# Patient Record
Sex: MALE | Race: WHITE | Employment: OTHER | URBAN - NONMETROPOLITAN AREA
[De-identification: names, ages, dates, MRNs, and addresses within clinical notes are randomized per-mention and may not be internally consistent; named-entity substitution may affect disease eponyms.]

---

## 2017-10-29 ENCOUNTER — HOSPITAL ENCOUNTER (INPATIENT)
Age: 82
LOS: 5 days | Discharge: HOME HEALTH | DRG: 690 | End: 2017-11-03
Attending: EMERGENCY MEDICINE | Admitting: INTERNAL MEDICINE
Payer: MEDICARE

## 2017-10-29 ENCOUNTER — APPOINTMENT (OUTPATIENT)
Dept: GENERAL RADIOLOGY | Age: 82
DRG: 690 | End: 2017-10-29
Payer: MEDICARE

## 2017-10-29 ENCOUNTER — APPOINTMENT (OUTPATIENT)
Dept: CT IMAGING | Age: 82
DRG: 690 | End: 2017-10-29
Payer: MEDICARE

## 2017-10-29 DIAGNOSIS — N18.9 ACUTE RENAL FAILURE SUPERIMPOSED ON CHRONIC KIDNEY DISEASE, UNSPECIFIED CKD STAGE, UNSPECIFIED ACUTE RENAL FAILURE TYPE (HCC): ICD-10-CM

## 2017-10-29 DIAGNOSIS — R31.9 URINARY TRACT INFECTION WITH HEMATURIA, SITE UNSPECIFIED: ICD-10-CM

## 2017-10-29 DIAGNOSIS — N17.9 ACUTE RENAL FAILURE SUPERIMPOSED ON CHRONIC KIDNEY DISEASE, UNSPECIFIED CKD STAGE, UNSPECIFIED ACUTE RENAL FAILURE TYPE (HCC): ICD-10-CM

## 2017-10-29 DIAGNOSIS — R06.02 SHORTNESS OF BREATH: ICD-10-CM

## 2017-10-29 DIAGNOSIS — N39.0 URINARY TRACT INFECTION WITH HEMATURIA, SITE UNSPECIFIED: ICD-10-CM

## 2017-10-29 DIAGNOSIS — A41.9 SIRS DUE TO INFECTIOUS PROCESS WITH ORGAN DYSFUNCTION (HCC): Primary | ICD-10-CM

## 2017-10-29 DIAGNOSIS — R65.20 SIRS DUE TO INFECTIOUS PROCESS WITH ORGAN DYSFUNCTION (HCC): Primary | ICD-10-CM

## 2017-10-29 LAB
ALBUMIN SERPL-MCNC: 3.9 G/DL (ref 3.5–5.1)
ALLEN TEST: POSITIVE
ALP BLD-CCNC: 66 U/L (ref 38–126)
ALT SERPL-CCNC: 15 U/L (ref 11–66)
ANION GAP SERPL CALCULATED.3IONS-SCNC: 13 MEQ/L (ref 8–16)
ANISOCYTOSIS: ABNORMAL
AST SERPL-CCNC: 17 U/L (ref 5–40)
BACTERIA: ABNORMAL /HPF
BASE EXCESS (CALCULATED): -2.7 MMOL/L (ref -2.5–2.5)
BASOPHILS # BLD: 0.5 %
BASOPHILS ABSOLUTE: 0.1 THOU/MM3 (ref 0–0.1)
BILIRUB SERPL-MCNC: 1.1 MG/DL (ref 0.3–1.2)
BILIRUBIN DIRECT: 0.3 MG/DL (ref 0–0.3)
BILIRUBIN URINE: NEGATIVE
BLOOD, URINE: ABNORMAL
BUN BLDV-MCNC: 25 MG/DL (ref 7–22)
CALCIUM SERPL-MCNC: 8.8 MG/DL (ref 8.5–10.5)
CASTS 2: ABNORMAL /LPF
CASTS UA: ABNORMAL /LPF
CHARACTER, URINE: ABNORMAL
CHLORIDE BLD-SCNC: 92 MEQ/L (ref 98–111)
CO2: 23 MEQ/L (ref 23–33)
COLLECTED BY:: ABNORMAL
COLOR: YELLOW
CREAT SERPL-MCNC: 1.6 MG/DL (ref 0.4–1.2)
CRYSTALS, UA: ABNORMAL
DEVICE: ABNORMAL
EOSINOPHIL # BLD: 1.3 %
EOSINOPHILS ABSOLUTE: 0.2 THOU/MM3 (ref 0–0.4)
EPITHELIAL CELLS, UA: ABNORMAL /HPF
GFR SERPL CREATININE-BSD FRML MDRD: 41 ML/MIN/1.73M2
GLUCOSE BLD-MCNC: 142 MG/DL (ref 70–108)
GLUCOSE BLD-MCNC: 146 MG/DL (ref 70–108)
GLUCOSE URINE: NEGATIVE MG/DL
HCO3: 22 MMOL/L (ref 23–28)
HCT VFR BLD CALC: 39 % (ref 42–52)
HEMOGLOBIN: 13 GM/DL (ref 14–18)
IFIO2: 4
KETONES, URINE: NEGATIVE
LACTIC ACID: 1.3 MMOL/L (ref 0.5–2.2)
LEUKOCYTE ESTERASE, URINE: ABNORMAL
LIPASE: 11.1 U/L (ref 5.6–51.3)
LYMPHOCYTES # BLD: 8.1 %
LYMPHOCYTES ABSOLUTE: 1.2 THOU/MM3 (ref 1–4.8)
MCH RBC QN AUTO: 29.9 PG (ref 27–31)
MCHC RBC AUTO-ENTMCNC: 33.2 GM/DL (ref 33–37)
MCV RBC AUTO: 89.9 FL (ref 80–94)
MISCELLANEOUS 2: ABNORMAL
MONOCYTES # BLD: 10.5 %
MONOCYTES ABSOLUTE: 1.5 THOU/MM3 (ref 0.4–1.3)
NITRITE, URINE: NEGATIVE
NUCLEATED RED BLOOD CELLS: 0 /100 WBC
O2 SATURATION: 98 %
OSMOLALITY CALCULATION: 264.1 MOSMOL/KG (ref 275–300)
PCO2: 37 MMHG (ref 35–45)
PDW BLD-RTO: 18.8 % (ref 11.5–14.5)
PH BLOOD GAS: 7.39 (ref 7.35–7.45)
PH UA: 5.5
PLATELET # BLD: 308 THOU/MM3 (ref 130–400)
PMV BLD AUTO: 8.4 MCM (ref 7.4–10.4)
PO2: 100 MMHG (ref 71–104)
POTASSIUM SERPL-SCNC: 4.8 MEQ/L (ref 3.5–5.2)
PROTEIN UA: 30
RBC # BLD: 4.34 MILL/MM3 (ref 4.7–6.1)
RBC URINE: ABNORMAL /HPF
RENAL EPITHELIAL, UA: ABNORMAL
SEG NEUTROPHILS: 79.6 %
SEGMENTED NEUTROPHILS ABSOLUTE COUNT: 11.7 THOU/MM3 (ref 1.8–7.7)
SODIUM BLD-SCNC: 128 MEQ/L (ref 135–145)
SOURCE, BLOOD GAS: ABNORMAL
SPECIFIC GRAVITY, URINE: 1.02 (ref 1–1.03)
TOTAL PROTEIN: 6.9 G/DL (ref 6.1–8)
TROPONIN T: < 0.01 NG/ML
UROBILINOGEN, URINE: 0.2 EU/DL
WBC # BLD: 14.7 THOU/MM3 (ref 4.8–10.8)
WBC UA: > 100 /HPF
YEAST: ABNORMAL

## 2017-10-29 PROCEDURE — 99285 EMERGENCY DEPT VISIT HI MDM: CPT

## 2017-10-29 PROCEDURE — 81001 URINALYSIS AUTO W/SCOPE: CPT

## 2017-10-29 PROCEDURE — 87184 SC STD DISK METHOD PER PLATE: CPT

## 2017-10-29 PROCEDURE — 85025 COMPLETE CBC W/AUTO DIFF WBC: CPT

## 2017-10-29 PROCEDURE — 82948 REAGENT STRIP/BLOOD GLUCOSE: CPT

## 2017-10-29 PROCEDURE — 74176 CT ABD & PELVIS W/O CONTRAST: CPT

## 2017-10-29 PROCEDURE — 96361 HYDRATE IV INFUSION ADD-ON: CPT

## 2017-10-29 PROCEDURE — 84484 ASSAY OF TROPONIN QUANT: CPT

## 2017-10-29 PROCEDURE — 87186 SC STD MICRODIL/AGAR DIL: CPT

## 2017-10-29 PROCEDURE — 96375 TX/PRO/DX INJ NEW DRUG ADDON: CPT

## 2017-10-29 PROCEDURE — 6360000002 HC RX W HCPCS: Performed by: EMERGENCY MEDICINE

## 2017-10-29 PROCEDURE — 36600 WITHDRAWAL OF ARTERIAL BLOOD: CPT

## 2017-10-29 PROCEDURE — 87040 BLOOD CULTURE FOR BACTERIA: CPT

## 2017-10-29 PROCEDURE — 83690 ASSAY OF LIPASE: CPT

## 2017-10-29 PROCEDURE — 93005 ELECTROCARDIOGRAM TRACING: CPT

## 2017-10-29 PROCEDURE — 87077 CULTURE AEROBIC IDENTIFY: CPT

## 2017-10-29 PROCEDURE — 96374 THER/PROPH/DIAG INJ IV PUSH: CPT

## 2017-10-29 PROCEDURE — 80053 COMPREHEN METABOLIC PANEL: CPT

## 2017-10-29 PROCEDURE — 2580000003 HC RX 258: Performed by: EMERGENCY MEDICINE

## 2017-10-29 PROCEDURE — 82248 BILIRUBIN DIRECT: CPT

## 2017-10-29 PROCEDURE — 36415 COLL VENOUS BLD VENIPUNCTURE: CPT

## 2017-10-29 PROCEDURE — 83605 ASSAY OF LACTIC ACID: CPT

## 2017-10-29 PROCEDURE — 71010 XR CHEST PORTABLE: CPT

## 2017-10-29 PROCEDURE — 82803 BLOOD GASES ANY COMBINATION: CPT

## 2017-10-29 PROCEDURE — 87086 URINE CULTURE/COLONY COUNT: CPT

## 2017-10-29 PROCEDURE — C9113 INJ PANTOPRAZOLE SODIUM, VIA: HCPCS | Performed by: EMERGENCY MEDICINE

## 2017-10-29 PROCEDURE — 1200000003 HC TELEMETRY R&B

## 2017-10-29 RX ORDER — ONDANSETRON 2 MG/ML
4 INJECTION INTRAMUSCULAR; INTRAVENOUS EVERY 30 MIN PRN
Status: DISCONTINUED | OUTPATIENT
Start: 2017-10-29 | End: 2017-10-30

## 2017-10-29 RX ORDER — 0.9 % SODIUM CHLORIDE 0.9 %
30 INTRAVENOUS SOLUTION INTRAVENOUS ONCE
Status: COMPLETED | OUTPATIENT
Start: 2017-10-29 | End: 2017-10-29

## 2017-10-29 RX ORDER — PANTOPRAZOLE SODIUM 40 MG/10ML
40 INJECTION, POWDER, LYOPHILIZED, FOR SOLUTION INTRAVENOUS ONCE
Status: COMPLETED | OUTPATIENT
Start: 2017-10-29 | End: 2017-10-29

## 2017-10-29 RX ORDER — SODIUM CHLORIDE 9 MG/ML
INJECTION, SOLUTION INTRAVENOUS CONTINUOUS
Status: DISCONTINUED | OUTPATIENT
Start: 2017-10-29 | End: 2017-10-30

## 2017-10-29 RX ORDER — 0.9 % SODIUM CHLORIDE 0.9 %
1000 INTRAVENOUS SOLUTION INTRAVENOUS ONCE
Status: COMPLETED | OUTPATIENT
Start: 2017-10-29 | End: 2017-10-29

## 2017-10-29 RX ADMIN — ONDANSETRON 4 MG: 2 INJECTION INTRAMUSCULAR; INTRAVENOUS at 19:59

## 2017-10-29 RX ADMIN — SODIUM CHLORIDE 1000 ML: 9 INJECTION, SOLUTION INTRAVENOUS at 21:54

## 2017-10-29 RX ADMIN — WATER 1 G: 1 INJECTION INTRAMUSCULAR; INTRAVENOUS; SUBCUTANEOUS at 21:54

## 2017-10-29 RX ADMIN — PANTOPRAZOLE SODIUM 40 MG: 40 INJECTION, POWDER, FOR SOLUTION INTRAVENOUS at 19:59

## 2017-10-29 RX ADMIN — SODIUM CHLORIDE 1000 ML: 9 INJECTION, SOLUTION INTRAVENOUS at 19:59

## 2017-10-29 ASSESSMENT — ENCOUNTER SYMPTOMS
VOMITING: 0
WHEEZING: 0
COUGH: 0
DIARRHEA: 0
ABDOMINAL PAIN: 1
SHORTNESS OF BREATH: 1
NAUSEA: 1
BLOOD IN STOOL: 0
SORE THROAT: 0
BACK PAIN: 0

## 2017-10-29 NOTE — ED PROVIDER NOTES
[facility] [[email or web address]]     eMERGENCY dEPARTMENT eNCOUnter         Pt Name: Laura Truong  MRN: [id number]  [place] 10/6/1928  Date of evaluation: 10/29/2017  Provider: Ksenia Gonzalez MD    CHIEF COMPLAINT       Chief Complaint   Patient presents with    Abdominal Pain    Dizziness    Nausea    Hypertension    Urinary Retention       Nurses Notes reviewed and I agree except as noted in the HPI. HISTORY OF PRESENT ILLNESS    Laura Truong is a 80 y.o. male who presents with complaints of abdominal pain. Patient describes it more as a pressure than a pain. Patient reports associated dizziness, difficulty urinating, nausea, and shortness of breath. Patient denies any fever, chest pain, constipation, headache, seizure, or syncopal episode. Patient denies taking anticoagulants. Patient has a history of cancer, CAD, diabetes, hypertension, and CABG. No other complaints at this time. This HPI was provided by the patient. REVIEW OF SYSTEMS     Review of Systems   Constitutional: Negative for chills, fever and unexpected weight change. HENT: Negative for congestion, ear pain and sore throat. Eyes: Negative for visual disturbance. Respiratory: Positive for shortness of breath. Negative for cough and wheezing. Cardiovascular: Negative for chest pain, palpitations and leg swelling. Gastrointestinal: Positive for abdominal pain and nausea. Negative for blood in stool, diarrhea and vomiting. Genitourinary: Positive for difficulty urinating. Negative for decreased urine volume, dysuria and hematuria. Musculoskeletal: Negative for back pain, joint swelling and neck pain. Skin: Negative for rash. Allergic/Immunologic: Negative for environmental allergies. Neurological: Positive for dizziness. Negative for syncope, weakness and headaches. Hematological: Does not bruise/bleed easily. Psychiatric/Behavioral: Negative for confusion and dysphoric mood.  The patient is not nervous/anxious. All other systems reviewed and are negative. PAST MEDICAL HISTORY    has a past medical history of Arthritis; CAD (coronary artery disease); Cancer ([facility] [address] Diabetes mellitus ([facility] [address] GERD (gastroesophageal reflux disease); Hyperlipidemia; Hypertension; and Hypothyroid. SURGICAL HISTORY      has a past surgical history that includes Coronary artery bypass graft; knee surgery; colostomy; colectomy; Abdomen surgery; Cardiac surgery; joint replacement; vascular surgery; Tonsillectomy; Colonoscopy; Appendectomy; eye surgery; and Dilatation, esophagus. CURRENT MEDICATIONS       Previous Medications    CAPTOPRIL (CAPOTEN) 12.5 MG TABLET    Take 1 tablet by mouth 3 times daily    CARVEDILOL (COREG) 3.125 MG TABLET    Take 3.125 mg by mouth 2 times daily (with meals). GLIMEPIRIDE (AMARYL) 4 MG TABLET    Take 4 mg by mouth 2 times daily    INSULIN ASPART (NOVOLOG) 100 UNIT/ML INJECTION VIAL    Inject 10 Units into the skin 2 times daily    INSULIN GLARGINE (LANTUS) 100 UNIT/ML INJECTION    Inject 40 Units into the skin every morning. LEVOTHYROXINE (SYNTHROID) 50 MCG TABLET    Take 50 mcg by mouth every morning       ALLERGIES     has No Known Allergies. FAMILY HISTORY     indicated that his mother is . He indicated that his father is . He indicated that his sister is . family history includes Asthma in his father; Cancer in his mother. SOCIAL HISTORY      reports that he has quit smoking. He quit after 25.00 years of use. He does not have any smokeless tobacco history on file. He reports that he does not drink alcohol or use drugs.     PHYSICAL EXAM       ED Triage Vitals   BP Temp Temp Source Pulse Resp SpO2 Height Weight   10/29/17 1941 10/29/17 1939 10/29/17 1939 10/29/17 1939 10/29/17 1939 10/29/17 1939 -- 10/29/17 1939   112/83 98.5 °F (36.9 °C) Oral 68 18 (!) 81 %  250 lb (113.4 kg)      Physical Exam   Constitutional: He is oriented to person, place, and time. Vital signs are normal. He appears well-developed and well-nourished. He is cooperative. Non-toxic appearance. He does not appear ill. Nasal cannula in place. HENT:   Head: Normocephalic. Right Ear: External ear normal. No drainage. Left Ear: External ear normal. No drainage. Nose: Nose normal. No epistaxis. Mouth/Throat: Mucous membranes are not dry and not cyanotic. Patient is hard of hearing. Eyes: Conjunctivae and EOM are normal. Right eye exhibits no discharge. Left eye exhibits no discharge. No scleral icterus. Neck: Trachea normal, normal range of motion and phonation normal. Neck supple. No tracheal deviation present. Cardiovascular: Normal rate, regular rhythm, normal heart sounds and intact distal pulses. Exam reveals no gallop and no friction rub. No murmur heard. Pulses:       Radial pulses are 2+ on the right side. Pulmonary/Chest: Effort normal and breath sounds normal. No stridor. No respiratory distress. He has no wheezes. He has no rales. He exhibits no tenderness. Abdominal: Soft. Bowel sounds are normal. He exhibits distension. He exhibits no pulsatile midline mass. There is no tenderness. There is no rebound and no guarding. Colostomy bag present. Musculoskeletal: Normal range of motion. He exhibits no edema or tenderness (No calf pain or tenderness. No evidence of DVT). Neurological: He is alert and oriented to person, place, and time. He has normal strength. He displays no tremor. He displays no seizure activity. Coordination normal. GCS eye subscore is 4. GCS verbal subscore is 5. GCS motor subscore is 6. Skin: Skin is warm and dry. No rash (on exposed surfaced) noted. He is not diaphoretic. No cyanosis or erythema. No pallor. Psychiatric: He has a normal mood and affect. His speech is normal and behavior is normal.   Nursing note and vitals reviewed.         DIFFERENTIAL DIAGNOSIS:   Including but not limited to: Dehydration with electrolyte

## 2017-10-30 PROBLEM — E87.1 HYPONATREMIA: Status: ACTIVE | Noted: 2017-10-30

## 2017-10-30 PROBLEM — R65.10 SIRS (SYSTEMIC INFLAMMATORY RESPONSE SYNDROME) (HCC): Status: ACTIVE | Noted: 2017-10-30

## 2017-10-30 PROBLEM — N17.9 AKI (ACUTE KIDNEY INJURY) (HCC): Status: ACTIVE | Noted: 2017-10-30

## 2017-10-30 PROBLEM — N12 PYELONEPHRITIS: Status: ACTIVE | Noted: 2017-10-30

## 2017-10-30 LAB
ANION GAP SERPL CALCULATED.3IONS-SCNC: 13 MEQ/L (ref 8–16)
ANISOCYTOSIS: ABNORMAL
BASOPHILS # BLD: 0.3 %
BASOPHILS ABSOLUTE: 0 THOU/MM3 (ref 0–0.1)
BUN BLDV-MCNC: 20 MG/DL (ref 7–22)
CALCIUM SERPL-MCNC: 8 MG/DL (ref 8.5–10.5)
CHLORIDE BLD-SCNC: 100 MEQ/L (ref 98–111)
CO2: 21 MEQ/L (ref 23–33)
CREAT SERPL-MCNC: 1.4 MG/DL (ref 0.4–1.2)
EKG ATRIAL RATE: 73 BPM
EKG P AXIS: 54 DEGREES
EKG P-R INTERVAL: 170 MS
EKG Q-T INTERVAL: 380 MS
EKG QRS DURATION: 94 MS
EKG QTC CALCULATION (BAZETT): 418 MS
EKG R AXIS: -17 DEGREES
EKG T AXIS: 83 DEGREES
EKG VENTRICULAR RATE: 73 BPM
EOSINOPHIL # BLD: 0.2 %
EOSINOPHILS ABSOLUTE: 0 THOU/MM3 (ref 0–0.4)
GFR SERPL CREATININE-BSD FRML MDRD: 48 ML/MIN/1.73M2
GLUCOSE BLD-MCNC: 133 MG/DL (ref 70–108)
GLUCOSE BLD-MCNC: 152 MG/DL (ref 70–108)
GLUCOSE BLD-MCNC: 162 MG/DL (ref 70–108)
GLUCOSE BLD-MCNC: 174 MG/DL (ref 70–108)
GLUCOSE BLD-MCNC: 193 MG/DL (ref 70–108)
GLUCOSE BLD-MCNC: 196 MG/DL (ref 70–108)
GLUCOSE BLD-MCNC: 63 MG/DL (ref 70–108)
HCT VFR BLD CALC: 33.7 % (ref 42–52)
HEMOGLOBIN: 11.1 GM/DL (ref 14–18)
LYMPHOCYTES # BLD: 5.9 %
LYMPHOCYTES ABSOLUTE: 1 THOU/MM3 (ref 1–4.8)
MCH RBC QN AUTO: 29.4 PG (ref 27–31)
MCHC RBC AUTO-ENTMCNC: 32.9 GM/DL (ref 33–37)
MCV RBC AUTO: 89.2 FL (ref 80–94)
MONOCYTES # BLD: 10.3 %
MONOCYTES ABSOLUTE: 1.7 THOU/MM3 (ref 0.4–1.3)
NUCLEATED RED BLOOD CELLS: 0 /100 WBC
OSMOLALITY URINE: 351 MOSMOL/KG (ref 250–750)
OSMOLALITY: 282 MOSMOL/KG (ref 275–295)
PDW BLD-RTO: 19 % (ref 11.5–14.5)
PLATELET # BLD: 253 THOU/MM3 (ref 130–400)
PMV BLD AUTO: 8.1 MCM (ref 7.4–10.4)
POTASSIUM SERPL-SCNC: 5 MEQ/L (ref 3.5–5.2)
RBC # BLD: 3.77 MILL/MM3 (ref 4.7–6.1)
SEG NEUTROPHILS: 83.3 %
SEGMENTED NEUTROPHILS ABSOLUTE COUNT: 13.8 THOU/MM3 (ref 1.8–7.7)
SODIUM BLD-SCNC: 134 MEQ/L (ref 135–145)
SODIUM URINE: 33 MEQ/L
WBC # BLD: 16.6 THOU/MM3 (ref 4.8–10.8)

## 2017-10-30 PROCEDURE — 82948 REAGENT STRIP/BLOOD GLUCOSE: CPT

## 2017-10-30 PROCEDURE — 6370000000 HC RX 637 (ALT 250 FOR IP): Performed by: INTERNAL MEDICINE

## 2017-10-30 PROCEDURE — 83930 ASSAY OF BLOOD OSMOLALITY: CPT

## 2017-10-30 PROCEDURE — 1200000003 HC TELEMETRY R&B

## 2017-10-30 PROCEDURE — 87086 URINE CULTURE/COLONY COUNT: CPT

## 2017-10-30 PROCEDURE — 80048 BASIC METABOLIC PNL TOTAL CA: CPT

## 2017-10-30 PROCEDURE — 85025 COMPLETE CBC W/AUTO DIFF WBC: CPT

## 2017-10-30 PROCEDURE — 6360000002 HC RX W HCPCS: Performed by: INTERNAL MEDICINE

## 2017-10-30 PROCEDURE — G8979 MOBILITY GOAL STATUS: HCPCS

## 2017-10-30 PROCEDURE — 84300 ASSAY OF URINE SODIUM: CPT

## 2017-10-30 PROCEDURE — 87077 CULTURE AEROBIC IDENTIFY: CPT

## 2017-10-30 PROCEDURE — 2580000003 HC RX 258: Performed by: INTERNAL MEDICINE

## 2017-10-30 PROCEDURE — 97162 PT EVAL MOD COMPLEX 30 MIN: CPT

## 2017-10-30 PROCEDURE — 99221 1ST HOSP IP/OBS SF/LOW 40: CPT | Performed by: NURSE PRACTITIONER

## 2017-10-30 PROCEDURE — 83935 ASSAY OF URINE OSMOLALITY: CPT

## 2017-10-30 PROCEDURE — 97110 THERAPEUTIC EXERCISES: CPT

## 2017-10-30 PROCEDURE — G8978 MOBILITY CURRENT STATUS: HCPCS

## 2017-10-30 PROCEDURE — 2580000003 HC RX 258: Performed by: EMERGENCY MEDICINE

## 2017-10-30 PROCEDURE — 2700000000 HC OXYGEN THERAPY PER DAY

## 2017-10-30 PROCEDURE — 36415 COLL VENOUS BLD VENIPUNCTURE: CPT

## 2017-10-30 RX ORDER — NICOTINE POLACRILEX 4 MG
15 LOZENGE BUCCAL PRN
Status: DISCONTINUED | OUTPATIENT
Start: 2017-10-30 | End: 2017-11-03 | Stop reason: HOSPADM

## 2017-10-30 RX ORDER — CIPROFLOXACIN 2 MG/ML
200 INJECTION, SOLUTION INTRAVENOUS EVERY 12 HOURS
Status: DISCONTINUED | OUTPATIENT
Start: 2017-10-30 | End: 2017-11-02

## 2017-10-30 RX ORDER — HYDROCODONE BITARTRATE AND ACETAMINOPHEN 5; 325 MG/1; MG/1
2 TABLET ORAL EVERY 4 HOURS PRN
Status: DISCONTINUED | OUTPATIENT
Start: 2017-10-30 | End: 2017-11-03 | Stop reason: HOSPADM

## 2017-10-30 RX ORDER — HEPARIN SODIUM 5000 [USP'U]/ML
5000 INJECTION, SOLUTION INTRAVENOUS; SUBCUTANEOUS EVERY 8 HOURS SCHEDULED
Status: DISCONTINUED | OUTPATIENT
Start: 2017-10-30 | End: 2017-11-03 | Stop reason: HOSPADM

## 2017-10-30 RX ORDER — HYDROCODONE BITARTRATE AND ACETAMINOPHEN 5; 325 MG/1; MG/1
1 TABLET ORAL EVERY 4 HOURS PRN
Status: DISCONTINUED | OUTPATIENT
Start: 2017-10-30 | End: 2017-11-03 | Stop reason: HOSPADM

## 2017-10-30 RX ORDER — LEVOTHYROXINE SODIUM 0.05 MG/1
50 TABLET ORAL
Status: DISCONTINUED | OUTPATIENT
Start: 2017-10-30 | End: 2017-11-03 | Stop reason: HOSPADM

## 2017-10-30 RX ORDER — SODIUM CHLORIDE 0.9 % (FLUSH) 0.9 %
10 SYRINGE (ML) INJECTION EVERY 12 HOURS SCHEDULED
Status: DISCONTINUED | OUTPATIENT
Start: 2017-10-30 | End: 2017-11-03 | Stop reason: HOSPADM

## 2017-10-30 RX ORDER — CARVEDILOL 3.12 MG/1
3.12 TABLET ORAL 2 TIMES DAILY WITH MEALS
Status: DISCONTINUED | OUTPATIENT
Start: 2017-10-30 | End: 2017-11-02

## 2017-10-30 RX ORDER — SODIUM CHLORIDE 0.9 % (FLUSH) 0.9 %
10 SYRINGE (ML) INJECTION PRN
Status: DISCONTINUED | OUTPATIENT
Start: 2017-10-30 | End: 2017-10-30

## 2017-10-30 RX ORDER — ACETAMINOPHEN 325 MG/1
650 TABLET ORAL EVERY 4 HOURS PRN
Status: DISCONTINUED | OUTPATIENT
Start: 2017-10-30 | End: 2017-10-30 | Stop reason: SDUPTHER

## 2017-10-30 RX ORDER — ACETAMINOPHEN 325 MG/1
650 TABLET ORAL EVERY 4 HOURS PRN
Status: DISCONTINUED | OUTPATIENT
Start: 2017-10-30 | End: 2017-11-03 | Stop reason: HOSPADM

## 2017-10-30 RX ORDER — DEXTROSE MONOHYDRATE 50 MG/ML
100 INJECTION, SOLUTION INTRAVENOUS PRN
Status: DISCONTINUED | OUTPATIENT
Start: 2017-10-30 | End: 2017-11-03 | Stop reason: HOSPADM

## 2017-10-30 RX ORDER — SODIUM CHLORIDE 0.9 % (FLUSH) 0.9 %
10 SYRINGE (ML) INJECTION PRN
Status: DISCONTINUED | OUTPATIENT
Start: 2017-10-30 | End: 2017-11-03 | Stop reason: HOSPADM

## 2017-10-30 RX ORDER — POLYVINYL ALCOHOL 14 MG/ML
1 SOLUTION/ DROPS OPHTHALMIC PRN
Status: DISCONTINUED | OUTPATIENT
Start: 2017-10-30 | End: 2017-11-03 | Stop reason: HOSPADM

## 2017-10-30 RX ORDER — INSULIN GLARGINE 100 [IU]/ML
40 INJECTION, SOLUTION SUBCUTANEOUS EVERY MORNING
Status: DISCONTINUED | OUTPATIENT
Start: 2017-10-30 | End: 2017-11-03 | Stop reason: HOSPADM

## 2017-10-30 RX ORDER — DEXTROSE MONOHYDRATE 25 G/50ML
12.5 INJECTION, SOLUTION INTRAVENOUS PRN
Status: DISCONTINUED | OUTPATIENT
Start: 2017-10-30 | End: 2017-11-03 | Stop reason: HOSPADM

## 2017-10-30 RX ORDER — GLIMEPIRIDE 4 MG/1
4 TABLET ORAL 2 TIMES DAILY
Status: DISCONTINUED | OUTPATIENT
Start: 2017-10-30 | End: 2017-11-01

## 2017-10-30 RX ORDER — SODIUM CHLORIDE 0.9 % (FLUSH) 0.9 %
10 SYRINGE (ML) INJECTION EVERY 12 HOURS SCHEDULED
Status: DISCONTINUED | OUTPATIENT
Start: 2017-10-30 | End: 2017-10-30

## 2017-10-30 RX ORDER — SODIUM CHLORIDE 9 MG/ML
INJECTION, SOLUTION INTRAVENOUS CONTINUOUS
Status: DISCONTINUED | OUTPATIENT
Start: 2017-10-30 | End: 2017-11-01

## 2017-10-30 RX ORDER — ONDANSETRON 2 MG/ML
4 INJECTION INTRAMUSCULAR; INTRAVENOUS EVERY 6 HOURS PRN
Status: DISCONTINUED | OUTPATIENT
Start: 2017-10-30 | End: 2017-11-03 | Stop reason: HOSPADM

## 2017-10-30 RX ADMIN — Medication 1 UNITS: at 22:12

## 2017-10-30 RX ADMIN — INSULIN GLARGINE 40 UNITS: 100 INJECTION, SOLUTION SUBCUTANEOUS at 07:41

## 2017-10-30 RX ADMIN — CARVEDILOL 3.12 MG: 3.12 TABLET, FILM COATED ORAL at 07:46

## 2017-10-30 RX ADMIN — HEPARIN SODIUM 5000 UNITS: 5000 INJECTION, SOLUTION INTRAVENOUS; SUBCUTANEOUS at 06:19

## 2017-10-30 RX ADMIN — CIPROFLOXACIN 200 MG: 2 INJECTION, SOLUTION INTRAVENOUS at 21:57

## 2017-10-30 RX ADMIN — GLIMEPIRIDE 4 MG: 4 TABLET ORAL at 07:46

## 2017-10-30 RX ADMIN — HEPARIN SODIUM 5000 UNITS: 5000 INJECTION, SOLUTION INTRAVENOUS; SUBCUTANEOUS at 22:12

## 2017-10-30 RX ADMIN — LEVOTHYROXINE SODIUM 50 MCG: 50 TABLET ORAL at 07:46

## 2017-10-30 RX ADMIN — HEPARIN SODIUM 5000 UNITS: 5000 INJECTION, SOLUTION INTRAVENOUS; SUBCUTANEOUS at 15:24

## 2017-10-30 RX ADMIN — SODIUM CHLORIDE: 9 INJECTION, SOLUTION INTRAVENOUS at 00:45

## 2017-10-30 RX ADMIN — Medication 2 UNITS: at 16:42

## 2017-10-30 RX ADMIN — GLIMEPIRIDE 4 MG: 4 TABLET ORAL at 22:10

## 2017-10-30 RX ADMIN — SODIUM CHLORIDE: 9 INJECTION, SOLUTION INTRAVENOUS at 15:29

## 2017-10-30 RX ADMIN — LISINOPRIL 25 MG: 20 TABLET ORAL at 23:47

## 2017-10-30 RX ADMIN — Medication 2 UNITS: at 07:42

## 2017-10-30 RX ADMIN — CARVEDILOL 3.12 MG: 3.12 TABLET, FILM COATED ORAL at 16:40

## 2017-10-30 RX ADMIN — WATER 1 G: 1 INJECTION INTRAMUSCULAR; INTRAVENOUS; SUBCUTANEOUS at 23:48

## 2017-10-30 RX ADMIN — WATER 1 G: 1 INJECTION INTRAMUSCULAR; INTRAVENOUS; SUBCUTANEOUS at 10:17

## 2017-10-30 ASSESSMENT — PAIN SCALES - GENERAL
PAINLEVEL_OUTOF10: 0

## 2017-10-30 NOTE — H&P
[address] [place] [address]                             HISTORY AND PHYSICAL    PATIENT NAME: Ghulam Mahajan                   :             10/06/1928  MED REC NO:   [id number]                            ROOM:            0010  ACCOUNT NO:   [de-identified]                            ADMISSION DATE:  10/29/2017  PROVIDER:     MARIMAR Bah Hasten:  10/30/2017    HOSPITAL COURSE:  This is an 79-year-old male known to our practice,  apparently has not been able to urinate, and hence admitted for  further evaluation. He did have a Matos catheter placed, was noted to  have urinary tract infection. He does have chronic complaints of  shortness of breath and dizziness. He has been offered certain  testing, but the patient had declined. He did agree to having an  echocardiogram, but then later, he said that he was not going to have  further testing done. The patient was seen in the ER, did have Matos  catheter placed, and antibiotics were started. He did not notice  blood in his urine, but UA did show blood. PAST MEDICAL HISTORY:  Significant for history of prostate cancer,  status post radiation treatment, history of benign prostate  hypertrophy, ulcerative colitis status post colectomy, history of  diabetes, coronary artery disease status post coronary artery bypass  graft, hypertension, hypothyroidism, and emphysema. PAST SURGICAL HISTORY:  He had bilateral knee replacement, coronary  artery bypass graft x3, colectomy, and left rotator cuff repair. ALLERGIES:  No known drug allergies. HOME MEDICATION LIST:  Capoten, Amaryl, NovoLog, Synthroid, Coreg, and  Lantus. SOCIAL HISTORY:  He lives with his wife who has dementia and another  son also lives with him. He has 3 children. He quit smoking more  than 35 years back. No recent alcohol or illicit drug use.     REVIEW OF SYSTEMS:  Positive for inability to We  will consult Palliative Care. 5.  Careful IV hydration for his acute kidney injury with chronic  kidney disease stage III.         Verito Helms M.D.    D: 10/30/2017 12:02:17       T: 10/30/2017 13:38:28     MOIRA/ROBERT_APARNA_REMBERTO  Job#: [id number]     Doc#: [id number]

## 2017-10-30 NOTE — PROGRESS NOTES
[address] [facility]  INPATIENT PHYSICAL THERAPY  EVALUATION  [facility] ONC MED 5K    Time In: 9729  Time Out: 1054  Timed Code Treatment Minutes: 10 Minutes  Minutes: 24    Date: 10/30/2017  Patient Name: Luis Ayala,  Gender:  male        MRN: [id number]  : 10/6/1928  (80 y.o.)  Referral Date : 10/30/17   Referring Practitioner: Earline Miguel MD  Diagnosis: SIRS due to infectious process with organ dysfunction  Additional Pertinent Hx: 81 y/o male admitted 10/29 with complaints of abdominal pain associated with dizziness, difficulty urinating, nausea and SOB. Admitted for SIRS and UTI. Past Medical History:   Diagnosis Date    Arthritis     CAD (coronary artery disease)     s/p CABG    Cancer ([facility] [address]     prostate    Diabetes mellitus ([facility] [address]     GERD (gastroesophageal reflux disease)     Hyperlipidemia     Hypertension     Hypothyroid      Past Surgical History:   Procedure Laterality Date    ABDOMEN SURGERY      APPENDECTOMY      CARDIAC SURGERY      COLECTOMY      COLONOSCOPY      COLOSTOMY      CORONARY ARTERY BYPASS GRAFT      triple to Distal RCA, first OM, and LIMA to diagonal by Dr Sabrina Palacio, ESOPHAGUS     [facility] 12      bilateral cataracts    JOINT REPLACEMENT      KNEE SURGERY      left total knee and right total knee    TONSILLECTOMY      VASCULAR SURGERY      cabg harvests from legs       Restrictions/Precautions:  Restrictions/Precautions: Fall Risk    Subjective:  Chart Reviewed: Yes  Patient assessed for rehabilitation services?: Yes  Family / Caregiver Present: No  Subjective: RN approved PT session. Patient lying in bed upon therapist arrival, agreeable to participating with encouragement.     General:  Overall Orientation Status: Within Normal Limits  Follows Commands: Within Functional Limits    Vision: Within Functional Limits    Hearing: Exceptions to [facility]  Hearing Exceptions: Bilateral hearing aid    Pain:  No.          Social/Functional details. Assessment: Body structures, Functions, Activity limitations: Decreased functional mobility , Decreased strength, Decreased safe awareness, Decreased balance  Assessment: Patient demonstrates fair tolerance to PT evaluation. He will benefit from continued PT to address the above stated impairments in order to maximize his safety and independence with mobility. Will continue to assess his need for ECF vs returning home with home PT. Prognosis: Good    Clinical Presentation: Moderate - Evolving with Changing Characteristics: . Due to an analysis of data from a detailed assessment, a consideration of several treatment options, the presence of co morbidities that affect the POC, and the need for minimal to moderate modifications or assistance needed to complete the evaluation. Decision Making: Moderate Complexitybased on patient assessment and decision making process of determining plan of care and establishing reasonable expectations for measurable functional outcomes    REQUIRES PT FOLLOW UP: Yes  Discharge Recommendations: Continue to assess pending progress, Patient would benefit from continued therapy after discharge    Patient Education:  Patient Education: Role of PT, POC, LE therex    Equipment Recommendations:  Equipment Needed: Yes  Other: will continue to assess    Safety:  Type of devices:  All fall risk precautions in place, Call light within reach, Chair alarm in place, Patient at risk for falls, Gait belt, Left in chair    Plan:  Times per week: 5x GM  Current Treatment Recommendations: Strengthening, Balance Training, Functional Mobility Training, Equipment Evaluation, Education, & procurement, Transfer Training, Gait Training, Safety Education & Training, Endurance Training, Patient/Caregiver Education & Training, Stair training    Goals:  Patient goals : Return home  Short term goals  Time Frame for Short term goals: 2 weeks  Short term goal 1: Patient will complete bed mobility with mod I.  Short term goal 2: Patient will complete sit <--> stand transfers with mod I. Short term goal 3: Patient will ambulate 150 ft with SBA using least restrictive AD in order to safely ambulate in his home. Short term goal 4: Patient will ascend/descend 2 steps with B hand rail and SBA in order to get into/out of his home. Long term goals  Time Frame for Long term goals : No LTGs due to estimated length of stay    Evaluation Complexity: Based on the findings of patient history, examination, clinical presentation, and decision making during this evaluation, the evaluation of Geneva Mcmillan  is of medium complexity. PT G-Codes  Functional Assessment Tool Used: Professional judgement  Functional Limitation: Mobility: Walking and moving around  Mobility: Walking and Moving Around Current Status (): At least 40 percent but less than 60 percent impaired, limited or restricted  Mobility: Walking and Moving Around Goal Status ():  At least 20 percent but less than 40 percent impaired, limited or restricted    AM-PAC Inpatient Mobility without Stair Climbing Raw Score : 15  AM-PAC Inpatient without Stair Climbing T-Scale Score : 43.03  Mobility Inpatient CMS 0-100% Score: 47.43  Mobility Inpatient without Stair CMS G-Code Modifier : HERNAN Singleton, PT, DPT

## 2017-10-30 NOTE — CONSULTS
stated in HPI. PHYSICAL EXAM:  VITALS:  BP (!) 153/66   Pulse 86   Temp 98.3 °F (36.8 °C)   Resp 24   Ht 5' 10\" (1.778 m)   Wt 247 lb (112 kg)   SpO2 99%   BMI 35.44 kg/m² . Nursing note and vitals reviewed. Constitutional:    Alert and oriented times 3, no acute distress and cooperative to examination with appropriate mood and affect. HEENT:   Head:         Normocephalic and atraumatic. Mouth/Throat:          Mucous membranes are normal.   Eyes:         EOM are normal. No scleral icterus. PERRLA. Cardiovascular:        Normal rate, regular rhythm, S1 S2 heart sounds. No murmurs, rub, or gallops. Pulmonary/Chest:       Chest symmetric with normal A/P diameter,  CTA with no wheezes, rales, or rhonchi noted. Normal respiratory rate and rhthym. No use of accessory muscles. Abdominal:          Soft. No abdominal or suprapubic tenderness with palpation. No rebound, no guarding and no CVA tenderness. Bowel sounds active. :             Matos catheter in place draining darkyellow urine with small clots and sediment to gravity   Psychiatric:    Normal mood and affect.     DATA:  CBC:   Lab Results   Component Value Date    WBC 16.6 10/30/2017    RBC 3.77 10/30/2017    RBC 4.16 10/22/2011    HGB 11.1 10/30/2017    HCT 33.7 10/30/2017    MCV 89.2 10/30/2017    MCH 29.4 10/30/2017    MCHC 32.9 10/30/2017    RDW 19.0 10/30/2017     10/30/2017    MPV 8.1 10/30/2017     BMP:    Lab Results   Component Value Date     10/30/2017    K 5.0 10/30/2017     10/30/2017    CO2 21 10/30/2017    BUN 20 10/30/2017    CREATININE 1.4 10/30/2017    CALCIUM 8.0 10/30/2017    LABGLOM 48 10/30/2017    GLUCOSE 152 10/30/2017    GLUCOSE 170 10/23/2011     BUN/Creatinine:    Lab Results   Component Value Date    BUN 20 10/30/2017    CREATININE 1.4 10/30/2017     Magnesium:    Lab Results   Component Value Date    MG 2.0 10/29/2016     Phosphorus:    Lab Results   Component Value Date    PHOS 3.6

## 2017-10-30 NOTE — ED NOTES
Pt resting in bed with family at the bedside. Dr. Bobby Crook into update pt and family on the POC  And inform them of admission. Pt medicated per order and fluids increased per order. Pt assisted to the side of the bed to use the urinal and returned to bed without incident however pt was sob/labored and complained of not feeling well. Radiology into transport pt for testing. Family at the bedside are questioning if pt can have a catheter due to his sob with movement and admission status.       Hafsa Bryson, RN  10/29/17 Jai [id number], HORACE  10/29/17 5664

## 2017-10-31 LAB
ANION GAP SERPL CALCULATED.3IONS-SCNC: 13 MEQ/L (ref 8–16)
ANISOCYTOSIS: ABNORMAL
BASOPHILS # BLD: 0.5 %
BASOPHILS ABSOLUTE: 0.1 THOU/MM3 (ref 0–0.1)
BLOOD CULTURE, ROUTINE: ABNORMAL
BLOOD CULTURE, ROUTINE: ABNORMAL
BUN BLDV-MCNC: 20 MG/DL (ref 7–22)
CALCIUM SERPL-MCNC: 7.9 MG/DL (ref 8.5–10.5)
CHLORIDE BLD-SCNC: 98 MEQ/L (ref 98–111)
CO2: 22 MEQ/L (ref 23–33)
CREAT SERPL-MCNC: 1.4 MG/DL (ref 0.4–1.2)
EOSINOPHIL # BLD: 2.1 %
EOSINOPHILS ABSOLUTE: 0.2 THOU/MM3 (ref 0–0.4)
GFR SERPL CREATININE-BSD FRML MDRD: 48 ML/MIN/1.73M2
GLUCOSE BLD-MCNC: 131 MG/DL (ref 70–108)
GLUCOSE BLD-MCNC: 142 MG/DL (ref 70–108)
GLUCOSE BLD-MCNC: 206 MG/DL (ref 70–108)
GLUCOSE BLD-MCNC: 49 MG/DL (ref 70–108)
GLUCOSE BLD-MCNC: 65 MG/DL (ref 70–108)
GLUCOSE BLD-MCNC: 84 MG/DL (ref 70–108)
GLUCOSE BLD-MCNC: 91 MG/DL (ref 70–108)
HCT VFR BLD CALC: 31.7 % (ref 42–52)
HEMOGLOBIN: 10.5 GM/DL (ref 14–18)
LYMPHOCYTES # BLD: 9.7 %
LYMPHOCYTES ABSOLUTE: 1.1 THOU/MM3 (ref 1–4.8)
MCH RBC QN AUTO: 29.8 PG (ref 27–31)
MCHC RBC AUTO-ENTMCNC: 33.1 GM/DL (ref 33–37)
MCV RBC AUTO: 90 FL (ref 80–94)
MONOCYTES # BLD: 15.9 %
MONOCYTES ABSOLUTE: 1.8 THOU/MM3 (ref 0.4–1.3)
NUCLEATED RED BLOOD CELLS: 0 /100 WBC
ORGANISM: ABNORMAL
ORGANISM: ABNORMAL
PDW BLD-RTO: 19 % (ref 11.5–14.5)
PLATELET # BLD: 229 THOU/MM3 (ref 130–400)
PMV BLD AUTO: 8.7 MCM (ref 7.4–10.4)
POTASSIUM SERPL-SCNC: 4.5 MEQ/L (ref 3.5–5.2)
RBC # BLD: 3.52 MILL/MM3 (ref 4.7–6.1)
SEG NEUTROPHILS: 71.8 %
SEGMENTED NEUTROPHILS ABSOLUTE COUNT: 8.3 THOU/MM3 (ref 1.8–7.7)
SODIUM BLD-SCNC: 133 MEQ/L (ref 135–145)
URINE CULTURE REFLEX: ABNORMAL
WBC # BLD: 11.5 THOU/MM3 (ref 4.8–10.8)

## 2017-10-31 PROCEDURE — 6360000002 HC RX W HCPCS: Performed by: INTERNAL MEDICINE

## 2017-10-31 PROCEDURE — 6370000000 HC RX 637 (ALT 250 FOR IP): Performed by: INTERNAL MEDICINE

## 2017-10-31 PROCEDURE — 82948 REAGENT STRIP/BLOOD GLUCOSE: CPT

## 2017-10-31 PROCEDURE — 2580000003 HC RX 258: Performed by: INTERNAL MEDICINE

## 2017-10-31 PROCEDURE — 2700000000 HC OXYGEN THERAPY PER DAY

## 2017-10-31 PROCEDURE — 1200000003 HC TELEMETRY R&B

## 2017-10-31 PROCEDURE — 80048 BASIC METABOLIC PNL TOTAL CA: CPT

## 2017-10-31 PROCEDURE — 85025 COMPLETE CBC W/AUTO DIFF WBC: CPT

## 2017-10-31 PROCEDURE — 36415 COLL VENOUS BLD VENIPUNCTURE: CPT

## 2017-10-31 RX ORDER — HYDRALAZINE HYDROCHLORIDE 20 MG/ML
10 INJECTION INTRAMUSCULAR; INTRAVENOUS EVERY 6 HOURS PRN
Status: DISCONTINUED | OUTPATIENT
Start: 2017-10-31 | End: 2017-11-01

## 2017-10-31 RX ADMIN — INSULIN GLARGINE 40 UNITS: 100 INJECTION, SOLUTION SUBCUTANEOUS at 09:20

## 2017-10-31 RX ADMIN — GLIMEPIRIDE 4 MG: 4 TABLET ORAL at 09:17

## 2017-10-31 RX ADMIN — WATER 1 G: 1 INJECTION INTRAMUSCULAR; INTRAVENOUS; SUBCUTANEOUS at 22:42

## 2017-10-31 RX ADMIN — HYDRALAZINE HYDROCHLORIDE 10 MG: 20 INJECTION INTRAMUSCULAR; INTRAVENOUS at 21:00

## 2017-10-31 RX ADMIN — LEVOTHYROXINE SODIUM 50 MCG: 50 TABLET ORAL at 09:17

## 2017-10-31 RX ADMIN — Medication 4 UNITS: at 12:01

## 2017-10-31 RX ADMIN — HEPARIN SODIUM 5000 UNITS: 5000 INJECTION, SOLUTION INTRAVENOUS; SUBCUTANEOUS at 21:42

## 2017-10-31 RX ADMIN — WATER 1 G: 1 INJECTION INTRAMUSCULAR; INTRAVENOUS; SUBCUTANEOUS at 09:18

## 2017-10-31 RX ADMIN — CIPROFLOXACIN 200 MG: 2 INJECTION, SOLUTION INTRAVENOUS at 21:38

## 2017-10-31 RX ADMIN — CIPROFLOXACIN 200 MG: 2 INJECTION, SOLUTION INTRAVENOUS at 09:18

## 2017-10-31 RX ADMIN — LISINOPRIL 25 MG: 20 TABLET ORAL at 09:17

## 2017-10-31 RX ADMIN — CARVEDILOL 3.12 MG: 3.12 TABLET, FILM COATED ORAL at 11:33

## 2017-10-31 RX ADMIN — HEPARIN SODIUM 5000 UNITS: 5000 INJECTION, SOLUTION INTRAVENOUS; SUBCUTANEOUS at 05:52

## 2017-10-31 RX ADMIN — GLIMEPIRIDE 4 MG: 4 TABLET ORAL at 21:42

## 2017-10-31 RX ADMIN — HEPARIN SODIUM 5000 UNITS: 5000 INJECTION, SOLUTION INTRAVENOUS; SUBCUTANEOUS at 13:37

## 2017-10-31 RX ADMIN — CARVEDILOL 3.12 MG: 3.12 TABLET, FILM COATED ORAL at 17:03

## 2017-10-31 ASSESSMENT — PAIN SCALES - GENERAL
PAINLEVEL_OUTOF10: 0

## 2017-10-31 NOTE — PROGRESS NOTES
No changes to morning assessment. Patient continues to sit up in chair. Denies needs at present time. Call light and bedside table in place.

## 2017-10-31 NOTE — PROGRESS NOTES
IM Progress Note  Dr. Smith Fall  10/31/2017 11:53 AM      Patient name Addy Mancini  [id number]  PCP: Rashmi Buenrostro MD  Admit Date: 10/29/2017  Acct No. [de-identified]    Subjective: Interval History:   Sitting in chair  Wants to go home  Blood cult + for gram neg   Urine + kleb      Diet: DIET CARDIAC;  DIET CARB CONTROL;    I/O last 3 completed shifts: In: 2464 [P.O.:1000; I.V.:1464]  Out: 1475 [Urine:1050; Stool:425]  No intake/output data recorded. Admission weight: 250 lb (113.4 kg) as of 10/29/2017  7:34 PM  Wt Readings from Last 3 Encounters:   10/30/17 247 lb (112 kg)   03/31/17 250 lb (113.4 kg)   10/29/16 240 lb (108.9 kg)     Body mass index is 35.44 kg/m².     ROS   CVS;  no cp or palpitation  Resp: no SOB or cough  Neuro:  No numbness or weakness or dizziness  Abd: no nausea or vomiting or abd pain      Medications:   Scheduled Meds:   sodium chloride flush  10 mL Intravenous 2 times per day    carvedilol  3.125 mg Oral BID WC    glimepiride  4 mg Oral BID    insulin glargine  40 Units Subcutaneous QAM    levothyroxine  50 mcg Oral QAM AC    cefTRIAXone (ROCEPHIN) IV  1 g Intravenous Q12H    insulin lispro  0-12 Units Subcutaneous TID WC    insulin lispro  0-6 Units Subcutaneous QPM    heparin (porcine)  5,000 Units Subcutaneous 3 times per day    ciprofloxacin  200 mg Intravenous Q12H    lisinopril  25 mg Oral Daily     Continuous Infusions:   sodium chloride 60 mL/hr at 10/30/17 1529    dextrose         Labs :     CBC:   Recent Labs      10/29/17   1953  10/30/17   1115  10/31/17   0520   WBC  14.7*  16.6*  11.5*   HGB  13.0*  11.1*  10.5*   PLT  308  253  229     BMP:    Recent Labs      10/29/17   1953  10/30/17   1115  10/31/17   0520   NA  128*  134*  133*   K  4.8  5.0  4.5   CL  92*  100  98   CO2  23  21*  22*   BUN  25*  20  20   CREATININE  1.6*  1.4*  1.4*   GLUCOSE  146*  152*  142*     Hepatic:   Recent Labs      10/29/17   1953   AST  17   ALT  15 BILITOT  1.1   ALKPHOS  66     Troponin: No results for input(s): TROPONINI in the last 72 hours. BNP: No results for input(s): BNP in the last 72 hours. Lipids: No results for input(s): CHOL, HDL in the last 72 hours. Invalid input(s): LDLCALCU  INR: No results for input(s): INR in the last 72 hours. Radiology    Objective:   Vitals: BP (!) 160/72   Pulse 63   Temp 98.5 °F (36.9 °C) (Oral)   Resp 18   Ht 5' 10\" (1.778 m)   Wt 247 lb (112 kg)   SpO2 100%   BMI 35.44 kg/m²   HEENT: Head:pupils react  Neck: supple  Lungs: decreased both base bilat  Heart: regular rate and rhythm   Abdomen: soft BS heard   Extremities: warm no edema  Neurologic:  Alert, oriented X3    Impression:   :   1.  Urinary tract infection with CT suggestive of  Non obstructing calculi in the midpole of the right kidney and       possible pyelonephritis . 2. History of prostate cancer, status post radiation. 3.  History of benign prostate hypertrophy with urinary symptoms. 4.  Coronary artery disease, status post coronary artery bypass graft. 5.  Insulin-requiring diabetes mellitus. 6.  Hypertension. 7.  Hypothyroidism. 8.  Possible COPD. 9.  Hyponatremia. 10.  Leukocytosis. 11.  Acute kidney injury with chronic kidney disease.   12 Acute hypoxic resp failure    Plan:    Cont antibiotics  Await final blood cult results to narrow down antibiotics  Check psa      Ronny Jimenez MD

## 2017-10-31 NOTE — PROGRESS NOTES
Physical Therapy  [facility]  PHYSICAL THERAPY MISSED TREATMENT NOTE  ACUTE CARE    Date: 10/31/2017  Patient Name: Kassie Cortez        MRN: [id number]   : 10/6/1928  (80 y.o.)  Gender: male   Referring Practitioner: Dr. General Joe MD  Referring Practitioner: Kamryn Nicole MD  Diagnosis: SIRS Due to Infectious Process with Organ Dysfunction  Diagnosis: SIRS due to infectious process with organ dysfunction         REASON FOR MISSED TREATMENT:  RN approved session and present. Pt at first ok with session. Upon mentioning getting in bedside chair pt became very agitated and swinging UE at therapist stating it was to early and he was not doing anything. Offered supine therex and pt still declined. Pt very angry at therapist stating \"I'm not doing this today. \" Will attempt back if time allows.          Rahel Govea PTA [id number]

## 2017-10-31 NOTE — PLAN OF CARE
Problem: Discharge Planning:  Goal: Patients continuum of care needs are met  Patients continuum of care needs are met  Outcome: Ongoing  Home as PTA-denies need for services. See SW notes.

## 2017-11-01 ENCOUNTER — APPOINTMENT (OUTPATIENT)
Dept: GENERAL RADIOLOGY | Age: 82
DRG: 690 | End: 2017-11-01
Payer: MEDICARE

## 2017-11-01 LAB
ANION GAP SERPL CALCULATED.3IONS-SCNC: 12 MEQ/L (ref 8–16)
ANISOCYTOSIS: ABNORMAL
BASOPHILS # BLD: 0.5 %
BASOPHILS ABSOLUTE: 0 THOU/MM3 (ref 0–0.1)
BLOOD CULTURE, ROUTINE: ABNORMAL
BUN BLDV-MCNC: 16 MG/DL (ref 7–22)
CALCIUM SERPL-MCNC: 8.1 MG/DL (ref 8.5–10.5)
CHLORIDE BLD-SCNC: 101 MEQ/L (ref 98–111)
CO2: 21 MEQ/L (ref 23–33)
CREAT SERPL-MCNC: 1.2 MG/DL (ref 0.4–1.2)
EOSINOPHIL # BLD: 4.2 %
EOSINOPHILS ABSOLUTE: 0.4 THOU/MM3 (ref 0–0.4)
GFR SERPL CREATININE-BSD FRML MDRD: 57 ML/MIN/1.73M2
GLUCOSE BLD-MCNC: 166 MG/DL (ref 70–108)
GLUCOSE BLD-MCNC: 171 MG/DL (ref 70–108)
GLUCOSE BLD-MCNC: 67 MG/DL (ref 70–108)
GLUCOSE BLD-MCNC: 80 MG/DL (ref 70–108)
HCT VFR BLD CALC: 32.4 % (ref 42–52)
HEMOGLOBIN: 10.8 GM/DL (ref 14–18)
LYMPHOCYTES # BLD: 12.1 %
LYMPHOCYTES ABSOLUTE: 1.1 THOU/MM3 (ref 1–4.8)
MCH RBC QN AUTO: 30 PG (ref 27–31)
MCHC RBC AUTO-ENTMCNC: 33.4 GM/DL (ref 33–37)
MCV RBC AUTO: 89.9 FL (ref 80–94)
MONOCYTES # BLD: 17.6 %
MONOCYTES ABSOLUTE: 1.7 THOU/MM3 (ref 0.4–1.3)
NUCLEATED RED BLOOD CELLS: 0 /100 WBC
ORGANISM: ABNORMAL
ORGANISM: ABNORMAL
PDW BLD-RTO: 19.1 % (ref 11.5–14.5)
PLATELET # BLD: 248 THOU/MM3 (ref 130–400)
PMV BLD AUTO: 8.6 MCM (ref 7.4–10.4)
POTASSIUM SERPL-SCNC: 4.3 MEQ/L (ref 3.5–5.2)
PROSTATE SPECIFIC ANTIGEN: 1.58 NG/ML (ref 0–1)
RBC # BLD: 3.61 MILL/MM3 (ref 4.7–6.1)
SEG NEUTROPHILS: 65.6 %
SEGMENTED NEUTROPHILS ABSOLUTE COUNT: 6.2 THOU/MM3 (ref 1.8–7.7)
SODIUM BLD-SCNC: 134 MEQ/L (ref 135–145)
URINE CULTURE, ROUTINE: ABNORMAL
WBC # BLD: 9.5 THOU/MM3 (ref 4.8–10.8)

## 2017-11-01 PROCEDURE — 97116 GAIT TRAINING THERAPY: CPT

## 2017-11-01 PROCEDURE — 6360000002 HC RX W HCPCS: Performed by: INTERNAL MEDICINE

## 2017-11-01 PROCEDURE — 71020 XR CHEST STANDARD TWO VW: CPT

## 2017-11-01 PROCEDURE — G8988 SELF CARE GOAL STATUS: HCPCS

## 2017-11-01 PROCEDURE — 2580000003 HC RX 258: Performed by: INTERNAL MEDICINE

## 2017-11-01 PROCEDURE — G0103 PSA SCREENING: HCPCS

## 2017-11-01 PROCEDURE — 94640 AIRWAY INHALATION TREATMENT: CPT

## 2017-11-01 PROCEDURE — 36415 COLL VENOUS BLD VENIPUNCTURE: CPT

## 2017-11-01 PROCEDURE — 97165 OT EVAL LOW COMPLEX 30 MIN: CPT

## 2017-11-01 PROCEDURE — 6370000000 HC RX 637 (ALT 250 FOR IP): Performed by: INTERNAL MEDICINE

## 2017-11-01 PROCEDURE — 2700000000 HC OXYGEN THERAPY PER DAY

## 2017-11-01 PROCEDURE — 1200000003 HC TELEMETRY R&B

## 2017-11-01 PROCEDURE — 97530 THERAPEUTIC ACTIVITIES: CPT

## 2017-11-01 PROCEDURE — 85025 COMPLETE CBC W/AUTO DIFF WBC: CPT

## 2017-11-01 PROCEDURE — 82948 REAGENT STRIP/BLOOD GLUCOSE: CPT

## 2017-11-01 PROCEDURE — G8987 SELF CARE CURRENT STATUS: HCPCS

## 2017-11-01 PROCEDURE — 80048 BASIC METABOLIC PNL TOTAL CA: CPT

## 2017-11-01 PROCEDURE — 97110 THERAPEUTIC EXERCISES: CPT

## 2017-11-01 RX ORDER — METHYLPREDNISOLONE SODIUM SUCCINATE 40 MG/ML
40 INJECTION, POWDER, LYOPHILIZED, FOR SOLUTION INTRAMUSCULAR; INTRAVENOUS EVERY 8 HOURS
Status: COMPLETED | OUTPATIENT
Start: 2017-11-01 | End: 2017-11-02

## 2017-11-01 RX ORDER — HYDRALAZINE HYDROCHLORIDE 25 MG/1
25 TABLET, FILM COATED ORAL 3 TIMES DAILY
Status: DISCONTINUED | OUTPATIENT
Start: 2017-11-01 | End: 2017-11-02

## 2017-11-01 RX ORDER — HYDRALAZINE HYDROCHLORIDE 20 MG/ML
20 INJECTION INTRAMUSCULAR; INTRAVENOUS EVERY 4 HOURS PRN
Status: DISCONTINUED | OUTPATIENT
Start: 2017-11-01 | End: 2017-11-03 | Stop reason: HOSPADM

## 2017-11-01 RX ORDER — IPRATROPIUM BROMIDE AND ALBUTEROL SULFATE 2.5; .5 MG/3ML; MG/3ML
1 SOLUTION RESPIRATORY (INHALATION) EVERY 4 HOURS PRN
Status: DISCONTINUED | OUTPATIENT
Start: 2017-11-01 | End: 2017-11-03 | Stop reason: HOSPADM

## 2017-11-01 RX ORDER — IPRATROPIUM BROMIDE AND ALBUTEROL SULFATE 2.5; .5 MG/3ML; MG/3ML
1 SOLUTION RESPIRATORY (INHALATION) 4 TIMES DAILY
Status: DISCONTINUED | OUTPATIENT
Start: 2017-11-01 | End: 2017-11-03 | Stop reason: HOSPADM

## 2017-11-01 RX ADMIN — IPRATROPIUM BROMIDE AND ALBUTEROL SULFATE 1 AMPULE: .5; 3 SOLUTION RESPIRATORY (INHALATION) at 22:50

## 2017-11-01 RX ADMIN — HYDRALAZINE HYDROCHLORIDE 25 MG: 25 TABLET, FILM COATED ORAL at 20:26

## 2017-11-01 RX ADMIN — CIPROFLOXACIN 200 MG: 2 INJECTION, SOLUTION INTRAVENOUS at 09:05

## 2017-11-01 RX ADMIN — LEVOTHYROXINE SODIUM 50 MCG: 50 TABLET ORAL at 09:04

## 2017-11-01 RX ADMIN — LISINOPRIL 25 MG: 20 TABLET ORAL at 07:47

## 2017-11-01 RX ADMIN — METHYLPREDNISOLONE SODIUM SUCCINATE 40 MG: 40 INJECTION, POWDER, FOR SOLUTION INTRAMUSCULAR; INTRAVENOUS at 15:55

## 2017-11-01 RX ADMIN — Medication 2 UNITS: at 12:14

## 2017-11-01 RX ADMIN — IPRATROPIUM BROMIDE AND ALBUTEROL SULFATE 1 AMPULE: .5; 3 SOLUTION RESPIRATORY (INHALATION) at 16:19

## 2017-11-01 RX ADMIN — CIPROFLOXACIN 200 MG: 2 INJECTION, SOLUTION INTRAVENOUS at 21:00

## 2017-11-01 RX ADMIN — METHYLPREDNISOLONE SODIUM SUCCINATE 40 MG: 40 INJECTION, POWDER, FOR SOLUTION INTRAMUSCULAR; INTRAVENOUS at 22:55

## 2017-11-01 RX ADMIN — HYDRALAZINE HYDROCHLORIDE 25 MG: 25 TABLET, FILM COATED ORAL at 15:55

## 2017-11-01 RX ADMIN — WATER 1 G: 1 INJECTION INTRAMUSCULAR; INTRAVENOUS; SUBCUTANEOUS at 09:04

## 2017-11-01 RX ADMIN — GLIMEPIRIDE 4 MG: 4 TABLET ORAL at 09:04

## 2017-11-01 RX ADMIN — HYDRALAZINE HYDROCHLORIDE 10 MG: 20 INJECTION INTRAMUSCULAR; INTRAVENOUS at 05:55

## 2017-11-01 RX ADMIN — HEPARIN SODIUM 5000 UNITS: 5000 INJECTION, SOLUTION INTRAVENOUS; SUBCUTANEOUS at 20:26

## 2017-11-01 RX ADMIN — CARVEDILOL 3.12 MG: 3.12 TABLET, FILM COATED ORAL at 16:28

## 2017-11-01 RX ADMIN — HYDRALAZINE HYDROCHLORIDE 10 MG: 20 INJECTION INTRAMUSCULAR; INTRAVENOUS at 16:35

## 2017-11-01 RX ADMIN — Medication 10 ML: at 20:27

## 2017-11-01 RX ADMIN — ONDANSETRON 4 MG: 2 INJECTION INTRAMUSCULAR; INTRAVENOUS at 07:31

## 2017-11-01 RX ADMIN — HEPARIN SODIUM 5000 UNITS: 5000 INJECTION, SOLUTION INTRAVENOUS; SUBCUTANEOUS at 14:12

## 2017-11-01 RX ADMIN — CARVEDILOL 3.12 MG: 3.12 TABLET, FILM COATED ORAL at 07:47

## 2017-11-01 RX ADMIN — HEPARIN SODIUM 5000 UNITS: 5000 INJECTION, SOLUTION INTRAVENOUS; SUBCUTANEOUS at 05:53

## 2017-11-01 ASSESSMENT — PAIN SCALES - GENERAL
PAINLEVEL_OUTOF10: 0

## 2017-11-01 ASSESSMENT — PAIN DESCRIPTION - PAIN TYPE: TYPE: ACUTE PAIN

## 2017-11-01 NOTE — PROGRESS NOTES
[facility]  INPATIENT OCCUPATIONAL THERAPY  [facility] ONC MED 5K  EVALUATION    Time:  Time In: 3587  Time Out: 1445  Timed Code Treatment Minutes: 10 Minutes  Minutes: 25          Date: 2017  Patient Name: Walt Moya,   Gender: male      MRN: [id number]  : 10/6/1928  (80 y.o.)  Referring Practitioner: Dr. Goldie Stephens MD  Diagnosis: SIRS Due to Infectious Process with Organ Dysfunction  Additional Pertinent Hx: Pt admitted 10/29 with complaints of abdominal pain associated with dizziness, difficulty urinating, nausea and SOB. Admitted for SIRS and UTI. Restrictions/Precautions:  Restrictions/Precautions: Fall Risk                      Past Medical History:   Diagnosis Date    Arthritis     CAD (coronary artery disease)     s/p CABG    Cancer (HCC)     prostate    Diabetes mellitus ([facility] [address]     GERD (gastroesophageal reflux disease)     Hyperlipidemia     Hypertension     Hypothyroid      Past Surgical History:   Procedure Laterality Date    ABDOMEN SURGERY      APPENDECTOMY      CARDIAC SURGERY      COLECTOMY      COLONOSCOPY      COLOSTOMY      CORONARY ARTERY BYPASS GRAFT      triple to Distal RCA, first OM, and LIMA to diagonal by Dr Mehul Nick, ESOPHAGUS     [address]      bilateral cataracts    JOINT REPLACEMENT      KNEE SURGERY      left total knee and right total knee    TONSILLECTOMY      VASCULAR SURGERY      cabg harvests from legs           Subjective  Chart Reviewed: Yes (Internal medicine note; PT evaluation; order review)  Patient assessed for rehabilitation services?: Yes  Response to previous treatment: Patient with no complaints from previous session  Family / Caregiver Present: No    Subjective: Pleasant and cooperative  Comments: Pt agreed to get up and shave while at the sink.       General:  Overall Orientation Status: Within Normal Limits    Vision: Impaired    Hearing: Exceptions to [facility]  Hearing Exceptions: Bilateral hearing

## 2017-11-01 NOTE — PROGRESS NOTES
Dr. Flora Olivas called in an order of 10 mg IV Hydralazine Q6 PRN for a systolic of 140 or above. Thank you.

## 2017-11-01 NOTE — SIGNIFICANT EVENT
On-Call for Dr. Apple Barcenas    Notified by RN via Pager that patient's BP has remained uncontrolled with PRN IV medications. Chart review showed SBP > 200 despite IV & PO Hydralazine. No other symptoms reported. IV Nicardipine gtt was ordered and patient was upgraded to [employer].     Donovan Flynn MD

## 2017-11-01 NOTE — PROGRESS NOTES
[facility] ONC MED 5K    Time In: 8656  Time Out: 0920  Timed Code Treatment Minutes: 32 Minutes  Minutes: 27          Date: 2017  Patient Name: Gordon Sloan,  Gender:  male        MRN: [id number]  : 10/6/1928  (80 y.o.)  Referral Date : 10/30/17  Referring Practitioner: Demetris Myers MD  Diagnosis: SIRS due to infectious process with organ dysfunction  Additional Pertinent Hx: 79 y/o male admitted 10/29 with complaints of abdominal pain associated with dizziness, difficulty urinating, nausea and SOB. Admitted for SIRS and UTI. Past Medical History:   Diagnosis Date    Arthritis     CAD (coronary artery disease)     s/p CABG    Cancer ([facility] [address]     prostate    Diabetes mellitus ([facility] [address]     GERD (gastroesophageal reflux disease)     Hyperlipidemia     Hypertension     Hypothyroid      Past Surgical History:   Procedure Laterality Date    ABDOMEN SURGERY      APPENDECTOMY      CARDIAC SURGERY      COLECTOMY      COLONOSCOPY      COLOSTOMY      CORONARY ARTERY BYPASS GRAFT      triple to Distal RCA, first OM, and LIMA to diagonal by Dr Sindy Briceño, ESOPHAGUS     [address]      bilateral cataracts    JOINT REPLACEMENT      KNEE SURGERY      left total knee and right total knee    TONSILLECTOMY      VASCULAR SURGERY      cabg harvests from legs       Restrictions/Precautions:  Restrictions/Precautions: Fall Risk                      Subjective:     Subjective: RN approved therapy session and present in room with student RN. Pt. Edenilson Dodson in bed and requires encouragement to participate. Pt. impulsive at times during session. Pain:  Denies.           Social/Functional:  Lives With: Spouse (and son/daughter)  Type of Home: House  Home Layout: One level  Home Access: Stairs to enter with rails  Entrance Stairs - Number of Steps: 2  Entrance Stairs - Rails: Both     Objective:  Supine to Sit: Stand by assistance

## 2017-11-01 NOTE — PLAN OF CARE
Problem: Impaired respiratory status  Goal: Clear lung sounds  Outcome: Ongoing  Pt has diminished breath sounds. txs to help improve lung aeration.

## 2017-11-02 LAB
ANION GAP SERPL CALCULATED.3IONS-SCNC: 15 MEQ/L (ref 8–16)
ANISOCYTOSIS: ABNORMAL
BASOPHILS # BLD: 0.2 %
BASOPHILS ABSOLUTE: 0 THOU/MM3 (ref 0–0.1)
BUN BLDV-MCNC: 20 MG/DL (ref 7–22)
CALCIUM SERPL-MCNC: 8.3 MG/DL (ref 8.5–10.5)
CHLORIDE BLD-SCNC: 96 MEQ/L (ref 98–111)
CO2: 19 MEQ/L (ref 23–33)
CREAT SERPL-MCNC: 1.1 MG/DL (ref 0.4–1.2)
EOSINOPHIL # BLD: 0.1 %
EOSINOPHILS ABSOLUTE: 0 THOU/MM3 (ref 0–0.4)
GFR SERPL CREATININE-BSD FRML MDRD: 63 ML/MIN/1.73M2
GLUCOSE BLD-MCNC: 182 MG/DL (ref 70–108)
GLUCOSE BLD-MCNC: 236 MG/DL (ref 70–108)
GLUCOSE BLD-MCNC: 237 MG/DL (ref 70–108)
GLUCOSE BLD-MCNC: 256 MG/DL (ref 70–108)
GLUCOSE BLD-MCNC: 311 MG/DL (ref 70–108)
HCT VFR BLD CALC: 34.4 % (ref 42–52)
HEMOGLOBIN: 11.6 GM/DL (ref 14–18)
LYMPHOCYTES # BLD: 14.1 %
LYMPHOCYTES ABSOLUTE: 0.7 THOU/MM3 (ref 1–4.8)
MCH RBC QN AUTO: 30.2 PG (ref 27–31)
MCHC RBC AUTO-ENTMCNC: 33.9 GM/DL (ref 33–37)
MCV RBC AUTO: 89.1 FL (ref 80–94)
MONOCYTES # BLD: 5.2 %
MONOCYTES ABSOLUTE: 0.2 THOU/MM3 (ref 0.4–1.3)
NUCLEATED RED BLOOD CELLS: 0 /100 WBC
PDW BLD-RTO: 18.8 % (ref 11.5–14.5)
PLATELET # BLD: 298 THOU/MM3 (ref 130–400)
PMV BLD AUTO: 8.5 MCM (ref 7.4–10.4)
POTASSIUM SERPL-SCNC: 5.1 MEQ/L (ref 3.5–5.2)
PRO-BNP: 1822 PG/ML (ref 0–1800)
RBC # BLD: 3.86 MILL/MM3 (ref 4.7–6.1)
SEG NEUTROPHILS: 80.4 %
SEGMENTED NEUTROPHILS ABSOLUTE COUNT: 3.8 THOU/MM3 (ref 1.8–7.7)
SODIUM BLD-SCNC: 130 MEQ/L (ref 135–145)
WBC # BLD: 4.7 THOU/MM3 (ref 4.8–10.8)

## 2017-11-02 PROCEDURE — 83880 ASSAY OF NATRIURETIC PEPTIDE: CPT

## 2017-11-02 PROCEDURE — 6370000000 HC RX 637 (ALT 250 FOR IP): Performed by: INTERNAL MEDICINE

## 2017-11-02 PROCEDURE — 97116 GAIT TRAINING THERAPY: CPT

## 2017-11-02 PROCEDURE — 94640 AIRWAY INHALATION TREATMENT: CPT

## 2017-11-02 PROCEDURE — 6360000002 HC RX W HCPCS: Performed by: INTERNAL MEDICINE

## 2017-11-02 PROCEDURE — 2580000003 HC RX 258: Performed by: INTERNAL MEDICINE

## 2017-11-02 PROCEDURE — 1200000003 HC TELEMETRY R&B

## 2017-11-02 PROCEDURE — 80048 BASIC METABOLIC PNL TOTAL CA: CPT

## 2017-11-02 PROCEDURE — 97110 THERAPEUTIC EXERCISES: CPT

## 2017-11-02 PROCEDURE — 85025 COMPLETE CBC W/AUTO DIFF WBC: CPT

## 2017-11-02 PROCEDURE — 36415 COLL VENOUS BLD VENIPUNCTURE: CPT

## 2017-11-02 PROCEDURE — 82948 REAGENT STRIP/BLOOD GLUCOSE: CPT

## 2017-11-02 RX ORDER — GLIMEPIRIDE 2 MG/1
2 TABLET ORAL
Status: DISCONTINUED | OUTPATIENT
Start: 2017-11-02 | End: 2017-11-03 | Stop reason: HOSPADM

## 2017-11-02 RX ORDER — HYDRALAZINE HYDROCHLORIDE 50 MG/1
50 TABLET, FILM COATED ORAL 3 TIMES DAILY
Status: DISCONTINUED | OUTPATIENT
Start: 2017-11-02 | End: 2017-11-03 | Stop reason: HOSPADM

## 2017-11-02 RX ORDER — ALBUTEROL SULFATE 90 UG/1
2 AEROSOL, METERED RESPIRATORY (INHALATION) EVERY 6 HOURS PRN
Status: DISCONTINUED | OUTPATIENT
Start: 2017-11-02 | End: 2017-11-03 | Stop reason: HOSPADM

## 2017-11-02 RX ORDER — CIPROFLOXACIN 500 MG/1
500 TABLET, FILM COATED ORAL EVERY 12 HOURS SCHEDULED
Status: DISCONTINUED | OUTPATIENT
Start: 2017-11-02 | End: 2017-11-03 | Stop reason: HOSPADM

## 2017-11-02 RX ORDER — CARVEDILOL 3.12 MG/1
3.12 TABLET ORAL ONCE
Status: COMPLETED | OUTPATIENT
Start: 2017-11-02 | End: 2017-11-02

## 2017-11-02 RX ORDER — CARVEDILOL 6.25 MG/1
6.25 TABLET ORAL 2 TIMES DAILY WITH MEALS
Status: DISCONTINUED | OUTPATIENT
Start: 2017-11-02 | End: 2017-11-03 | Stop reason: HOSPADM

## 2017-11-02 RX ORDER — HYDRALAZINE HYDROCHLORIDE 25 MG/1
25 TABLET, FILM COATED ORAL ONCE
Status: COMPLETED | OUTPATIENT
Start: 2017-11-02 | End: 2017-11-02

## 2017-11-02 RX ORDER — HYDRALAZINE HYDROCHLORIDE 25 MG/1
25 TABLET, FILM COATED ORAL ONCE
Status: DISCONTINUED | OUTPATIENT
Start: 2017-11-02 | End: 2017-11-02

## 2017-11-02 RX ADMIN — CARVEDILOL 3.12 MG: 3.12 TABLET, FILM COATED ORAL at 08:49

## 2017-11-02 RX ADMIN — HYDRALAZINE HYDROCHLORIDE 25 MG: 25 TABLET, FILM COATED ORAL at 08:52

## 2017-11-02 RX ADMIN — METHYLPREDNISOLONE SODIUM SUCCINATE 40 MG: 40 INJECTION, POWDER, FOR SOLUTION INTRAMUSCULAR; INTRAVENOUS at 08:27

## 2017-11-02 RX ADMIN — INSULIN GLARGINE 40 UNITS: 100 INJECTION, SOLUTION SUBCUTANEOUS at 08:34

## 2017-11-02 RX ADMIN — Medication 2 UNITS: at 16:55

## 2017-11-02 RX ADMIN — LEVOTHYROXINE SODIUM 50 MCG: 50 TABLET ORAL at 07:13

## 2017-11-02 RX ADMIN — IPRATROPIUM BROMIDE AND ALBUTEROL SULFATE 1 AMPULE: .5; 3 SOLUTION RESPIRATORY (INHALATION) at 17:51

## 2017-11-02 RX ADMIN — Medication 10 ML: at 20:15

## 2017-11-02 RX ADMIN — IPRATROPIUM BROMIDE AND ALBUTEROL SULFATE 1 AMPULE: .5; 3 SOLUTION RESPIRATORY (INHALATION) at 12:28

## 2017-11-02 RX ADMIN — HYDRALAZINE HYDROCHLORIDE 50 MG: 50 TABLET, FILM COATED ORAL at 20:14

## 2017-11-02 RX ADMIN — Medication 10 ML: at 08:34

## 2017-11-02 RX ADMIN — HYDRALAZINE HYDROCHLORIDE 50 MG: 50 TABLET, FILM COATED ORAL at 15:22

## 2017-11-02 RX ADMIN — HYDRALAZINE HYDROCHLORIDE 25 MG: 25 TABLET, FILM COATED ORAL at 12:44

## 2017-11-02 RX ADMIN — GLIMEPIRIDE 2 MG: 2 TABLET ORAL at 12:44

## 2017-11-02 RX ADMIN — HYDRALAZINE HYDROCHLORIDE 20 MG: 20 INJECTION INTRAMUSCULAR; INTRAVENOUS at 14:42

## 2017-11-02 RX ADMIN — CARVEDILOL 6.25 MG: 6.25 TABLET, FILM COATED ORAL at 16:40

## 2017-11-02 RX ADMIN — Medication 8 UNITS: at 13:16

## 2017-11-02 RX ADMIN — CIPROFLOXACIN 200 MG: 2 INJECTION, SOLUTION INTRAVENOUS at 08:39

## 2017-11-02 RX ADMIN — IPRATROPIUM BROMIDE AND ALBUTEROL SULFATE 1 AMPULE: .5; 3 SOLUTION RESPIRATORY (INHALATION) at 08:56

## 2017-11-02 RX ADMIN — HEPARIN SODIUM 5000 UNITS: 5000 INJECTION, SOLUTION INTRAVENOUS; SUBCUTANEOUS at 13:38

## 2017-11-02 RX ADMIN — LISINOPRIL 25 MG: 20 TABLET ORAL at 08:52

## 2017-11-02 RX ADMIN — HEPARIN SODIUM 5000 UNITS: 5000 INJECTION, SOLUTION INTRAVENOUS; SUBCUTANEOUS at 07:12

## 2017-11-02 RX ADMIN — CARVEDILOL 3.12 MG: 3.12 TABLET, FILM COATED ORAL at 12:44

## 2017-11-02 RX ADMIN — CIPROFLOXACIN 500 MG: 500 TABLET, FILM COATED ORAL at 20:14

## 2017-11-02 RX ADMIN — HEPARIN SODIUM 5000 UNITS: 5000 INJECTION, SOLUTION INTRAVENOUS; SUBCUTANEOUS at 22:26

## 2017-11-02 ASSESSMENT — PAIN SCALES - GENERAL
PAINLEVEL_OUTOF10: 0

## 2017-11-02 NOTE — PROGRESS NOTES
hours.  Troponin: No results for input(s): TROPONINI in the last 72 hours. BNP: No results for input(s): BNP in the last 72 hours. Lipids: No results for input(s): CHOL, HDL in the last 72 hours. Invalid input(s): LDLCALCU  INR: No results for input(s): INR in the last 72 hours. Radiology    Objective:   Vitals: BP (!) 191/80   Pulse 81   Temp 97.9 °F (36.6 °C) (Oral)   Resp 18   Ht 5' 10\" (1.778 m)   Wt 245 lb 4.8 oz (111.3 kg)   SpO2 97%   BMI 35.20 kg/m²   HEENT: Head:pupils react  Neck: supple  Lungs: decreased both base bilat  Heart: regular rate and rhythm   Abdomen: soft BS heard   Extremities: warm no edema  Neurologic:  Alert, oriented X3    Impression:   :   1.  Urinary tract infection with pyelonephritis . + Kleb sen to cipro  2. History of prostate cancer, status post radiation. 3.  History of benign prostate hypertrophy with urinary symptoms. 4.  Coronary artery disease, status post coronary artery bypass graft. 5.  Insulin-requiring diabetes mellitus. hypoglycemia  6. Hypertension accelerated    7. Hypothyroidism. 8.  COPD exac clinically improved  9. Hyponatremia. 10.  Leukocytosis. 11.  Acute kidney injury  Improved with chronic kidney disease.   12 Acute hypoxic resp failure  13 Acute CHF sec to diastolic dysfunction    Plan:    Stop rousseau   Increase hydaralazine  If BP stable discharge home       Chaim Hoyt MD

## 2017-11-02 NOTE — PROGRESS NOTES
assistance (At trunk)  Sit to Supine: Stand by assistance    Transfers  Sit to Stand: Stand by assistance (From EOB. Pt. impulsive)  Stand to sit: Stand by assistance (To EOB)       Ambulation 1  Surface: level tile  Device: No Device  Assistance: Contact guard assistance  Quality of Gait: Decreased morro and velocity. Decreased step length. Unsteady with multiple path deviations. Scissoring of gait at times. Pt. fatigued easily. Distance: 76' x 2         Exercises:  Exercises  Comments: Pt. performed seated B LE ther ex 10x each consisting of ankle pumps, glute sets, marches, long arc quads, and hip abd/add all to increase strength and improve functional mobility. Activity Tolerance:  Activity Tolerance: Patient limited by fatigue;Patient limited by endurance;Treatment limited secondary to agitation    Assessment: Body structures, Functions, Activity limitations: Decreased functional mobility , Decreased strength, Decreased safe awareness, Decreased balance  Assessment: Pt. tolerated session fairly well. Pt. unsteady on feet and requires assistance for safety. Pt. demos decreased safety awareness throughout session and is impulsive with transfers. Pt. would benefit from continued skilled PT to increase strength, endurance, and balance to improve functional mobility. Prognosis: Good  REQUIRES PT FOLLOW UP: Yes  Discharge Recommendations: Continue to assess pending progress, Patient would benefit from continued therapy after discharge    Patient Education:  Patient Education: Ther ex, transfers, ambulation, POC, bed mobility    Equipment Recommendations:  Equipment Needed: Yes  Other: will continue to assess    Safety:  Type of devices:  All fall risk precautions in place, Call light within reach, Patient at risk for falls, Gait belt, Left in bed, Bed alarm in place    Plan:  Times per week: 5x GM  Current Treatment Recommendations: Strengthening, Balance Training, Functional Mobility Training,

## 2017-11-02 NOTE — PROGRESS NOTES
Received a telephone order from Dr. Stas Nickerson to remove rousseau cath. No other orders received at this time.

## 2017-11-02 NOTE — FLOWSHEET NOTE
11/01/17 1923   Vital Signs   Pulse 76   Heart Rate Source Monitor   Resp 16   BP (!) 150/53   BP Location Left lower arm   MAP (mmHg) 79       Blood pressure less than goal of 160.  Cardene not started

## 2017-11-03 VITALS
OXYGEN SATURATION: 94 % | BODY MASS INDEX: 34.95 KG/M2 | WEIGHT: 244.1 LBS | HEIGHT: 70 IN | RESPIRATION RATE: 17 BRPM | HEART RATE: 79 BPM | DIASTOLIC BLOOD PRESSURE: 79 MMHG | TEMPERATURE: 98.4 F | SYSTOLIC BLOOD PRESSURE: 123 MMHG

## 2017-11-03 LAB
ANION GAP SERPL CALCULATED.3IONS-SCNC: 13 MEQ/L (ref 8–16)
BUN BLDV-MCNC: 28 MG/DL (ref 7–22)
CALCIUM SERPL-MCNC: 8.5 MG/DL (ref 8.5–10.5)
CHLORIDE BLD-SCNC: 97 MEQ/L (ref 98–111)
CO2: 21 MEQ/L (ref 23–33)
CREAT SERPL-MCNC: 1 MG/DL (ref 0.4–1.2)
GFR SERPL CREATININE-BSD FRML MDRD: 70 ML/MIN/1.73M2
GLUCOSE BLD-MCNC: 129 MG/DL (ref 70–108)
GLUCOSE BLD-MCNC: 160 MG/DL (ref 70–108)
GLUCOSE BLD-MCNC: 182 MG/DL (ref 70–108)
POTASSIUM SERPL-SCNC: 4.8 MEQ/L (ref 3.5–5.2)
SODIUM BLD-SCNC: 131 MEQ/L (ref 135–145)

## 2017-11-03 PROCEDURE — 36415 COLL VENOUS BLD VENIPUNCTURE: CPT

## 2017-11-03 PROCEDURE — 6370000000 HC RX 637 (ALT 250 FOR IP): Performed by: INTERNAL MEDICINE

## 2017-11-03 PROCEDURE — 94640 AIRWAY INHALATION TREATMENT: CPT

## 2017-11-03 PROCEDURE — 82948 REAGENT STRIP/BLOOD GLUCOSE: CPT

## 2017-11-03 PROCEDURE — 97110 THERAPEUTIC EXERCISES: CPT

## 2017-11-03 PROCEDURE — 80048 BASIC METABOLIC PNL TOTAL CA: CPT

## 2017-11-03 PROCEDURE — 6360000002 HC RX W HCPCS: Performed by: INTERNAL MEDICINE

## 2017-11-03 PROCEDURE — 97116 GAIT TRAINING THERAPY: CPT

## 2017-11-03 RX ORDER — HYDRALAZINE HYDROCHLORIDE 50 MG/1
50 TABLET, FILM COATED ORAL 3 TIMES DAILY
Qty: 90 TABLET | Refills: 0 | Status: SHIPPED | OUTPATIENT
Start: 2017-11-03 | End: 2017-11-30 | Stop reason: DRUGHIGH

## 2017-11-03 RX ORDER — CIPROFLOXACIN 500 MG/1
500 TABLET, FILM COATED ORAL EVERY 12 HOURS SCHEDULED
Qty: 14 TABLET | Refills: 0 | Status: SHIPPED | OUTPATIENT
Start: 2017-11-03 | End: 2017-11-10

## 2017-11-03 RX ORDER — CARVEDILOL 3.12 MG/1
6.25 TABLET ORAL 2 TIMES DAILY WITH MEALS
Qty: 60 TABLET | Refills: 3 | Status: ON HOLD
Start: 2017-11-03 | End: 2017-12-14

## 2017-11-03 RX ORDER — ACETAMINOPHEN 325 MG/1
650 TABLET ORAL EVERY 4 HOURS PRN
Qty: 120 TABLET | Refills: 3 | COMMUNITY
Start: 2017-11-03 | End: 2019-11-06

## 2017-11-03 RX ORDER — LISINOPRIL 20 MG/1
20 TABLET ORAL DAILY
Qty: 30 TABLET | Refills: 0 | Status: ON HOLD | OUTPATIENT
Start: 2017-11-04 | End: 2017-12-14 | Stop reason: HOSPADM

## 2017-11-03 RX ORDER — GLIMEPIRIDE 4 MG/1
4 TABLET ORAL DAILY
Qty: 30 TABLET | Refills: 3 | Status: ON HOLD
Start: 2017-11-03 | End: 2018-08-31

## 2017-11-03 RX ORDER — ALBUTEROL SULFATE 90 UG/1
2 AEROSOL, METERED RESPIRATORY (INHALATION) EVERY 6 HOURS PRN
Qty: 1 INHALER | Refills: 0 | Status: SHIPPED | OUTPATIENT
Start: 2017-11-03 | End: 2019-07-23 | Stop reason: ALTCHOICE

## 2017-11-03 RX ADMIN — Medication 2 UNITS: at 08:50

## 2017-11-03 RX ADMIN — CIPROFLOXACIN 500 MG: 500 TABLET, FILM COATED ORAL at 08:17

## 2017-11-03 RX ADMIN — LISINOPRIL 25 MG: 20 TABLET ORAL at 08:18

## 2017-11-03 RX ADMIN — CARVEDILOL 6.25 MG: 6.25 TABLET, FILM COATED ORAL at 08:18

## 2017-11-03 RX ADMIN — LEVOTHYROXINE SODIUM 50 MCG: 50 TABLET ORAL at 06:53

## 2017-11-03 RX ADMIN — HYDRALAZINE HYDROCHLORIDE 50 MG: 50 TABLET, FILM COATED ORAL at 14:19

## 2017-11-03 RX ADMIN — HYDRALAZINE HYDROCHLORIDE 50 MG: 50 TABLET, FILM COATED ORAL at 08:18

## 2017-11-03 RX ADMIN — HEPARIN SODIUM 5000 UNITS: 5000 INJECTION, SOLUTION INTRAVENOUS; SUBCUTANEOUS at 06:53

## 2017-11-03 RX ADMIN — IPRATROPIUM BROMIDE AND ALBUTEROL SULFATE 1 AMPULE: .5; 3 SOLUTION RESPIRATORY (INHALATION) at 14:09

## 2017-11-03 RX ADMIN — GLIMEPIRIDE 2 MG: 2 TABLET ORAL at 08:18

## 2017-11-03 RX ADMIN — INSULIN GLARGINE 40 UNITS: 100 INJECTION, SOLUTION SUBCUTANEOUS at 08:50

## 2017-11-03 ASSESSMENT — PAIN DESCRIPTION - ORIENTATION: ORIENTATION: RIGHT

## 2017-11-03 ASSESSMENT — PAIN DESCRIPTION - PAIN TYPE: TYPE: ACUTE PAIN

## 2017-11-03 ASSESSMENT — PAIN DESCRIPTION - LOCATION: LOCATION: GROIN

## 2017-11-03 NOTE — PROGRESS NOTES
light within reach, Patient at risk for falls, Gait belt, Left in bed, Bed alarm in place    Plan:  Times per week: 5x GM  Current Treatment Recommendations: Strengthening, Balance Training, Functional Mobility Training, Equipment Evaluation, Education, & procurement, Transfer Training, Gait Training, Safety Education & Training, Endurance Training, Patient/Caregiver Education & Training, Stair training    Goals:  Patient goals : Return home    Short term goals  Time Frame for Short term goals: 2 weeks  Short term goal 1: Patient will complete bed mobility with mod I. Short term goal 2: Patient will complete sit <--> stand transfers with mod I. Short term goal 3: Patient will ambulate 150 ft with SBA using least restrictive AD in order to safely ambulate in his home. Short term goal 4: Patient will ascend/descend 2 steps with B hand rail and SBA in order to get into/out of his home.     Long term goals  Time Frame for Long term goals : No LTGs due to estimated length of stay

## 2017-11-03 NOTE — CARE COORDINATION
11/3/17, 10:35 AM    DISCHARGE BARRIERS    Referral made to [facility], spoke to Karen Driver.
11/3/17, 2:41 PM    Discharge plan discussed by  and . Discharge plan reviewed with patient/ family. Patient/ family verbalize understanding of discharge plan and are in agreement with plan. Understanding was demonstrated using the teach back method. IMM Letter  IMM Letter given to Patient/Family/Significant other/Guardian/POA/by[de-identified]   IMM Letter date given[de-identified] 11/03/17  IMM Letter time given[de-identified] 8348     Patient discharge home with referral to [facility]. RN updated.
Completed: yes  Pneumonia Vaccination Screening Completed: yes  Core measures: vte  PCP: Bin Bassett MD  Readmission:   none  Risk Score: 5.5     Discharge Planning  Current Residence:  Private Residence  Living Arrangements:  Spouse/Significant Other, Children   Support Systems:  Spouse/Significant Other, Family Members  Current Services PTA:     Potential Assistance Needed:  N/A  Potential Assistance Purchasing Medications:  No  Does patient want to participate in local refill/ meds to beds program?  No  Type of Home Care Services:  None  Patient expects to be discharged to:  Home  Expected Discharge date:  11/03/17  Follow Up Appointment: Best Day/ Time: Monday AM    Discharge Plan: Home with spouse, denies needs, he helps to take care of his wife, lives in same house as son and daughter      Evaluation: yes

## 2017-11-03 NOTE — PROGRESS NOTES
Discharge teaching and instructions for diagnosis/procedure of SIRS completed with patient using teachback method. AVS reviewed. Printed prescriptions given to patient. Patient voiced understanding regarding prescriptions, follow up appointments, and care of self at home. Discharged in a wheelchair to  home with support per family, daughter. Son called and this RN informed of patient being discharged. Patient left with all belongings in stable condition. Informed daughter at time of  about picking up prescriptions and that patient has agreed to home health services.

## 2017-11-03 NOTE — FLOWSHEET NOTE
11/03/17 0423   Provider Notification   Reason for Communication Evaluate   Provider Name Dr. Nir Hankins   Provider Notification Physician   Method of Communication Call   Response Waiting for response  (high bp)

## 2017-11-09 ENCOUNTER — HOSPITAL ENCOUNTER (EMERGENCY)
Age: 82
Discharge: HOME | End: 2017-11-09
Attending: EMERGENCY MEDICINE
Payer: MEDICARE

## 2017-11-09 ENCOUNTER — HOSPITAL ENCOUNTER (OUTPATIENT)
Age: 82
Setting detail: SPECIMEN
Discharge: HOME | End: 2017-11-09
Payer: MEDICARE

## 2017-11-09 ENCOUNTER — APPOINTMENT (OUTPATIENT)
Dept: GENERAL RADIOLOGY | Age: 82
End: 2017-11-09
Payer: MEDICARE

## 2017-11-09 VITALS
DIASTOLIC BLOOD PRESSURE: 54 MMHG | WEIGHT: 240 LBS | OXYGEN SATURATION: 98 % | RESPIRATION RATE: 16 BRPM | SYSTOLIC BLOOD PRESSURE: 146 MMHG | BODY MASS INDEX: 34.44 KG/M2 | TEMPERATURE: 97.8 F | HEART RATE: 70 BPM

## 2017-11-09 DIAGNOSIS — R53.83 FATIGUE, UNSPECIFIED TYPE: ICD-10-CM

## 2017-11-09 DIAGNOSIS — E87.1 HYPONATREMIA: Primary | ICD-10-CM

## 2017-11-09 LAB
ANION GAP SERPL CALCULATED.3IONS-SCNC: 12 MEQ/L (ref 8–16)
ANION GAP SERPL CALCULATED.3IONS-SCNC: 15 MEQ/L (ref 8–16)
ANISOCYTOSIS: ABNORMAL
BACTERIA: ABNORMAL
BASOPHILS # BLD: 0.2 %
BASOPHILS ABSOLUTE: 0 THOU/MM3 (ref 0–0.1)
BILIRUBIN URINE: NEGATIVE
BLOOD, URINE: NEGATIVE
BUN BLDV-MCNC: 23 MG/DL (ref 7–22)
BUN BLDV-MCNC: 23 MG/DL (ref 7–22)
CALCIUM SERPL-MCNC: 8.5 MG/DL (ref 8.5–10.5)
CALCIUM SERPL-MCNC: 8.6 MG/DL (ref 8.5–10.5)
CASTS: ABNORMAL /LPF
CASTS: ABNORMAL /LPF
CHARACTER, URINE: ABNORMAL
CHLORIDE BLD-SCNC: 91 MEQ/L (ref 98–111)
CHLORIDE BLD-SCNC: 92 MEQ/L (ref 98–111)
CLOSTRIDIUM DIFFICILE, PCR: NEGATIVE
CO2: 18 MEQ/L (ref 23–33)
CO2: 20 MEQ/L (ref 23–33)
COLOR: YELLOW
CREAT SERPL-MCNC: 1.3 MG/DL (ref 0.4–1.2)
CREAT SERPL-MCNC: 1.4 MG/DL (ref 0.4–1.2)
CRYSTALS: ABNORMAL
EKG ATRIAL RATE: 69 BPM
EKG P AXIS: 56 DEGREES
EKG P-R INTERVAL: 166 MS
EKG Q-T INTERVAL: 394 MS
EKG QRS DURATION: 88 MS
EKG QTC CALCULATION (BAZETT): 422 MS
EKG R AXIS: -6 DEGREES
EKG T AXIS: 71 DEGREES
EKG VENTRICULAR RATE: 69 BPM
EOSINOPHIL # BLD: 2.2 %
EOSINOPHILS ABSOLUTE: 0.4 THOU/MM3 (ref 0–0.4)
EPITHELIAL CELLS, UA: ABNORMAL /HPF
GFR SERPL CREATININE-BSD FRML MDRD: 48 ML/MIN/1.73M2
GFR SERPL CREATININE-BSD FRML MDRD: 52 ML/MIN/1.73M2
GLUCOSE BLD-MCNC: 107 MG/DL (ref 70–108)
GLUCOSE BLD-MCNC: 160 MG/DL (ref 70–108)
GLUCOSE, URINE: NEGATIVE MG/DL
HCT VFR BLD CALC: 37 % (ref 42–52)
HEMOGLOBIN: 12.1 GM/DL (ref 14–18)
KETONES, URINE: NEGATIVE
LEUKOCYTE EST, POC: NEGATIVE
LYMPHOCYTES # BLD: 12 %
LYMPHOCYTES ABSOLUTE: 2.2 THOU/MM3 (ref 1–4.8)
MCH RBC QN AUTO: 29.1 PG (ref 27–31)
MCHC RBC AUTO-ENTMCNC: 32.7 GM/DL (ref 33–37)
MCV RBC AUTO: 88.9 FL (ref 80–94)
MISCELLANEOUS LAB TEST RESULT: ABNORMAL
MONOCYTES # BLD: 10.5 %
MONOCYTES ABSOLUTE: 1.9 THOU/MM3 (ref 0.4–1.3)
NITRITE, URINE: NEGATIVE
NUCLEATED RED BLOOD CELLS: 0 /100 WBC
OSMOLALITY CALCULATION: 254.9 MOSMOL/KG (ref 275–300)
PDW BLD-RTO: 19.2 % (ref 11.5–14.5)
PH UA: 5.5
PLATELET # BLD: 450 THOU/MM3 (ref 130–400)
PMV BLD AUTO: 7.8 MCM (ref 7.4–10.4)
POTASSIUM SERPL-SCNC: 4.9 MEQ/L (ref 3.5–5.2)
POTASSIUM SERPL-SCNC: 5 MEQ/L (ref 3.5–5.2)
PROTEIN UA: ABNORMAL MG/DL
RBC # BLD: 4.16 MILL/MM3 (ref 4.7–6.1)
RBC URINE: ABNORMAL /HPF
RENAL EPITHELIAL, UA: ABNORMAL
SEG NEUTROPHILS: 75.1 %
SEGMENTED NEUTROPHILS ABSOLUTE COUNT: 13.9 THOU/MM3 (ref 1.8–7.7)
SODIUM BLD-SCNC: 123 MEQ/L (ref 135–145)
SODIUM BLD-SCNC: 125 MEQ/L (ref 135–145)
SPECIFIC GRAVITY UA: 1.02 (ref 1–1.03)
UROBILINOGEN, URINE: 0.2 EU/DL
WBC # BLD: 18.5 THOU/MM3 (ref 4.8–10.8)
WBC UA: ABNORMAL /HPF
YEAST: ABNORMAL

## 2017-11-09 PROCEDURE — 85025 COMPLETE CBC W/AUTO DIFF WBC: CPT

## 2017-11-09 PROCEDURE — 80048 BASIC METABOLIC PNL TOTAL CA: CPT

## 2017-11-09 PROCEDURE — 87493 C DIFF AMPLIFIED PROBE: CPT

## 2017-11-09 PROCEDURE — 87086 URINE CULTURE/COLONY COUNT: CPT

## 2017-11-09 PROCEDURE — 71020 XR CHEST STANDARD TWO VW: CPT

## 2017-11-09 PROCEDURE — 93005 ELECTROCARDIOGRAM TRACING: CPT

## 2017-11-09 PROCEDURE — 81001 URINALYSIS AUTO W/SCOPE: CPT

## 2017-11-09 PROCEDURE — 99284 EMERGENCY DEPT VISIT MOD MDM: CPT

## 2017-11-09 PROCEDURE — 36415 COLL VENOUS BLD VENIPUNCTURE: CPT

## 2017-11-09 PROCEDURE — 2580000003 HC RX 258: Performed by: EMERGENCY MEDICINE

## 2017-11-09 RX ORDER — 0.9 % SODIUM CHLORIDE 0.9 %
500 INTRAVENOUS SOLUTION INTRAVENOUS ONCE
Status: COMPLETED | OUTPATIENT
Start: 2017-11-09 | End: 2017-11-09

## 2017-11-09 RX ADMIN — SODIUM CHLORIDE 500 ML: 9 INJECTION, SOLUTION INTRAVENOUS at 17:28

## 2017-11-09 ASSESSMENT — ENCOUNTER SYMPTOMS
SORE THROAT: 0
VOMITING: 0
DIARRHEA: 1
COUGH: 0
NAUSEA: 0
ABDOMINAL PAIN: 0
EYE REDNESS: 0
BACK PAIN: 0
EYE DISCHARGE: 0
WHEEZING: 0
SHORTNESS OF BREATH: 0
RHINORRHEA: 0

## 2017-11-09 NOTE — ED TRIAGE NOTES
Pt to ED room 7 with c/o oberall fatigue and weakness. Pt stated was admitted lat week with a UTI and currently taking cipro. Pt c/o 1 episode of diarrhea. Pt stated sodium levels were low according to primary care provider. EKG completed.

## 2017-11-09 NOTE — ED PROVIDER NOTES
Neurological: Positive for weakness and light-headedness. Negative for dizziness, syncope and headaches. Hematological: Negative for adenopathy. Psychiatric/Behavioral: Negative for agitation, confusion, dysphoric mood and suicidal ideas. The patient is not nervous/anxious. PAST MEDICAL HISTORY    has a past medical history of Arthritis; CAD (coronary artery disease); Cancer ([facility] [address] Diabetes mellitus ([facility] [address] GERD (gastroesophageal reflux disease); Hyperlipidemia; Hypertension; and Hypothyroid. SURGICAL HISTORY      has a past surgical history that includes Coronary artery bypass graft; knee surgery; colostomy; colectomy; Abdomen surgery; Cardiac surgery; joint replacement; vascular surgery; Tonsillectomy; Colonoscopy; Appendectomy; eye surgery; and Dilatation, esophagus.     CURRENT MEDICATIONS       Discharge Medication List as of 11/9/2017  8:22 PM      CONTINUE these medications which have NOT CHANGED    Details   acetaminophen (TYLENOL) 325 MG tablet Take 2 tablets by mouth every 4 hours as needed for Pain, Disp-120 tablet, R-3OTC      albuterol sulfate  (90 Base) MCG/ACT inhaler Inhale 2 puffs into the lungs every 6 hours as needed for Wheezing, Disp-1 Inhaler, R-0Print      glimepiride (AMARYL) 4 MG tablet Take 1 tablet by mouth daily, Disp-30 tablet, R-3NO PRINT      insulin aspart (NOVOLOG) 100 UNIT/ML injection vial Inject 2 Units into the skin 3 times daily (before meals) And sliding scale, Disp-1 vial, R-3Normal      hydrALAZINE (APRESOLINE) 50 MG tablet Take 1 tablet by mouth 3 times daily, Disp-90 tablet, R-0Print      lisinopril (PRINIVIL;ZESTRIL) 20 MG tablet Take 1 tablet by mouth daily, Disp-30 tablet, R-0Print      carvedilol (COREG) 3.125 MG tablet Take 2 tablets by mouth 2 times daily (with meals), Disp-60 tablet, R-3NO PRINT      ciprofloxacin (CIPRO) 500 MG tablet Take 1 tablet by mouth every 12 hours for 7 days, Disp-14 tablet, R-0Print      levothyroxine (SYNTHROID) 50 MCG tablet Take 50 mcg by mouth every morning      insulin glargine (LANTUS) 100 UNIT/ML injection Inject 40 Units into the skin every morning. ALLERGIES     has No Known Allergies. FAMILY HISTORY     indicated that his mother is . He indicated that his father is . He indicated that his sister is . family history includes Asthma in his father; Cancer in his mother. SOCIAL HISTORY      reports that he has quit smoking. He quit after 25.00 years of use. He does not have any smokeless tobacco history on file. He reports that he does not drink alcohol or use drugs. PHYSICAL EXAM     INITIAL VITALS:  weight is 240 lb (108.9 kg). His oral temperature is 97.8 °F (36.6 °C). His blood pressure is 146/54 (abnormal) and his pulse is 70. His respiration is 16 and oxygen saturation is 98%. Physical Exam   Constitutional: He is oriented to person, place, and time. He appears well-developed and well-nourished. HENT:   Head: Normocephalic and atraumatic. Right Ear: External ear normal.   Left Ear: External ear normal.   Eyes: Conjunctivae are normal. Right eye exhibits no discharge. Left eye exhibits no discharge. No scleral icterus. Neck: Normal range of motion. Neck supple. No JVD present. Cardiovascular: Normal rate, regular rhythm and normal heart sounds. Exam reveals no gallop and no friction rub. No murmur heard. Pulses:       Radial pulses are 2+ on the right side, and 2+ on the left side. Pulmonary/Chest: Effort normal and breath sounds normal. No stridor. No respiratory distress. He has no wheezes. He has no rales. Abdominal: Soft. He exhibits no distension. Musculoskeletal: Normal range of motion. He exhibits no edema. Neurological: He is alert and oriented to person, place, and time. He has normal strength. No cranial nerve deficit or sensory deficit. He exhibits normal muscle tone. GCS eye subscore is 4. GCS verbal subscore is 5.  GCS motor subscore is 6.   5/5 strength upper extremities bilaterally   Skin: Skin is warm and dry. He is not diaphoretic. No erythema. Psychiatric: He has a normal mood and affect. His behavior is normal.   Nursing note and vitals reviewed. DIFFERENTIAL DIAGNOSIS:   Hyponatremia due to diarrhea, medications, and SIADH    DIAGNOSTIC RESULTS     EKG: All EKG's are interpreted by the Emergency Department Physician who either signs or Co-signs this chart in the absence of a cardiologist.  EKG interpreted by Lauren Keita, DO:    Vent. Rate: 69 bpm  WI interval: 166 ms  QRS duration: 88 ms  QTc: 422 ms  P-R-T axes: 56, -6, 70  Sinus rhythm with premature atrial complexes  Compared to old EKG on 10-29-17      RADIOLOGY: non-plain film images(s) such as CT, Ultrasound and MRI are read by the radiologist.    XR CHEST STANDARD (2 VW)   Final Result   STABLE CHRONIC FINDINGS; NO ACUTE CARDIOPULMONARY PROCESS. **This report has been created using voice recognition software. It may contain minor errors which are inherent in voice recognition technology. **            Final report electronically signed by Dr. Slick Wood on 11/9/2017 5:50 PM          LABS:   Labs Reviewed   CBC WITH AUTO DIFFERENTIAL - Abnormal; Notable for the following:        Result Value    WBC 18.5 (*)     RBC 4.16 (*)     Hemoglobin 12.1 (*)     Hematocrit 37.0 (*)     MCHC 32.7 (*)     RDW 19.2 (*)     Platelets 249 (*)     Segs Absolute 13.9 (*)     Monocytes # 1.9 (*)     All other components within normal limits   BASIC METABOLIC PANEL - Abnormal; Notable for the following:     Sodium 123 (*)     Chloride 91 (*)     CO2 20 (*)     Glucose 160 (*)     BUN 23 (*)     CREATININE 1.4 (*)     All other components within normal limits   MICROSCOPIC URINALYSIS - Abnormal; Notable for the following:     Protein, UA TRACE (*)     Character, Urine CLOUDY (*)     All other components within normal limits   OSMOLALITY - Abnormal; Notable for the following: in 1 day  for repeat sodium level. DISCHARGE MEDICATIONS:  Discharge Medication List as of 11/9/2017  8:22 PM          (Please note that portions of this note were completed with a voice recognition program.  Efforts were made to edit the dictations but occasionally words are mis-transcribed.)    Scribe:  Carlos Bocanegra   11/9/17 5:14 PM Scribing for and in the presence of [id number] DO Gene. Signed by: Sathya Araujo, 11/09/17 8:42 PM    Provider:  I personally performed the services described in the documentation, reviewed and edited the documentation which was dictated to the scribe in my presence, and it accurately records my words and actions.     [facility]  11/9/17 8:42 PM                          [address] [place], [place] [address]DO  Resident  11/09/17 2046

## 2017-11-10 ENCOUNTER — HOSPITAL ENCOUNTER (OUTPATIENT)
Age: 82
Discharge: HOME | End: 2017-11-10
Payer: MEDICARE

## 2017-11-10 DIAGNOSIS — E87.1 HYPONATREMIA: ICD-10-CM

## 2017-11-10 LAB — SODIUM BLD-SCNC: 124 MEQ/L (ref 135–145)

## 2017-11-10 PROCEDURE — 84295 ASSAY OF SERUM SODIUM: CPT

## 2017-11-10 PROCEDURE — 36415 COLL VENOUS BLD VENIPUNCTURE: CPT

## 2017-11-11 LAB — URINE CULTURE, ROUTINE: NORMAL

## 2017-11-12 ENCOUNTER — HOSPITAL ENCOUNTER (EMERGENCY)
Age: 82
Discharge: HOME | End: 2017-11-12
Attending: FAMILY MEDICINE
Payer: MEDICARE

## 2017-11-12 VITALS
OXYGEN SATURATION: 99 % | HEART RATE: 85 BPM | SYSTOLIC BLOOD PRESSURE: 128 MMHG | RESPIRATION RATE: 16 BRPM | DIASTOLIC BLOOD PRESSURE: 57 MMHG

## 2017-11-12 DIAGNOSIS — E87.5 HYPERKALEMIA: ICD-10-CM

## 2017-11-12 DIAGNOSIS — R04.0 EPISTAXIS: Primary | ICD-10-CM

## 2017-11-12 DIAGNOSIS — E87.1 HYPONATREMIA: ICD-10-CM

## 2017-11-12 DIAGNOSIS — N18.9 CHRONIC KIDNEY DISEASE, UNSPECIFIED CKD STAGE: ICD-10-CM

## 2017-11-12 LAB
ANION GAP SERPL CALCULATED.3IONS-SCNC: 11 MEQ/L (ref 8–16)
ANION GAP SERPL CALCULATED.3IONS-SCNC: 12 MEQ/L (ref 8–16)
ANISOCYTOSIS: ABNORMAL
BASOPHILS # BLD: 0.3 %
BASOPHILS ABSOLUTE: 0 THOU/MM3 (ref 0–0.1)
BUN BLDV-MCNC: 33 MG/DL (ref 7–22)
BUN BLDV-MCNC: 33 MG/DL (ref 7–22)
CALCIUM SERPL-MCNC: 8.7 MG/DL (ref 8.5–10.5)
CALCIUM SERPL-MCNC: 9.2 MG/DL (ref 8.5–10.5)
CHLORIDE BLD-SCNC: 94 MEQ/L (ref 98–111)
CHLORIDE BLD-SCNC: 96 MEQ/L (ref 98–111)
CO2: 18 MEQ/L (ref 23–33)
CO2: 20 MEQ/L (ref 23–33)
CREAT SERPL-MCNC: 1.5 MG/DL (ref 0.4–1.2)
CREAT SERPL-MCNC: 1.6 MG/DL (ref 0.4–1.2)
EKG ATRIAL RATE: 61 BPM
EKG P AXIS: 65 DEGREES
EKG P-R INTERVAL: 180 MS
EKG Q-T INTERVAL: 400 MS
EKG QRS DURATION: 88 MS
EKG QTC CALCULATION (BAZETT): 402 MS
EKG R AXIS: -33 DEGREES
EKG T AXIS: 67 DEGREES
EKG VENTRICULAR RATE: 61 BPM
EOSINOPHIL # BLD: 2.5 %
EOSINOPHILS ABSOLUTE: 0.4 THOU/MM3 (ref 0–0.4)
GFR SERPL CREATININE-BSD FRML MDRD: 41 ML/MIN/1.73M2
GFR SERPL CREATININE-BSD FRML MDRD: 44 ML/MIN/1.73M2
GLUCOSE BLD-MCNC: 101 MG/DL (ref 70–108)
GLUCOSE BLD-MCNC: 214 MG/DL (ref 70–108)
HCT VFR BLD CALC: 34.8 % (ref 42–52)
HEMOGLOBIN: 11.5 GM/DL (ref 14–18)
INR BLD: 1.01 (ref 0.85–1.13)
LYMPHOCYTES # BLD: 13.4 %
LYMPHOCYTES ABSOLUTE: 2 THOU/MM3 (ref 1–4.8)
MCH RBC QN AUTO: 29 PG (ref 27–31)
MCHC RBC AUTO-ENTMCNC: 33 GM/DL (ref 33–37)
MCV RBC AUTO: 88 FL (ref 80–94)
MONOCYTES # BLD: 10.6 %
MONOCYTES ABSOLUTE: 1.5 THOU/MM3 (ref 0.4–1.3)
NUCLEATED RED BLOOD CELLS: 0 /100 WBC
OSMOLALITY CALCULATION: 262.6 MOSMOL/KG (ref 275–300)
OSMOLALITY CALCULATION: 263.3 MOSMOL/KG (ref 275–300)
PDW BLD-RTO: 20.2 % (ref 11.5–14.5)
PLATELET # BLD: 396 THOU/MM3 (ref 130–400)
PMV BLD AUTO: 8.2 MCM (ref 7.4–10.4)
POTASSIUM SERPL-SCNC: 4.6 MEQ/L (ref 3.5–5.2)
POTASSIUM SERPL-SCNC: 5.6 MEQ/L (ref 3.5–5.2)
RBC # BLD: 3.96 MILL/MM3 (ref 4.7–6.1)
SEG NEUTROPHILS: 73.2 %
SEGMENTED NEUTROPHILS ABSOLUTE COUNT: 10.7 THOU/MM3 (ref 1.8–7.7)
SODIUM BLD-SCNC: 124 MEQ/L (ref 135–145)
SODIUM BLD-SCNC: 127 MEQ/L (ref 135–145)
WBC # BLD: 14.6 THOU/MM3 (ref 4.8–10.8)

## 2017-11-12 PROCEDURE — 93005 ELECTROCARDIOGRAM TRACING: CPT

## 2017-11-12 PROCEDURE — 36415 COLL VENOUS BLD VENIPUNCTURE: CPT

## 2017-11-12 PROCEDURE — 6360000002 HC RX W HCPCS: Performed by: FAMILY MEDICINE

## 2017-11-12 PROCEDURE — 6370000000 HC RX 637 (ALT 250 FOR IP): Performed by: FAMILY MEDICINE

## 2017-11-12 PROCEDURE — 85025 COMPLETE CBC W/AUTO DIFF WBC: CPT

## 2017-11-12 PROCEDURE — 96374 THER/PROPH/DIAG INJ IV PUSH: CPT

## 2017-11-12 PROCEDURE — 99284 EMERGENCY DEPT VISIT MOD MDM: CPT

## 2017-11-12 PROCEDURE — 2580000003 HC RX 258: Performed by: FAMILY MEDICINE

## 2017-11-12 PROCEDURE — 85610 PROTHROMBIN TIME: CPT

## 2017-11-12 PROCEDURE — 80048 BASIC METABOLIC PNL TOTAL CA: CPT

## 2017-11-12 PROCEDURE — 94640 AIRWAY INHALATION TREATMENT: CPT

## 2017-11-12 PROCEDURE — 96375 TX/PRO/DX INJ NEW DRUG ADDON: CPT

## 2017-11-12 RX ORDER — CALCIUM GLUCONATE 94 MG/ML
1 INJECTION, SOLUTION INTRAVENOUS ONCE
Status: COMPLETED | OUTPATIENT
Start: 2017-11-12 | End: 2017-11-12

## 2017-11-12 RX ORDER — DEXTROSE MONOHYDRATE 25 G/50ML
25 INJECTION, SOLUTION INTRAVENOUS ONCE
Status: COMPLETED | OUTPATIENT
Start: 2017-11-12 | End: 2017-11-12

## 2017-11-12 RX ORDER — SODIUM CHLORIDE 9 MG/ML
INJECTION, SOLUTION INTRAVENOUS CONTINUOUS
Status: DISCONTINUED | OUTPATIENT
Start: 2017-11-12 | End: 2017-11-13 | Stop reason: HOSPADM

## 2017-11-12 RX ADMIN — ALBUTEROL SULFATE 10 MG: 2.5 SOLUTION RESPIRATORY (INHALATION) at 21:21

## 2017-11-12 RX ADMIN — SODIUM CHLORIDE: 9 INJECTION, SOLUTION INTRAVENOUS at 21:29

## 2017-11-12 RX ADMIN — DEXTROSE MONOHYDRATE 25 G: 25 INJECTION, SOLUTION INTRAVENOUS at 21:32

## 2017-11-12 RX ADMIN — CALCIUM GLUCONATE 1 G: 94 INJECTION, SOLUTION INTRAVENOUS at 21:32

## 2017-11-12 RX ADMIN — INSULIN HUMAN 10 UNITS: 100 INJECTION, SOLUTION PARENTERAL at 21:32

## 2017-11-12 ASSESSMENT — ENCOUNTER SYMPTOMS
ABDOMINAL PAIN: 0
VOMITING: 0
NAUSEA: 0
WHEEZING: 0
EYE REDNESS: 0
EYE DISCHARGE: 0
SHORTNESS OF BREATH: 0
RHINORRHEA: 0
COUGH: 0
DIARRHEA: 0
BACK PAIN: 0
SORE THROAT: 0

## 2017-11-13 NOTE — ED PROVIDER NOTES
oxygen saturation is 99%. Physical Exam   Constitutional: He is oriented to person, place, and time. He appears well-developed and well-nourished. HENT:   Head: Normocephalic and atraumatic. Right Ear: External ear normal.   Left Ear: External ear normal.   Nose: Epistaxis is observed. Clamp present on the nose provided by the ED. Tissue packing in the right nostril. Dried blood in the mouth and oropharynx. No current nasal drip. Eyes: Conjunctivae are normal. Right eye exhibits no discharge. Left eye exhibits no discharge. No scleral icterus. Neck: Normal range of motion. Neck supple. No JVD present. Cardiovascular: Normal rate, regular rhythm and normal heart sounds. Exam reveals no gallop and no friction rub. No murmur heard. Pulmonary/Chest: Effort normal and breath sounds normal. No respiratory distress. He has no decreased breath sounds. He has no wheezes. He has no rhonchi. He has no rales. Abdominal: Soft. He exhibits no distension. There is no tenderness. There is no rebound and no guarding. Musculoskeletal: Normal range of motion. He exhibits no edema. Neurological: He is alert and oriented to person, place, and time. He exhibits normal muscle tone. He displays no seizure activity. GCS eye subscore is 4. GCS verbal subscore is 5. GCS motor subscore is 6. Skin: Skin is warm and dry. No rash noted. He is not diaphoretic. Psychiatric: He has a normal mood and affect. His behavior is normal. Thought content normal.   Nursing note and vitals reviewed. DIFFERENTIAL DIAGNOSIS:   Including but not limited to epistaxis. DIAGNOSTIC RESULTS     EKG: All EKG's are interpreted by the Emergency Department Physician who either signs or Co-signs this chart in the absence of a cardiologist.  EKG interpreted by Dennis Friedman MD:    Vent.  Rate: 61 bpm  NJ interval: 180 ms  QRS duration: 88 ms  QTc: 402 ms  P-R-T axes: 65, -33, 67  Normal sinus rhythm  Left axis deviation  Low by me at bedside. Patient did not appear in any distress. History and physical exam were obtained which resulted in a nose clamp present provided by the EMT, tissue compacted in right nostril to stop bleeding, and some dried blood in the throat and mouth but no nasal drip. Appropriate lab studies were ordered and reviewed. Patient was given proventil and IV fluids here in the ED. All results were discussed with patient. I provided information to the patient for hyponatremia. I advised the patient to follow up with his PCP to recheck his sodium and potassium levels. I also advised the patient to consider seeing a kidney specialist/physician. The patient is instructed to return to the emergency department for any worsening or otherwise change in their symptoms. Patient discharged from the emergency department in good condition with all questions answered. See disposition below. She advised to have adequate oral intake, follow-up with primary care physician to recheck her white blood cell count and sodium, potassium levels. Consider nephrology outpatient for CKD and sodium. Patient was provided with ENT information to follow up as an outpatient. Also provided with a clamp just in case of recurrence of his nasal bleeding. Return precautions were discussed with the patient prior to discharge. CRITICAL CARE:   None     CONSULTS:  None    PROCEDURES:  None     FINAL IMPRESSION      1. Epistaxis    2. Hyponatremia    3. Hyperkalemia    4.  Chronic kidney disease, unspecified CKD stage          DISPOSITION/PLAN   Discharge    PATIENT REFERRED TO:  Pilar Altamirano MD  [address] [place] [address] 210  [facility] PATRICK COULTER .[facility] [address]          Georgette Livingston MD  [place] [id number] [facility] [phone]            DISCHARGE MEDICATIONS:  New Prescriptions    No medications on file       (Please note that portions of this note were completed with a voice recognition program.  Efforts were made to edit the dictations but occasionally words are mis-transcribed.)    Kemal Kelsey 11/12/17 8:11 PM Scribing for and in the presence of Wilver Beckford MD.    Signed by:Sathya Balderas, 11/12/17 11:07 PM    Provider:  I personally performed the services described in the documentation, reviewed and edited the documentation which was dictated to the scribe in my presence, and it accurately records my words and actions.     Wilver Beckford MD 11/12/17 11:07 PM        Wilver Beckford MD  11/12/17 [id number]

## 2017-11-13 NOTE — ED NOTES
Pt presents via ems with c/o epistaxis. He reports a nose bleed early this morning that he was able to get stopped and then tonight another that would not stop. Pt presented with nose clamp in place from ems. Blood present on face and neck and was cleaned by RN. Pt denies any blood thinner use but does report being admitted recently and having issues with BP. Pt also reports some diarrhea but attributed it possibly to antibiotic use for UTI.       Pete Brady RN  11/12/17 2008

## 2017-11-30 ENCOUNTER — OFFICE VISIT (OUTPATIENT)
Dept: NEPHROLOGY | Age: 82
End: 2017-11-30
Payer: MEDICARE

## 2017-11-30 VITALS
DIASTOLIC BLOOD PRESSURE: 58 MMHG | BODY MASS INDEX: 32.57 KG/M2 | HEART RATE: 72 BPM | OXYGEN SATURATION: 98 % | SYSTOLIC BLOOD PRESSURE: 118 MMHG | WEIGHT: 227 LBS

## 2017-11-30 DIAGNOSIS — C61 PROSTATE CA (HCC): Chronic | ICD-10-CM

## 2017-11-30 DIAGNOSIS — Z95.1 S/P CABG (CORONARY ARTERY BYPASS GRAFT): ICD-10-CM

## 2017-11-30 DIAGNOSIS — E03.8 OTHER SPECIFIED HYPOTHYROIDISM: ICD-10-CM

## 2017-11-30 DIAGNOSIS — E11.22 TYPE 2 DIABETES MELLITUS WITH STAGE 3 CHRONIC KIDNEY DISEASE, WITH LONG-TERM CURRENT USE OF INSULIN (HCC): ICD-10-CM

## 2017-11-30 DIAGNOSIS — E87.1 HYPONATREMIA: Primary | ICD-10-CM

## 2017-11-30 DIAGNOSIS — Z93.3 COLOSTOMY STATUS (HCC): ICD-10-CM

## 2017-11-30 DIAGNOSIS — N18.30 TYPE 2 DIABETES MELLITUS WITH STAGE 3 CHRONIC KIDNEY DISEASE, WITH LONG-TERM CURRENT USE OF INSULIN (HCC): ICD-10-CM

## 2017-11-30 DIAGNOSIS — Z79.4 TYPE 2 DIABETES MELLITUS WITH STAGE 3 CHRONIC KIDNEY DISEASE, WITH LONG-TERM CURRENT USE OF INSULIN (HCC): ICD-10-CM

## 2017-11-30 DIAGNOSIS — N18.30 STAGE 3 CHRONIC KIDNEY DISEASE (HCC): ICD-10-CM

## 2017-11-30 DIAGNOSIS — I10 BENIGN ESSENTIAL HTN: ICD-10-CM

## 2017-11-30 DIAGNOSIS — J43.1 PANLOBULAR EMPHYSEMA (HCC): ICD-10-CM

## 2017-11-30 PROCEDURE — 99213 OFFICE O/P EST LOW 20 MIN: CPT | Performed by: NURSE PRACTITIONER

## 2017-11-30 RX ORDER — HYDRALAZINE HYDROCHLORIDE 50 MG/1
25 TABLET, FILM COATED ORAL 3 TIMES DAILY
Qty: 90 TABLET | Refills: 0 | Status: SHIPPED
Start: 2017-11-30 | End: 2017-11-30 | Stop reason: SINTOL

## 2017-11-30 RX ORDER — SODIUM CHLORIDE 1000 MG
1 TABLET, SOLUBLE MISCELLANEOUS 2 TIMES DAILY
Qty: 90 TABLET | Refills: 3 | Status: ON HOLD | OUTPATIENT
Start: 2017-11-30 | End: 2019-08-09 | Stop reason: HOSPADM

## 2017-11-30 NOTE — PATIENT INSTRUCTIONS
Hyponatremia. Acute on chronic, Etiology includes dehydration from watery diarrhea from colostomy. Noted elevated BUN/Creatinine ratio. Pt advised to drink more water. Will start salt tabs 1 gm twice daily. Consider lung pathology with history of tobacco use. BUt given pt age and his refusal, will not proceed with further work up. -     Basic Metabolic Panel; Future    Stage 3 chronic kidney disease, Likely 2nd to DM and HTN, Overall stable  -     Basic Metabolic Panel; Future    Prostate [facility]) s/p radiation    Colostomy status ([facility] [address] Hx ulcerative colitis    S/P CABG (coronary artery bypass graft)    Type 2 diabetes mellitus with stage 3 chronic kidney disease, with long-term current use of insulin (HCC)    Panlobular emphysema (HCC)    Benign essential HTN, Overcorrected, Advised to decrease hydralazine from 50 to 25 mg twice daily. Other specified hypothyroidism, TSH ok    Chronic diastolic HF    Other orders  -     Stop Hydralazine  -     sodium chloride 1 g tablet; Take 1 tablet by mouth 2 times daily    Avoid nonsteroidal anti inflammatory drugs such as Ibuprofen, Aleve, Advil, Motrin. Schedule for follow up appt in 2 months. Pt asked to bring pill bottles to next office visit. Please make early appointment if needed and to call office for questions, concerns, persistent, changing or worsening symptoms. Discussed with the patient and answered their questions     Pt was seen and evaluated by Dr Parish Phoenix.   Shirin Chaudhary. RICH., C.N.P.

## 2017-11-30 NOTE — PROGRESS NOTES
Pauly Cee is a 80 y. o.oldmale came as New consult regarding his chronic kidney disease, and Hyponatremia,  Mare Fofana's most recent creatinine is 1.5 and has been stable over past one year. His creatinine has increased slightly past few years. His Na chronically runs low. Noted Na 133 in 2011, 131 in 2014, 128 in Oct 2017, 123 and 125 in Nov 9, 2017. The pt states compliance with meds and denies adverse effects. , including his thyroid meds. The pt does check home BP. The BP was 136/60 at Dr Teto Bartlett recent office visit and is 118/ today. . Denies dysuria, frequency, hesitancy, urgency or hematuria. History of urinary retention and has seen Urologist in the past. S/P prostate cancer with radiation. denies edema. The pt denies use of NSAIDs. Has a good appetite, The pt denies Nausea. Pt has colostomy with recent watery diarrhea stools last 2 days. He feels tired, Reports dizziness and unsteady on his feet. He reports fair appetite, Drinks 3-4 glasses water per day, 1-2 cups coffee in AM. Denies alcohol use. Hx tobacco use 1 PPD x 40 yrs. Hx of COPD.      Current Outpatient Prescriptions   Medication Sig Dispense Refill    hydrALAZINE (APRESOLINE) 50 MG tablet Take 0.5 tablets by mouth 3 times daily 90 tablet 0    sodium chloride 1 g tablet Take 1 tablet by mouth 2 times daily 90 tablet 3    acetaminophen (TYLENOL) 325 MG tablet Take 2 tablets by mouth every 4 hours as needed for Pain 120 tablet 3    albuterol sulfate  (90 Base) MCG/ACT inhaler Inhale 2 puffs into the lungs every 6 hours as needed for Wheezing 1 Inhaler 0    glimepiride (AMARYL) 4 MG tablet Take 1 tablet by mouth daily 30 tablet 3    insulin aspart (NOVOLOG) 100 UNIT/ML injection vial Inject 2 Units into the skin 3 times daily (before meals) And sliding scale 1 vial 3    carvedilol (COREG) 3.125 MG tablet Take 2 tablets by mouth 2 times daily (with meals) 60 tablet 3    levothyroxine (SYNTHROID) 50 MCG tablet Take 50 mcg by mouth every morning      insulin glargine (LANTUS) 100 UNIT/ML injection Inject 40 Units into the skin every morning.  lisinopril (PRINIVIL;ZESTRIL) 20 MG tablet Take 1 tablet by mouth daily 30 tablet 0     No current facility-administered medications for this visit. PAST MEDICAL HISTORY:       Diagnosis Date    Arthritis     CAD (coronary artery disease)     s/p CABG    Cancer ([facility] [address]     prostate    Diabetes mellitus ([facility]     GERD (gastroesophageal reflux disease)     Hyperlipidemia     Hypertension     Hypothyroid      SURGICAL HISTORY:    Past Surgical History:   Procedure Laterality Date    ABDOMEN SURGERY      APPENDECTOMY      CARDIAC SURGERY      COLECTOMY      COLONOSCOPY      COLOSTOMY      CORONARY ARTERY BYPASS GRAFT      triple to Distal RCA, first OM, and LIMA to diagonal by Dr Ranjit Ken, [address]      bilateral cataracts    JOINT REPLACEMENT      KNEE SURGERY      left total knee and right total knee    TONSILLECTOMY      VASCULAR SURGERY      cabg harvests from legs     FAMILY HISTORY:       Problem Relation Age of Onset   Lucy Hunter Cancer Mother     Asthma Father      ALLERGIES:  No Known Allergies  Social and Occupational History:    TOBACCO:   reports that he has quit smoking. He quit after 25.00 years of use. He does not have any smokeless tobacco history on file. ETOH:   reports that he does not drink alcohol. REVIEW OF SYSTEMS:   GENERAL: Usual state of health. Reports 10 lb wt loss last few months. HEENT: Denies recent vision change, Denies Upper respiratory complaints  CARDIOVASCULAR: Denies chest pain/angina. RESPIRATORY:Reports sob, cough, wheezing  ABDOMEN: Denies nausea, vomiting, or abdominal pain  CNS:Denies headache.  Reports intermittent dizziness  MUSCULOSKELETAL: Reports joint arthralgia  SKIN: Denies rash, pruritis  HEMATOLOGIC: Denies bruising  ENDOCRINE:  Negative for heat or cold intolerance     EXAM:  VITALS:  BP of fibrotic changes of the left lung base are again evident. Stable appearance of the cardiac silhouette and mediastinum. Stable osseous framework. STABLE CHRONIC FINDINGS; NO ACUTE CARDIOPULMONARY PROCESS. Summary 6/7/17   Technically difficult examination. This is a suboptimal study due to poor echocardiographic window. Normal left ventricle size and systolic function. Ejection fraction was   estimated at 50-55%. wall thickness was within normal limits. Unable to determine wall motion abnormalities due to poor image quality. Features were consistent with a pseudonormal left ventricular filling   pattern, with concomitant abnormal relaxation and increased filling   pressure (grade 2 diastolic dysfunction). Aortic valve appears tricuspid. Aortic valve leaflets are somewhat   thickened. Aortic valve leaflets are Mildly calcified. Mild aortic   regurgitation is noted. Assessment:    Emiliano Harper was seen today for new patient. Diagnoses and all orders for this visit:    Hyponatremia. Acute on chronic, Etiology includes dehydration from watery diarrhea from colostomy. Noted elevated BUN/Creatinine ratio. Pt advised to drink more water. Will start salt tabs 1 gm twice daily. Consider lung pathology with history of tobacco use. But given pt age and his refusal, will not proceed with further work up. -     Basic Metabolic Panel; Future    Stage 3 chronic kidney disease, Likely 2nd to DM and HTN, Overall stable  -     Basic Metabolic Panel; Future    Prostate CA [facility]) s/p radiation    Colostomy status ([facility] [address] Hx ulcerative colitis    S/P CABG (coronary artery bypass graft)    Type 2 diabetes mellitus with stage 3 chronic kidney disease, with long-term current use of insulin (HCC)    Panlobular emphysema (HCC)    Benign essential HTN, Overcorrected, Stop hydralazine. Other specified hypothyroidism, TSH ok    Chronic Grade II diastolic HF    Other orders  -     sodium chloride 1 g tablet;  Take 1 tablet by mouth 2 times daily    Avoid nonsteroidal anti inflammatory drugs such as Ibuprofen, Aleve, Advil, Motrin. Schedule for follow up appt in 2 months. Pt asked to bring pill bottles to next office visit. Please make early appointment if needed and to call office for questions, concerns, persistent, changing or worsening symptoms. Discussed with the patient and answered their questions     Pt was seen and evaluated by Dr Jennifer Smith.   Louisa Hernandez. RICH., C.N.P.

## 2017-12-11 ENCOUNTER — HOSPITAL ENCOUNTER (INPATIENT)
Age: 82
LOS: 3 days | Discharge: HOME | DRG: 872 | End: 2017-12-14
Attending: INTERNAL MEDICINE | Admitting: INTERNAL MEDICINE
Payer: MEDICARE

## 2017-12-11 ENCOUNTER — APPOINTMENT (OUTPATIENT)
Dept: GENERAL RADIOLOGY | Age: 82
DRG: 872 | End: 2017-12-11
Payer: MEDICARE

## 2017-12-11 ENCOUNTER — CARE COORDINATOR VISIT (OUTPATIENT)
Dept: CASE MANAGEMENT | Age: 82
End: 2017-12-11

## 2017-12-11 ENCOUNTER — APPOINTMENT (OUTPATIENT)
Dept: CT IMAGING | Age: 82
DRG: 872 | End: 2017-12-11
Payer: MEDICARE

## 2017-12-11 DIAGNOSIS — R19.7 DIARRHEA, UNSPECIFIED TYPE: Primary | ICD-10-CM

## 2017-12-11 DIAGNOSIS — N18.9 CHRONIC KIDNEY DISEASE, UNSPECIFIED CKD STAGE: ICD-10-CM

## 2017-12-11 DIAGNOSIS — R77.8 ELEVATED TROPONIN: ICD-10-CM

## 2017-12-11 DIAGNOSIS — E87.1 HYPONATREMIA WITH DECREASED SERUM OSMOLALITY: ICD-10-CM

## 2017-12-11 DIAGNOSIS — R53.1 GENERAL WEAKNESS: ICD-10-CM

## 2017-12-11 PROBLEM — A41.9 SEPSIS DUE TO URINARY TRACT INFECTION (HCC): Status: ACTIVE | Noted: 2017-12-11

## 2017-12-11 PROBLEM — N39.0 SEPSIS DUE TO URINARY TRACT INFECTION (HCC): Status: ACTIVE | Noted: 2017-12-11

## 2017-12-11 LAB
ALBUMIN SERPL-MCNC: 3.1 G/DL (ref 3.5–5.1)
ALP BLD-CCNC: 78 U/L (ref 38–126)
ALT SERPL-CCNC: 20 U/L (ref 11–66)
ANION GAP SERPL CALCULATED.3IONS-SCNC: 14 MEQ/L (ref 8–16)
ANISOCYTOSIS: ABNORMAL
AST SERPL-CCNC: 22 U/L (ref 5–40)
BACTERIA: ABNORMAL /HPF
BASOPHILS # BLD: 0.1 %
BASOPHILS ABSOLUTE: 0 THOU/MM3 (ref 0–0.1)
BILIRUB SERPL-MCNC: 1.3 MG/DL (ref 0.3–1.2)
BILIRUBIN URINE: NEGATIVE
BLOOD, URINE: ABNORMAL
BUN BLDV-MCNC: 25 MG/DL (ref 7–22)
CALCIUM SERPL-MCNC: 8.6 MG/DL (ref 8.5–10.5)
CASTS 2: ABNORMAL /LPF
CASTS UA: ABNORMAL /LPF
CHARACTER, URINE: ABNORMAL
CHLORIDE BLD-SCNC: 90 MEQ/L (ref 98–111)
CO2: 21 MEQ/L (ref 23–33)
COLOR: ABNORMAL
CREAT SERPL-MCNC: 1.5 MG/DL (ref 0.4–1.2)
CRYSTALS, UA: ABNORMAL
EKG ATRIAL RATE: 70 BPM
EKG P AXIS: 74 DEGREES
EKG P-R INTERVAL: 156 MS
EKG Q-T INTERVAL: 394 MS
EKG QRS DURATION: 90 MS
EKG QTC CALCULATION (BAZETT): 425 MS
EKG R AXIS: -17 DEGREES
EKG T AXIS: 58 DEGREES
EKG VENTRICULAR RATE: 70 BPM
EOSINOPHIL # BLD: 0.6 %
EOSINOPHILS ABSOLUTE: 0.1 THOU/MM3 (ref 0–0.4)
EPITHELIAL CELLS, UA: ABNORMAL /HPF
GFR SERPL CREATININE-BSD FRML MDRD: 44 ML/MIN/1.73M2
GLUCOSE BLD-MCNC: 185 MG/DL (ref 70–108)
GLUCOSE BLD-MCNC: 187 MG/DL (ref 70–108)
GLUCOSE BLD-MCNC: 268 MG/DL (ref 70–108)
GLUCOSE URINE: NEGATIVE MG/DL
HCT VFR BLD CALC: 30.1 % (ref 42–52)
HEMOGLOBIN: 9.9 GM/DL (ref 14–18)
KETONES, URINE: NEGATIVE
LACTIC ACID: 1 MMOL/L (ref 0.5–2.2)
LACTIC ACID: 1.2 MMOL/L (ref 0.5–2.2)
LEUKOCYTE ESTERASE, URINE: ABNORMAL
LYMPHOCYTES # BLD: 4.7 %
LYMPHOCYTES ABSOLUTE: 0.8 THOU/MM3 (ref 1–4.8)
MAGNESIUM: 1.6 MG/DL (ref 1.6–2.4)
MCH RBC QN AUTO: 28.1 PG (ref 27–31)
MCHC RBC AUTO-ENTMCNC: 32.9 GM/DL (ref 33–37)
MCV RBC AUTO: 85.5 FL (ref 80–94)
MISCELLANEOUS 2: ABNORMAL
MONOCYTES # BLD: 10.1 %
MONOCYTES ABSOLUTE: 1.7 THOU/MM3 (ref 0.4–1.3)
NITRITE, URINE: NEGATIVE
NUCLEATED RED BLOOD CELLS: 0 /100 WBC
OSMOLALITY CALCULATION: 260.8 MOSMOL/KG (ref 275–300)
PDW BLD-RTO: 19.1 % (ref 11.5–14.5)
PH UA: 5
PLATELET # BLD: 362 THOU/MM3 (ref 130–400)
PMV BLD AUTO: 7.7 MCM (ref 7.4–10.4)
POTASSIUM SERPL-SCNC: 4.8 MEQ/L (ref 3.5–5.2)
PROTEIN UA: 100
RBC # BLD: 3.52 MILL/MM3 (ref 4.7–6.1)
RBC URINE: ABNORMAL /HPF
RENAL EPITHELIAL, UA: ABNORMAL
SEG NEUTROPHILS: 84.5 %
SEGMENTED NEUTROPHILS ABSOLUTE COUNT: 14.6 THOU/MM3 (ref 1.8–7.7)
SODIUM BLD-SCNC: 125 MEQ/L (ref 135–145)
SPECIFIC GRAVITY, URINE: 1.02 (ref 1–1.03)
T4 FREE: 0.91 NG/DL (ref 0.93–1.76)
TOTAL PROTEIN: 6.3 G/DL (ref 6.1–8)
TROPONIN T: 0.02 NG/ML
TSH SERPL DL<=0.05 MIU/L-ACNC: 3.27 UIU/ML (ref 0.4–4.2)
UROBILINOGEN, URINE: 0.2 EU/DL
WBC # BLD: 17.3 THOU/MM3 (ref 4.8–10.8)
WBC UA: > 200 /HPF
YEAST: ABNORMAL

## 2017-12-11 PROCEDURE — 1200000003 HC TELEMETRY R&B

## 2017-12-11 PROCEDURE — 6370000000 HC RX 637 (ALT 250 FOR IP): Performed by: INTERNAL MEDICINE

## 2017-12-11 PROCEDURE — 80050 GENERAL HEALTH PANEL: CPT

## 2017-12-11 PROCEDURE — 99285 EMERGENCY DEPT VISIT HI MDM: CPT

## 2017-12-11 PROCEDURE — 84439 ASSAY OF FREE THYROXINE: CPT

## 2017-12-11 PROCEDURE — 93005 ELECTROCARDIOGRAM TRACING: CPT

## 2017-12-11 PROCEDURE — 36415 COLL VENOUS BLD VENIPUNCTURE: CPT

## 2017-12-11 PROCEDURE — 83735 ASSAY OF MAGNESIUM: CPT

## 2017-12-11 PROCEDURE — 84484 ASSAY OF TROPONIN QUANT: CPT

## 2017-12-11 PROCEDURE — 87086 URINE CULTURE/COLONY COUNT: CPT

## 2017-12-11 PROCEDURE — 71020 XR CHEST STANDARD TWO VW: CPT

## 2017-12-11 PROCEDURE — 87186 SC STD MICRODIL/AGAR DIL: CPT

## 2017-12-11 PROCEDURE — 6360000002 HC RX W HCPCS: Performed by: INTERNAL MEDICINE

## 2017-12-11 PROCEDURE — 87077 CULTURE AEROBIC IDENTIFY: CPT

## 2017-12-11 PROCEDURE — 87040 BLOOD CULTURE FOR BACTERIA: CPT

## 2017-12-11 PROCEDURE — 70450 CT HEAD/BRAIN W/O DYE: CPT

## 2017-12-11 PROCEDURE — 83605 ASSAY OF LACTIC ACID: CPT

## 2017-12-11 PROCEDURE — 87184 SC STD DISK METHOD PER PLATE: CPT

## 2017-12-11 PROCEDURE — 82948 REAGENT STRIP/BLOOD GLUCOSE: CPT

## 2017-12-11 PROCEDURE — 2580000003 HC RX 258: Performed by: INTERNAL MEDICINE

## 2017-12-11 PROCEDURE — 81001 URINALYSIS AUTO W/SCOPE: CPT

## 2017-12-11 RX ORDER — SODIUM CHLORIDE 1000 MG
1 TABLET, SOLUBLE MISCELLANEOUS 2 TIMES DAILY
Status: DISCONTINUED | OUTPATIENT
Start: 2017-12-11 | End: 2017-12-14 | Stop reason: HOSPADM

## 2017-12-11 RX ORDER — LEVOTHYROXINE SODIUM 0.05 MG/1
50 TABLET ORAL EVERY MORNING
Status: DISCONTINUED | OUTPATIENT
Start: 2017-12-12 | End: 2017-12-14 | Stop reason: HOSPADM

## 2017-12-11 RX ORDER — ALBUTEROL SULFATE 90 UG/1
2 AEROSOL, METERED RESPIRATORY (INHALATION) EVERY 6 HOURS PRN
Status: DISCONTINUED | OUTPATIENT
Start: 2017-12-11 | End: 2017-12-14 | Stop reason: HOSPADM

## 2017-12-11 RX ORDER — GLIMEPIRIDE 4 MG/1
4 TABLET ORAL DAILY
Status: DISCONTINUED | OUTPATIENT
Start: 2017-12-11 | End: 2017-12-11 | Stop reason: DRUGHIGH

## 2017-12-11 RX ORDER — ACETAMINOPHEN 325 MG/1
650 TABLET ORAL EVERY 4 HOURS PRN
Status: DISCONTINUED | OUTPATIENT
Start: 2017-12-11 | End: 2017-12-14 | Stop reason: HOSPADM

## 2017-12-11 RX ORDER — LISINOPRIL 20 MG/1
20 TABLET ORAL DAILY
Status: DISCONTINUED | OUTPATIENT
Start: 2017-12-11 | End: 2017-12-13 | Stop reason: ALTCHOICE

## 2017-12-11 RX ORDER — SODIUM CHLORIDE 9 MG/ML
INJECTION, SOLUTION INTRAVENOUS CONTINUOUS
Status: DISCONTINUED | OUTPATIENT
Start: 2017-12-11 | End: 2017-12-13

## 2017-12-11 RX ORDER — NICOTINE POLACRILEX 4 MG
15 LOZENGE BUCCAL PRN
Status: DISCONTINUED | OUTPATIENT
Start: 2017-12-11 | End: 2017-12-14 | Stop reason: HOSPADM

## 2017-12-11 RX ORDER — SODIUM CHLORIDE 0.9 % (FLUSH) 0.9 %
10 SYRINGE (ML) INJECTION EVERY 12 HOURS SCHEDULED
Status: DISCONTINUED | OUTPATIENT
Start: 2017-12-11 | End: 2017-12-14 | Stop reason: HOSPADM

## 2017-12-11 RX ORDER — CARVEDILOL 3.12 MG/1
3.12 TABLET ORAL 2 TIMES DAILY WITH MEALS
Status: DISCONTINUED | OUTPATIENT
Start: 2017-12-11 | End: 2017-12-14 | Stop reason: HOSPADM

## 2017-12-11 RX ORDER — DEXTROSE AND SODIUM CHLORIDE 5; .9 G/100ML; G/100ML
100 INJECTION, SOLUTION INTRAVENOUS PRN
Status: DISCONTINUED | OUTPATIENT
Start: 2017-12-11 | End: 2017-12-14 | Stop reason: HOSPADM

## 2017-12-11 RX ORDER — DEXTROSE MONOHYDRATE 25 G/50ML
12.5 INJECTION, SOLUTION INTRAVENOUS PRN
Status: DISCONTINUED | OUTPATIENT
Start: 2017-12-11 | End: 2017-12-14 | Stop reason: HOSPADM

## 2017-12-11 RX ORDER — GLIMEPIRIDE 4 MG/1
4 TABLET ORAL 2 TIMES DAILY WITH MEALS
Status: DISCONTINUED | OUTPATIENT
Start: 2017-12-11 | End: 2017-12-14 | Stop reason: HOSPADM

## 2017-12-11 RX ORDER — INSULIN GLARGINE 100 [IU]/ML
40 INJECTION, SOLUTION SUBCUTANEOUS EVERY MORNING
Status: DISCONTINUED | OUTPATIENT
Start: 2017-12-12 | End: 2017-12-14

## 2017-12-11 RX ORDER — SODIUM CHLORIDE 0.9 % (FLUSH) 0.9 %
10 SYRINGE (ML) INJECTION PRN
Status: DISCONTINUED | OUTPATIENT
Start: 2017-12-11 | End: 2017-12-14 | Stop reason: HOSPADM

## 2017-12-11 RX ADMIN — INSULIN LISPRO 2 UNITS: 100 INJECTION, SOLUTION INTRAVENOUS; SUBCUTANEOUS at 20:06

## 2017-12-11 RX ADMIN — CARVEDILOL 3.12 MG: 3.12 TABLET, FILM COATED ORAL at 17:26

## 2017-12-11 RX ADMIN — ENOXAPARIN SODIUM 40 MG: 40 INJECTION SUBCUTANEOUS at 15:52

## 2017-12-11 RX ADMIN — CEFTRIAXONE SODIUM 1 G: 10 INJECTION, POWDER, FOR SOLUTION INTRAVENOUS at 17:17

## 2017-12-11 RX ADMIN — SODIUM CHLORIDE: 9 INJECTION, SOLUTION INTRAVENOUS at 15:49

## 2017-12-11 RX ADMIN — Medication 1 UNITS: at 17:22

## 2017-12-11 RX ADMIN — GLIMEPIRIDE 4 MG: 4 TABLET ORAL at 17:20

## 2017-12-11 RX ADMIN — LISINOPRIL 20 MG: 20 TABLET ORAL at 15:50

## 2017-12-11 RX ADMIN — SODIUM CHLORIDE TAB 1 GM 1 G: 1 TAB at 20:07

## 2017-12-11 ASSESSMENT — ENCOUNTER SYMPTOMS
PHOTOPHOBIA: 0
EYE REDNESS: 0
ABDOMINAL DISTENTION: 0
ABDOMINAL PAIN: 0
RHINORRHEA: 0
COUGH: 0
SHORTNESS OF BREATH: 0
FACIAL SWELLING: 0
EYE DISCHARGE: 0
VOMITING: 0
NAUSEA: 0
COLOR CHANGE: 0
STRIDOR: 0
DIARRHEA: 0

## 2017-12-11 NOTE — PROGRESS NOTES
Tried calling patient's spouse and daughter at [phone] to update them and to figure out Amaryl dose for pharmacy. No answer. Will try again.

## 2017-12-11 NOTE — PLAN OF CARE
Problem: GI  Goal: No bowel complications  Outcome: Ongoing  In to see patient to change his colostomy pouching system due to it having been on for 10 days and has stool on the outside. One piece paul flat system removed. Skin cleansed with warm wet wash cloth and dried. Peristomal skin intact. Stoma red moist and functioning. Loose brownish green stool emptied from pouch. Stoma oval and fairly flush. Coloplast one piece convex system obtained and cut to fit the stoma at 43mm. Patient states he uses cement and paste so this was also obtained and applied. New hope cement to flange and peristomal skin. Stoma paste to the cut edge. Pouching system applied to patient and taught to open and close coloplast pouch. Supplies at bedside. Will cont to follow.

## 2017-12-11 NOTE — H&P
111 meq/L    CO2 21 (L) 23 - 33 meq/L    Calcium 8.6 8.5 - 10.5 mg/dL    AST 22 5 - 40 U/L    Alkaline Phosphatase 78 38 - 126 U/L    Total Protein 6.3 6.1 - 8.0 g/dL    Alb 3.1 (L) 3.5 - 5.1 g/dL    Total Bilirubin 1.3 (H) 0.3 - 1.2 mg/dL    ALT 20 11 - 66 U/L   Troponin    Collection Time: 12/11/17 10:07 AM   Result Value Ref Range    Troponin T 0.022 (A) ng/ml   TSH with Reflex    Collection Time: 12/11/17 10:07 AM   Result Value Ref Range    TSH 3.270 0.400 - 4.20 uIU/mL   Magnesium    Collection Time: 12/11/17 10:07 AM   Result Value Ref Range    Magnesium 1.6 1.6 - 2.4 mg/dL   Anion Gap    Collection Time: 12/11/17 10:07 AM   Result Value Ref Range    Anion Gap 14.0 8.0 - 16.0 meq/L   Glomerular Filtration Rate, Estimated    Collection Time: 12/11/17 10:07 AM   Result Value Ref Range    Est, Glom Filt Rate 44 (A) ml/min/1.73m2   Osmolality    Collection Time: 12/11/17 10:07 AM   Result Value Ref Range    Osmolality Calc 260.8 (L) 275.0 - 300 mOsmol/kg   Urine with Reflexed Micro    Collection Time: 12/11/17 12:09 PM   Result Value Ref Range    Glucose, Ur NEGATIVE NEGATIVE mg/dl    Bilirubin Urine NEGATIVE NEGATIVE    Ketones, Urine NEGATIVE NEGATIVE    Specific Gravity, Urine 1.018 1.002 - 1.03    Blood, Urine SMALL (A) NEGATIVE    pH, UA 5.0 5.0 - 9.0    Protein,  (A) NEGATIVE    Urobilinogen, Urine 0.2 0.0 - 1.0 eu/dl    Nitrite, Urine NEGATIVE NEGATIVE    Leukocyte Esterase, Urine LARGE (A) NEGATIVE    Color, UA DK YELLOW (A) STRAW-YELL    Character, Urine TURBID (A) CLEAR-SL C    RBC, UA 10-15 0-2/hpf /hpf    WBC, UA > 200 0-4/hpf /hpf    Epi Cells 0-2 3-5/hpf /hpf    Bacteria, UA MANY FEW/NONE S /hpf    Casts UA NONE SEEN NONE SEEN /lpf    Crystals NONE SEEN NONE SEEN    Renal Epithelial, Urine NONE SEEN NONE SEEN    Yeast, UA NONE SEEN NONE SEEN    CASTS 2 NONE SEEN NONE SEEN /lpf    MISCELLANEOUS 2 NONE SEEN        Radiology:     Xr Chest Standard (2 Vw)    Result Date: 12/11/2017  PROCEDURE: XR slightly larger than the left. Vascular calcifications are redemonstrated in the distal vertebral arteries and distal internal carotid arteries to indicate evidence of intracranial arthrosclerosis. Stable osseous framework. Minimal mucosal abnormalities are present in the maxillary sinuses without air-fluid level. Overlying soft tissues are satisfactory. NO CT EVIDENCE OF ACUTE BRAIN PROCESS; STABLE EXAM, WITH INCIDENTAL FINDINGS AS DISCUSSED. **This report has been created using voice recognition software. It may contain minor errors which are inherent in voice recognition technology. ** Final report electronically signed by Dr. Nina Scherer on 12/11/2017 10:47 AM        EKG: no change since previous ECG dated      Assessment:    Principal Problem:    Sepsis due to urinary tract infection ([address]  Active Problems:    Hyponatremia with decreased serum osmolality      Plan:  1. Admit for IP management  2. Continue IV antibiotics for UTI, follow up cultures  3. IV rehydration with NS at 75 ml/hr; monitor Na levels  4. Follow up free T4 levels  5. Resume home medications as clinically indicated  6.  PT / OT       DVT prophylaxis: [] Lovenox                                 [] SCDs                                 [x] SQ Heparin                                 [] Encourage ambulation, low risk for DVT, no chemical or mechanical prophylaxis necessary              [] Already on Anticoagulation                Anticipated Disposition upon discharge: [x] Home                                                                         [x] Home with Home Health                                                                         [] [facility]                                                                         [] [address]          Electronically signed by Antione Munoz MD on 12/11/2017 at 1:43 PM

## 2017-12-11 NOTE — ED PROVIDER NOTES
[facility] ICU STEPDOWN TELEMETRY 4K      CHIEF COMPLAINT       Chief Complaint   Patient presents with    Fatigue    Dizziness    Diarrhea       Nurses Notes reviewed and I agree except as noted in the HPI. HISTORY OF PRESENT ILLNESS    Angelina De Leon is a 80 y.o. male who presents to the ED for evaluation of generalized weakness and dizziness. The patient states he woke up this morning and \"didnt feel well\". He states when he walks he feels as though he is going to fall. He states this has been going on intermittently for \"awhile\". The patient lives at home with his family. He denies chest pain, abdominal pain, headache, visual changes, nausea, vomiting, fever, cold symptoms or urinary symptoms. He reports diarrhea for two days but denies any blood in his stool. The patient has a colostomy in place. Location/Symptom: weakness and dizziness  Timing/Onset: this morning  Context/Setting: woke up stating he \"didnt feel well\" and states he feels as though he will fall if he tries to walk  Quality: weak and dizzy  Duration: constant  Severity: moderate    REVIEW OF SYSTEMS     Review of Systems   Constitutional: Negative for chills, diaphoresis and fever. HENT: Negative for ear discharge, facial swelling and rhinorrhea. Eyes: Negative for photophobia, discharge, redness and visual disturbance. Respiratory: Negative for cough, shortness of breath and stridor. Cardiovascular: Negative for chest pain, palpitations and leg swelling. Gastrointestinal: Negative for abdominal distention, abdominal pain, diarrhea, nausea and vomiting. Genitourinary: Negative for difficulty urinating, dysuria, frequency and hematuria. Musculoskeletal: Negative for gait problem and joint swelling. Skin: Negative for color change and rash (on exposed body surfaces). Neurological: Positive for dizziness and weakness. Negative for tremors, syncope and headaches.    Psychiatric/Behavioral: Negative for agitation and confusion. The patient is not nervous/anxious. PAST MEDICAL HISTORY    has a past medical history of Arthritis; CAD (coronary artery disease); Cancer ([facility] [address] Diabetes mellitus ([facility] [address] GERD (gastroesophageal reflux disease); Hyperlipidemia; Hypertension; and Hypothyroid. SURGICAL HISTORY      has a past surgical history that includes Coronary artery bypass graft; knee surgery; colostomy; colectomy; Abdomen surgery; Cardiac surgery; joint replacement; vascular surgery; Tonsillectomy; Colonoscopy; Appendectomy; eye surgery; and Dilatation, esophagus. CURRENT MEDICATIONS       Discharge Medication List as of 2017  3:47 PM      CONTINUE these medications which have NOT CHANGED    Details   sodium chloride 1 g tablet Take 1 tablet by mouth 2 times daily, Disp-90 tablet, R-3Normal      glimepiride (AMARYL) 4 MG tablet Take 1 tablet by mouth daily, Disp-30 tablet, R-3NO PRINT      insulin aspart (NOVOLOG) 100 UNIT/ML injection vial Inject 2 Units into the skin 3 times daily (before meals) And sliding scale, Disp-1 vial, R-3Normal      levothyroxine (SYNTHROID) 50 MCG tablet Take 50 mcg by mouth every morning      acetaminophen (TYLENOL) 325 MG tablet Take 2 tablets by mouth every 4 hours as needed for Pain, Disp-120 tablet, R-3OTC      albuterol sulfate  (90 Base) MCG/ACT inhaler Inhale 2 puffs into the lungs every 6 hours as needed for Wheezing, Disp-1 Inhaler, R-0Print             ALLERGIES     has No Known Allergies. FAMILY HISTORY     indicated that his mother is . He indicated that his father is . He indicated that his sister is . family history includes Asthma in his father; Cancer in his mother. SOCIAL HISTORY      reports that he has quit smoking. He quit after 25.00 years of use. He has never used smokeless tobacco. He reports that he does not drink alcohol or use drugs.     PHYSICAL EXAM     INITIAL VITALS:  height is 5' 10\" (1.778 m) and weight is 224 lb abnormality    DIAGNOSTIC RESULTS     EKG: All EKG's are interpreted by the Emergency Department Physician who either signs or Co-signs this chart in the absence of a cardiologist.  Monique Mayer. Rate: 70 bpm  TN interval: 156 ms  QRS duration: 90 ms  QTc: 425 ms  P-R-T axes: 74, -17, 58  Sinus rhythm with occasional PVC's  No STEMI  Compared to old EKG on 12-Nov-2017    RADIOLOGY: non-plain film images(s) such as CT, Ultrasound and MRI are read by the radiologist.  Plain radiographic images are visualized and preliminarily interpreted by the emergency physician unless otherwise stated below. US RENAL COMPLETE   Final Result   1. Normal appearance of the kidneys with significantly elevated resistive indices bilaterally. 2. Cholelithiasis            **This report has been created using voice recognition software. It may contain minor errors which are inherent in voice recognition technology. **      Final report electronically signed by Dr. Malia Morris on 12/12/2017 8:10 PM      CT HEAD WO CONTRAST   Final Result   NO CT EVIDENCE OF ACUTE BRAIN PROCESS; STABLE EXAM, WITH INCIDENTAL FINDINGS AS DISCUSSED. **This report has been created using voice recognition software. It may contain minor errors which are inherent in voice recognition technology. **            Final report electronically signed by Dr. Jeffery Johnson on 12/11/2017 10:47 AM      XR CHEST STANDARD (2 VW)   Final Result   Minimal bibasilar atelectasis/infiltrate. **This report has been created using voice recognition software. It may contain minor errors which are inherent in voice recognition technology. **      Final report electronically signed by Dr. Candace Desouza on 12/11/2017 10:42 AM          LABS:   Labs Reviewed   URINE CULTURE, REFLEXED - Abnormal; Notable for the following:        Result Value    Organism Klebsiella pneumoniae (*)     All other components within normal limits    Narrative:     Source: urine, clean catch       Site:           Current Antibiotics: Ceftriaxone,   Gentamicin   CULTURE BLOOD #1 - Abnormal; Notable for the following:     Blood Culture, Routine No growth-preliminary (*)     Organism Klebsiella pneumoniae (*)     All other components within normal limits    Narrative:     Source: blood-Adult-suboptimal <5.5oz./set volume       Site: Peripheral Vein            Current Antibiotics: not stated   CULTURE BLOOD #2 - Abnormal; Notable for the following:     Blood Culture, Routine No growth-preliminary (*)     Organism Klebsiella pneumoniae (*)     All other components within normal limits    Narrative:     Source: blood-Adult-suboptimal <5.5oz./set volume       Site: Peripheral Vein            Current Antibiotics: Ceftriaxone   CBC WITH AUTO DIFFERENTIAL - Abnormal; Notable for the following:     WBC 17.3 (*)     RBC 3.52 (*)     Hemoglobin 9.9 (*)     Hematocrit 30.1 (*)     MCHC 32.9 (*)     RDW 19.1 (*)     Segs Absolute 14.6 (*)     Lymphocytes # 0.8 (*)     Monocytes # 1.7 (*)     All other components within normal limits   COMPREHENSIVE METABOLIC PANEL - Abnormal; Notable for the following:     Glucose 187 (*)     CREATININE 1.5 (*)     BUN 25 (*)     Sodium 125 (*)     Chloride 90 (*)     CO2 21 (*)     Alb 3.1 (*)     Total Bilirubin 1.3 (*)     All other components within normal limits   TROPONIN - Abnormal; Notable for the following:     Troponin T 0.022 (*)     All other components within normal limits   GLOMERULAR FILTRATION RATE, ESTIMATED - Abnormal; Notable for the following:     Est, Glom Filt Rate 44 (*)     All other components within normal limits   OSMOLALITY - Abnormal; Notable for the following:     Osmolality Calc 260.8 (*)     All other components within normal limits   URINE WITH REFLEXED MICRO - Abnormal; Notable for the following:     Blood, Urine SMALL (*)     Protein,  (*)     Leukocyte Esterase, Urine LARGE (*)     Color, UA DK YELLOW (*)     Character, Urine TURBID (*) All other components within normal limits   T4, FREE - Abnormal; Notable for the following:     T4 Free 0.91 (*)     All other components within normal limits   BASIC METABOLIC PANEL - Abnormal; Notable for the following:     Sodium 131 (*)     CO2 19 (*)     Glucose 145 (*)     BUN 27 (*)     CREATININE 1.5 (*)     Calcium 8.4 (*)     All other components within normal limits   CBC WITH AUTO DIFFERENTIAL - Abnormal; Notable for the following:     WBC 12.4 (*)     RBC 3.28 (*)     Hemoglobin 9.4 (*)     Hematocrit 28.3 (*)     RDW 19.1 (*)     Segs Absolute 9.5 (*)     Monocytes # 1.6 (*)     All other components within normal limits   GLOMERULAR FILTRATION RATE, ESTIMATED - Abnormal; Notable for the following:     Est, Glom Filt Rate 44 (*)     All other components within normal limits   BASIC METABOLIC PANEL - Abnormal; Notable for the following:     Sodium 133 (*)     CO2 18 (*)     Glucose 67 (*)     BUN 25 (*)     CREATININE 1.5 (*)     Calcium 8.0 (*)     All other components within normal limits   GLOMERULAR FILTRATION RATE, ESTIMATED - Abnormal; Notable for the following:     Est, Glom Filt Rate 44 (*)     All other components within normal limits   BASIC METABOLIC PANEL - Abnormal; Notable for the following:     Sodium 134 (*)     CO2 19 (*)     Glucose 54 (*)     CREATININE 1.4 (*)     Calcium 8.1 (*)     All other components within normal limits   GLOMERULAR FILTRATION RATE, ESTIMATED - Abnormal; Notable for the following:     Est, Glom Filt Rate 48 (*)     All other components within normal limits   POCT GLUCOSE - Abnormal; Notable for the following:     POC Glucose 185 (*)     All other components within normal limits   POCT GLUCOSE - Abnormal; Notable for the following:     POC Glucose 268 (*)     All other components within normal limits   POCT GLUCOSE - Abnormal; Notable for the following:     POC Glucose 138 (*)     All other components within normal limits   POCT GLUCOSE - Abnormal; Notable for the following:     POC Glucose 214 (*)     All other components within normal limits   POCT GLUCOSE - Abnormal; Notable for the following:     POC Glucose 179 (*)     All other components within normal limits   POCT GLUCOSE - Abnormal; Notable for the following:     POC Glucose 164 (*)     All other components within normal limits   POCT GLUCOSE - Abnormal; Notable for the following:     POC Glucose 163 (*)     All other components within normal limits   POCT GLUCOSE - Abnormal; Notable for the following:     POC Glucose 60 (*)     All other components within normal limits   POCT GLUCOSE - Abnormal; Notable for the following:     POC Glucose 176 (*)     All other components within normal limits   POCT GLUCOSE - Abnormal; Notable for the following:     POC Glucose 193 (*)     All other components within normal limits   CULTURE STOOL    Narrative:     Source: feces       Site: cup          Current Antibiotics: Ceftriaxone   TSH WITH REFLEX   MAGNESIUM   ANION GAP   LACTIC ACID, PLASMA   LACTIC ACID, PLASMA   ANION GAP   OSMOLALITY, SERUM   OSMOLALITY, URINE   SODIUM, URINE, RANDOM   ANION GAP   ANION GAP   POCT GLUCOSE   POCT GLUCOSE   POCT GLUCOSE   POCT GLUCOSE       EMERGENCY DEPARTMENT COURSE:   Vitals:    Vitals:    12/14/17 0432 12/14/17 0810 12/14/17 1104 12/14/17 1337   BP: (!) 169/72 (!) 155/71 (!) 182/72 (!) 146/64   Pulse: 64 64 59    Resp: 18 18 18    Temp: 97.5 °F (36.4 °C) 98.1 °F (36.7 °C) 98.6 °F (37 °C)    TempSrc: Oral Oral Oral    SpO2: 96% 97% 93%    Weight: 224 lb (101.6 kg)      Height:         The patient presents today with dizziness and weakness. His vital signs are within acceptable range. The patient's CT head shows nothing acute. Chest Xray shows bibasilar atelectasis/Infiltrate. His lab work shows leukocytosis, hyponatremia, elevated creatinine and elevated troponin. The patient was given IV fluids and antibiotics.   Results and desire for admission were discussed with the patient and son

## 2017-12-11 NOTE — CARE COORDINATION
ED Care Transition    2017    Patient Name: Joy Arce   : 10/6/1928  MRN: [id number]      MYRON Score: 3  PCP: Akhil Reza MD   Specialist: yes - Dr. Polo Urrutia  Active ACC/CTC: no       Utilization Review:  ED visits:  6 - last 17  Admissions: 1 - last 10/29/17    Problem List:  Patient Active Problem List   Diagnosis    Lower urinary tract infectious disease    HTN (hypertension)    [facility]state CA ([facility]    Colon cancer ([facility]    Diabetes mellitus ([facility]    SHAYAN (acute kidney injury) ([facility]    Hypothyroidism    CAD (coronary artery disease)    Chest pain    Hyponatremia    Hyperkalemia    Anemia    Leukocytosis    Dyspnea    Type 2 diabetes mellitus with stage 3 chronic kidney disease, with long-term current use of insulin (HCC)    S/P CABG (coronary artery bypass graft)    CKD (chronic kidney disease)    Hyperlipidemia    Obesity    Syncope    Pyelonephritis    SIRS (systemic inflammatory response syndrome) ([facility])    Hyponatremia    SHAYAN (acute kidney injury) ([facility]    Colostomy status ([facility]    Panlobular emphysema ([facility] [facility]     Did you call your PCP before coming to the ED? No     Housing Review:  Current Housin-story house/ trailer  Who do you live with?   with family:  spouse, daughter and son       Are you an active caregiver in your home?   no    Medication Review:  Are you able to afford your meds? Yes  Do you take your medications as prescribed? Yes   Do you manage your medications? Yes    Social Support/Self Care:  Who helps you at home?  a good social support network  correction Support:  Family assistance Payor/Insurance Company        Active Skilled Services/Treatment:  None    Durable Medical Equipment:  Do you have any DME? none   Patient Home Respiratory Equipment no      Summary:  Spoke with Merline Forde, introduced self/role. Patient presents to ED for fatigue, dizziness, and diarrhea since yesterday.  Has colostomy, obtains supplies, company unknown per

## 2017-12-12 ENCOUNTER — APPOINTMENT (OUTPATIENT)
Dept: ULTRASOUND IMAGING | Age: 82
DRG: 872 | End: 2017-12-12
Payer: MEDICARE

## 2017-12-12 LAB
ANION GAP SERPL CALCULATED.3IONS-SCNC: 13 MEQ/L (ref 8–16)
ANISOCYTOSIS: ABNORMAL
BASOPHILS # BLD: 0.3 %
BASOPHILS ABSOLUTE: 0 THOU/MM3 (ref 0–0.1)
BUN BLDV-MCNC: 27 MG/DL (ref 7–22)
CALCIUM SERPL-MCNC: 8.4 MG/DL (ref 8.5–10.5)
CHLORIDE BLD-SCNC: 99 MEQ/L (ref 98–111)
CO2: 19 MEQ/L (ref 23–33)
CREAT SERPL-MCNC: 1.5 MG/DL (ref 0.4–1.2)
EOSINOPHIL # BLD: 2.4 %
EOSINOPHILS ABSOLUTE: 0.3 THOU/MM3 (ref 0–0.4)
GFR SERPL CREATININE-BSD FRML MDRD: 44 ML/MIN/1.73M2
GLUCOSE BLD-MCNC: 138 MG/DL (ref 70–108)
GLUCOSE BLD-MCNC: 145 MG/DL (ref 70–108)
GLUCOSE BLD-MCNC: 164 MG/DL (ref 70–108)
GLUCOSE BLD-MCNC: 179 MG/DL (ref 70–108)
GLUCOSE BLD-MCNC: 214 MG/DL (ref 70–108)
HCT VFR BLD CALC: 28.3 % (ref 42–52)
HEMOGLOBIN: 9.4 GM/DL (ref 14–18)
LYMPHOCYTES # BLD: 7.7 %
LYMPHOCYTES ABSOLUTE: 1 THOU/MM3 (ref 1–4.8)
MCH RBC QN AUTO: 28.5 PG (ref 27–31)
MCHC RBC AUTO-ENTMCNC: 33 GM/DL (ref 33–37)
MCV RBC AUTO: 86.4 FL (ref 80–94)
MONOCYTES # BLD: 12.9 %
MONOCYTES ABSOLUTE: 1.6 THOU/MM3 (ref 0.4–1.3)
NUCLEATED RED BLOOD CELLS: 0 /100 WBC
OSMOLALITY URINE: 513 MOSMOL/KG (ref 250–750)
OSMOLALITY: 282 MOSMOL/KG (ref 275–295)
PDW BLD-RTO: 19.1 % (ref 11.5–14.5)
PLATELET # BLD: 329 THOU/MM3 (ref 130–400)
PMV BLD AUTO: 7.8 MCM (ref 7.4–10.4)
POTASSIUM SERPL-SCNC: 4.6 MEQ/L (ref 3.5–5.2)
RBC # BLD: 3.28 MILL/MM3 (ref 4.7–6.1)
SEG NEUTROPHILS: 76.7 %
SEGMENTED NEUTROPHILS ABSOLUTE COUNT: 9.5 THOU/MM3 (ref 1.8–7.7)
SODIUM BLD-SCNC: 131 MEQ/L (ref 135–145)
SODIUM URINE: 35 MEQ/L
WBC # BLD: 12.4 THOU/MM3 (ref 4.8–10.8)

## 2017-12-12 PROCEDURE — 6370000000 HC RX 637 (ALT 250 FOR IP): Performed by: INTERNAL MEDICINE

## 2017-12-12 PROCEDURE — 6360000002 HC RX W HCPCS: Performed by: INTERNAL MEDICINE

## 2017-12-12 PROCEDURE — 85025 COMPLETE CBC W/AUTO DIFF WBC: CPT

## 2017-12-12 PROCEDURE — 1200000003 HC TELEMETRY R&B

## 2017-12-12 PROCEDURE — 80048 BASIC METABOLIC PNL TOTAL CA: CPT

## 2017-12-12 PROCEDURE — 82948 REAGENT STRIP/BLOOD GLUCOSE: CPT

## 2017-12-12 PROCEDURE — 83930 ASSAY OF BLOOD OSMOLALITY: CPT

## 2017-12-12 PROCEDURE — 2580000003 HC RX 258: Performed by: INTERNAL MEDICINE

## 2017-12-12 PROCEDURE — 76770 US EXAM ABDO BACK WALL COMP: CPT

## 2017-12-12 PROCEDURE — 84300 ASSAY OF URINE SODIUM: CPT

## 2017-12-12 PROCEDURE — 83935 ASSAY OF URINE OSMOLALITY: CPT

## 2017-12-12 PROCEDURE — 36415 COLL VENOUS BLD VENIPUNCTURE: CPT

## 2017-12-12 RX ORDER — GENTAMICIN SULFATE 100 MG/100ML
100 INJECTION, SOLUTION INTRAVENOUS ONCE
Status: COMPLETED | OUTPATIENT
Start: 2017-12-12 | End: 2017-12-12

## 2017-12-12 RX ADMIN — ENOXAPARIN SODIUM 40 MG: 40 INJECTION SUBCUTANEOUS at 17:53

## 2017-12-12 RX ADMIN — SODIUM CHLORIDE: 9 INJECTION, SOLUTION INTRAVENOUS at 20:59

## 2017-12-12 RX ADMIN — SODIUM CHLORIDE: 9 INJECTION, SOLUTION INTRAVENOUS at 09:40

## 2017-12-12 RX ADMIN — GENTAMICIN SULFATE 100 MG: 100 INJECTION, SOLUTION INTRAVENOUS at 20:49

## 2017-12-12 RX ADMIN — INSULIN LISPRO 1 UNITS: 100 INJECTION, SOLUTION INTRAVENOUS; SUBCUTANEOUS at 20:42

## 2017-12-12 RX ADMIN — CARVEDILOL 3.12 MG: 3.12 TABLET, FILM COATED ORAL at 07:59

## 2017-12-12 RX ADMIN — SODIUM CHLORIDE: 9 INJECTION, SOLUTION INTRAVENOUS at 03:34

## 2017-12-12 RX ADMIN — CEFTRIAXONE SODIUM 1 G: 10 INJECTION, POWDER, FOR SOLUTION INTRAVENOUS at 20:49

## 2017-12-12 RX ADMIN — CARVEDILOL 3.12 MG: 3.12 TABLET, FILM COATED ORAL at 17:48

## 2017-12-12 RX ADMIN — Medication 1 UNITS: at 17:48

## 2017-12-12 RX ADMIN — LEVOTHYROXINE SODIUM 50 MCG: 50 TABLET ORAL at 08:01

## 2017-12-12 RX ADMIN — LISINOPRIL 20 MG: 20 TABLET ORAL at 08:01

## 2017-12-12 RX ADMIN — GLIMEPIRIDE 4 MG: 4 TABLET ORAL at 08:00

## 2017-12-12 RX ADMIN — GLIMEPIRIDE 4 MG: 4 TABLET ORAL at 17:48

## 2017-12-12 RX ADMIN — SODIUM CHLORIDE TAB 1 GM 1 G: 1 TAB at 08:04

## 2017-12-12 RX ADMIN — CEFTRIAXONE SODIUM 1 G: 10 INJECTION, POWDER, FOR SOLUTION INTRAVENOUS at 16:25

## 2017-12-12 RX ADMIN — Medication 2 UNITS: at 12:30

## 2017-12-12 RX ADMIN — SODIUM CHLORIDE TAB 1 GM 1 G: 1 TAB at 20:49

## 2017-12-12 RX ADMIN — INSULIN GLARGINE 40 UNITS: 100 INJECTION, SOLUTION SUBCUTANEOUS at 10:14

## 2017-12-12 NOTE — PROGRESS NOTES
1520:Stopped to see pt, but he is currently off unit for kidney ultrasound. Daughter at bedside is unfamiliar with pt and states that her sister Maria Elena Paredes lives with them and she is home sick today. Will try to visit tomorrow.

## 2017-12-12 NOTE — PROGRESS NOTES
IM Progress Note  Dr. Torrey Betancourt  12/12/2017 11:53 AM      Patient name Tushar Glasgow  [id number]  PCP: Angie Quiroga MD  Admit Date: 12/11/2017  Acct No. [de-identified]    Subjective: Interval History:   Pt seen chart reviewed  Admitted for hyponatremia and diarrhea  Has low Na for a while with intermittent hyperkalemia  Does not feel good  [place]      Diet: DIET CARB CONTROL;    I/O last 3 completed shifts: In: 1717.2 [P.O.:300; I.V.:1417.2]  Out: 1050 [Urine:450; Stool:600]  No intake/output data recorded. Admission weight: 231 lb (104.8 kg) as of 12/11/2017  9:11 AM  Wt Readings from Last 3 Encounters:   12/12/17 222 lb 8 oz (100.9 kg)   11/30/17 227 lb (103 kg)   11/09/17 240 lb (108.9 kg)     Body mass index is 31.93 kg/m².     ROS   CVS;  no cp or palpitation  Resp: intermittent SOB no cough  Neuro:  c/o dizziness  Abd: no nausea or vomiting or abd pain      Medications:   Scheduled Meds:   carvedilol  3.125 mg Oral BID WC    insulin glargine  40 Units Subcutaneous QAM    levothyroxine  50 mcg Oral QAM    lisinopril  20 mg Oral Daily    sodium chloride  1 g Oral BID    sodium chloride flush  10 mL Intravenous 2 times per day    enoxaparin  40 mg Subcutaneous Q24H    cefTRIAXone (ROCEPHIN) IV  1 g Intravenous Q24H    pneumococcal 13-valent conjugate  0.5 mL Intramuscular Once    influenza virus vaccine  0.5 mL Intramuscular Once    insulin lispro  0-6 Units Subcutaneous TID WC    insulin lispro  0-3 Units Subcutaneous Nightly    glimepiride  4 mg Oral BID WC     Continuous Infusions:   sodium chloride 150 mL/hr at 12/12/17 0940    dextrose 5 % and 0.9 % NaCl         Labs :     CBC:   Recent Labs      12/11/17   1007  12/12/17   0507   WBC  17.3*  12.4*   HGB  9.9*  9.4*   PLT  362  329     BMP:    Recent Labs      12/11/17   1007  12/12/17   0507   NA  125*  131*   K  4.8  4.6   CL  90*  99   CO2  21*  19*   BUN  25*  27*   CREATININE  1.5*  1.5*   GLUCOSE  187*  145*

## 2017-12-12 NOTE — CONSULTS
per day    enoxaparin  40 mg Subcutaneous Q24H    cefTRIAXone (ROCEPHIN) IV  1 g Intravenous Q24H    pneumococcal 13-valent conjugate  0.5 mL Intramuscular Once    influenza virus vaccine  0.5 mL Intramuscular Once    insulin lispro  0-6 Units Subcutaneous TID WC    insulin lispro  0-3 Units Subcutaneous Nightly    glimepiride  4 mg Oral BID WC     Continuous Infusions:   sodium chloride 75 mL/hr at 12/12/17 1230    dextrose 5 % and 0.9 % NaCl       PRN Meds:albuterol sulfate HFA, sodium chloride flush, acetaminophen, glucose, dextrose, glucagon (rDNA), dextrose 5 % and 0.9 % NaCl  Allergies:   ALLERGIES: Review of patient's allergies indicates no known allergies. SOCIAL HISTORY:     TOBACCO:   reports that he has quit smoking. He quit after 25.00 years of use. He has never used smokeless tobacco.     ETOH:   reports that he does not drink alcohol. Patient currently lives with family       FAMILY HISTORY:         Problem Relation Age of Onset    Cancer Mother     Asthma Father        REVIEW OF SYSTEMS:     Constitutional: no fever, no night sweats,+fatigue. Head: no head ache , no head injury, no migranes. Eye: no blurring of vision, no double vision.   Ears: no hearing difficulty, no tinnitus  Mouth/throat: no ulceration, dental caries , dysphagia  Lungs: no cough no chest pain  CVS: no palpitation, no chest pain, no shortness of breath  GI: no abdominal pain, no nausea , no vomiting, has colostomy  MARIYA: no dysuria, frequency and urgency, no hematuria, no kidney stones  Musculoskeletal: no joint pain, swelling , stiffness,  Endocrine: no polyuria, polydipsia, no cold or heat intolerance  Hematology: no anemia, no easy brusing or bleeding, no hx of clotting disorder  Dermatology: no skin rash, no eczema, no pruritis,  Psychiatry: no depression, no anxiety,no panic attacks, no suicide ideation    PHYSICAL EXAM:     BP (!) 99/56   Pulse 60   Temp 98.7 °F (37.1 °C) (Oral)   Resp 18   Ht 5' 10\" (1.778 m)   Wt 222 lb 8 oz (100.9 kg)   SpO2 94%   BMI 31.93 kg/m²   General:  Awake, alert, not in distress. HEENT: pink conjunctiva, unicteric sclera, moist oral mucosa. Chest:  Bilateral air entry. Cardiovascular:  RRR ,S1S2, no murmur or gallop. Abdomen:  Soft, non tender to palpation. she has colostomy. Extremities: no edema  Skin:  Warm and dry. CNS:.oriented        LABS:     CBC:   Recent Labs      12/11/17   1007  12/12/17   0507   WBC  17.3*  12.4*   HGB  9.9*  9.4*   PLT  362  329     BMP:    Recent Labs      12/11/17 1007 12/12/17   0507   NA  125*  131*   K  4.8  4.6   CL  90*  99   CO2  21*  19*   BUN  25*  27*   CREATININE  1.5*  1.5*   GLUCOSE  187*  145*     Calcium:  Recent Labs      12/12/17   0507   CALCIUM  8.4*     Ionized Calcium:No results for input(s): IONCA in the last 72 hours. Magnesium:  Recent Labs      12/11/17   1007   MG  1.6     Phosphorus:No results for input(s): PHOS in the last 72 hours. BNP:No results for input(s): BNP in the last 72 hours. Glucose:  Recent Labs      12/11/17   2004  12/12/17   0725  12/12/17   1206   POCGLU  268*  138*  214*     HgbA1C: No results for input(s): LABA1C in the last 72 hours. INR: No results for input(s): INR in the last 72 hours. Hepatic:   Recent Labs      12/11/17   1007   ALKPHOS  78   ALT  20   AST  22   PROT  6.3   BILITOT  1.3*   LABALBU  3.1*     Amylase and Lipase:  Recent Labs      12/11/17   1616   LACTA  1.0     Lactic Acid:   Recent Labs      12/11/17   1616   LACTA  1.0     Troponin: No results for input(s): CKTOTAL, CKMB, TROPONINI in the last 72 hours. BNP: No results for input(s): BNP in the last 72 hours. Lipids: No results for input(s): CHOL, TRIG, HDL, LDLCALC in the last 72 hours.     Invalid input(s): LDL  ABGs: No results found for: PHART, PO2ART, HVY5HHB, FWP3QGY, BEART    Cultures:      CXR:       UA:   Recent Labs      12/11/17   1209   PHUR  5.0   COLORU  DK YELLOW*   PROTEINU  100*   BLOODU  SMALL*

## 2017-12-12 NOTE — PLAN OF CARE
Problem: Falls - Risk of  Goal: Absence of falls  No falls this shift  Up with one assist    Problem: Discharge Planning:  Goal: Discharged to appropriate level of care  Discharged to appropriate level of care   From home with wife  May benefit from home health for colostomy    Problem: Serum Glucose Level - Abnormal:  Goal: Ability to maintain appropriate glucose levels will improve  Ability to maintain appropriate glucose levels will improve   Monitoring glucose ACHS  Insulin per order    Problem: Skin Integrity/Risk  Goal: No skin breakdown during hospitalization  No new skin breakdown this shift    Problem: Musculor/Skeletal Functional Status  Goal: Absence of falls  No falls this shift  Up with one assist    Comments: Care plan reviewed with patient. Patient verbalizes understanding of the plan of care and contributes to goal setting.

## 2017-12-13 LAB
ANION GAP SERPL CALCULATED.3IONS-SCNC: 15 MEQ/L (ref 8–16)
BLOOD CULTURE, ROUTINE: ABNORMAL
BLOOD CULTURE, ROUTINE: ABNORMAL
BUN BLDV-MCNC: 25 MG/DL (ref 7–22)
CALCIUM SERPL-MCNC: 8 MG/DL (ref 8.5–10.5)
CHLORIDE BLD-SCNC: 100 MEQ/L (ref 98–111)
CO2: 18 MEQ/L (ref 23–33)
CREAT SERPL-MCNC: 1.5 MG/DL (ref 0.4–1.2)
GFR SERPL CREATININE-BSD FRML MDRD: 44 ML/MIN/1.73M2
GLUCOSE BLD-MCNC: 102 MG/DL (ref 70–108)
GLUCOSE BLD-MCNC: 163 MG/DL (ref 70–108)
GLUCOSE BLD-MCNC: 67 MG/DL (ref 70–108)
GLUCOSE BLD-MCNC: 72 MG/DL (ref 70–108)
GLUCOSE BLD-MCNC: 91 MG/DL (ref 70–108)
ORGANISM: ABNORMAL
ORGANISM: ABNORMAL
POTASSIUM SERPL-SCNC: 4.4 MEQ/L (ref 3.5–5.2)
SODIUM BLD-SCNC: 133 MEQ/L (ref 135–145)
URINE CULTURE REFLEX: ABNORMAL

## 2017-12-13 PROCEDURE — 36415 COLL VENOUS BLD VENIPUNCTURE: CPT

## 2017-12-13 PROCEDURE — 99221 1ST HOSP IP/OBS SF/LOW 40: CPT | Performed by: INTERNAL MEDICINE

## 2017-12-13 PROCEDURE — 87045 FECES CULTURE AEROBIC BACT: CPT

## 2017-12-13 PROCEDURE — 82948 REAGENT STRIP/BLOOD GLUCOSE: CPT

## 2017-12-13 PROCEDURE — 6360000002 HC RX W HCPCS: Performed by: INTERNAL MEDICINE

## 2017-12-13 PROCEDURE — 6370000000 HC RX 637 (ALT 250 FOR IP): Performed by: INTERNAL MEDICINE

## 2017-12-13 PROCEDURE — 2580000003 HC RX 258: Performed by: INTERNAL MEDICINE

## 2017-12-13 PROCEDURE — 90686 IIV4 VACC NO PRSV 0.5 ML IM: CPT | Performed by: INTERNAL MEDICINE

## 2017-12-13 PROCEDURE — 1200000003 HC TELEMETRY R&B

## 2017-12-13 PROCEDURE — G0008 ADMIN INFLUENZA VIRUS VAC: HCPCS | Performed by: INTERNAL MEDICINE

## 2017-12-13 PROCEDURE — 80048 BASIC METABOLIC PNL TOTAL CA: CPT

## 2017-12-13 RX ORDER — HYDRALAZINE HYDROCHLORIDE 25 MG/1
25 TABLET, FILM COATED ORAL EVERY 8 HOURS SCHEDULED
Status: DISCONTINUED | OUTPATIENT
Start: 2017-12-13 | End: 2017-12-14 | Stop reason: HOSPADM

## 2017-12-13 RX ORDER — FAMOTIDINE 20 MG/1
20 TABLET, FILM COATED ORAL DAILY
Status: DISCONTINUED | OUTPATIENT
Start: 2017-12-13 | End: 2017-12-14 | Stop reason: HOSPADM

## 2017-12-13 RX ORDER — SODIUM CHLORIDE 9 MG/ML
INJECTION, SOLUTION INTRAVENOUS CONTINUOUS
Status: DISCONTINUED | OUTPATIENT
Start: 2017-12-13 | End: 2017-12-14 | Stop reason: HOSPADM

## 2017-12-13 RX ADMIN — Medication 10 ML: at 21:02

## 2017-12-13 RX ADMIN — CEFTRIAXONE SODIUM 2 G: 10 INJECTION, POWDER, FOR SOLUTION INTRAVENOUS at 16:55

## 2017-12-13 RX ADMIN — CARVEDILOL 3.12 MG: 3.12 TABLET, FILM COATED ORAL at 16:55

## 2017-12-13 RX ADMIN — FAMOTIDINE 20 MG: 20 TABLET, FILM COATED ORAL at 21:01

## 2017-12-13 RX ADMIN — INFLUENZA A VIRUS A/SINGAPORE/GP1908/2015 IVR-180A (H1N1) ANTIGEN (PROPIOLACTONE INACTIVATED), INFLUENZA A VIRUS A/HONG KONG/4801/2014 X-263B (H3N2) ANTIGEN (PROPIOLACTONE INACTIVATED), INFLUENZA B VIRUS B/BRISBANE/46/2015 ANTIGEN (PROPIOLACTONE INACTIVATED), AND INFLUENZA B VIRUS B/PHUKET/3073/2013 BVR-1B ANTIGEN (PROPIOLACTONE INACTIVATED) 0.5 ML: 15; 15; 15; 15 INJECTION, SUSPENSION INTRAMUSCULAR at 17:17

## 2017-12-13 RX ADMIN — SODIUM CHLORIDE TAB 1 GM 1 G: 1 TAB at 08:12

## 2017-12-13 RX ADMIN — HYDRALAZINE HYDROCHLORIDE 25 MG: 25 TABLET, FILM COATED ORAL at 21:01

## 2017-12-13 RX ADMIN — CARVEDILOL 3.12 MG: 3.12 TABLET, FILM COATED ORAL at 08:12

## 2017-12-13 RX ADMIN — SODIUM CHLORIDE: 9 INJECTION, SOLUTION INTRAVENOUS at 21:01

## 2017-12-13 RX ADMIN — GLIMEPIRIDE 4 MG: 4 TABLET ORAL at 16:55

## 2017-12-13 RX ADMIN — SODIUM CHLORIDE TAB 1 GM 1 G: 1 TAB at 21:01

## 2017-12-13 RX ADMIN — GLIMEPIRIDE 4 MG: 4 TABLET ORAL at 08:12

## 2017-12-13 RX ADMIN — INSULIN GLARGINE 40 UNITS: 100 INJECTION, SOLUTION SUBCUTANEOUS at 08:19

## 2017-12-13 RX ADMIN — INSULIN LISPRO 1 UNITS: 100 INJECTION, SOLUTION INTRAVENOUS; SUBCUTANEOUS at 21:02

## 2017-12-13 RX ADMIN — LISINOPRIL 20 MG: 20 TABLET ORAL at 08:12

## 2017-12-13 RX ADMIN — LEVOTHYROXINE SODIUM 50 MCG: 50 TABLET ORAL at 08:12

## 2017-12-13 RX ADMIN — ENOXAPARIN SODIUM 40 MG: 40 INJECTION SUBCUTANEOUS at 17:16

## 2017-12-13 ASSESSMENT — PAIN SCALES - GENERAL
PAINLEVEL_OUTOF10: 0

## 2017-12-13 NOTE — CONSULTS
Nephrology Consult Note  Patient's Name: Gilmer Mcardle  6:42 PM  12/13/2017    Nephrologist: More Bolton    Reason for Consult:  Elevated serum creatinine level  Requesting Physician:  Lizabeth Dasilva MD    Chief Complaint:  None  Assessment  1-stage IIIB chronic kidney disease stable  2-Klebsiella sepsis  3-urinary tract infection  4. Leukocytosis due to sepsis  5. Anemia normocytic  6. Cholelithiasis  7. Pneumonia? 8. Hypotension improved  9. Hyponatremia chronic  10. Diabetes mellitus type 2    Plan  1-I discussed my thoughts with the patient  2-I am uncertain how much he understood  3-labs reviewed  4. Chest x-ray report and images reviewed  5.  Echocardiogram report reviewed  6. Decrease enoxaparin from 40 mg subcu a day to 30 mg subcu a day  7. Add famotidine 20 mg 1 tablet daily for peptic ulcer disease prophylaxis  8.  0.9 saline 50 ml per hour  9. Labs in the morning    History Obtained From:  Staff and medical record  History of Present Ilness:    Gilmer Mcardle is a 80 y.o. male with history of chronic kidney disease, coronary artery disease with a previous coronary angiogram, coronary artery bypass graft ,DM2 who was admitted through the emergency department after the patient presented there with weakness, nausea vomiting with diarrhea for several days prior to presentation. No fever or chills. No abdominal pain. No chest pain or shortness of breath. He also does have a history of COPD and hypertension. He was recently seen in our clinic for chronic kidney disease. Baseline serum creatinine has been between  1.3-1.6 mg/dL.     Past Medical History:   Diagnosis Date    Arthritis     CAD (coronary artery disease)     s/p CABG    Cancer [facility]     prostate    Diabetes mellitus ([facility]     GERD (gastroesophageal reflux disease)     Hyperlipidemia     Hypertension     Hypothyroid        Past Surgical History:   Procedure Laterality Date    ABDOMEN SURGERY      APPENDECTOMY      CARDIAC SURGERY      COLECTOMY      COLONOSCOPY      COLOSTOMY      CORONARY ARTERY BYPASS GRAFT      triple to Distal RCA, first OM, and LIMA to diagonal by Dr Church Most, ESOPHAGUS      EYE SURGERY      bilateral cataracts    JOINT REPLACEMENT      KNEE SURGERY      left total knee and right total knee    TONSILLECTOMY      VASCULAR SURGERY      cabg harvests from legs       Family History   Problem Relation Age of Onset    Cancer Mother     Asthma Father         reports that he has quit smoking. He quit after 25.00 years of use. He has never used smokeless tobacco. He reports that he does not drink alcohol or use drugs. Allergies:  Review of patient's allergies indicates no known allergies. Current Medications:      hydrALAZINE (APRESOLINE) tablet 25 mg 3 times per day   [START ON 12/14/2017] enoxaparin (LOVENOX) injection 30 mg Q24H   famotidine (PEPCID) tablet 20 mg Daily   cefTRIAXone (ROCEPHIN) 2 g in sterile water 20 mL IV syringe Q24H   albuterol sulfate  (90 Base) MCG/ACT inhaler 2 puff Q6H PRN   carvedilol (COREG) tablet 3.125 mg BID WC   insulin glargine (LANTUS) injection vial 40 Units QAM   levothyroxine (SYNTHROID) tablet 50 mcg QAM   sodium chloride tablet 1 g BID   sodium chloride flush 0.9 % injection 10 mL 2 times per day   sodium chloride flush 0.9 % injection 10 mL PRN   acetaminophen (TYLENOL) tablet 650 mg Q4H PRN   pneumococcal 13-valent conjugate (PREVNAR) injection 0.5 mL Once   insulin lispro (HUMALOG) injection vial 0-6 Units TID WC   insulin lispro (HUMALOG) injection vial 0-3 Units Nightly   glucose (GLUTOSE) 40 % oral gel 15 g PRN   dextrose 50 % solution 12.5 g PRN   glucagon (rDNA) injection 1 mg PRN   dextrose 5 % and 0.9 % sodium chloride infusion PRN   glimepiride (AMARYL) tablet 4 mg BID        Review of Systems:   Pertinent positives stated above in HPI. All other systems were reviewed and were negative.   ROS:Constitutional: positive for 17.3 (H) 12/11/2017    WBC 14.6 (H) 11/12/2017    HGB 9.4 (L) 12/12/2017    HGB 9.9 (L) 12/11/2017    HGB 11.5 (L) 11/12/2017    HCT 28.3 (L) 12/12/2017    HCT 30.1 (L) 12/11/2017    HCT 34.8 (L) 11/12/2017    MCV 86.4 12/12/2017     12/12/2017     Labs reviewed    Imaging:  CXR results:         Thank you Dr. Lashaun Garnett MD for allowing us to participate in care of Jake Burnett       Electronically signed by Monika Zaragoza MD on 12/13/2017 at 6:42 PM

## 2017-12-13 NOTE — PROGRESS NOTES
Patient resting in bed. Alert and oriented. Palliative care introduced. Discussion about code status levels and what each one entails. Patient states, \"I already filled out that paperwork. \"   Discussed the differences between a living will and code status. [place] DNR form discussed and patient becomes very irritable and waves for this RN to leave and states that he is not interested in completing the form. Respect given and this RN left the room. Updated primary RN, Fer Shepard. Please call palliative care if patient becomes receptive.

## 2017-12-13 NOTE — PROGRESS NOTES
BILITOT  1.3*   ALKPHOS  78     Troponin: No results for input(s): TROPONINI in the last 72 hours. BNP: No results for input(s): BNP in the last 72 hours. Lipids: No results for input(s): CHOL, HDL in the last 72 hours. Invalid input(s): LDLCALCU  INR: No results for input(s): INR in the last 72 hours. Radiology    Objective:   Vitals: BP (!) 184/78   Pulse 70   Temp 98.9 °F (37.2 °C) (Oral)   Resp 18   Ht 5' 10\" (1.778 m)   Wt 225 lb 9.6 oz (102.3 kg)   SpO2 96%   BMI 32.37 kg/m²   HEENT: Head:pupils react  Neck: supple  Lungs: air exchange heard bilat  Heart: regular rate and rhythm   Abdomen: soft BS heard NG NT colostmy in place  Extremities: warm no edema  Neurologic:  Alert, oriented X3    Impression:   :   Dizziness with hyponatremia improved   Recurrent UTI US kidney no obstruction + Kleb sen to Rocephin  Diarrhea  COPD   Hypertension. History of prostate cancer, status post radiation. Benign prostate hypertrophy   Coronary artery disease, status post coronary artery bypass graft. Insulin requiring diabetes mellitus. Hypothyroidism. Ulcerative colitis s/p colostomy  chronic kidney disease. Chronic  CHF secondary to diastolic dysfunction.     Plan:    Stop IVF  Check stool for C diff  Cont antibiotics  Increase activity    Alex Ching MD

## 2017-12-14 VITALS
HEIGHT: 70 IN | SYSTOLIC BLOOD PRESSURE: 146 MMHG | WEIGHT: 224 LBS | HEART RATE: 59 BPM | BODY MASS INDEX: 32.07 KG/M2 | OXYGEN SATURATION: 93 % | DIASTOLIC BLOOD PRESSURE: 64 MMHG | RESPIRATION RATE: 18 BRPM | TEMPERATURE: 98.6 F

## 2017-12-14 LAB
ANION GAP SERPL CALCULATED.3IONS-SCNC: 14 MEQ/L (ref 8–16)
BLOOD CULTURE, ROUTINE: ABNORMAL
BUN BLDV-MCNC: 21 MG/DL (ref 7–22)
CALCIUM SERPL-MCNC: 8.1 MG/DL (ref 8.5–10.5)
CHLORIDE BLD-SCNC: 101 MEQ/L (ref 98–111)
CO2: 19 MEQ/L (ref 23–33)
CREAT SERPL-MCNC: 1.4 MG/DL (ref 0.4–1.2)
GFR SERPL CREATININE-BSD FRML MDRD: 48 ML/MIN/1.73M2
GLUCOSE BLD-MCNC: 176 MG/DL (ref 70–108)
GLUCOSE BLD-MCNC: 193 MG/DL (ref 70–108)
GLUCOSE BLD-MCNC: 54 MG/DL (ref 70–108)
GLUCOSE BLD-MCNC: 60 MG/DL (ref 70–108)
ORGANISM: ABNORMAL
POTASSIUM SERPL-SCNC: 4.1 MEQ/L (ref 3.5–5.2)
SODIUM BLD-SCNC: 134 MEQ/L (ref 135–145)

## 2017-12-14 PROCEDURE — 80048 BASIC METABOLIC PNL TOTAL CA: CPT

## 2017-12-14 PROCEDURE — 36415 COLL VENOUS BLD VENIPUNCTURE: CPT

## 2017-12-14 PROCEDURE — 6370000000 HC RX 637 (ALT 250 FOR IP): Performed by: INTERNAL MEDICINE

## 2017-12-14 PROCEDURE — 99232 SBSQ HOSP IP/OBS MODERATE 35: CPT | Performed by: NURSE PRACTITIONER

## 2017-12-14 PROCEDURE — 6370000000 HC RX 637 (ALT 250 FOR IP): Performed by: NURSE PRACTITIONER

## 2017-12-14 PROCEDURE — 82948 REAGENT STRIP/BLOOD GLUCOSE: CPT

## 2017-12-14 RX ORDER — INSULIN GLARGINE 100 [IU]/ML
40 INJECTION, SOLUTION SUBCUTANEOUS EVERY MORNING
Qty: 1 VIAL | Refills: 3
Start: 2017-12-14 | End: 2017-12-14

## 2017-12-14 RX ORDER — INSULIN GLARGINE 100 [IU]/ML
32 INJECTION, SOLUTION SUBCUTANEOUS EVERY MORNING
Qty: 1 VIAL | Refills: 3 | Status: ON HOLD | OUTPATIENT
Start: 2017-12-14 | End: 2018-08-31

## 2017-12-14 RX ORDER — HYDRALAZINE HYDROCHLORIDE 25 MG/1
25 TABLET, FILM COATED ORAL EVERY 8 HOURS SCHEDULED
Qty: 90 TABLET | Refills: 0 | Status: ON HOLD
Start: 2017-12-14 | End: 2019-08-09 | Stop reason: HOSPADM

## 2017-12-14 RX ORDER — CEFDINIR 300 MG/1
300 CAPSULE ORAL 2 TIMES DAILY
Qty: 14 CAPSULE | Refills: 0 | Status: SHIPPED | OUTPATIENT
Start: 2017-12-14 | End: 2017-12-21

## 2017-12-14 RX ORDER — CARVEDILOL 3.12 MG/1
3.12 TABLET ORAL 2 TIMES DAILY WITH MEALS
Qty: 60 TABLET | Refills: 3 | Status: ON HOLD
Start: 2017-12-14 | End: 2019-08-09 | Stop reason: HOSPADM

## 2017-12-14 RX ORDER — SODIUM BICARBONATE 650 MG/1
650 TABLET ORAL 2 TIMES DAILY
Status: DISCONTINUED | OUTPATIENT
Start: 2017-12-14 | End: 2017-12-14 | Stop reason: HOSPADM

## 2017-12-14 RX ORDER — SODIUM BICARBONATE 650 MG/1
650 TABLET ORAL 2 TIMES DAILY
Qty: 60 TABLET | Refills: 0 | Status: SHIPPED | OUTPATIENT
Start: 2017-12-14 | End: 2017-12-14 | Stop reason: HOSPADM

## 2017-12-14 RX ORDER — CEFUROXIME AXETIL 250 MG/1
250 TABLET ORAL 2 TIMES DAILY
Qty: 14 TABLET | Refills: 0 | Status: SHIPPED | OUTPATIENT
Start: 2017-12-14 | End: 2017-12-14 | Stop reason: HOSPADM

## 2017-12-14 RX ORDER — INSULIN GLARGINE 100 [IU]/ML
32 INJECTION, SOLUTION SUBCUTANEOUS EVERY MORNING
Status: DISCONTINUED | OUTPATIENT
Start: 2017-12-14 | End: 2017-12-14 | Stop reason: HOSPADM

## 2017-12-14 RX ADMIN — HYDRALAZINE HYDROCHLORIDE 25 MG: 25 TABLET, FILM COATED ORAL at 05:05

## 2017-12-14 RX ADMIN — SODIUM CHLORIDE TAB 1 GM 1 G: 1 TAB at 08:16

## 2017-12-14 RX ADMIN — Medication 1 UNITS: at 11:43

## 2017-12-14 RX ADMIN — GLIMEPIRIDE 4 MG: 4 TABLET ORAL at 08:16

## 2017-12-14 RX ADMIN — INSULIN GLARGINE 32 UNITS: 100 INJECTION, SOLUTION SUBCUTANEOUS at 11:43

## 2017-12-14 RX ADMIN — FAMOTIDINE 20 MG: 20 TABLET, FILM COATED ORAL at 08:16

## 2017-12-14 RX ADMIN — CARVEDILOL 3.12 MG: 3.12 TABLET, FILM COATED ORAL at 08:16

## 2017-12-14 RX ADMIN — LEVOTHYROXINE SODIUM 50 MCG: 50 TABLET ORAL at 08:16

## 2017-12-14 RX ADMIN — SODIUM BICARBONATE 650 MG: 650 TABLET ORAL at 11:45

## 2017-12-14 RX ADMIN — HYDRALAZINE HYDROCHLORIDE 25 MG: 25 TABLET, FILM COATED ORAL at 14:07

## 2017-12-14 NOTE — PLAN OF CARE
Problem: Falls - Risk of  Goal: Absence of falls  Outcome: Met This Shift  No falls this hisft. Stay with me pt. Up with stand by assistance. Bed alarm on. Problem: Discharge Planning:  Goal: Discharged to appropriate level of care  Discharged to appropriate level of care   Outcome: Ongoing  Plan is for pt to go home with family at home. Problem: Serum Glucose Level - Abnormal:  Goal: Ability to maintain appropriate glucose levels will improve  Ability to maintain appropriate glucose levels will improve   Outcome: Met This Shift  Blood sugars ar less than 120. Problem: Venous Thromboembolism:  Goal: Will show no signs or symptoms of venous thromboembolism  Will show no signs or symptoms of venous thromboembolism   Outcome: Met This Shift  No redness noted BLE. Up to chair. Pt is on Lovenox. Problem: Skin Integrity/Risk  Goal: No skin breakdown during hospitalization  Outcome: Met This Shift  Ski assessment this shift no new skin areas of breakdowns. Problem: Musculor/Skeletal Functional Status  Goal: Absence of falls  Outcome: Met This Shift  No falls this hisft. Stay with me pt. Up with stand by assistance. Bed alarm on. Problem: GI  Goal: No bowel complications  Outcome: Ongoing  Colostomy delivery is greenish mucous thick stool. Problem: HH SN OSTOMY/ILEOSTOMY CARE  Goal: BALANCE EDUCATION  Outcome: Not Met This Shift  On going working with pt about cleaning site of colostomy. Pt requesting staff care for his colostomy not the pt. Comments: Care plan reviewed with patient . Patient verbalize understanding of the plan of care and contribute to goal setting.   Electronically signed by Levander Bumpers, RN on 12/13/2017 at 8:03 PM

## 2017-12-14 NOTE — PROGRESS NOTES
Renal Progress Note    Patient Alesha Perez   MRN -  [id number]   Davey # - [de-identified]      - 10/6/1928    80 y.o. Admit Date: 2017  Hospital Day: 3  Location: [facility]  Date of evaluation -  2017    Subjective:   CC: weakness, nausea vomiting with diarrhea   UOP unmeasured  Stool 1450/24h  Denies sob or cough. On Room air   -188/   Denies discomfort  Ready to go home    Objective:   VITALS:  BP (!) 155/71   Pulse 64   Temp 98.1 °F (36.7 °C) (Oral)   Resp 18   Ht 5' 10\" (1.778 m)   Wt 224 lb (101.6 kg)   SpO2 97%   BMI 32.14 kg/m²    Patient Vitals for the past 24 hrs:   BP Temp Temp src Pulse Resp SpO2 Weight   17 0810 (!) 155/71 98.1 °F (36.7 °C) Oral 64 18 97 % -   17 0432 (!) 169/72 97.5 °F (36.4 °C) Oral 64 18 96 % 224 lb (101.6 kg)   17 2055 (!) 175/72 98.8 °F (37.1 °C) Oral 67 18 93 % -   17 1531 (!) 192/79 98.8 °F (37.1 °C) Oral 60 19 97 % -   17 1121 (!) 156/66 98.8 °F (37.1 °C) Oral 61 18 97 % -        Patient Vitals for the past 96 hrs (Last 3 readings):   Weight   17 0432 224 lb (101.6 kg)   17 0300 225 lb 9.6 oz (102.3 kg)   17 0325 222 lb 8 oz (100.9 kg)       Intake/Output Summary (Last 24 hours) at 17 0849  Last data filed at 17 0434   Gross per 24 hour   Intake           2911.2 ml   Output             1800 ml   Net           1111.2 ml       EXAM:  CONSTITUTIONAL:  No acute distress. Lying comfortable in bed. HEENT:  Head is normocephalic, Extraocular movement intact, Mucus membranes moist. Neck is supple. Voice is clear. CARDIOVASCULAR:  S1, S2  regular rate and rhythm. RESPIRATORY: Clear to ausculation bilaterally. Equal breath sounds. No wheezes. No shortness of breath noted at rest.  ABDOMEN: soft, non tender  NEUROLOGICAL: Patient is alert and oriented to person, place, and time. Recent and remote memory intact. Pt is attentive. Thought is coherant.   SKIN: no rash, No significant ventricular filling   pattern, with concomitant abnormal relaxation and increased filling   pressure (grade 2 diastolic dysfunction). Aortic valve appears tricuspid. Aortic valve leaflets are somewhat   thickened. Aortic valve leaflets are Mildly calcified. Mild aortic   regurgitation is noted. Renal US 12/12/17  1. Normal appearance of the kidneys with significantly elevated resistive indices bilaterally. 2. Cholelithiasis       ASSESSMENT:  1. Chronic Kidney Disease Stage IIIB, Likely 2nd to DM and HTN. Baseline 1.3-1.6, Renal US neg for obstruction  2. Sepsis with (+) blood and urine cultures with Klebsiella  3. Acute on chronic Hyponatremia 2nd to appropriate secretion of ADH due to volume contraction. Back to his baseline. Continue PO salt tabs  4. Acute on chronic Metabolic acidosis 2nd to GI bicarb loss, Start PO bicarb 650 twice daily  5. Leukocytosis improved  6. Essential Hypertension with nephrosclerosis   7. Diabetes Mellitus Type II with nephrosclerosis with long term use of insulin   8. Chronic Grade 2 Diastolic heart failure, EF 55%  9. Coronary artery disease S/P CABG  10. Ulcerative colitis with colostomy with diarrhea, C diff neg  Ok for discharge from renal aspect.  FU at previously scheduled appt with BMP  Meds and Labs reviewed  Principal Problem:    Sepsis due to urinary tract infection ([facility] [address]  Active Problems:    Hyponatremia with decreased serum osmolality     Maria Antonia Lim CNP 12/14/2017 8:49 AM

## 2017-12-14 NOTE — CARE COORDINATION
12/14/17, 1:20 PM  Client denied needs as plans home and is caregiver for spouse; denied need for [facility], has ostomy  Discharge plan discussed by  and . Discharge plan reviewed with patient/ family. Patient/ family verbalize understanding of discharge plan and are in agreement with plan. Understanding was demonstrated using the teach back method.        IMM Letter  IMM Letter given to Patient/Family/Significant other/Guardian/POA/by[de-identified]   IMM Letter date given[de-identified] 12/14/17  IMM Letter time given[de-identified] [id number]

## 2017-12-14 NOTE — FLOWSHEET NOTE
Patient is a bit frustrated wanting to go home. I believe he is a potential discharge. I offered prayer and he agreed. 12/14/17 3260   Encounter Summary   Services provided to: Patient   Referral/Consult From: Rounding   Continue Visiting No  (12/14  leaving soon)   Complexity of Encounter Moderate   Length of Encounter 15 minutes   Routine   Type Initial   Assessment Approachable; Anxious   Intervention Prayer;Nurtured hope   Outcome Coping   . Spiritual care card with [facility], prayer or Prayer for peace was given. It has our information of service, hours and phone number on it.

## 2017-12-14 NOTE — PROGRESS NOTES
12/13/17 2101    dextrose 5 % and 0.9 % NaCl       albuterol sulfate HFA, sodium chloride flush, acetaminophen, glucose, dextrose, glucagon (rDNA), dextrose 5 % and 0.9 % NaCl      LABS:     CBC:   Recent Labs      12/12/17   0507   WBC  12.4*   HGB  9.4*   PLT  329     BMP:  Recent Labs      12/12/17   0507  12/13/17   0421  12/14/17   0525   NA  131*  133*  134*   K  4.6  4.4  4.1   CL  99  100  101   CO2  19*  18*  19*   BUN  27*  25*  21   CREATININE  1.5*  1.5*  1.4*   GLUCOSE  145*  67*  54*     Calcium:  Recent Labs      12/14/17   0525   CALCIUM  8.1*     Ionized Calcium:No results for input(s): IONCA in the last 72 hours. Magnesium:No results for input(s): MG in the last 72 hours. Phosphorus:No results for input(s): PHOS in the last 72 hours. BNP:No results for input(s): BNP in the last 72 hours. Glucose:  Recent Labs      12/14/17   0730  12/14/17   0839  12/14/17   1106   POCGLU  60*  176*  193*     HgbA1C: No results for input(s): LABA1C in the last 72 hours. INR: No results for input(s): INR in the last 72 hours. Hepatic: No results for input(s): ALKPHOS, ALT, AST, PROT, BILITOT, BILIDIR, LABALBU in the last 72 hours. Amylase and Lipase:  Recent Labs      12/11/17   1616   LACTA  1.0     Lactic Acid:   Recent Labs      12/11/17   1616   LACTA  1.0     Troponin: No results for input(s): CKTOTAL, CKMB, TROPONINI in the last 72 hours. BNP: No results for input(s): BNP in the last 72 hours.     CULTURES:   UA: Recent Labs      12/11/17   1209   PHUR  5.0   COLORU  DK YELLOW*   PROTEINU  100*   BLOODU  SMALL*   RBCUA  10-15   WBCUA  > 200   BACTERIA  MANY   NITRU  NEGATIVE   GLUCOSEU  NEGATIVE   BILIRUBINUR  NEGATIVE   UROBILINOGEN  0.2   KETUA  NEGATIVE     Micro:   Lab Results   Component Value Date    BC No growth-preliminary 12/11/2017    BC sensitivity done-previous specimen 12/11/2017    BC No growth-preliminary 12/11/2017         IMAGING:         Problem list of patient:     Patient Active Problem List   Diagnosis Code    Lower urinary tract infectious disease N39.0    HTN (hypertension) I10    Prostate CA ([facility] C61    Colon cancer ([facility] C18.9    Diabetes mellitus ([facility] E11.9    [facility] N17.9    Hypothyroidism E03.9    CAD (coronary artery disease) I25.10    Chest pain R07.9    Hyponatremia E87.1    Hyperkalemia E87.5    Anemia D64.9    Leukocytosis D72.829    Dyspnea R06.00    Type 2 diabetes mellitus with stage 3 chronic kidney disease, with long-term current use of insulin ([facility]) E11.22, N18.3, Z79.4    S/P CABG (coronary artery bypass graft) Z95.1    CKD (chronic kidney disease) N18.9    Hyperlipidemia E78.5    Obesity E66.9    Syncope R55    Pyelonephritis N12    SIRS (systemic inflammatory response syndrome) ([facility]) R65.10    Hyponatremia E87.1    SHAYAN (acute kidney injury) ([facility]) N17.9    Colostomy status ([facility]) Z93.3    Panlobular emphysema ([facility]) J43.1    Generalized weakness R53.1    Sepsis due to urinary tract infection ([facility]) A41.9, N39.0    Hyponatremia with decreased serum osmolality E87.1         ASSESSMENT/PLAN   UTI: clinically doing well  COPD  UC with hx of colostomy  Discharge plan today with cefuroxime      Josephine Kay MD, FACP 12/14/2017 11:49 AM

## 2017-12-14 NOTE — DISCHARGE INSTR - DIET
on your intake of high fat food can help reduce body weight and cholesterol levels. Reduce intake of fried food, kumari, sausage, luncheon meat, gravy, sour cream, cheese, egg yolks and margarine/butter. Limit your intake of alcohol. Drink alcohol only with permission of your doctor. Never drink alcohol on an empty stomach. Be more active. Regular exercise is an important part of your diabetes care as exercise can help lower your blood sugar levels. The type and amount of exercise that is right for you should be discussed with your doctor.

## 2017-12-14 NOTE — PLAN OF CARE
Problem: Falls - Risk of  Goal: Absence of falls  Outcome: Ongoing  Patient had no falls this shift. Patient is up with assist to bathroom and chair. Patient uses call light appropriately    Problem: Discharge Planning:  Goal: Discharged to appropriate level of care  Discharged to appropriate level of care   Outcome: Ongoing  Plan for patient to be discharged home with wife    Problem: Serum Glucose Level - Abnormal:  Goal: Ability to maintain appropriate glucose levels will improve  Ability to maintain appropriate glucose levels will improve   Outcome: Ongoing  Patient's blood sugar was 163. Gave 1 unit of Humalog    Problem: Venous Thromboembolism:  Goal: Will show no signs or symptoms of venous thromboembolism  Will show no signs or symptoms of venous thromboembolism   Outcome: Ongoing  Patient is getting lovenox. No signs of DVT    Problem: Skin Integrity/Risk  Goal: No skin breakdown during hospitalization  Outcome: Ongoing  No new evidence of skin breakdown. Patient is up with assist and turns self in bed. Buttocks red but blanchable  Goal: Wound healing  Outcome: Ongoing  Patient is afebrile and WBCs 12.8 from 17.3    Problem: Musculor/Skeletal Functional Status  Goal: Absence of falls  Outcome: Ongoing  Patient had no falls this shift. Patient is up with assist to bathroom and chair. Patient uses call light appropriately    Problem: GI  Goal: No bowel complications  Outcome: Ongoing  Patient has colostomy bag. Needs education about how often to change the bag, etc.    Problem: HH SN OSTOMY/ILEOSTOMY CARE  Goal: BALANCE EDUCATION  Outcome: Ongoing  Patient needs more education about colostomy care. Wound/ostomy to see    Comments: Care plan reviewed with patient. Patient verbalize understanding of the plan of care and contribute to goal setting.

## 2017-12-14 NOTE — PROGRESS NOTES
IM Progress Note  Dr. Westley Matos  12/14/2017 2:34 PM      Patient name Shilpi Rocha  [id number]  PCP: Janice Oleary MD  Admit Date: 12/11/2017  Acct No. [de-identified]    Subjective: Interval History:   Feels much better  No diarrhea noted in ostomy       Diet: DIET CARB CONTROL;    I/O last 3 completed shifts: In: 2911.2 [P.O.:2070; I.V.:841.2]  Out: 1800 [Urine:350; PLAKC:3218]  I/O this shift:  In: 1202.9 [P.O.:720; I.V.:482.9]  Out: 203 [Urine:3; Stool:200]        Admission weight: 231 lb (104.8 kg) as of 12/11/2017  9:11 AM  Wt Readings from Last 3 Encounters:   12/14/17 224 lb (101.6 kg)   11/30/17 227 lb (103 kg)   11/09/17 240 lb (108.9 kg)     Body mass index is 32.14 kg/m².     ROS   CVS;  no cp or palpitation  Resp: intermittent SOB no cough  Neuro:  c/o dizziness  Abd: no nausea or vomiting or abd pain      Medications:   Scheduled Meds:   insulin glargine  32 Units Subcutaneous QAM    sodium bicarbonate  650 mg Oral BID    hydrALAZINE  25 mg Oral 3 times per day    enoxaparin  30 mg Subcutaneous Q24H    famotidine  20 mg Oral Daily    cefTRIAXone (ROCEPHIN) IV  2 g Intravenous Q24H    carvedilol  3.125 mg Oral BID WC    levothyroxine  50 mcg Oral QAM    sodium chloride  1 g Oral BID    sodium chloride flush  10 mL Intravenous 2 times per day    pneumococcal 13-valent conjugate  0.5 mL Intramuscular Once    insulin lispro  0-6 Units Subcutaneous TID WC    glimepiride  4 mg Oral BID WC     Continuous Infusions:   sodium chloride 50 mL/hr at 12/13/17 2101    dextrose 5 % and 0.9 % NaCl         Labs :     CBC:   Recent Labs      12/12/17   0507   WBC  12.4*   HGB  9.4*   PLT  329     BMP:    Recent Labs      12/12/17   0507  12/13/17   0421  12/14/17   0525   NA  131*  133*  134*   K  4.6  4.4  4.1   CL  99  100  101   CO2  19*  18*  19*   BUN  27*  25*  21   CREATININE  1.5*  1.5*  1.4*   GLUCOSE  145*  67*  54*     Hepatic:   No results for input(s): AST, ALT, ALB, BILITOT, ALKPHOS in the last 72 hours. Troponin: No results for input(s): TROPONINI in the last 72 hours. BNP: No results for input(s): BNP in the last 72 hours. Lipids: No results for input(s): CHOL, HDL in the last 72 hours. Invalid input(s): LDLCALCU  INR: No results for input(s): INR in the last 72 hours. Radiology    Objective:   Vitals: BP (!) 146/64   Pulse 59   Temp 98.6 °F (37 °C) (Oral)   Resp 18   Ht 5' 10\" (1.778 m)   Wt 224 lb (101.6 kg)   SpO2 93%   BMI 32.14 kg/m²   HEENT: Head:pupils react  Neck: supple  Lungs: air exchange heard bilat  Heart: regular rate and rhythm   Abdomen: soft BS heard NG NT colostmy in place  Extremities: warm no edema  Neurologic:  Alert, oriented X3    Impression:   :   Dizziness with hyponatremia improved   Recurrent UTI US kidney no obstruction + Kleb sen to Rocephin  Diarrhea  COPD   Hypertension. History of prostate cancer, status post radiation. Benign prostate hypertrophy   Coronary artery disease, status post coronary artery bypass graft. Insulin requiring diabetes mellitus. Hypothyroidism. Ulcerative colitis s/p colostomy  chronic kidney disease. Chronic  CHF secondary to diastolic dysfunction.     Plan:    Discharge home     Argyle Castleman, MD

## 2017-12-15 LAB — CULTURE, STOOL: NORMAL

## 2017-12-15 NOTE — DISCHARGE SUMMARY
[address] [place] [address]                                 DISCHARGE SUMMARY    PATIENT NAME: Mary Anne Morelos                  :        10/06/1928  MED REC NO:   [id number]                           ROOM:       0028  ACCOUNT NO:   [de-identified]                           ADMIT DATE: 2017  PROVIDER:     Aura Leong M.D.            Jamas Comes: 2017      HOSPITAL COURSE:  An 80year-old admitted for diarrhea, hyponatremia, and  also was noted to have urinary tract infection. His sodium was low. Because of his persistent intermittent low sodium and hyperkalemia,  Nephrology was consulted. ID was also following for his recurrent urinary  tract infection. He had no further increased output from his ostomy. He  was tolerating his medications fairly well. ID did okay for him to be  switched over to cefuroxime and cultures were positive for Klebsiella that  was sensitive to ceftriaxone, but his urine culture was sensitive to  cefuroxime. Hence, we will check with ID about antibiotics for discharge  and Nephrology did okay. DISCHARGE FINAL DIAGNOSES:  1. Klebsiella urinary tract infection with bacteremia. 2.  Dizziness, improved. 3.  Recurrent UTI. Ultrasound negative for any obstruction. 4.  COPD. 5.  Hypertension. 6.  History of prostate cancer, status post radiation. 7.  Ulcerative colitis, status post colostomy. 8.  Coronary artery disease, status post coronary artery bypass graft. 9.  Insulin requiring diabetes mellitus. 10.  Hypothyroidism. 11.  History of chronic congestive heart failure secondary to diastolic  dysfunction. MEDICATIONS:  To continue as addressed in the discharge sheet. DISPOSITION:  Home. DIET:  Low sodium, low cholesterol. ACTIVITY:  As tolerated.         Rosanna Samuel M.D.    D: 2017 14:45:12       T: 12/15/2017 4:06:55     [email or web address]  Job#: [id number] Doc#: [id number]    CC:

## 2017-12-20 LAB
BUN BLDV-MCNC: 27 MG/DL
BUN BLDV-MCNC: 27 MG/DL
CALCIUM SERPL-MCNC: 9 MG/DL
CALCIUM SERPL-MCNC: 9 MG/DL
CHLORIDE BLD-SCNC: 95 MMOL/L
CHLORIDE BLD-SCNC: 95 MMOL/L
CO2: 18 MMOL/L
CO2: 18 MMOL/L
CREAT SERPL-MCNC: 1.73 MG/DL
CREAT SERPL-MCNC: 1.73 MG/DL
GFR CALCULATED: 34
GFR CALCULATED: 34
GLUCOSE BLD-MCNC: 195 MG/DL
GLUCOSE BLD-MCNC: 195 MG/DL
POTASSIUM SERPL-SCNC: 5.6 MMOL/L
POTASSIUM SERPL-SCNC: 5.6 MMOL/L
SODIUM BLD-SCNC: 132 MMOL/L
SODIUM BLD-SCNC: 132 MMOL/L

## 2017-12-21 ENCOUNTER — CARE COORDINATOR VISIT (OUTPATIENT)
Dept: CASE MANAGEMENT | Age: 82
End: 2017-12-21

## 2017-12-21 ENCOUNTER — HOSPITAL ENCOUNTER (EMERGENCY)
Age: 82
Discharge: HOME | End: 2017-12-21
Attending: FAMILY MEDICINE
Payer: MEDICARE

## 2017-12-21 ENCOUNTER — TELEPHONE (OUTPATIENT)
Dept: NEPHROLOGY | Age: 82
End: 2017-12-21

## 2017-12-21 ENCOUNTER — APPOINTMENT (OUTPATIENT)
Dept: GENERAL RADIOLOGY | Age: 82
End: 2017-12-21
Payer: MEDICARE

## 2017-12-21 VITALS
HEIGHT: 70 IN | SYSTOLIC BLOOD PRESSURE: 131 MMHG | DIASTOLIC BLOOD PRESSURE: 50 MMHG | TEMPERATURE: 97.5 F | HEART RATE: 74 BPM | WEIGHT: 230 LBS | RESPIRATION RATE: 16 BRPM | BODY MASS INDEX: 32.93 KG/M2 | OXYGEN SATURATION: 97 %

## 2017-12-21 DIAGNOSIS — R11.2 NON-INTRACTABLE VOMITING WITH NAUSEA, UNSPECIFIED VOMITING TYPE: Primary | ICD-10-CM

## 2017-12-21 LAB
ALBUMIN SERPL-MCNC: 3.2 G/DL (ref 3.5–5.1)
ALP BLD-CCNC: 86 U/L (ref 38–126)
ALT SERPL-CCNC: 20 U/L (ref 11–66)
ANION GAP SERPL CALCULATED.3IONS-SCNC: 16 MEQ/L (ref 8–16)
ANISOCYTOSIS: ABNORMAL
APTT: 30.3 SECONDS (ref 22–38)
AST SERPL-CCNC: 18 U/L (ref 5–40)
BACTERIA: ABNORMAL
BANDED NEUTROPHILS ABSOLUTE COUNT: 0.3 THOU/MM3
BANDS PRESENT: 3 %
BASOPHILIA: SLIGHT
BASOPHILS # BLD: 0 %
BASOPHILS ABSOLUTE: 0 THOU/MM3 (ref 0–0.1)
BILIRUB SERPL-MCNC: 0.7 MG/DL (ref 0.3–1.2)
BILIRUBIN DIRECT: < 0.2 MG/DL (ref 0–0.3)
BILIRUBIN URINE: NEGATIVE
BLOOD, URINE: NEGATIVE
BUN BLDV-MCNC: 29 MG/DL (ref 7–22)
CALCIUM SERPL-MCNC: 9.3 MG/DL (ref 8.5–10.5)
CASTS: ABNORMAL /LPF
CASTS: ABNORMAL /LPF
CHARACTER, URINE: ABNORMAL
CHLORIDE BLD-SCNC: 95 MEQ/L (ref 98–111)
CO2: 20 MEQ/L (ref 23–33)
COLOR: YELLOW
CREAT SERPL-MCNC: 1.7 MG/DL (ref 0.4–1.2)
CRYSTALS: ABNORMAL
DIFFERENTIAL, MANUAL: NORMAL
EKG ATRIAL RATE: 76 BPM
EKG P AXIS: 64 DEGREES
EKG P-R INTERVAL: 158 MS
EKG Q-T INTERVAL: 404 MS
EKG QRS DURATION: 86 MS
EKG QTC CALCULATION (BAZETT): 454 MS
EKG R AXIS: -30 DEGREES
EKG T AXIS: 75 DEGREES
EKG VENTRICULAR RATE: 76 BPM
EOSINOPHIL # BLD: 1 %
EOSINOPHILS ABSOLUTE: 0.1 THOU/MM3 (ref 0–0.4)
EPITHELIAL CELLS, UA: ABNORMAL /HPF
GFR SERPL CREATININE-BSD FRML MDRD: 38 ML/MIN/1.73M2
GLUCOSE BLD-MCNC: 208 MG/DL
GLUCOSE BLD-MCNC: 208 MG/DL (ref 70–108)
GLUCOSE BLD-MCNC: 211 MG/DL (ref 70–108)
GLUCOSE, URINE: NEGATIVE MG/DL
HCT VFR BLD CALC: 33.6 % (ref 42–52)
HEMOGLOBIN: 11.1 GM/DL (ref 14–18)
INR BLD: 1.08 (ref 0.85–1.13)
KETONES, URINE: NEGATIVE
LEUKOCYTE ESTERASE, URINE: NEGATIVE
LIPASE: 10.1 U/L (ref 5.6–51.3)
LYMPHOCYTES # BLD: 8 %
LYMPHOCYTES ABSOLUTE: 0.9 THOU/MM3 (ref 1–4.8)
MCH RBC QN AUTO: 28.5 PG (ref 27–31)
MCHC RBC AUTO-ENTMCNC: 32.9 GM/DL (ref 33–37)
MCV RBC AUTO: 86.6 FL (ref 80–94)
METAMYELOCYTES: 4 %
MISCELLANEOUS LAB TEST RESULT: ABNORMAL
MONOCYTES # BLD: 10 %
MONOCYTES ABSOLUTE: 1.1 THOU/MM3 (ref 0.4–1.3)
NITRITE, URINE: NEGATIVE
NUCLEATED RED BLOOD CELLS: 0 /100 WBC
OSMOLALITY CALCULATION: 274.7 MOSMOL/KG (ref 275–300)
PATHOLOGIST REVIEW: ABNORMAL
PDW BLD-RTO: 19.4 % (ref 11.5–14.5)
PH UA: 5
PLATELET # BLD: 445 THOU/MM3 (ref 130–400)
PLATELET ESTIMATE: ABNORMAL
PMV BLD AUTO: 7.9 MCM (ref 7.4–10.4)
POIKILOCYTES: ABNORMAL
POTASSIUM SERPL-SCNC: 4.9 MEQ/L (ref 3.5–5.2)
PRO-BNP: 332.9 PG/ML (ref 0–1800)
PROTEIN UA: 30 MG/DL
RBC # BLD: 3.88 MILL/MM3 (ref 4.7–6.1)
RBC URINE: ABNORMAL /HPF
RENAL EPITHELIAL, UA: ABNORMAL
SEG NEUTROPHILS: 74 %
SEGMENTED NEUTROPHILS ABSOLUTE COUNT: 8 THOU/MM3 (ref 1.8–7.7)
SODIUM BLD-SCNC: 131 MEQ/L (ref 135–145)
SPECIFIC GRAVITY UA: 1.02 (ref 1–1.03)
TOTAL PROTEIN: 6.6 G/DL (ref 6.1–8)
TROPONIN T: 0.02 NG/ML
UROBILINOGEN, URINE: 0.2 EU/DL
WBC # BLD: 10.8 THOU/MM3 (ref 4.8–10.8)
WBC UA: ABNORMAL /HPF
YEAST: ABNORMAL

## 2017-12-21 PROCEDURE — 2580000003 HC RX 258: Performed by: FAMILY MEDICINE

## 2017-12-21 PROCEDURE — 82248 BILIRUBIN DIRECT: CPT

## 2017-12-21 PROCEDURE — 84484 ASSAY OF TROPONIN QUANT: CPT

## 2017-12-21 PROCEDURE — 85730 THROMBOPLASTIN TIME PARTIAL: CPT

## 2017-12-21 PROCEDURE — 80053 COMPREHEN METABOLIC PANEL: CPT

## 2017-12-21 PROCEDURE — 83690 ASSAY OF LIPASE: CPT

## 2017-12-21 PROCEDURE — 82948 REAGENT STRIP/BLOOD GLUCOSE: CPT

## 2017-12-21 PROCEDURE — 85025 COMPLETE CBC W/AUTO DIFF WBC: CPT

## 2017-12-21 PROCEDURE — 96361 HYDRATE IV INFUSION ADD-ON: CPT

## 2017-12-21 PROCEDURE — 36415 COLL VENOUS BLD VENIPUNCTURE: CPT

## 2017-12-21 PROCEDURE — 71010 XR CHEST PORTABLE: CPT

## 2017-12-21 PROCEDURE — 81001 URINALYSIS AUTO W/SCOPE: CPT

## 2017-12-21 PROCEDURE — 99285 EMERGENCY DEPT VISIT HI MDM: CPT

## 2017-12-21 PROCEDURE — 96360 HYDRATION IV INFUSION INIT: CPT

## 2017-12-21 PROCEDURE — 83880 ASSAY OF NATRIURETIC PEPTIDE: CPT

## 2017-12-21 PROCEDURE — 85610 PROTHROMBIN TIME: CPT

## 2017-12-21 PROCEDURE — 93005 ELECTROCARDIOGRAM TRACING: CPT

## 2017-12-21 PROCEDURE — 87086 URINE CULTURE/COLONY COUNT: CPT

## 2017-12-21 RX ORDER — ONDANSETRON 4 MG/1
4 TABLET, FILM COATED ORAL EVERY 8 HOURS PRN
Qty: 10 TABLET | Refills: 0 | Status: SHIPPED | OUTPATIENT
Start: 2017-12-21 | End: 2018-08-29

## 2017-12-21 RX ORDER — SODIUM CHLORIDE 9 MG/ML
INJECTION, SOLUTION INTRAVENOUS CONTINUOUS
Status: DISCONTINUED | OUTPATIENT
Start: 2017-12-21 | End: 2017-12-21 | Stop reason: HOSPADM

## 2017-12-21 RX ORDER — 0.9 % SODIUM CHLORIDE 0.9 %
250 INTRAVENOUS SOLUTION INTRAVENOUS ONCE
Status: COMPLETED | OUTPATIENT
Start: 2017-12-21 | End: 2017-12-21

## 2017-12-21 RX ADMIN — SODIUM CHLORIDE 250 ML: 9 INJECTION, SOLUTION INTRAVENOUS at 11:47

## 2017-12-21 ASSESSMENT — ENCOUNTER SYMPTOMS
DIARRHEA: 0
VOMITING: 1
COUGH: 0
SHORTNESS OF BREATH: 0
NAUSEA: 1
EYE DISCHARGE: 0

## 2017-12-21 NOTE — CARE COORDINATION
ED Care Transition    2017    Patient Name: Pancho Roberto   : 10/6/1928  MRN: [id number]  MYRON Score: 3  PCP: Pilar Altamirano MD   Specialist: yes - JONES Recio CNP   Active ACC/CTC: no    Utilization Review:  ED visits:  6  Admissions: 2 - -     Problem List:  Patient Active Problem List   Diagnosis    Lower urinary tract infectious disease    HTN (hypertension)    [facility]    Colon cancer ([facility]    Diabetes mellitus ([address]    SHAYAN (acute kidney injury) ([facility]    Hypothyroidism    CAD (coronary artery disease)    Chest pain    Hyponatremia    Hyperkalemia    Anemia    Leukocytosis    Dyspnea    Type 2 diabetes mellitus with stage 3 chronic kidney disease, with long-term current use of insulin (HCC)    S/P CABG (coronary artery bypass graft)    CKD (chronic kidney disease)    Hyperlipidemia    Obesity    Syncope    Pyelonephritis    [facility]    Hyponatremia    SHAYAN (acute kidney injury) ([facility]    Colostomy status ([facility])    Panlobular emphysema ([facility])    Generalized weakness    Sepsis due to urinary tract infection ([address]    Hyponatremia with decreased serum osmolality     Housing Review:  Current Housin-story house/ trailer  Who do you live with?   with family:  Wife       Are you an active caregiver in your home?   yes - Wife has dementia    Medication Review:  Are you able to afford your meds? Yes  Do you take your medications as prescribed? Yes   Do you manage your medications? Yes    Social Support/Self Care:  Who helps you at home?  a good social support network  [facility] Support:  Family assistance Payor/Insurance Company        Active Skilled Services/Treatment:  None    Durable Medical Equipment:  Do you have any DME? Has a walker but does not use. Patient Home Respiratory Equipment no      Summary:  Spoke with Natalia Oscar and daughter, introduced self/role. Presented to ED for generalized malaise.  Daughter

## 2017-12-21 NOTE — ED NOTES
Pt provided with meal and water.       Sabino Lovell, [facility]  12/21/17 1412

## 2017-12-21 NOTE — ED PROVIDER NOTES
[facility]  eMERGENCY dEPARTMENT eNCOUnter          [address] [facility]       Chief Complaint   Patient presents with    Fatigue    Nausea       Nurses Notes reviewed and I agree except as noted in the HPI. HISTORY OF PRESENT ILLNESS    Mercedes Banda is a 80 y.o. male with a past medical history of CAD, Arthritis, DM, and GERD who presents to the ED via EMS for evaluation of generalized malaise. The patient reports feeling generally unwell this morning which has been persistent since onset. He has associated nausea, generalized weakness, dizziness, and mild shortness of breath. His dizziness is described as an off balance sensation, reported to feel unsteady while ambulation, however he denies any recent falls. The patient denies any emesis, diarrhea, chest pain, abdominal pain, hematuria, hematochezia, melena, or other URI symptoms. The patient was recently admitted to the hospital on 12/11 for hyponatremia, diarrhea, and a UTI. The patient denies any additional complaints or concerns at the time of initial evaluation. Location/Symptom: generalized malaise   Timing/Onset: this morning   Context/Setting: recent admission for a UTI, diarrhea, and hyponatremia   Quality: generally unwell   Duration: persistent   Modifying Factors: none   Severity: moderate     The HPI was provided by the patient. REVIEW OF SYSTEMS     Review of Systems   Constitutional: Positive for fatigue. Negative for chills, diaphoresis and fever. HENT: Negative for congestion. Eyes: Negative for discharge. Respiratory: Negative for cough and shortness of breath. Cardiovascular: Negative for chest pain. Gastrointestinal: Positive for nausea and vomiting. Negative for diarrhea. Genitourinary:        Recently treated for UTI with omnicef   Musculoskeletal: Negative for joint swelling. Skin: Negative for rash. Neurological: Negative for numbness and headaches.    Hematological: Negative for adenopathy. Psychiatric/Behavioral: Negative for confusion. PAST MEDICAL HISTORY    has a past medical history of Arthritis; CAD (coronary artery disease); Cancer ([facility] Diabetes mellitus ([facility] GERD (gastroesophageal reflux disease); Hyperlipidemia; Hypertension; and Hypothyroid. SURGICAL HISTORY      has a past surgical history that includes Coronary artery bypass graft; knee surgery; colostomy; colectomy; Abdomen surgery; Cardiac surgery; joint replacement; vascular surgery; Tonsillectomy; Colonoscopy; Appendectomy; eye surgery; and Dilatation, esophagus. CURRENT MEDICATIONS       Previous Medications    ACETAMINOPHEN (TYLENOL) 325 MG TABLET    Take 2 tablets by mouth every 4 hours as needed for Pain    ALBUTEROL SULFATE  (90 BASE) MCG/ACT INHALER    Inhale 2 puffs into the lungs every 6 hours as needed for Wheezing    CARVEDILOL (COREG) 3.125 MG TABLET    Take 1 tablet by mouth 2 times daily (with meals)    CEFDINIR (OMNICEF) 300 MG CAPSULE    Take 1 capsule by mouth 2 times daily for 7 days    GLIMEPIRIDE (AMARYL) 4 MG TABLET    Take 1 tablet by mouth daily    HYDRALAZINE (APRESOLINE) 25 MG TABLET    Take 1 tablet by mouth every 8 hours    INSULIN ASPART (NOVOLOG) 100 UNIT/ML INJECTION VIAL    Inject 2 Units into the skin 3 times daily (before meals) And sliding scale    INSULIN GLARGINE (LANTUS) 100 UNIT/ML INJECTION VIAL    Inject 32 Units into the skin every morning    LEVOTHYROXINE (SYNTHROID) 50 MCG TABLET    Take 50 mcg by mouth every morning    SODIUM CHLORIDE 1 G TABLET    Take 1 tablet by mouth 2 times daily       ALLERGIES     has No Known Allergies. FAMILY HISTORY     indicated that his mother is . He indicated that his father is . He indicated that his sister is . family history includes Asthma in his father; Cancer in his mother. SOCIAL HISTORY      reports that he has quit smoking. He quit after 25.00 years of use.  He has never used smokeless Weight:       Height:           11:28 AM: The patient was seen and evaluated. Labs and imaging will be completed. The patient will be given IV fluids while in the ED. Nursing notes reviewed    Given IV hydration    The WBC 10,800    Hemoglobin 11.1    Liver panel and lipase normal    Chronic azotemia with BUN 29, creatinine 1.7 similar to previous values    blood sugar 211    Sodium 131        Troponin 0.017    Previous troponin 0.022 on 12/11/17    The chest x-ray was clear    Urinalysis revealed 5-10 white cells with few bacteria    With normal white count will not give additional antibiotic at this time    He feels well enough to go home    I did discuss case with  who was comfortable with outpatient follow-up    CRITICAL CARE:   None      CONSULTS:  Dr. Julio Torres:  None      FINAL IMPRESSION      1. Non-intractable vomiting with nausea, unspecified vomiting type          DISPOSITION/PLAN   Discharge home    PATIENT REFERRED TO:  Ramon Chandler MD  [address] [place] [address]  [phone]    In 1 week        DISCHARGE MEDICATIONS:  New Prescriptions    ONDANSETRON (ZOFRAN) 4 MG TABLET    Take 1 tablet by mouth every 8 hours as needed for Nausea       (Please note that portions of this note were completed with a voice recognition program.  Efforts were made to edit the dictations but occasionally words are mis-transcribed.)    This document serves as a record of the services and decisions personally performed and made by David Mcguire MD. It was created on their behalf by Margo Robin, a trained medical scribe. The creation of this document is based the provider's statements to the medical scribe. Signed by: Sathya Cox, 4:00 PM 12/21/17     Provider: The information in this document, created by the medical scribe for me, accurately reflects the services I personally performed and the decisions made by me.  I have reviewed and approved this document for accuracy prior to leaving the patient care area.     Tg Fuentes MD 4:00 PM 12/21/17                                    Tg Fuentes MD  12/21/17 AURELIA Rodriguez MD  12/21/17 1600

## 2017-12-21 NOTE — ED TRIAGE NOTES
Pt presents to ED with c/o \"not feeling well. \" Pt states he is dizzy, lightheaded, and weak. Pt states that he also feels SOB. Pt denies pain. Pt states he hasn't been steady on his feet, but hasn't fallen. EKG complete. Dr Roque Morrow at bedside.

## 2017-12-22 ENCOUNTER — CARE COORDINATOR VISIT (OUTPATIENT)
Dept: CASE MANAGEMENT | Age: 82
End: 2017-12-22

## 2017-12-22 ENCOUNTER — APPOINTMENT (OUTPATIENT)
Dept: CT IMAGING | Age: 82
End: 2017-12-22
Payer: MEDICARE

## 2017-12-22 ENCOUNTER — HOSPITAL ENCOUNTER (EMERGENCY)
Age: 82
Discharge: HOME | End: 2017-12-22
Attending: EMERGENCY MEDICINE
Payer: MEDICARE

## 2017-12-22 ENCOUNTER — APPOINTMENT (OUTPATIENT)
Dept: GENERAL RADIOLOGY | Age: 82
End: 2017-12-22
Payer: MEDICARE

## 2017-12-22 ENCOUNTER — APPOINTMENT (OUTPATIENT)
Dept: MRI IMAGING | Age: 82
End: 2017-12-22
Payer: MEDICARE

## 2017-12-22 VITALS
TEMPERATURE: 98.2 F | HEART RATE: 73 BPM | DIASTOLIC BLOOD PRESSURE: 58 MMHG | OXYGEN SATURATION: 93 % | RESPIRATION RATE: 18 BRPM | SYSTOLIC BLOOD PRESSURE: 137 MMHG
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
## 2017-12-22 DIAGNOSIS — T07.XXXA MULTIPLE ABRASIONS: ICD-10-CM

## 2017-12-22 DIAGNOSIS — R53.1 GENERALIZED WEAKNESS: ICD-10-CM

## 2017-12-22 DIAGNOSIS — R77.8 ELEVATED TROPONIN: ICD-10-CM

## 2017-12-22 DIAGNOSIS — R42 DIZZINESS: Primary | ICD-10-CM

## 2017-12-22 LAB
ANION GAP SERPL CALCULATED.3IONS-SCNC: 14 MEQ/L (ref 8–16)
ANISOCYTOSIS: ABNORMAL
BANDED NEUTROPHILS ABSOLUTE COUNT: 0.3 THOU/MM3
BANDS PRESENT: 2 %
BASOPHILIA: SLIGHT
BASOPHILS # BLD: 0 %
BASOPHILS ABSOLUTE: 0 THOU/MM3 (ref 0–0.1)
BUN BLDV-MCNC: 33 MG/DL (ref 7–22)
CALCIUM SERPL-MCNC: 8.8 MG/DL (ref 8.5–10.5)
CHLORIDE BLD-SCNC: 96 MEQ/L (ref 98–111)
CO2: 21 MEQ/L (ref 23–33)
CREAT SERPL-MCNC: 1.8 MG/DL (ref 0.4–1.2)
DIFFERENTIAL, MANUAL: NORMAL
EKG ATRIAL RATE: 68 BPM
EKG P AXIS: 50 DEGREES
EKG P-R INTERVAL: 152 MS
EKG Q-T INTERVAL: 412 MS
EKG QRS DURATION: 88 MS
EKG QTC CALCULATION (BAZETT): 438 MS
EKG R AXIS: -25 DEGREES
EKG T AXIS: 71 DEGREES
EKG VENTRICULAR RATE: 68 BPM
EOSINOPHIL # BLD: 1 %
EOSINOPHILS ABSOLUTE: 0.1 THOU/MM3 (ref 0–0.4)
GFR SERPL CREATININE-BSD FRML MDRD: 36 ML/MIN/1.73M2
GLUCOSE BLD-MCNC: 175 MG/DL (ref 70–108)
GLUCOSE BLD-MCNC: 206 MG/DL (ref 70–108)
HCT VFR BLD CALC: 32.9 % (ref 42–52)
HEMOGLOBIN: 10.6 GM/DL (ref 14–18)
LYMPHOCYTES # BLD: 18 %
LYMPHOCYTES ABSOLUTE: 2.4 THOU/MM3 (ref 1–4.8)
MCH RBC QN AUTO: 27.6 PG (ref 27–31)
MCHC RBC AUTO-ENTMCNC: 32.3 GM/DL (ref 33–37)
MCV RBC AUTO: 85.5 FL (ref 80–94)
MONOCYTES # BLD: 6 %
MONOCYTES ABSOLUTE: 0.8 THOU/MM3 (ref 0.4–1.3)
NUCLEATED RED BLOOD CELLS: 2 /100 WBC
OSMOLALITY CALCULATION: 275.9 MOSMOL/KG (ref 275–300)
OVALOCYTES: ABNORMAL
PDW BLD-RTO: 20.5 % (ref 11.5–14.5)
PLATELET # BLD: 527 THOU/MM3 (ref 130–400)
PLATELET ESTIMATE: ABNORMAL
PMV BLD AUTO: 7.6 MCM (ref 7.4–10.4)
POIKILOCYTES: ABNORMAL
POTASSIUM SERPL-SCNC: 5.3 MEQ/L (ref 3.5–5.2)
PRO-BNP: 252 PG/ML (ref 0–1800)
RBC # BLD: 3.84 MILL/MM3 (ref 4.7–6.1)
SEG NEUTROPHILS: 73 %
SEGMENTED NEUTROPHILS ABSOLUTE COUNT: 9.8 THOU/MM3 (ref 1.8–7.7)
SODIUM BLD-SCNC: 131 MEQ/L (ref 135–145)
TROPONIN T: 0.03 NG/ML
WBC # BLD: 13.4 THOU/MM3 (ref 4.8–10.8)

## 2017-12-22 PROCEDURE — 71101 X-RAY EXAM UNILAT RIBS/CHEST: CPT

## 2017-12-22 PROCEDURE — 36415 COLL VENOUS BLD VENIPUNCTURE: CPT

## 2017-12-22 PROCEDURE — 80048 BASIC METABOLIC PNL TOTAL CA: CPT

## 2017-12-22 PROCEDURE — 84484 ASSAY OF TROPONIN QUANT: CPT

## 2017-12-22 PROCEDURE — 83880 ASSAY OF NATRIURETIC PEPTIDE: CPT

## 2017-12-22 PROCEDURE — 70450 CT HEAD/BRAIN W/O DYE: CPT

## 2017-12-22 PROCEDURE — 99284 EMERGENCY DEPT VISIT MOD MDM: CPT

## 2017-12-22 PROCEDURE — 85025 COMPLETE CBC W/AUTO DIFF WBC: CPT

## 2017-12-22 PROCEDURE — 82948 REAGENT STRIP/BLOOD GLUCOSE: CPT

## 2017-12-22 PROCEDURE — 93005 ELECTROCARDIOGRAM TRACING: CPT

## 2017-12-22 PROCEDURE — 72125 CT NECK SPINE W/O DYE: CPT

## 2017-12-22 PROCEDURE — 70551 MRI BRAIN STEM W/O DYE: CPT

## 2017-12-22 RX ORDER — MECLIZINE HYDROCHLORIDE 25 MG/1
25 TABLET ORAL 3 TIMES DAILY PRN
Qty: 15 TABLET | Refills: 0 | Status: SHIPPED | OUTPATIENT
Start: 2017-12-22 | End: 2018-01-01

## 2017-12-22 ASSESSMENT — PAIN DESCRIPTION - LOCATION: LOCATION: HEAD

## 2017-12-22 ASSESSMENT — PAIN DESCRIPTION - PAIN TYPE: TYPE: ACUTE PAIN

## 2017-12-22 ASSESSMENT — PAIN DESCRIPTION - DESCRIPTORS: DESCRIPTORS: ACHING

## 2017-12-22 ASSESSMENT — PAIN DESCRIPTION - ORIENTATION: ORIENTATION: LEFT

## 2017-12-22 ASSESSMENT — PAIN SCALES - GENERAL: PAINLEVEL_OUTOF10: 2

## 2017-12-22 NOTE — ADDENDUM NOTE
Encounter addended by: Griffin Laboy RN on: 12/22/2017  3:39 PM<BR>    Actions taken: Pend clinical note

## 2017-12-22 NOTE — CARE COORDINATION
ED Care Transition     2017     Patient Name: Sandra King   : 10/6/1928  MRN: [id number]  MYRON Score: 3  PCP: Raquel Moreno MD   Specialist: yes - Dr. Irina Ko. Tere Avila CNP  Active ACC/CTC: no  Utilization Review:  ED visits: 7  Admissions: 2 - -     Problem List:      Patient Active Problem List   Diagnosis    Lower urinary tract infectious disease    HTN (hypertension)    [facility]    Colon cancer ([facility]    Diabetes mellitus ([facility]    SHAYAN (acute kidney injury) ([facility]    Hypothyroidism    CAD (coronary artery disease)    Chest pain    Hyponatremia    Hyperkalemia    Anemia    Leukocytosis    Dyspnea    Type 2 diabetes mellitus with stage 3 chronic kidney disease, with long-term current use of insulin ([facility]CC)    S/P CABG (coronary artery bypass graft)    CKD (chronic kidney disease)    Hyperlipidemia    Obesity    Syncope    Pyelonephritis    [facility]    Hyponatremia    SHAYAN (acute kidney injury) ([facility]    Colostomy status ([facility]    Panlobular emphysema ([facility])    Generalized weakness    Sepsis due to urinary tract infection ([facility]    Hyponatremia with decreased serum osmolality      Housing Review:  Current Housing:  One story with basement, can live on one level  Who do you live with?   with family:  Spouse, son and daughter Are you an active caregiver in your home? Caregiver for wife     Medication Review:  Are you able to afford your meds? Yes  Do you take your medications as prescribed? Yes   Do you manage your medications? Yes     Social Support/Self Care:  Who helps you at home?  a good social support network  residential Support:  Family assistance Payor/Insurance Company                               Active Skilled Services/Treatment:  None     Durable Medical Equipment:  Do you have any DME? none   Patient Home Respiratory Equipment no       Summary:  Rounded with Dr. Justo Whitman.  Mixed reports received from 10/06/1988    Pneumococcal low/med risk (1 of 2 - PCV13) 10/06/1993

## 2017-12-22 NOTE — ED PROVIDER NOTES
indicated that his mother is . He indicated that his father is . He indicated that his sister is . family history includes Asthma in his father; Cancer in his mother. SOCIAL HISTORY      reports that he has quit smoking. He quit after 25.00 years of use. He has never used smokeless tobacco. He reports that he does not drink alcohol or use drugs. PHYSICAL EXAM     INITIAL VITALS:  axillary temperature is 98.2 °F (36.8 °C). His blood pressure is 137/58 (abnormal) and his pulse is 73. His respiration is 18 and oxygen saturation is 93%. Physical Exam   Constitutional:  well-developed and well-nourished. HENT: Head: Normocephalic, atraumatic, Bilateral external ears normal, Oropharynx mosit, No oral exudates, Nose normal.   Eyes: PERRL, EOMI, Conjunctiva normal, No discharge. No scleral icterus  Neck: Normal range of motion, No tenderness, Supple  Lympatics: No lymphadenopathy. Cardiovascular: Normal rate, regular rhythm, S1 normal and S2 normal.  Exam reveals no gallop. Pulmonary/Chest: Effort normal and breath sounds normal. No accessory muscle usage or stridor. No respiratory distress. no wheezes. has no rales. exhibits no tenderness. Abdominal: Soft. Bowel sounds are normal.  exhibits no distension. There is no tenderness. There is no rebound and no guarding. Extremities: Positive for abrasion/lac for to left face, left arm and knee    Musculoskeletal: Good range of motion in major joints is observed. No major deformities noted. Neurological: Alert and oriented ×3, normal motor function, normal sensory function, no focal deficits.   GCS   Skin: Numerous with facial abrasions to left arm, left knee, left lateral eyebrow     DIFFERENTIAL DIAGNOSIS:       DIAGNOSTIC RESULTS     EKG: All EKG's are interpreted by the Emergency Department Physician who either signs or Co-signs this chart in the absence of a cardiologist.      RADIOLOGY: non-plain film images(s) such as CT,

## 2017-12-22 NOTE — ED TRIAGE NOTES
Patient presents to the ED via EMS after falling at his home this morning. Patient states that he got up and started walking down the hallway. He got dizzy and fell. He hit his left temple leaving a small laceration and has skin tears on his left arm. Patient is alert and orientated. Patient denies any loss of consciousness. Patient denies any dizziness at this time. eveline does state that he has not felt well since he got his flu shot a few days ago.

## 2017-12-23 LAB — URINE CULTURE, ROUTINE: NORMAL

## 2018-01-09 ENCOUNTER — TELEPHONE (OUTPATIENT)
Dept: NEPHROLOGY | Age: 83
End: 2018-01-09

## 2018-01-09 NOTE — TELEPHONE ENCOUNTER
Called and spoke to patient -I explained to him about the high   K diet -the patient states that he has been dizzy for 3 days now-he states that he has not done anything different to create this dizziness-Please advise-I will send him a low K diet

## 2018-01-25 ENCOUNTER — OFFICE VISIT (OUTPATIENT)
Dept: NEPHROLOGY | Age: 83
End: 2018-01-25
Payer: MEDICARE

## 2018-01-25 ENCOUNTER — APPOINTMENT (OUTPATIENT)
Dept: CT IMAGING | Age: 83
End: 2018-01-25
Payer: MEDICARE

## 2018-01-25 ENCOUNTER — TELEPHONE (OUTPATIENT)
Dept: NEPHROLOGY | Age: 83
End: 2018-01-25

## 2018-01-25 ENCOUNTER — HOSPITAL ENCOUNTER (EMERGENCY)
Age: 83
Discharge: HOME | End: 2018-01-25
Payer: MEDICARE

## 2018-01-25 VITALS
BODY MASS INDEX: 36.1 KG/M2 | OXYGEN SATURATION: 97 % | WEIGHT: 230 LBS | DIASTOLIC BLOOD PRESSURE: 52 MMHG | TEMPERATURE: 98.6 F | RESPIRATION RATE: 18 BRPM | SYSTOLIC BLOOD PRESSURE: 127 MMHG | HEART RATE: 60 BPM | HEIGHT: 67 IN

## 2018-01-25 VITALS
BODY MASS INDEX: 32.43 KG/M2 | WEIGHT: 226 LBS | HEART RATE: 55 BPM | DIASTOLIC BLOOD PRESSURE: 58 MMHG | OXYGEN SATURATION: 98 % | SYSTOLIC BLOOD PRESSURE: 128 MMHG

## 2018-01-25 DIAGNOSIS — R42 VERTIGO: Primary | ICD-10-CM

## 2018-01-25 DIAGNOSIS — E87.1 HYPONATREMIA: ICD-10-CM

## 2018-01-25 DIAGNOSIS — N18.30 CKD (CHRONIC KIDNEY DISEASE) STAGE 3, GFR 30-59 ML/MIN (HCC): Primary | ICD-10-CM

## 2018-01-25 LAB
ANION GAP SERPL CALCULATED.3IONS-SCNC: 16 MEQ/L (ref 8–16)
ANISOCYTOSIS: ABNORMAL
BASOPHILIA: SLIGHT
BASOPHILS # BLD: 0.8 %
BASOPHILS ABSOLUTE: 0.1 THOU/MM3 (ref 0–0.1)
BUN BLDV-MCNC: 26 MG/DL (ref 7–22)
CALCIUM SERPL-MCNC: 9.2 MG/DL (ref 8.5–10.5)
CHLORIDE BLD-SCNC: 100 MEQ/L (ref 98–111)
CO2: 20 MEQ/L (ref 23–33)
CREAT SERPL-MCNC: 1.7 MG/DL (ref 0.4–1.2)
EKG ATRIAL RATE: 64 BPM
EKG P AXIS: 56 DEGREES
EKG P-R INTERVAL: 166 MS
EKG Q-T INTERVAL: 418 MS
EKG QRS DURATION: 84 MS
EKG QTC CALCULATION (BAZETT): 431 MS
EKG R AXIS: -20 DEGREES
EKG T AXIS: 64 DEGREES
EKG VENTRICULAR RATE: 64 BPM
ELLIPTOCYTES: ABNORMAL
EOSINOPHIL # BLD: 3.3 %
EOSINOPHILS ABSOLUTE: 0.4 THOU/MM3 (ref 0–0.4)
GFR SERPL CREATININE-BSD FRML MDRD: 38 ML/MIN/1.73M2
GLUCOSE BLD-MCNC: 54 MG/DL (ref 70–108)
HCT VFR BLD CALC: 31.6 % (ref 42–52)
HEMOGLOBIN: 10.2 GM/DL (ref 14–18)
LYMPHOCYTES # BLD: 27.7 %
LYMPHOCYTES ABSOLUTE: 3.2 THOU/MM3 (ref 1–4.8)
MCH RBC QN AUTO: 29 PG (ref 27–31)
MCHC RBC AUTO-ENTMCNC: 32.3 GM/DL (ref 33–37)
MCV RBC AUTO: 89.7 FL (ref 80–94)
MONOCYTES # BLD: 14.3 %
MONOCYTES ABSOLUTE: 1.7 THOU/MM3 (ref 0.4–1.3)
NUCLEATED RED BLOOD CELLS: 1 /100 WBC
OSMOLALITY CALCULATION: 274.2 MOSMOL/KG (ref 275–300)
PDW BLD-RTO: 22.5 % (ref 11.5–14.5)
PLATELET # BLD: 338 THOU/MM3 (ref 130–400)
PLATELET ESTIMATE: ADEQUATE
PMV BLD AUTO: 8.6 MCM (ref 7.4–10.4)
POIKILOCYTES: SLIGHT
POTASSIUM SERPL-SCNC: 4.7 MEQ/L (ref 3.5–5.2)
RBC # BLD: 3.53 MILL/MM3 (ref 4.7–6.1)
SCAN OF BLOOD SMEAR: NORMAL
SEG NEUTROPHILS: 53.9 %
SEGMENTED NEUTROPHILS ABSOLUTE COUNT: 6.3 THOU/MM3 (ref 1.8–7.7)
SODIUM BLD-SCNC: 136 MEQ/L (ref 135–145)
TARGET CELLS: ABNORMAL
TROPONIN T: 0.02 NG/ML
WBC # BLD: 11.6 THOU/MM3 (ref 4.8–10.8)

## 2018-01-25 PROCEDURE — 99284 EMERGENCY DEPT VISIT MOD MDM: CPT

## 2018-01-25 PROCEDURE — 6370000000 HC RX 637 (ALT 250 FOR IP): Performed by: NURSE PRACTITIONER

## 2018-01-25 PROCEDURE — 84484 ASSAY OF TROPONIN QUANT: CPT

## 2018-01-25 PROCEDURE — 93005 ELECTROCARDIOGRAM TRACING: CPT

## 2018-01-25 PROCEDURE — 2580000003 HC RX 258: Performed by: NURSE PRACTITIONER

## 2018-01-25 PROCEDURE — 99213 OFFICE O/P EST LOW 20 MIN: CPT | Performed by: NURSE PRACTITIONER

## 2018-01-25 PROCEDURE — 70450 CT HEAD/BRAIN W/O DYE: CPT

## 2018-01-25 PROCEDURE — 85025 COMPLETE CBC W/AUTO DIFF WBC: CPT

## 2018-01-25 PROCEDURE — 36415 COLL VENOUS BLD VENIPUNCTURE: CPT

## 2018-01-25 PROCEDURE — 80048 BASIC METABOLIC PNL TOTAL CA: CPT

## 2018-01-25 RX ORDER — MECLIZINE HYDROCHLORIDE CHEWABLE TABLETS 25 MG/1
25 TABLET, CHEWABLE ORAL ONCE
Status: COMPLETED | OUTPATIENT
Start: 2018-01-25 | End: 2018-01-25

## 2018-01-25 RX ORDER — 0.9 % SODIUM CHLORIDE 0.9 %
1000 INTRAVENOUS SOLUTION INTRAVENOUS ONCE
Status: COMPLETED | OUTPATIENT
Start: 2018-01-25 | End: 2018-01-26

## 2018-01-25 RX ORDER — MECLIZINE HYDROCHLORIDE 25 MG/1
25 TABLET ORAL 3 TIMES DAILY PRN
Qty: 20 TABLET | Refills: 0 | Status: SHIPPED | OUTPATIENT
Start: 2018-01-25 | End: 2018-02-04

## 2018-01-25 RX ORDER — FUROSEMIDE 20 MG/1
20 TABLET ORAL DAILY
COMMUNITY
End: 2018-08-29

## 2018-01-25 RX ADMIN — SODIUM CHLORIDE 1000 ML: 9 INJECTION, SOLUTION INTRAVENOUS at 15:35

## 2018-01-25 RX ADMIN — MECLIZINE HCL 25 MG: 25 TABLET, CHEWABLE ORAL at 14:47

## 2018-01-25 ASSESSMENT — ENCOUNTER SYMPTOMS
FACIAL SWELLING: 0
COLOR CHANGE: 0
STRIDOR: 0
COUGH: 0
EYE DISCHARGE: 0
PHOTOPHOBIA: 0
ABDOMINAL DISTENTION: 0
VOMITING: 0
EYE REDNESS: 0
NAUSEA: 0
SHORTNESS OF BREATH: 1
RHINORRHEA: 0
DIARRHEA: 0

## 2018-01-25 NOTE — PROGRESS NOTES
Alba Anguiano is a 80 y. o.oldmale came for follow up regarding his Acute Kidney Injury due to volume contraction and chronic kidney disease, Stage III, of several year duration. Gumaro Fofana's most recent creatinine is 1.8 in Dec 22 2017. His K was up 5.3 and was advised Low K diet. . The pt states compliance with meds and denies adverse effects. The pt does not check home BP. C/O of dizziness when he stands. Ongoing shortness of breath which he says is his usual. Denies dysuria, frequency, hesitancy, urgency or hematuria. denies frothy urine. denies edema. The pt denies use of NSAIDs. Has a good appetite and is remaining active. The pt denies Nausea, vomiting, diarrhea. He has not seen his Felisa Zelaya MD recently per pt report.         Current Outpatient Prescriptions   Medication Sig Dispense Refill    furosemide (LASIX) 20 MG tablet Take 20 mg by mouth daily      hydrALAZINE (APRESOLINE) 25 MG tablet Take 1 tablet by mouth every 8 hours (Patient taking differently: Take 25 mg by mouth 3 times daily ) 90 tablet 0    carvedilol (COREG) 3.125 MG tablet Take 1 tablet by mouth 2 times daily (with meals) 60 tablet 3    insulin glargine (LANTUS) 100 UNIT/ML injection vial Inject 32 Units into the skin every morning 1 vial 3    insulin aspart (NOVOLOG) 100 UNIT/ML injection vial Inject 2 Units into the skin 3 times daily (before meals) And sliding scale 1 vial 3    levothyroxine (SYNTHROID) 50 MCG tablet Take 50 mcg by mouth every morning      ondansetron (ZOFRAN) 4 MG tablet Take 1 tablet by mouth every 8 hours as needed for Nausea 10 tablet 0    sodium chloride 1 g tablet Take 1 tablet by mouth 2 times daily 90 tablet 3    acetaminophen (TYLENOL) 325 MG tablet Take 2 tablets by mouth every 4 hours as needed for Pain 120 tablet 3    albuterol sulfate  (90 Base) MCG/ACT inhaler Inhale 2 puffs into the lungs every 6 hours as needed for Wheezing 1 Inhaler 0    glimepiride (AMARYL) 4 MG tablet Take 1 tablet by mouth daily (Patient taking differently: Take 4 mg by mouth 2 times daily ) 30 tablet 3     No current facility-administered medications for this visit. PAST MEDICAL HISTORY:       Diagnosis Date    Arthritis     CAD (coronary artery disease)     s/p CABG    Cancer ([facility]     prostate    Diabetes mellitus ([facility]     GERD (gastroesophageal reflux disease)     Hyperlipidemia     Hypertension     Hypothyroid      SURGICAL HISTORY:    Past Surgical History:   Procedure Laterality Date    ABDOMEN SURGERY      APPENDECTOMY      CARDIAC SURGERY      COLECTOMY      COLONOSCOPY      COLOSTOMY      CORONARY ARTERY BYPASS GRAFT      triple to Distal RCA, first OM, and LIMA to diagonal by Dr Nay Claire, [address]      bilateral cataracts    JOINT REPLACEMENT      KNEE SURGERY      left total knee and right total knee    TONSILLECTOMY      VASCULAR SURGERY      cabg harvests from legs     FAMILY HISTORY:       Problem Relation Age of Onset   Jena Bread Cancer Mother     Asthma Father      ALLERGIES:  No Known Allergies  Social and Occupational History:    TOBACCO:   reports that he has quit smoking. He quit after 25.00 years of use. He has never used smokeless tobacco.  ETOH:   reports that he does not drink alcohol. REVIEW OF SYSTEMS:   GENERAL: Usual state of health. Stable weight. Denies fever, C/O of being tired  HEENT: Denies recent vision change, Denies Upper respiratory complaints. Hard of hearing. CARDIOVASCULAR: Denies chest pain/angina.   RESPIRATORY:Denies sob, cough, wheezing  ABDOMEN: Denies nausea, vomiting, diarrhea, or abdominal pain  CNS:Denies headache,  MUSCULOSKELETAL: Reports joint arthralgia  SKIN: Denies rash, pruritis  HEMATOLOGIC: Denies bruising  ENDOCRINE:  Negative for heat or cold intolerance     EXAM:  VITALS:  BP (!) 128/58 (Site: Left Arm, Position: Sitting, Cuff Size: Small Adult)   Pulse 55   SpO2 98%    There is no height

## 2018-01-25 NOTE — PATIENT INSTRUCTIONS
1. Chronic Kidney Disease Stage IIIB, Likely 2nd to DM and HTN. Baseline 1.3-1.6, Renal US neg for obstruction. Will ask pt to get BMP drawn today and will review. 2. Chronic Hyponatremia 2nd to appropriate secretion of ADH. Back to his baseline in 12/22 NOt taking his PO salt tabs  3. Chronic Metabolic acidosis 2nd to GI bicarb loss, S/P PO bicarb 650  4. Essential Hypertension with nephrosclerosis   5. 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 Chronic Grade 2 Diastolic heart failure, EF 55%  7. Coronary artery disease S/P CABG  8. Ulcerative colitis with colostomy with diarrhea, S/P C diff neg  Schedule for follow up appt in 3 months. Pt asked to bring pill bottles to next office visit. Please make early appointment if needed and to call office for questions, concerns, persistent, changing or worsening symptoms.   Discussed with the patient and answered their questions   Mary PEPE, CNP

## 2018-01-26 NOTE — TELEPHONE ENCOUNTER
The patient rescheduled his 12-28-17 appointment to 01-04-18. Then rescheduled is 01-04-18 appointment to 01-18-18. Then rescheduled his 01-18-18 appointment to 01-25-18. The patient did come to his appointment to see Augustina Mejia CNP on 01-25-18 and she seen the patient [facility]

## 2018-06-05 ENCOUNTER — HOSPITAL ENCOUNTER (INPATIENT)
Age: 83
LOS: 1 days | Discharge: HOME | DRG: 086 | End: 2018-06-06
Attending: INTERNAL MEDICINE | Admitting: SURGERY
Payer: MEDICARE

## 2018-06-05 ENCOUNTER — APPOINTMENT (OUTPATIENT)
Dept: GENERAL RADIOLOGY | Age: 83
DRG: 086 | End: 2018-06-05
Payer: MEDICARE

## 2018-06-05 ENCOUNTER — APPOINTMENT (OUTPATIENT)
Dept: CT IMAGING | Age: 83
DRG: 086 | End: 2018-06-05
Payer: MEDICARE

## 2018-06-05 DIAGNOSIS — S06.5XAA SUBDURAL HEMATOMA: Primary | ICD-10-CM
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
## 2018-06-05 PROBLEM — S00.01XA SCALP ABRASION: Status: ACTIVE | Noted: 2018-06-05

## 2018-06-05 PROBLEM — S00.03XA TRAUMATIC HEMATOMA OF SCALP: Status: ACTIVE | Noted: 2018-06-05

## 2018-06-05 LAB
ALBUMIN SERPL-MCNC: 3.6 G/DL (ref 3.5–5.1)
ALP BLD-CCNC: 69 U/L (ref 38–126)
ALT SERPL-CCNC: 10 U/L (ref 11–66)
ANION GAP SERPL CALCULATED.3IONS-SCNC: 11 MEQ/L (ref 8–16)
ANISOCYTOSIS: ABNORMAL
APTT: 34 SECONDS (ref 22–38)
AST SERPL-CCNC: 14 U/L (ref 5–40)
BASOPHILS # BLD: 0.9 %
BASOPHILS ABSOLUTE: 0.1 THOU/MM3 (ref 0–0.1)
BILIRUB SERPL-MCNC: 0.7 MG/DL (ref 0.3–1.2)
BUN BLDV-MCNC: 26 MG/DL (ref 7–22)
CALCIUM SERPL-MCNC: 9.1 MG/DL (ref 8.5–10.5)
CHLORIDE BLD-SCNC: 100 MEQ/L (ref 98–111)
CO2: 24 MEQ/L (ref 23–33)
CREAT SERPL-MCNC: 1.3 MG/DL (ref 0.4–1.2)
EKG ATRIAL RATE: 69 BPM
EKG P AXIS: 59 DEGREES
EKG P-R INTERVAL: 176 MS
EKG Q-T INTERVAL: 398 MS
EKG QRS DURATION: 84 MS
EKG QTC CALCULATION (BAZETT): 426 MS
EKG R AXIS: -23 DEGREES
EKG T AXIS: 76 DEGREES
EKG VENTRICULAR RATE: 69 BPM
EOSINOPHIL # BLD: 5.7 %
EOSINOPHILS ABSOLUTE: 0.6 THOU/MM3 (ref 0–0.4)
GFR SERPL CREATININE-BSD FRML MDRD: 52 ML/MIN/1.73M2
GLUCOSE BLD-MCNC: 185 MG/DL (ref 70–108)
GLUCOSE BLD-MCNC: 201 MG/DL (ref 70–108)
HCT VFR BLD CALC: 34.3 % (ref 42–52)
HEMOGLOBIN: 11.3 GM/DL (ref 14–18)
INR BLD: 1.01 (ref 0.85–1.13)
LYMPHOCYTES # BLD: 21.8 %
LYMPHOCYTES ABSOLUTE: 2.2 THOU/MM3 (ref 1–4.8)
MCH RBC QN AUTO: 28.9 PG (ref 27–31)
MCHC RBC AUTO-ENTMCNC: 32.9 GM/DL (ref 33–37)
MCV RBC AUTO: 87.7 FL (ref 80–94)
MONOCYTES # BLD: 12.5 %
MONOCYTES ABSOLUTE: 1.3 THOU/MM3 (ref 0.4–1.3)
NUCLEATED RED BLOOD CELLS: 0 /100 WBC
OSMOLALITY CALCULATION: 279.7 MOSMOL/KG (ref 275–300)
PDW BLD-RTO: 18.8 % (ref 11.5–14.5)
PLATELET # BLD: 385 THOU/MM3 (ref 130–400)
PLATELET ESTIMATE: ADEQUATE
PMV BLD AUTO: 8 FL (ref 7.4–10.4)
POIKILOCYTES: SLIGHT
POTASSIUM SERPL-SCNC: 5.2 MEQ/L (ref 3.5–5.2)
RBC # BLD: 3.91 MILL/MM3 (ref 4.7–6.1)
SCAN OF BLOOD SMEAR: NORMAL
SEG NEUTROPHILS: 59.1 %
SEGMENTED NEUTROPHILS ABSOLUTE COUNT: 6.1 THOU/MM3 (ref 1.8–7.7)
SODIUM BLD-SCNC: 135 MEQ/L (ref 135–145)
TARGET CELLS: ABNORMAL
TOTAL PROTEIN: 6.4 G/DL (ref 6.1–8)
TROPONIN T: < 0.01 NG/ML
WBC # BLD: 10.3 THOU/MM3 (ref 4.8–10.8)

## 2018-06-05 PROCEDURE — 85610 PROTHROMBIN TIME: CPT

## 2018-06-05 PROCEDURE — 36415 COLL VENOUS BLD VENIPUNCTURE: CPT

## 2018-06-05 PROCEDURE — 72125 CT NECK SPINE W/O DYE: CPT

## 2018-06-05 PROCEDURE — 84484 ASSAY OF TROPONIN QUANT: CPT

## 2018-06-05 PROCEDURE — 6820000001 HC L2 TRAUMA SURGERY EVALUATION

## 2018-06-05 PROCEDURE — 85730 THROMBOPLASTIN TIME PARTIAL: CPT

## 2018-06-05 PROCEDURE — 71045 X-RAY EXAM CHEST 1 VIEW: CPT

## 2018-06-05 PROCEDURE — 6370000000 HC RX 637 (ALT 250 FOR IP): Performed by: NURSE PRACTITIONER

## 2018-06-05 PROCEDURE — 70450 CT HEAD/BRAIN W/O DYE: CPT

## 2018-06-05 PROCEDURE — APPSS180 APP SPLIT SHARED TIME > 60 MINUTES: Performed by: NURSE PRACTITIONER

## 2018-06-05 PROCEDURE — 87040 BLOOD CULTURE FOR BACTERIA: CPT

## 2018-06-05 PROCEDURE — 2580000003 HC RX 258: Performed by: NURSE PRACTITIONER

## 2018-06-05 PROCEDURE — 80053 COMPREHEN METABOLIC PANEL: CPT

## 2018-06-05 PROCEDURE — 1210000002 HC MED SURG R&B - NEUROSCIENCE

## 2018-06-05 PROCEDURE — 85025 COMPLETE CBC W/AUTO DIFF WBC: CPT

## 2018-06-05 PROCEDURE — 99285 EMERGENCY DEPT VISIT HI MDM: CPT

## 2018-06-05 PROCEDURE — 99222 1ST HOSP IP/OBS MODERATE 55: CPT | Performed by: SURGERY

## 2018-06-05 PROCEDURE — 93005 ELECTROCARDIOGRAM TRACING: CPT | Performed by: INTERNAL MEDICINE

## 2018-06-05 PROCEDURE — 6360000002 HC RX W HCPCS: Performed by: NURSE PRACTITIONER

## 2018-06-05 PROCEDURE — 6360000002 HC RX W HCPCS: Performed by: INTERNAL MEDICINE

## 2018-06-05 PROCEDURE — 99222 1ST HOSP IP/OBS MODERATE 55: CPT | Performed by: NEUROLOGICAL SURGERY

## 2018-06-05 PROCEDURE — 82948 REAGENT STRIP/BLOOD GLUCOSE: CPT

## 2018-06-05 PROCEDURE — 90715 TDAP VACCINE 7 YRS/> IM: CPT | Performed by: INTERNAL MEDICINE

## 2018-06-05 PROCEDURE — 90471 IMMUNIZATION ADMIN: CPT | Performed by: INTERNAL MEDICINE

## 2018-06-05 RX ORDER — BISACODYL 10 MG
10 SUPPOSITORY, RECTAL RECTAL DAILY PRN
Status: DISCONTINUED | OUTPATIENT
Start: 2018-06-05 | End: 2018-06-06 | Stop reason: HOSPADM

## 2018-06-05 RX ORDER — INSULIN GLARGINE 100 [IU]/ML
32 INJECTION, SOLUTION SUBCUTANEOUS EVERY MORNING
Status: DISCONTINUED | OUTPATIENT
Start: 2018-06-06 | End: 2018-06-06 | Stop reason: HOSPADM

## 2018-06-05 RX ORDER — DEXTROSE MONOHYDRATE 50 MG/ML
100 INJECTION, SOLUTION INTRAVENOUS PRN
Status: DISCONTINUED | OUTPATIENT
Start: 2018-06-05 | End: 2018-06-06 | Stop reason: HOSPADM

## 2018-06-05 RX ORDER — GINSENG 100 MG
CAPSULE ORAL 3 TIMES DAILY
Status: DISCONTINUED | OUTPATIENT
Start: 2018-06-05 | End: 2018-06-06 | Stop reason: HOSPADM

## 2018-06-05 RX ORDER — DEXTROSE MONOHYDRATE 25 G/50ML
12.5 INJECTION, SOLUTION INTRAVENOUS PRN
Status: DISCONTINUED | OUTPATIENT
Start: 2018-06-05 | End: 2018-06-06 | Stop reason: HOSPADM

## 2018-06-05 RX ORDER — FAMOTIDINE 20 MG/1
20 TABLET, FILM COATED ORAL DAILY
Status: DISCONTINUED | OUTPATIENT
Start: 2018-06-05 | End: 2018-06-06 | Stop reason: HOSPADM

## 2018-06-05 RX ORDER — HYDROCODONE BITARTRATE AND ACETAMINOPHEN 5; 325 MG/1; MG/1
2 TABLET ORAL EVERY 4 HOURS PRN
Status: DISCONTINUED | OUTPATIENT
Start: 2018-06-05 | End: 2018-06-06 | Stop reason: HOSPADM

## 2018-06-05 RX ORDER — ACETAMINOPHEN 325 MG/1
650 TABLET ORAL EVERY 4 HOURS PRN
Status: DISCONTINUED | OUTPATIENT
Start: 2018-06-05 | End: 2018-06-06 | Stop reason: HOSPADM

## 2018-06-05 RX ORDER — SODIUM CHLORIDE 9 MG/ML
INJECTION, SOLUTION INTRAVENOUS CONTINUOUS
Status: DISCONTINUED | OUTPATIENT
Start: 2018-06-05 | End: 2018-06-06

## 2018-06-05 RX ORDER — GLIMEPIRIDE 4 MG/1
4 TABLET ORAL 2 TIMES DAILY
Status: DISCONTINUED | OUTPATIENT
Start: 2018-06-06 | End: 2018-06-06 | Stop reason: HOSPADM

## 2018-06-05 RX ORDER — ALBUTEROL SULFATE 90 UG/1
2 AEROSOL, METERED RESPIRATORY (INHALATION) EVERY 6 HOURS PRN
Status: DISCONTINUED | OUTPATIENT
Start: 2018-06-05 | End: 2018-06-06 | Stop reason: HOSPADM

## 2018-06-05 RX ORDER — CARVEDILOL 3.12 MG/1
3.12 TABLET ORAL 2 TIMES DAILY WITH MEALS
Status: DISCONTINUED | OUTPATIENT
Start: 2018-06-05 | End: 2018-06-06 | Stop reason: HOSPADM

## 2018-06-05 RX ORDER — LEVOTHYROXINE SODIUM 0.05 MG/1
50 TABLET ORAL EVERY MORNING
Status: DISCONTINUED | OUTPATIENT
Start: 2018-06-06 | End: 2018-06-06 | Stop reason: HOSPADM

## 2018-06-05 RX ORDER — HYDRALAZINE HYDROCHLORIDE 25 MG/1
25 TABLET, FILM COATED ORAL EVERY 8 HOURS SCHEDULED
Status: DISCONTINUED | OUTPATIENT
Start: 2018-06-05 | End: 2018-06-06 | Stop reason: HOSPADM

## 2018-06-05 RX ORDER — MORPHINE SULFATE 4 MG/ML
4 INJECTION, SOLUTION INTRAMUSCULAR; INTRAVENOUS
Status: DISCONTINUED | OUTPATIENT
Start: 2018-06-05 | End: 2018-06-06 | Stop reason: HOSPADM

## 2018-06-05 RX ORDER — SODIUM CHLORIDE 0.9 % (FLUSH) 0.9 %
10 SYRINGE (ML) INJECTION EVERY 12 HOURS SCHEDULED
Status: DISCONTINUED | OUTPATIENT
Start: 2018-06-05 | End: 2018-06-06 | Stop reason: HOSPADM

## 2018-06-05 RX ORDER — NICOTINE POLACRILEX 4 MG
15 LOZENGE BUCCAL PRN
Status: DISCONTINUED | OUTPATIENT
Start: 2018-06-05 | End: 2018-06-06 | Stop reason: HOSPADM

## 2018-06-05 RX ORDER — SODIUM CHLORIDE 0.9 % (FLUSH) 0.9 %
10 SYRINGE (ML) INJECTION PRN
Status: DISCONTINUED | OUTPATIENT
Start: 2018-06-05 | End: 2018-06-06 | Stop reason: HOSPADM

## 2018-06-05 RX ORDER — HYDRALAZINE HYDROCHLORIDE 20 MG/ML
10 INJECTION INTRAMUSCULAR; INTRAVENOUS EVERY 6 HOURS PRN
Status: DISCONTINUED | OUTPATIENT
Start: 2018-06-05 | End: 2018-06-06 | Stop reason: HOSPADM

## 2018-06-05 RX ORDER — MORPHINE SULFATE 2 MG/ML
2 INJECTION, SOLUTION INTRAMUSCULAR; INTRAVENOUS
Status: DISCONTINUED | OUTPATIENT
Start: 2018-06-05 | End: 2018-06-06 | Stop reason: HOSPADM

## 2018-06-05 RX ORDER — HYDROCODONE BITARTRATE AND ACETAMINOPHEN 5; 325 MG/1; MG/1
1 TABLET ORAL EVERY 4 HOURS PRN
Status: DISCONTINUED | OUTPATIENT
Start: 2018-06-05 | End: 2018-06-06 | Stop reason: HOSPADM

## 2018-06-05 RX ORDER — ONDANSETRON 2 MG/ML
4 INJECTION INTRAMUSCULAR; INTRAVENOUS EVERY 6 HOURS PRN
Status: DISCONTINUED | OUTPATIENT
Start: 2018-06-05 | End: 2018-06-06 | Stop reason: HOSPADM

## 2018-06-05 RX ORDER — SODIUM CHLORIDE 1000 MG
1 TABLET, SOLUBLE MISCELLANEOUS 2 TIMES DAILY
Status: DISCONTINUED | OUTPATIENT
Start: 2018-06-05 | End: 2018-06-06 | Stop reason: HOSPADM

## 2018-06-05 RX ORDER — DOCUSATE SODIUM 100 MG/1
100 CAPSULE, LIQUID FILLED ORAL 2 TIMES DAILY
Status: DISCONTINUED | OUTPATIENT
Start: 2018-06-05 | End: 2018-06-06 | Stop reason: HOSPADM

## 2018-06-05 RX ADMIN — DOCUSATE SODIUM 100 MG: 100 CAPSULE, LIQUID FILLED ORAL at 20:10

## 2018-06-05 RX ADMIN — TETANUS TOXOID, REDUCED DIPHTHERIA TOXOID AND ACELLULAR PERTUSSIS VACCINE, ADSORBED 0.5 ML: 5; 2.5; 8; 8; 2.5 SUSPENSION INTRAMUSCULAR at 17:56

## 2018-06-05 RX ADMIN — SODIUM CHLORIDE TAB 1 GM 1 G: 1 TAB at 20:10

## 2018-06-05 RX ADMIN — FAMOTIDINE 20 MG: 20 TABLET ORAL at 21:10

## 2018-06-05 RX ADMIN — SODIUM CHLORIDE 50 ML/HR: 9 INJECTION, SOLUTION INTRAVENOUS at 20:07

## 2018-06-05 RX ADMIN — CARVEDILOL 3.12 MG: 3.12 TABLET, FILM COATED ORAL at 23:48

## 2018-06-05 RX ADMIN — HYDRALAZINE HYDROCHLORIDE 10 MG: 20 INJECTION INTRAMUSCULAR; INTRAVENOUS at 19:57

## 2018-06-05 RX ADMIN — Medication 2 UNITS: at 21:17

## 2018-06-05 RX ADMIN — HYDRALAZINE HYDROCHLORIDE 25 MG: 25 TABLET, FILM COATED ORAL at 21:11

## 2018-06-05 RX ADMIN — Medication 10 ML: at 20:09

## 2018-06-05 RX ADMIN — BACITRACIN: 500 OINTMENT TOPICAL at 21:10

## 2018-06-05 ASSESSMENT — ENCOUNTER SYMPTOMS
EYE REDNESS: 0
VOMITING: 0
NAUSEA: 0
RHINORRHEA: 0
COUGH: 1
EYE ITCHING: 0
SORE THROAT: 0
SHORTNESS OF BREATH: 1
SINUS PRESSURE: 0
CONSTIPATION: 0
COLOR CHANGE: 0
CHOKING: 0
FACIAL SWELLING: 0
COUGH: 0
PHOTOPHOBIA: 0
ABDOMINAL DISTENTION: 0
TROUBLE SWALLOWING: 0
VOICE CHANGE: 0
APNEA: 0
DIARRHEA: 0
ABDOMINAL PAIN: 0
WHEEZING: 0
CHEST TIGHTNESS: 0
SHORTNESS OF BREATH: 0
BLOOD IN STOOL: 0
BACK PAIN: 0
EYE DISCHARGE: 0
STRIDOR: 0
EYE PAIN: 0

## 2018-06-05 ASSESSMENT — PAIN DESCRIPTION - ORIENTATION: ORIENTATION: POSTERIOR

## 2018-06-05 ASSESSMENT — PAIN DESCRIPTION - PAIN TYPE: TYPE: ACUTE PAIN

## 2018-06-05 ASSESSMENT — PAIN DESCRIPTION - LOCATION: LOCATION: HEAD

## 2018-06-05 ASSESSMENT — PAIN DESCRIPTION - DESCRIPTORS: DESCRIPTORS: ACHING

## 2018-06-05 ASSESSMENT — PAIN SCALES - GENERAL: PAINLEVEL_OUTOF10: 6

## 2018-06-05 ASSESSMENT — PAIN DESCRIPTION - FREQUENCY: FREQUENCY: CONTINUOUS

## 2018-06-06 ENCOUNTER — APPOINTMENT (OUTPATIENT)
Dept: CT IMAGING | Age: 83
DRG: 086 | End: 2018-06-06
Payer: MEDICARE

## 2018-06-06 VITALS
OXYGEN SATURATION: 96 % | HEIGHT: 67 IN | HEART RATE: 60 BPM | DIASTOLIC BLOOD PRESSURE: 78 MMHG | BODY MASS INDEX: 36.1 KG/M2 | WEIGHT: 230 LBS | SYSTOLIC BLOOD PRESSURE: 138 MMHG | RESPIRATION RATE: 16 BRPM | TEMPERATURE: 97.5 F

## 2018-06-06 LAB
ANION GAP SERPL CALCULATED.3IONS-SCNC: 10 MEQ/L (ref 8–16)
BUN BLDV-MCNC: 22 MG/DL (ref 7–22)
CALCIUM SERPL-MCNC: 8.8 MG/DL (ref 8.5–10.5)
CHLORIDE BLD-SCNC: 104 MEQ/L (ref 98–111)
CO2: 22 MEQ/L (ref 23–33)
CREAT SERPL-MCNC: 1.2 MG/DL (ref 0.4–1.2)
GFR SERPL CREATININE-BSD FRML MDRD: 57 ML/MIN/1.73M2
GLUCOSE BLD-MCNC: 101 MG/DL (ref 70–108)
GLUCOSE BLD-MCNC: 182 MG/DL (ref 70–108)
GLUCOSE BLD-MCNC: 90 MG/DL (ref 70–108)
HCT VFR BLD CALC: 32.8 % (ref 42–52)
HEMOGLOBIN: 10.8 GM/DL (ref 14–18)
MCH RBC QN AUTO: 29.4 PG (ref 27–31)
MCHC RBC AUTO-ENTMCNC: 32.9 GM/DL (ref 33–37)
MCV RBC AUTO: 89.4 FL (ref 80–94)
PDW BLD-RTO: 19.6 % (ref 11.5–14.5)
PLATELET # BLD: 313 THOU/MM3 (ref 130–400)
PMV BLD AUTO: 8 FL (ref 7.4–10.4)
POTASSIUM REFLEX MAGNESIUM: 4.6 MEQ/L (ref 3.5–5.2)
RBC # BLD: 3.67 MILL/MM3 (ref 4.7–6.1)
SODIUM BLD-SCNC: 136 MEQ/L (ref 135–145)
WBC # BLD: 11.6 THOU/MM3 (ref 4.8–10.8)

## 2018-06-06 PROCEDURE — 36415 COLL VENOUS BLD VENIPUNCTURE: CPT

## 2018-06-06 PROCEDURE — G8978 MOBILITY CURRENT STATUS: HCPCS

## 2018-06-06 PROCEDURE — 80048 BASIC METABOLIC PNL TOTAL CA: CPT

## 2018-06-06 PROCEDURE — 6370000000 HC RX 637 (ALT 250 FOR IP): Performed by: SURGERY

## 2018-06-06 PROCEDURE — 99232 SBSQ HOSP IP/OBS MODERATE 35: CPT | Performed by: NURSE PRACTITIONER

## 2018-06-06 PROCEDURE — 99233 SBSQ HOSP IP/OBS HIGH 50: CPT | Performed by: SURGERY

## 2018-06-06 PROCEDURE — APPNB30 APP NON BILLABLE TIME 0-30 MINS: Performed by: NURSE PRACTITIONER

## 2018-06-06 PROCEDURE — 97110 THERAPEUTIC EXERCISES: CPT

## 2018-06-06 PROCEDURE — 6370000000 HC RX 637 (ALT 250 FOR IP): Performed by: NURSE PRACTITIONER

## 2018-06-06 PROCEDURE — 85027 COMPLETE CBC AUTOMATED: CPT

## 2018-06-06 PROCEDURE — 92523 SPEECH SOUND LANG COMPREHEN: CPT

## 2018-06-06 PROCEDURE — 97161 PT EVAL LOW COMPLEX 20 MIN: CPT

## 2018-06-06 PROCEDURE — G8979 MOBILITY GOAL STATUS: HCPCS

## 2018-06-06 PROCEDURE — APPSS60 APP SPLIT SHARED TIME 46-60 MINUTES: Performed by: NURSE PRACTITIONER

## 2018-06-06 PROCEDURE — G8988 SELF CARE GOAL STATUS: HCPCS

## 2018-06-06 PROCEDURE — 70450 CT HEAD/BRAIN W/O DYE: CPT

## 2018-06-06 PROCEDURE — G8987 SELF CARE CURRENT STATUS: HCPCS

## 2018-06-06 PROCEDURE — 82948 REAGENT STRIP/BLOOD GLUCOSE: CPT

## 2018-06-06 RX ADMIN — HYDROCODONE BITARTRATE AND ACETAMINOPHEN 1 TABLET: 5; 325 TABLET ORAL at 03:43

## 2018-06-06 RX ADMIN — HYDRALAZINE HYDROCHLORIDE 25 MG: 25 TABLET, FILM COATED ORAL at 07:04

## 2018-06-06 RX ADMIN — BACITRACIN: 500 OINTMENT TOPICAL at 09:29

## 2018-06-06 RX ADMIN — SODIUM CHLORIDE TAB 1 GM 1 G: 1 TAB at 09:29

## 2018-06-06 RX ADMIN — CARVEDILOL 3.12 MG: 3.12 TABLET, FILM COATED ORAL at 09:29

## 2018-06-06 RX ADMIN — LEVOTHYROXINE SODIUM 50 MCG: 50 TABLET ORAL at 07:02

## 2018-06-06 RX ADMIN — INSULIN GLARGINE 32 UNITS: 100 INJECTION, SOLUTION SUBCUTANEOUS at 09:29

## 2018-06-06 RX ADMIN — GLIMEPIRIDE 4 MG: 4 TABLET ORAL at 09:29

## 2018-06-06 ASSESSMENT — PAIN SCALES - GENERAL
PAINLEVEL_OUTOF10: 6
PAINLEVEL_OUTOF10: 0

## 2018-06-08 ENCOUNTER — TELEPHONE (OUTPATIENT)
Dept: PHARMACY | Facility: CLINIC | Age: 83
End: 2018-06-08

## 2018-06-08 DIAGNOSIS — Z79.899 ENCOUNTER FOR MEDICATION REVIEW: Primary | ICD-10-CM

## 2018-06-08 PROCEDURE — 1111F DSCHRG MED/CURRENT MED MERGE: CPT | Performed by: PHARMACIST

## 2018-06-11 LAB
BLOOD CULTURE, ROUTINE: NORMAL
BLOOD CULTURE, ROUTINE: NORMAL

## 2018-06-14 ASSESSMENT — ENCOUNTER SYMPTOMS
CHEST TIGHTNESS: 1
TROUBLE SWALLOWING: 0
ABDOMINAL PAIN: 0
BACK PAIN: 0

## 2018-06-18 ENCOUNTER — TELEPHONE (OUTPATIENT)
Dept: NEUROSURGERY | Age: 83
End: 2018-06-18

## 2018-06-18 DIAGNOSIS — S06.5XAA SUBDURAL HEMATOMA: Primary | ICD-10-CM
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
## 2018-06-18 NOTE — TELEPHONE ENCOUNTER
Called 6-18-18 pt.to cancel Apt. 6-18-18, pt. Needs a follow up CT before apt.  And needs to have new order Prior Authed for CT

## 2018-06-26 ENCOUNTER — HOSPITAL ENCOUNTER (OUTPATIENT)
Dept: CT IMAGING | Age: 83
Discharge: HOME | End: 2018-06-26
Payer: MEDICARE

## 2018-06-26 DIAGNOSIS — S06.5XAA SUBDURAL HEMATOMA: ICD-10-CM
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
## 2018-06-26 PROCEDURE — 70450 CT HEAD/BRAIN W/O DYE: CPT

## 2018-06-28 ENCOUNTER — TELEPHONE (OUTPATIENT)
Dept: NEUROSURGERY | Age: 83
End: 2018-06-28

## 2018-06-28 NOTE — TELEPHONE ENCOUNTER
Pt called 6-28-18 requesting his ct scan results would like a call back on results pt # is [phone]
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
## 2018-07-17 ENCOUNTER — OFFICE VISIT (OUTPATIENT)
Dept: SURGERY | Age: 83
End: 2018-07-17
Payer: MEDICARE

## 2018-07-17 VITALS
RESPIRATION RATE: 18 BRPM | SYSTOLIC BLOOD PRESSURE: 128 MMHG | HEIGHT: 70 IN | DIASTOLIC BLOOD PRESSURE: 80 MMHG | HEART RATE: 63 BPM | OXYGEN SATURATION: 93 % | TEMPERATURE: 98.4 F | WEIGHT: 223 LBS | BODY MASS INDEX: 31.92 KG/M2

## 2018-07-17 DIAGNOSIS — L98.9 SKIN LESION: Primary | ICD-10-CM

## 2018-07-17 PROCEDURE — 12032 INTMD RPR S/A/T/EXT 2.6-7.5: CPT | Performed by: SURGERY

## 2018-07-17 PROCEDURE — 11606 EXC TR-EXT MAL+MARG >4 CM: CPT | Performed by: SURGERY

## 2018-07-17 NOTE — PROGRESS NOTES
[facility]  [facility]  [address]  [address]  Dept: [phone]  Dept Fax: [phone]  Loc: [phone]    Visit Date: 7/17/2018    Yassine Lester is a 80 y.o. male who presents today for:  Chief Complaint   Patient presents with   [employer] [facility]     new pt-ref.  Dr. Malia Castillo lesion right elbow-       HPI:     HPI as above patient states he's had a two-week history of a lesion on his right arm however it is of good size and likely represents a squamous cell cancer patient is not on any blood thinners Will excise in the office now    Past Medical History:   Diagnosis Date    Arthritis     CAD (coronary artery disease)     s/p CABG    [facility]     prostate    Diabetes mellitus ([facility]     GERD (gastroesophageal reflux disease)     Hyperlipidemia     Hypertension     Hypothyroid       Past Surgical History:   Procedure Laterality Date    ABDOMEN SURGERY      APPENDECTOMY      CARDIAC SURGERY      COLECTOMY      COLONOSCOPY      COLOSTOMY      CORONARY ARTERY BYPASS GRAFT      triple to Distal RCA, first OM, and LIMA to diagonal by Dr Akshat Scott, ESOPHAGUS     [address]      bilateral cataracts    JOINT REPLACEMENT      KNEE SURGERY      left total knee and right total knee    TONSILLECTOMY      VASCULAR SURGERY      cabg harvests from legs     Family History   Problem Relation Age of Onset    Cancer Mother     Asthma Father      Social History   Substance Use Topics    Smoking status: Former Smoker     Years: 25.00    Smokeless tobacco: Never Used      Comment: quit 40 yrs ago    Alcohol use No       Current Outpatient Prescriptions   Medication Sig Dispense Refill    furosemide (LASIX) 20 MG tablet Take 20 mg by mouth daily      ondansetron (ZOFRAN) 4 MG tablet Take 1 tablet by mouth every 8 hours as needed for Nausea 10 tablet 0    hydrALAZINE (APRESOLINE) 25 MG tablet Take 1 tablet by mouth 32.9 (L) 33.0 - 37.0 gm/dl    RDW 19.6 (H) 11.5 - 14.5 %    Platelets 436 865 - 046 thou/mm3    MPV 8.0 7.4 - 10.4 fL   APTT   Result Value Ref Range    aPTT 34.0 22.0 - 38.0 seconds   Protime-INR   Result Value Ref Range    INR 1.01 0.85 - 1.13   Anion Gap   Result Value Ref Range    Anion Gap 10.0 8.0 - 16.0 meq/L   Glomerular Filtration Rate, Estimated   Result Value Ref Range    Est, Glom Filt Rate 57 (A) ml/min/1.73m2   POCT glucose   Result Value Ref Range    POC Glucose 201 (H) 70 - 108 mg/dl   POCT glucose   Result Value Ref Range    POC Glucose 101 70 - 108 mg/dl   POCT glucose   Result Value Ref Range    POC Glucose 182 (H) 70 - 108 mg/dl   EKG Fall   Result Value Ref Range    Ventricular Rate 69 BPM    Atrial Rate 69 BPM    P-R Interval 176 ms    QRS Duration 84 ms    Q-T Interval 398 ms    QTc Calculation (Bazett) 426 ms    P Axis 59 degrees    R Axis -23 degrees    T Axis 76 degrees       Assessment:     Probable squamous cell cancer    Plan:     Remove in office  Procedure Note:    Preoperative Diagnosis: 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 Mohit Koehler    Anethesia:local- 15 mL local    Complication:none    Indication for Procedure: Skin mass probable carcinoma    Procedure: Patient was brought into the procedure room and placed in the left lateral decubitus position DuraPrep was used to prep the area and Xylocaine was used to anesthetize the area and elliptical incision was made interrupted 4-0 Vicryl was used to close the subcutaneous and a running 3-0 Prolene vertical interlocking mattress suture was used to close the skin entire procedure well    Electronically signed by Jesi Patel MD on 7/17/2018 at 2:33 PM

## 2018-07-31 ENCOUNTER — OFFICE VISIT (OUTPATIENT)
Dept: SURGERY | Age: 83
End: 2018-07-31

## 2018-07-31 VITALS
BODY MASS INDEX: 31.92 KG/M2 | TEMPERATURE: 97.2 F | WEIGHT: 223 LBS | HEART RATE: 70 BPM | SYSTOLIC BLOOD PRESSURE: 120 MMHG | RESPIRATION RATE: 22 BRPM | HEIGHT: 70 IN | DIASTOLIC BLOOD PRESSURE: 74 MMHG | OXYGEN SATURATION: 97 %

## 2018-07-31 DIAGNOSIS — Z48.02 VISIT FOR SUTURE REMOVAL: Primary | ICD-10-CM

## 2018-07-31 PROCEDURE — 99024 POSTOP FOLLOW-UP VISIT: CPT | Performed by: SURGERY

## 2018-07-31 NOTE — PROGRESS NOTES
(COREG) 3.125 MG tablet Take 1 tablet by mouth 2 times daily (with meals) 60 tablet 3    insulin glargine (LANTUS) 100 UNIT/ML injection vial Inject 32 Units into the skin every morning 1 vial 3    sodium chloride 1 g tablet Take 1 tablet by mouth 2 times daily 90 tablet 3    acetaminophen (TYLENOL) 325 MG tablet Take 2 tablets by mouth every 4 hours as needed for Pain 120 tablet 3    albuterol sulfate  (90 Base) MCG/ACT inhaler Inhale 2 puffs into the lungs every 6 hours as needed for Wheezing 1 Inhaler 0    glimepiride (AMARYL) 4 MG tablet Take 1 tablet by mouth daily 30 tablet 3    insulin aspart (NOVOLOG) 100 UNIT/ML injection vial Inject 2 Units into the skin 3 times daily (before meals) And sliding scale 1 vial 3    levothyroxine (SYNTHROID) 50 MCG tablet Take 50 mcg by mouth every morning       No current facility-administered medications for this visit.       No Known Allergies    Subjective:      Review of Systems    Objective:   /74 (Site: Right Arm, Position: Sitting, Cuff Size: Medium Adult)   Pulse 70   Temp 97.2 °F (36.2 °C) (Tympanic)   Resp 22   Ht 5' 10\" (1.778 m)   Wt 223 lb (101.2 kg)   SpO2 97%   BMI 32.00 kg/m²     Physical Exam wound looks good sutures were removed    Results for orders placed or performed during the hospital encounter of 06/05/18   Culture blood #2   Result Value Ref Range    Blood Culture, Routine No growth-preliminary  No growth      Culture blood #1   Result Value Ref Range    Blood Culture, Routine No growth-preliminary  No growth      CBC auto differential   Result Value Ref Range    WBC 10.3 4.8 - 10.8 thou/mm3    RBC 3.91 (L) 4.70 - 6.10 mill/mm3    Hemoglobin 11.3 (L) 14.0 - 18.0 gm/dl    Hematocrit 34.3 (L) 42.0 - 52.0 %    MCV 87.7 80.0 - 94.0 fL    MCH 28.9 27.0 - 31.0 pg    MCHC 32.9 (L) 33.0 - 37.0 gm/dl    RDW 18.8 (H) 11.5 - 14.5 %    Platelets 792 025 - 947 thou/mm3    MPV 8.0 7.4 - 10.4 fL    Seg Neutrophils 59.1 %    Lymphocytes 21.8

## 2018-08-21 ENCOUNTER — APPOINTMENT (OUTPATIENT)
Dept: CT IMAGING | Age: 83
End: 2018-08-21
Payer: MEDICARE

## 2018-08-21 ENCOUNTER — HOSPITAL ENCOUNTER (EMERGENCY)
Age: 83
Discharge: HOME | End: 2018-08-21
Payer: MEDICARE

## 2018-08-21 VITALS
OXYGEN SATURATION: 96 % | SYSTOLIC BLOOD PRESSURE: 169 MMHG | HEART RATE: 62 BPM | BODY MASS INDEX: 33.72 KG/M2 | WEIGHT: 235 LBS | RESPIRATION RATE: 16 BRPM | DIASTOLIC BLOOD PRESSURE: 87 MMHG | TEMPERATURE: 97.6 F

## 2018-08-21 DIAGNOSIS — S70.00XA CONTUSION OF HIP, UNSPECIFIED LATERALITY, INITIAL ENCOUNTER: Primary | ICD-10-CM

## 2018-08-21 PROCEDURE — 72192 CT PELVIS W/O DYE: CPT

## 2018-08-21 PROCEDURE — 99283 EMERGENCY DEPT VISIT LOW MDM: CPT

## 2018-08-21 ASSESSMENT — ENCOUNTER SYMPTOMS
VOICE CHANGE: 0
WHEEZING: 0
EYE REDNESS: 0
VOMITING: 0
SORE THROAT: 0
ABDOMINAL DISTENTION: 0
BACK PAIN: 1
DIARRHEA: 0
NAUSEA: 0
CHEST TIGHTNESS: 0
SINUS PRESSURE: 0
SHORTNESS OF BREATH: 0
BLOOD IN STOOL: 0
RHINORRHEA: 0
COLOR CHANGE: 0
PHOTOPHOBIA: 0
COUGH: 0
CONSTIPATION: 0
ABDOMINAL PAIN: 0

## 2018-08-21 ASSESSMENT — PAIN DESCRIPTION - PAIN TYPE: TYPE: ACUTE PAIN

## 2018-08-21 ASSESSMENT — PAIN SCALES - GENERAL: PAINLEVEL_OUTOF10: 8

## 2018-08-21 NOTE — ED TRIAGE NOTES
Pt to ed per ems for buttock pain. Pt states that he was mowing his lawn on Friday and almost fell off the mower but caught the steering wheel preventing the fall and since he has had bilateral buttock pain 10/10. Pt also states difficulty walking since incident.  Pt waiting on provider to discuss the POC

## 2018-08-21 NOTE — ED PROVIDER NOTES
[facility] Emergency Department    CHIEF COMPLAINT       Chief Complaint   Patient presents with    Buttocks Pain       Nurses Notes reviewed and I agree except as noted in the HPI. HISTORY OF PRESENT ILLNESS    Dolores Barrow is a 80 y.o. male who presents to the ED for evaluation of buttocks pain that began Friday. Patient reports that he almost fell off of his mower and caught himself with the steering wheel and felt a sharp pain in his lower back and buttocks. Patient reports that he is having difficulty ambulation secondary to the pain. He rates his pain at a 8/10 in severity and describes it as a radiating sharp pain into both legs. He denies any abdominal pain or urinary symptoms. Patient denies any further complaints at this time. Pain description:  Onset: Acute   Location: lower back and buttocks   Duration: constant   Character: sharp  Aggravating factors: ambulation  Radiation: down both legs   Timing: Friday   Severity: 8/10    Experienced previously: No    HPI was provided by the patient. REVIEW OF SYSTEMS     Review of Systems   Constitutional: Negative for appetite change, chills, diaphoresis, fatigue, fever and unexpected weight change. HENT: Negative for congestion, hearing loss, postnasal drip, rhinorrhea, sinus pressure, sore throat and voice change. Eyes: Negative for photophobia, redness and visual disturbance. Respiratory: Negative for cough, chest tightness, shortness of breath and wheezing. Cardiovascular: Negative for chest pain and palpitations. Gastrointestinal: Negative for abdominal distention, abdominal pain, blood in stool, constipation, diarrhea, nausea and vomiting. Endocrine: Negative for cold intolerance, heat intolerance, polydipsia, polyphagia and polyuria. Genitourinary: Negative for decreased urine volume, difficulty urinating, dysuria, flank pain and frequency. Musculoskeletal: Positive for back pain and myalgias (Buttocks).  Negative for arthralgias (Left hip), gait problem, joint swelling, neck pain and neck stiffness. Skin: Negative for color change and rash. Allergic/Immunologic: Negative for immunocompromised state. Neurological: Negative for dizziness, tremors, weakness, light-headedness, numbness and headaches. Hematological: Does not bruise/bleed easily. Psychiatric/Behavioral: Negative for behavioral problems, confusion, decreased concentration, hallucinations, self-injury and suicidal ideas. The patient is not nervous/anxious. PAST MEDICAL HISTORY    has a past medical history of Arthritis; CAD (coronary artery disease); [facility] ([facility] GERD (gastroesophageal reflux disease); Hyperlipidemia; Hypertension; and Hypothyroid. SURGICAL HISTORY      has a past surgical history that includes Coronary artery bypass graft; knee surgery; colostomy; colectomy; Abdomen surgery; Cardiac surgery; joint replacement; vascular surgery; Tonsillectomy; Colonoscopy; Appendectomy; eye surgery; and Dilatation, esophagus.     CURRENT MEDICATIONS       Current Discharge Medication List      CONTINUE these medications which have NOT CHANGED    Details   furosemide (LASIX) 20 MG tablet Take 20 mg by mouth daily      ondansetron (ZOFRAN) 4 MG tablet Take 1 tablet by mouth every 8 hours as needed for Nausea  Qty: 10 tablet, Refills: 0      hydrALAZINE (APRESOLINE) 25 MG tablet Take 1 tablet by mouth every 8 hours  Qty: 90 tablet, Refills: 0      carvedilol (COREG) 3.125 MG tablet Take 1 tablet by mouth 2 times daily (with meals)  Qty: 60 tablet, Refills: 3      insulin glargine (LANTUS) 100 UNIT/ML injection vial Inject 32 Units into the skin every morning  Qty: 1 vial, Refills: 3      sodium chloride 1 g tablet Take 1 tablet by mouth 2 times daily  Qty: 90 tablet, Refills: 3      acetaminophen (TYLENOL) 325 MG tablet Take 2 tablets by mouth every 4 hours as needed for Pain  Qty: 120 tablet, Refills: 3      albuterol sulfate  (90 Base) MCG/ACT inhaler Inhale 2 puffs into the lungs every 6 hours as needed for Wheezing  Qty: 1 Inhaler, Refills: 0      glimepiride (AMARYL) 4 MG tablet Take 1 tablet by mouth daily  Qty: 30 tablet, Refills: 3      insulin aspart (NOVOLOG) 100 UNIT/ML injection vial Inject 2 Units into the skin 3 times daily (before meals) And sliding scale  Qty: 1 vial, Refills: 3      levothyroxine (SYNTHROID) 50 MCG tablet Take 50 mcg by mouth every morning             ALLERGIES     has No Known Allergies. FAMILY HISTORY     indicated that his mother is . He indicated that his father is . He indicated that his sister is . family history includes Asthma in his father; Cancer in his mother. SOCIAL HISTORY      reports that he has quit smoking. He quit after 25.00 years of use. He has never used smokeless tobacco. He reports that he does not drink alcohol or use drugs. PHYSICAL EXAM     INITIAL VITALS:  weight is 235 lb (106.6 kg). His oral temperature is 97.6 °F (36.4 °C). His blood pressure is 169/87 (abnormal) and his pulse is 62. His respiration is 16 and oxygen saturation is 96%. Physical Exam   Constitutional: He is oriented to person, place, and time. He appears well-developed and well-nourished. HENT:   Head: Normocephalic. Right Ear: External ear normal.   Left Ear: External ear normal.   Nose: Nose normal.   Mouth/Throat: Uvula is midline and oropharynx is clear and moist.   Eyes: Pupils are equal, round, and reactive to light. Conjunctivae and EOM are normal.   Neck: Normal range of motion. Neck supple. Cardiovascular: Normal rate, regular rhythm, S1 normal, S2 normal, normal heart sounds and intact distal pulses. Pulmonary/Chest: Effort normal and breath sounds normal. No respiratory distress. He exhibits no tenderness. Abdominal: Soft. Normal appearance and bowel sounds are normal. He exhibits no distension. There is no tenderness.    Musculoskeletal: Normal

## 2018-08-29 ENCOUNTER — APPOINTMENT (OUTPATIENT)
Dept: GENERAL RADIOLOGY | Age: 83
DRG: 683 | End: 2018-08-29
Payer: MEDICARE

## 2018-08-29 ENCOUNTER — HOSPITAL ENCOUNTER (INPATIENT)
Age: 83
LOS: 2 days | Discharge: HOME HEALTH | DRG: 683 | End: 2018-08-31
Attending: FAMILY MEDICINE | Admitting: INTERNAL MEDICINE
Payer: MEDICARE

## 2018-08-29 DIAGNOSIS — N17.9 AKI (ACUTE KIDNEY INJURY) (HCC): Primary | ICD-10-CM

## 2018-08-29 DIAGNOSIS — R53.1 WEAKNESS: ICD-10-CM

## 2018-08-29 PROBLEM — E87.1 HYPONATREMIA WITH DECREASED SERUM OSMOLALITY: Status: RESOLVED | Noted: 2017-12-11 | Resolved: 2018-08-29

## 2018-08-29 PROBLEM — A41.9 SEPSIS DUE TO URINARY TRACT INFECTION (HCC): Status: RESOLVED | Noted: 2017-12-11 | Resolved: 2018-08-29

## 2018-08-29 PROBLEM — N39.0 SEPSIS DUE TO URINARY TRACT INFECTION (HCC): Status: RESOLVED | Noted: 2017-12-11 | Resolved: 2018-08-29

## 2018-08-29 LAB
ALBUMIN SERPL-MCNC: 3.9 G/DL (ref 3.5–5.1)
ALP BLD-CCNC: 53 U/L (ref 38–126)
ALT SERPL-CCNC: 13 U/L (ref 11–66)
ANION GAP SERPL CALCULATED.3IONS-SCNC: 12 MEQ/L (ref 8–16)
APTT: 30.3 SECONDS (ref 22–38)
AST SERPL-CCNC: 15 U/L (ref 5–40)
BACTERIA: NORMAL
BASOPHILS # BLD: 0.6 %
BASOPHILS ABSOLUTE: 0.1 THOU/MM3 (ref 0–0.1)
BILIRUB SERPL-MCNC: 1.1 MG/DL (ref 0.3–1.2)
BILIRUBIN DIRECT: < 0.2 MG/DL (ref 0–0.3)
BILIRUBIN URINE: NEGATIVE
BLOOD, URINE: NEGATIVE
BUN BLDV-MCNC: 22 MG/DL (ref 7–22)
CALCIUM SERPL-MCNC: 9 MG/DL (ref 8.5–10.5)
CASTS: NORMAL /LPF
CASTS: NORMAL /LPF
CHARACTER, URINE: CLEAR
CHLORIDE BLD-SCNC: 98 MEQ/L (ref 98–111)
CO2: 22 MEQ/L (ref 23–33)
COLOR: YELLOW
CREAT SERPL-MCNC: 1.5 MG/DL (ref 0.4–1.2)
CRYSTALS: NORMAL
EKG ATRIAL RATE: 58 BPM
EKG P AXIS: 40 DEGREES
EKG P-R INTERVAL: 172 MS
EKG Q-T INTERVAL: 418 MS
EKG QRS DURATION: 94 MS
EKG QTC CALCULATION (BAZETT): 410 MS
EKG R AXIS: -17 DEGREES
EKG T AXIS: 74 DEGREES
EKG VENTRICULAR RATE: 58 BPM
EOSINOPHIL # BLD: 6.5 %
EOSINOPHILS ABSOLUTE: 0.6 THOU/MM3 (ref 0–0.4)
EPITHELIAL CELLS, UA: NORMAL /HPF
ERYTHROCYTE [DISTWIDTH] IN BLOOD BY AUTOMATED COUNT: 17.9 % (ref 11.5–14.5)
ERYTHROCYTE [DISTWIDTH] IN BLOOD BY AUTOMATED COUNT: 56.3 FL (ref 35–45)
GFR SERPL CREATININE-BSD FRML MDRD: 44 ML/MIN/1.73M2
GLUCOSE BLD-MCNC: 138 MG/DL (ref 70–108)
GLUCOSE BLD-MCNC: 195 MG/DL (ref 70–108)
GLUCOSE, URINE: NEGATIVE MG/DL
HCT VFR BLD CALC: 35.7 % (ref 42–52)
HEMOGLOBIN: 11.7 GM/DL (ref 14–18)
IMMATURE GRANS (ABS): 0.03 THOU/MM3 (ref 0–0.07)
IMMATURE GRANULOCYTES: 0.3 %
INR BLD: 0.98 (ref 0.85–1.13)
KETONES, URINE: NEGATIVE
LEUKOCYTE ESTERASE, URINE: NEGATIVE
LIPASE: 15.4 U/L (ref 5.6–51.3)
LYMPHOCYTES # BLD: 26 %
LYMPHOCYTES ABSOLUTE: 2.4 THOU/MM3 (ref 1–4.8)
MAGNESIUM: 1.9 MG/DL (ref 1.6–2.4)
MCH RBC QN AUTO: 28.2 PG (ref 26–33)
MCHC RBC AUTO-ENTMCNC: 32.8 GM/DL (ref 32.2–35.5)
MCV RBC AUTO: 86 FL (ref 80–94)
MISCELLANEOUS LAB TEST RESULT: NORMAL
MONOCYTES # BLD: 13.8 %
MONOCYTES ABSOLUTE: 1.3 THOU/MM3 (ref 0.4–1.3)
NITRITE, URINE: NEGATIVE
NUCLEATED RED BLOOD CELLS: 0 /100 WBC
OSMOLALITY CALCULATION: 270 MOSMOL/KG (ref 275–300)
PH UA: 5
PLATELET # BLD: 331 THOU/MM3 (ref 130–400)
PMV BLD AUTO: 9.8 FL (ref 9.4–12.4)
POTASSIUM SERPL-SCNC: 4.9 MEQ/L (ref 3.5–5.2)
PRO-BNP: 556.7 PG/ML (ref 0–1800)
PROTEIN UA: NEGATIVE MG/DL
RBC # BLD: 4.15 MILL/MM3 (ref 4.7–6.1)
RBC URINE: NORMAL /HPF
RENAL EPITHELIAL, UA: NORMAL
SEG NEUTROPHILS: 52.8 %
SEGMENTED NEUTROPHILS ABSOLUTE COUNT: 4.9 THOU/MM3 (ref 1.8–7.7)
SODIUM BLD-SCNC: 132 MEQ/L (ref 135–145)
SPECIFIC GRAVITY UA: 1.01 (ref 1–1.03)
TOTAL PROTEIN: 6.3 G/DL (ref 6.1–8)
TROPONIN T: < 0.01 NG/ML
UROBILINOGEN, URINE: 0.2 EU/DL
WBC # BLD: 9.3 THOU/MM3 (ref 4.8–10.8)
WBC UA: NORMAL /HPF
YEAST: NORMAL

## 2018-08-29 PROCEDURE — 83690 ASSAY OF LIPASE: CPT

## 2018-08-29 PROCEDURE — 82948 REAGENT STRIP/BLOOD GLUCOSE: CPT

## 2018-08-29 PROCEDURE — 2709999900 HC NON-CHARGEABLE SUPPLY

## 2018-08-29 PROCEDURE — 93005 ELECTROCARDIOGRAM TRACING: CPT | Performed by: FAMILY MEDICINE

## 2018-08-29 PROCEDURE — G0378 HOSPITAL OBSERVATION PER HR: HCPCS

## 2018-08-29 PROCEDURE — 83880 ASSAY OF NATRIURETIC PEPTIDE: CPT

## 2018-08-29 PROCEDURE — 93010 ELECTROCARDIOGRAM REPORT: CPT | Performed by: INTERNAL MEDICINE

## 2018-08-29 PROCEDURE — 71046 X-RAY EXAM CHEST 2 VIEWS: CPT

## 2018-08-29 PROCEDURE — 85730 THROMBOPLASTIN TIME PARTIAL: CPT

## 2018-08-29 PROCEDURE — 85025 COMPLETE CBC W/AUTO DIFF WBC: CPT

## 2018-08-29 PROCEDURE — 2580000003 HC RX 258: Performed by: FAMILY MEDICINE

## 2018-08-29 PROCEDURE — 96360 HYDRATION IV INFUSION INIT: CPT

## 2018-08-29 PROCEDURE — 80053 COMPREHEN METABOLIC PANEL: CPT

## 2018-08-29 PROCEDURE — 87086 URINE CULTURE/COLONY COUNT: CPT

## 2018-08-29 PROCEDURE — 83735 ASSAY OF MAGNESIUM: CPT

## 2018-08-29 PROCEDURE — 36415 COLL VENOUS BLD VENIPUNCTURE: CPT

## 2018-08-29 PROCEDURE — 96361 HYDRATE IV INFUSION ADD-ON: CPT

## 2018-08-29 PROCEDURE — 72100 X-RAY EXAM L-S SPINE 2/3 VWS: CPT

## 2018-08-29 PROCEDURE — 84484 ASSAY OF TROPONIN QUANT: CPT

## 2018-08-29 PROCEDURE — 6370000000 HC RX 637 (ALT 250 FOR IP): Performed by: INTERNAL MEDICINE

## 2018-08-29 PROCEDURE — 85610 PROTHROMBIN TIME: CPT

## 2018-08-29 PROCEDURE — 96372 THER/PROPH/DIAG INJ SC/IM: CPT

## 2018-08-29 PROCEDURE — 1200000000 HC SEMI PRIVATE

## 2018-08-29 PROCEDURE — 99285 EMERGENCY DEPT VISIT HI MDM: CPT

## 2018-08-29 PROCEDURE — 72170 X-RAY EXAM OF PELVIS: CPT

## 2018-08-29 PROCEDURE — 81001 URINALYSIS AUTO W/SCOPE: CPT

## 2018-08-29 PROCEDURE — 82248 BILIRUBIN DIRECT: CPT

## 2018-08-29 PROCEDURE — 6360000002 HC RX W HCPCS: Performed by: INTERNAL MEDICINE

## 2018-08-29 RX ORDER — SODIUM CHLORIDE 9 MG/ML
INJECTION, SOLUTION INTRAVENOUS CONTINUOUS
Status: DISCONTINUED | OUTPATIENT
Start: 2018-08-29 | End: 2018-08-31 | Stop reason: HOSPADM

## 2018-08-29 RX ORDER — SODIUM CHLORIDE 0.9 % (FLUSH) 0.9 %
10 SYRINGE (ML) INJECTION EVERY 12 HOURS SCHEDULED
Status: DISCONTINUED | OUTPATIENT
Start: 2018-08-29 | End: 2018-08-31 | Stop reason: HOSPADM

## 2018-08-29 RX ORDER — HYDRALAZINE HYDROCHLORIDE 25 MG/1
25 TABLET, FILM COATED ORAL EVERY 8 HOURS SCHEDULED
Status: DISCONTINUED | OUTPATIENT
Start: 2018-08-29 | End: 2018-08-31 | Stop reason: HOSPADM

## 2018-08-29 RX ORDER — SODIUM CHLORIDE 0.9 % (FLUSH) 0.9 %
10 SYRINGE (ML) INJECTION PRN
Status: DISCONTINUED | OUTPATIENT
Start: 2018-08-29 | End: 2018-08-31 | Stop reason: HOSPADM

## 2018-08-29 RX ORDER — ACETAMINOPHEN 325 MG/1
650 TABLET ORAL EVERY 4 HOURS PRN
Status: DISCONTINUED | OUTPATIENT
Start: 2018-08-29 | End: 2018-08-31 | Stop reason: HOSPADM

## 2018-08-29 RX ORDER — DEXTROSE MONOHYDRATE 50 MG/ML
100 INJECTION, SOLUTION INTRAVENOUS PRN
Status: DISCONTINUED | OUTPATIENT
Start: 2018-08-29 | End: 2018-08-31 | Stop reason: HOSPADM

## 2018-08-29 RX ORDER — INSULIN GLARGINE 100 [IU]/ML
32 INJECTION, SOLUTION SUBCUTANEOUS EVERY MORNING
Status: DISCONTINUED | OUTPATIENT
Start: 2018-08-30 | End: 2018-08-31

## 2018-08-29 RX ORDER — IBUPROFEN 800 MG/1
800 TABLET ORAL EVERY 8 HOURS PRN
Status: ON HOLD | COMMUNITY
End: 2018-08-31 | Stop reason: HOSPADM

## 2018-08-29 RX ORDER — IBUPROFEN 800 MG/1
800 TABLET ORAL EVERY 8 HOURS PRN
Status: DISCONTINUED | OUTPATIENT
Start: 2018-08-29 | End: 2018-08-31 | Stop reason: HOSPADM

## 2018-08-29 RX ORDER — CYCLOBENZAPRINE HCL 10 MG
10 TABLET ORAL 3 TIMES DAILY PRN
Status: DISCONTINUED | OUTPATIENT
Start: 2018-08-29 | End: 2018-08-31 | Stop reason: HOSPADM

## 2018-08-29 RX ORDER — ALBUTEROL SULFATE 90 UG/1
2 AEROSOL, METERED RESPIRATORY (INHALATION) EVERY 6 HOURS PRN
Status: DISCONTINUED | OUTPATIENT
Start: 2018-08-29 | End: 2018-08-31 | Stop reason: HOSPADM

## 2018-08-29 RX ORDER — SODIUM CHLORIDE 1000 MG
1 TABLET, SOLUBLE MISCELLANEOUS 2 TIMES DAILY
Status: DISCONTINUED | OUTPATIENT
Start: 2018-08-29 | End: 2018-08-31 | Stop reason: HOSPADM

## 2018-08-29 RX ORDER — DEXTROSE MONOHYDRATE 25 G/50ML
12.5 INJECTION, SOLUTION INTRAVENOUS PRN
Status: DISCONTINUED | OUTPATIENT
Start: 2018-08-29 | End: 2018-08-31 | Stop reason: HOSPADM

## 2018-08-29 RX ORDER — LEVOTHYROXINE SODIUM 0.07 MG/1
75 TABLET ORAL EVERY MORNING
Status: DISCONTINUED | OUTPATIENT
Start: 2018-08-30 | End: 2018-08-31 | Stop reason: HOSPADM

## 2018-08-29 RX ORDER — NICOTINE POLACRILEX 4 MG
15 LOZENGE BUCCAL PRN
Status: DISCONTINUED | OUTPATIENT
Start: 2018-08-29 | End: 2018-08-31 | Stop reason: HOSPADM

## 2018-08-29 RX ORDER — GLIMEPIRIDE 4 MG/1
4 TABLET ORAL DAILY
Status: DISCONTINUED | OUTPATIENT
Start: 2018-08-29 | End: 2018-08-31

## 2018-08-29 RX ORDER — CARVEDILOL 3.12 MG/1
3.12 TABLET ORAL 2 TIMES DAILY WITH MEALS
Status: DISCONTINUED | OUTPATIENT
Start: 2018-08-29 | End: 2018-08-31 | Stop reason: HOSPADM

## 2018-08-29 RX ORDER — ACETAMINOPHEN 325 MG/1
650 TABLET ORAL EVERY 4 HOURS PRN
Status: DISCONTINUED | OUTPATIENT
Start: 2018-08-29 | End: 2018-08-29 | Stop reason: SDUPTHER

## 2018-08-29 RX ADMIN — IBUPROFEN 800 MG: 800 TABLET ORAL at 21:47

## 2018-08-29 RX ADMIN — ACETAMINOPHEN 650 MG: 325 TABLET ORAL at 16:48

## 2018-08-29 RX ADMIN — SODIUM CHLORIDE TAB 1 GM 1 G: 1 TAB at 19:59

## 2018-08-29 RX ADMIN — GLIMEPIRIDE 4 MG: 4 TABLET ORAL at 16:49

## 2018-08-29 RX ADMIN — CYCLOBENZAPRINE 10 MG: 10 TABLET, FILM COATED ORAL at 16:48

## 2018-08-29 RX ADMIN — SODIUM CHLORIDE: 9 INJECTION, SOLUTION INTRAVENOUS at 08:16

## 2018-08-29 RX ADMIN — ENOXAPARIN SODIUM 40 MG: 40 INJECTION SUBCUTANEOUS at 17:01

## 2018-08-29 RX ADMIN — SODIUM CHLORIDE: 9 INJECTION, SOLUTION INTRAVENOUS at 18:49

## 2018-08-29 RX ADMIN — HYDRALAZINE HYDROCHLORIDE 25 MG: 25 TABLET ORAL at 16:49

## 2018-08-29 RX ADMIN — CARVEDILOL 3.12 MG: 3.12 TABLET, FILM COATED ORAL at 16:49

## 2018-08-29 ASSESSMENT — PAIN SCALES - GENERAL
PAINLEVEL_OUTOF10: 10
PAINLEVEL_OUTOF10: 0
PAINLEVEL_OUTOF10: 6
PAINLEVEL_OUTOF10: 3

## 2018-08-29 ASSESSMENT — ENCOUNTER SYMPTOMS
SHORTNESS OF BREATH: 0
ABDOMINAL PAIN: 0
EYE DISCHARGE: 0
BACK PAIN: 1

## 2018-08-29 ASSESSMENT — PAIN DESCRIPTION - LOCATION: LOCATION: HIP

## 2018-08-29 ASSESSMENT — PAIN DESCRIPTION - PAIN TYPE: TYPE: ACUTE PAIN

## 2018-08-29 ASSESSMENT — PAIN DESCRIPTION - ORIENTATION: ORIENTATION: LEFT;RIGHT

## 2018-08-29 NOTE — ED PROVIDER NOTES
view shows degenerative changes but no acute fracture    Pelvis x-ray, single view negative for pelvic or hip fracture    [x] Visualized and interpreted by me   [x] Radiologist's Report Reviewed   [] Discussed with Radiologist.      LABS:   Labs Reviewed   CBC WITH AUTO DIFFERENTIAL - Abnormal; Notable for the following:        Result Value    RBC 4.15 (*)     Hemoglobin 11.7 (*)     Hematocrit 35.7 (*)     RDW-CV 17.9 (*)     RDW-SD 56.3 (*)     Eosinophils # 0.6 (*)     All other components within normal limits   BASIC METABOLIC PANEL - Abnormal; Notable for the following:     Sodium 132 (*)     CO2 22 (*)     Glucose 138 (*)     CREATININE 1.5 (*)     All other components within normal limits   OSMOLALITY - Abnormal; Notable for the following:     Osmolality Calc 270.0 (*)     All other components within normal limits   GLOMERULAR FILTRATION RATE, ESTIMATED - Abnormal; Notable for the following:     Est, Glom Filt Rate 44 (*)     All other components within normal limits   URINE CULTURE   PROTIME-INR   APTT   BRAIN NATRIURETIC PEPTIDE   HEPATIC FUNCTION PANEL   LIPASE   TROPONIN   MAGNESIUM   URINALYSIS WITH MICROSCOPIC   ANION GAP       EMERGENCY DEPARTMENT COURSE:   Vitals:    Vitals:    08/29/18 0746 08/29/18 0816 08/29/18 0943 08/29/18 1050   BP: (!) 175/72 (!) 157/67 (!) 173/57 (!) 170/73   Pulse: 62 58 62 65   Resp: 16 20 16 18   Temp: 97.6 °F (36.4 °C)      TempSrc: Oral      SpO2: 97% 98% 98% 98%   Weight: 235 lb (106.6 kg)        7:57 AM: The patient was seen and evaluated.     Nursing notes reviewed    X-rays do not show any acute fractures    Mildly anemic 11.7    Creatinine 1.5 today up from previous 1.2 in June 2018  Which suggests some dehydration     Blood sugar 138     normal for age    Normal troponin      with significant inability to ambulate which is multifactorial including quadriceps sarcopenia, will recommend admission            CRITICAL CARE:   none    CONSULTS:   Brett    PROCEDURES:  None    FINAL IMPRESSION      1. SHAYAN (acute kidney injury) ([address]    2. Weakness          DISPOSITION/PLAN   Admit. PATIENT REFERRED TO:  Dayan Hughes MD  [address] [facility] [address]            DISCHARGE MEDICATIONS:  New Prescriptions    No medications on file       (Please note that portions of this note were completed with a voice recognition program.  Efforts were made to edit the dictations but occasionally words are mis-transcribed.)    Scribe: Hammad Chen 8/29/18 7:57 AM Scribing for and in the presence of Rhonda Hunt MD.    Signed by: Sathya Rivas, 8/29/18 11:35 AM    Provider:  I personally performed the services described in the documentation, reviewed and edited the documentation which was dictated to the scribe in my presence, and it accurately records my words and actions.     Rhonda Hunt MD 8/29/18 11:35 AM     Rhonda Hunt MD  08/29/18 [address] [place], [place]  08/29/18 2654

## 2018-08-29 NOTE — H&P
[address] [place] [address]                               HISTORY AND PHYSICAL    PATIENT NAME: Monique Miranda                  :        10/06/1928  MED REC NO:   [id number]                           ROOM:       0011  ACCOUNT NO:   [de-identified]                           ADMIT DATE: 2018  PROVIDER:     Gloria Hinojosa M.D. Admitting for Dr. Derrick Aden who will resume care in the morning. PRESENTING COMPLAINT:  The patient presents with weakness and inability to  walk well. HISTORY OF PRESENT ILLNESS:  The patient is 80years old who apparently was  riding his . He got jerked off. He tried to fall, but did not  quite fall, but as a result of that initial encounter about a week ago, he  started having some back pain limiting his ability to walk well. He did  present to the emergency department according to him a few days after, but  was released after an initial evaluation only to return today because he  just could not cope with his routine activities at home by himself,  although he lives with his wife. As a result the patient was admitted for  further intervention. At the time of my visit, he states as long as he is  stays in bed, usually he is painless, but when he starts moving, etc., or  standing for the most part is when he experiences the pain. His  medications given so far has helped him some including Tylenol. PAST MEDICAL HISTORY:  Remarkable for ascites, coronary artery disease post  CABG, diabetes, GERD disease, hyperlipidemia, hypothyroidism. PAST SURGICAL HISTORY:  Remarkable for abdominal surgery, appendectomy,  colectomy, again a CABG, eye surgery, joint replacement surgery. HOME MEDICATIONS:  Listed include the Apresoline, Coreg, Lantus, Amaryl,  NovoLog, Synthroid, Advil, Tylenol, albuterol inhaler. ALLERGIES:  No report of any known allergies.     SOCIAL HISTORY:  The patient quit smoking after 25 years of smoking. Denies any alcohol use. The patient is currently retired. FAMILY HISTORY:  Only positive for cancer in his mom and dad with asthma. REVIEW OF SYSTEMS:  Appetite is fair. No constipation or diarrhea. There  is some tingling and weakness in his lower extremities, inability to walk  for long due to pain that he does experience as well. He, otherwise,  denies any orthopnea or PND. He uses two pillows at home routinely for  comfort. PHYSICAL EXAMINATION:  GENERAL:   On examination, I found an elderly patient in bed, appears  otherwise comfortable, able to communicate with me, and maybe a little hard  of hearing. VITAL SIGNS:  Most recent vital signs showing blood pressure of 191/79 with  a pulse of 67, respirations 14, temperature 98.4. LUNGS:  Examination shows adequate movement bilaterally. No wheezes or  rales. CARDIOVASCULAR SYSTEM:  S1, S2 heard. Also other problems include deconditioning with the back pain. OTHER MEDICAL ISSUES:  Include his chronic medical conditions as listed. PLAN OF CARE:  We will place him on some muscle relaxant with pain control,  routinely scheduled medication in addition to p.r.n. medications. We will  also consult with PT/OT, and then further consult will be determined by the  response to treatment. The hypertension, we will review medications _____  diabetes, also we will resume _____. 0 [facility]  Elier Chandra M.D.    D: 08/29/2018 15:10:32       T: 08/29/2018 16:15:31     [email or web address]  Job#: [id number]     Doc#: [id number]    CC:

## 2018-08-29 NOTE — PROGRESS NOTES
Pt admitted to  [id number] from ED and via cart/stretcher. Complaints: Bilateral Hip Pain. IV normal saline infusing into the hand left, condition patent and no redness at a rate of 125 mls/ hour with about 900 mls in the bag still. IV site free of s/s of infection or infiltration. Vital signs obtained. Assessment and data collection initiated. Oriented to room. All questions answered with no further questions at this time. Fall prevention and safety brochure discussed with patient. The best day to schedule a follow up Dr appointment is:  Monday, Tuesday, Wednesday, Thursday and Friday a.mMayda Mercado RN 8/29/2018 12:33 PM

## 2018-08-30 ENCOUNTER — APPOINTMENT (OUTPATIENT)
Dept: CT IMAGING | Age: 83
DRG: 683 | End: 2018-08-30
Payer: MEDICARE

## 2018-08-30 LAB
ANION GAP SERPL CALCULATED.3IONS-SCNC: 10 MEQ/L (ref 8–16)
BUN BLDV-MCNC: 21 MG/DL (ref 7–22)
CALCIUM SERPL-MCNC: 8.7 MG/DL (ref 8.5–10.5)
CHLORIDE BLD-SCNC: 102 MEQ/L (ref 98–111)
CO2: 20 MEQ/L (ref 23–33)
CREAT SERPL-MCNC: 1.3 MG/DL (ref 0.4–1.2)
GFR SERPL CREATININE-BSD FRML MDRD: 52 ML/MIN/1.73M2
GLUCOSE BLD-MCNC: 126 MG/DL (ref 70–108)
GLUCOSE BLD-MCNC: 175 MG/DL (ref 70–108)
GLUCOSE BLD-MCNC: 226 MG/DL (ref 70–108)
GLUCOSE BLD-MCNC: 237 MG/DL (ref 70–108)
ORGANISM: ABNORMAL
POTASSIUM SERPL-SCNC: 4.9 MEQ/L (ref 3.5–5.2)
SODIUM BLD-SCNC: 132 MEQ/L (ref 135–145)
URINE CULTURE, ROUTINE: ABNORMAL

## 2018-08-30 PROCEDURE — G8987 SELF CARE CURRENT STATUS: HCPCS

## 2018-08-30 PROCEDURE — G8978 MOBILITY CURRENT STATUS: HCPCS

## 2018-08-30 PROCEDURE — 6370000000 HC RX 637 (ALT 250 FOR IP): Performed by: INTERNAL MEDICINE

## 2018-08-30 PROCEDURE — 96361 HYDRATE IV INFUSION ADD-ON: CPT

## 2018-08-30 PROCEDURE — 97166 OT EVAL MOD COMPLEX 45 MIN: CPT

## 2018-08-30 PROCEDURE — 1200000000 HC SEMI PRIVATE

## 2018-08-30 PROCEDURE — G8979 MOBILITY GOAL STATUS: HCPCS

## 2018-08-30 PROCEDURE — 80048 BASIC METABOLIC PNL TOTAL CA: CPT

## 2018-08-30 PROCEDURE — G8988 SELF CARE GOAL STATUS: HCPCS

## 2018-08-30 PROCEDURE — 36415 COLL VENOUS BLD VENIPUNCTURE: CPT

## 2018-08-30 PROCEDURE — 82948 REAGENT STRIP/BLOOD GLUCOSE: CPT

## 2018-08-30 PROCEDURE — G0378 HOSPITAL OBSERVATION PER HR: HCPCS

## 2018-08-30 PROCEDURE — 97110 THERAPEUTIC EXERCISES: CPT

## 2018-08-30 PROCEDURE — 97161 PT EVAL LOW COMPLEX 20 MIN: CPT

## 2018-08-30 PROCEDURE — 2709999900 HC NON-CHARGEABLE SUPPLY

## 2018-08-30 PROCEDURE — 2580000003 HC RX 258: Performed by: INTERNAL MEDICINE

## 2018-08-30 PROCEDURE — 6360000002 HC RX W HCPCS: Performed by: INTERNAL MEDICINE

## 2018-08-30 PROCEDURE — 2580000003 HC RX 258: Performed by: FAMILY MEDICINE

## 2018-08-30 PROCEDURE — 96372 THER/PROPH/DIAG INJ SC/IM: CPT

## 2018-08-30 PROCEDURE — 70450 CT HEAD/BRAIN W/O DYE: CPT

## 2018-08-30 PROCEDURE — 97530 THERAPEUTIC ACTIVITIES: CPT

## 2018-08-30 RX ADMIN — SODIUM CHLORIDE TAB 1 GM 1 G: 1 TAB at 10:34

## 2018-08-30 RX ADMIN — LEVOTHYROXINE SODIUM 75 MCG: 75 TABLET ORAL at 10:34

## 2018-08-30 RX ADMIN — SODIUM CHLORIDE TAB 1 GM 1 G: 1 TAB at 20:09

## 2018-08-30 RX ADMIN — Medication 2 UNITS: at 17:39

## 2018-08-30 RX ADMIN — HYDRALAZINE HYDROCHLORIDE 25 MG: 25 TABLET ORAL at 20:09

## 2018-08-30 RX ADMIN — HYDRALAZINE HYDROCHLORIDE 25 MG: 25 TABLET ORAL at 05:04

## 2018-08-30 RX ADMIN — INSULIN GLARGINE 32 UNITS: 100 INJECTION, SOLUTION SUBCUTANEOUS at 13:43

## 2018-08-30 RX ADMIN — SODIUM CHLORIDE: 9 INJECTION, SOLUTION INTRAVENOUS at 03:42

## 2018-08-30 RX ADMIN — CARVEDILOL 3.12 MG: 3.12 TABLET, FILM COATED ORAL at 10:36

## 2018-08-30 RX ADMIN — SODIUM CHLORIDE: 9 INJECTION, SOLUTION INTRAVENOUS at 12:36

## 2018-08-30 RX ADMIN — CARVEDILOL 3.12 MG: 3.12 TABLET, FILM COATED ORAL at 15:07

## 2018-08-30 RX ADMIN — CYCLOBENZAPRINE 10 MG: 10 TABLET, FILM COATED ORAL at 22:01

## 2018-08-30 RX ADMIN — ENOXAPARIN SODIUM 40 MG: 40 INJECTION SUBCUTANEOUS at 15:07

## 2018-08-30 RX ADMIN — IBUPROFEN 800 MG: 800 TABLET ORAL at 22:01

## 2018-08-30 RX ADMIN — Medication 4 UNITS: at 13:43

## 2018-08-30 RX ADMIN — GLIMEPIRIDE 4 MG: 4 TABLET ORAL at 10:34

## 2018-08-30 RX ADMIN — HYDRALAZINE HYDROCHLORIDE 25 MG: 25 TABLET ORAL at 15:07

## 2018-08-30 ASSESSMENT — PAIN SCALES - GENERAL
PAINLEVEL_OUTOF10: 0
PAINLEVEL_OUTOF10: 5
PAINLEVEL_OUTOF10: 0

## 2018-08-30 NOTE — PLAN OF CARE
Problem: Falls - Risk of:  Goal: Will remain free from falls  Will remain free from falls   Outcome: Ongoing  No fall this shift, better about using call light this shift when needing up. Up in chair at this time with alarm on and door to room open. Problem: Pain:  Goal: Pain level will decrease  Pain level will decrease   Outcome: Ongoing  Denies pain. Problem: Risk for Impaired Skin Integrity  Goal: Tissue integrity - skin and mucous membranes  Structural intactness and normal physiological function of skin and  mucous membranes. Outcome: Ongoing  No new skin issues assessed. Able to turn and reposition self. Problem: Musculor/Skeletal Functional Status  Intervention: OT Evaluation/treatment  PT/OT working with patient. Comments: Care plan reviewed with patient. Patient verbalizes understanding of the plan of care and contributes to goal setting.

## 2018-08-30 NOTE — PROGRESS NOTES
[address] [address]  INPATIENT PHYSICAL THERAPY  EVALUATION  [facility] ONC MED 5K - 9R-01/[id number]-Q    Time In: 6945  Time Out: 1450  Timed Code Treatment Minutes: 8 Minutes  Minutes: 23          Date: 2018  Patient Name: Kristine Eldridge,  Gender:  male        MRN: [id number]  : 10/6/1928  (80 y.o.)      Referring Practitioner: NOMI Son MD  Diagnosis: SHAYAN  Additional Pertinent Hx: Per ED note, pt is a 80 y.o. male who presents to the Emergency Department for the evaluation of hip pain that has been ongoing. Patient was seen in the ED on  where he was diagnosed with a hip contusion. Patient states that he is still experiencing difficulty with ambulation secondary to the pain in his hips and buttocks. He states that he feels weak even when using his walker at home. He rates his pain at a 9/10 in severity.       Past Medical History:   Diagnosis Date    Arthritis     CAD (coronary artery disease)     s/p CABG    [facility] 2017    Diabetes mellitus ([facility]     GERD (gastroesophageal reflux disease)     Hyperlipidemia     Hypertension     Hypothyroid     Panlobular emphysema ([facility] 2017    Prostate cancer ([facility]     prostate    S/P CABG (coronary artery bypass graft)      Past Surgical History:   Procedure Laterality Date    ABDOMEN SURGERY      APPENDECTOMY      CARDIAC SURGERY      COLECTOMY      COLONOSCOPY      COLOSTOMY      CORONARY ARTERY BYPASS GRAFT      triple to Distal RCA, first OM, and LIMA to diagonal by Dr Jessica Garay, [address]      bilateral cataracts    JOINT REPLACEMENT      KNEE SURGERY      left total knee and right total knee    TONSILLECTOMY      VASCULAR SURGERY      cabg harvests from legs       Restrictions/Precautions:  General Precautions, Fall Risk        Subjective:  Chart Reviewed: Yes  Patient assessed for rehabilitation services?: Yes  Subjective: Pt sitting up in room chair and agreed to

## 2018-08-30 NOTE — PROGRESS NOTES
IM Progress Note  Dr. Tasia Brown  8/30/2018 10:38 AM      Patient name Paulina Hayes  [id number]  PCP: Sylvan Ormond, MD  Admit Date: 8/29/2018  Acct No. [de-identified]    Subjective: Interval History:   Pt admitted yesterday for increased weakness and difficulty ambulating with hip pain  Fell few days back and was seen in ER   No fracture  Now wants to go home   Was noted to have SHAYAN      Diet: DIET CARB CONTROL;    I/O last 3 completed shifts: In: 2400 [P.O.:400; I.V.:2000]  Out: 1900 [Urine:950; Stool:950]  I/O this shift:  In: -   Out: 300 [Urine:300]        Admission weight: 235 lb (106.6 kg) as of 8/29/2018  7:37 AM  Wt Readings from Last 3 Encounters:   08/29/18 229 lb 12.8 oz (104.2 kg)   08/21/18 235 lb (106.6 kg)   07/31/18 223 lb (101.2 kg)     Body mass index is 32.97 kg/m². ROS   CVS;  no cp or palpitation  Resp: no SOB or cough  Neuro:  No numbness or weakness or dizziness  Abd: no nausea or vomiting or abd pain      Medications:   Scheduled Meds:   sodium chloride flush  10 mL Intravenous 2 times per day    enoxaparin  40 mg Subcutaneous Q24H    levothyroxine  75 mcg Oral QAM    glimepiride  4 mg Oral Daily    sodium chloride  1 g Oral BID    hydrALAZINE  25 mg Oral 3 times per day    carvedilol  3.125 mg Oral BID WC    insulin glargine  32 Units Subcutaneous QAM    insulin lispro  0-12 Units Subcutaneous TID WC    insulin lispro  0-6 Units Subcutaneous Nightly     Continuous Infusions:   sodium chloride 50 mL/hr at 08/30/18 1038    dextrose         Labs :     CBC:   Recent Labs      08/29/18   0819   WBC  9.3   HGB  11.7*   PLT  331     BMP:    Recent Labs      08/29/18   0819   NA  132*   K  4.9   CL  98   CO2  22*   BUN  22   CREATININE  1.5*   GLUCOSE  138*     Hepatic:   Recent Labs      08/29/18   0819   AST  15   ALT  13   BILITOT  1.1   ALKPHOS  53     Troponin: No results for input(s): TROPONINI in the last 72 hours.   BNP: No results for input(s): BNP in the last 72 hours. Lipids: No results for input(s): CHOL, HDL in the last 72 hours. Invalid input(s): LDLCALCU  INR:   Recent Labs      08/29/18   0819   INR  0.98       Radiology    Objective:   Vitals: BP (!) 185/73   Pulse 63   Temp 97.5 °F (36.4 °C) (Axillary)   Resp 16   Ht 5' 10\" (1.778 m)   Wt 229 lb 12.8 oz (104.2 kg)   SpO2 94%   BMI 32.97 kg/m²   HEENT: Head:pupils react  Neck: supple  Lungs: clear to auscultation  Heart: regular rate and rhythm   Abdomen: soft BS heard colostomy noted  Extremities: warm  No edema  Neurologic:  Alert, oriented X3    Impression:   :   1.  Fall says he did not hit his head but has difficulty ambulating. 2.  SHAYAN  3. Hypertension. 4.  COPD.  5.  History of prostate cancer status post radiation. 6.  Ulcerative colitis status post colectomy. 7.  Coronary artery disease status post coronary artery bypass graft. 8.  Insulin-requiring diabetes mellitus. 9.  Hypothyroidism.   10.  Chronic congestive heart failure secondary to diastolic dysfunction    Plan:    Cont IVF  CT head as pt had previous subdural   PT OT  If still with pain repeat imaging   Wishes full resuscitation    Kamrny Fonseca MD

## 2018-08-30 NOTE — CARE COORDINATION
Residence:  Private Residence  Living Arrangements:  Spouse/Significant Other, Children   Support Systems:  Spouse/Significant Other, Children, Family Members  Current Services PTA:   none  Potential Assistance Needed:  N/A  Potential Assistance Purchasing Medications:  No  Does patient want to participate in local refill/ meds to beds program?  No  Type of Home Care Services:  None  Patient expects to be discharged to:  home with family  Expected Discharge date:  09/01/18  Follow Up Appointment: Best Day/ Time: Wednesday AM    Discharge Plan: Joseph Prakash lives home with spouse who has dementia; SW spoke with son earlier today and shared the patient has 2 walkers at home. TCU /physiatry consult ordered; patient is a precert. Called and left a message for Merline Davies the daughter at [phone] to give update on POC. Ramehs Becerra called back at 13:50pm- daughter Ramesh Becerra and son Rosanna Paulson live with their parents, she shares the patient has a walker at home, was mowing the grass this past few weeks, still drives, and continues to take Aleve for pain. She also denies patient has ever had a stroke, left side is weak and has been for sometime. Ramesh Becerra shares her father will probably not agree to go to [facility]; questioned on back up plan and she states \"home with - since they chose them in the past.\" She shares when they call to come out patient says he does not need them to visit. Will follow CT head results.      Evaluation: yes

## 2018-08-31 VITALS
RESPIRATION RATE: 20 BRPM | WEIGHT: 229.8 LBS | SYSTOLIC BLOOD PRESSURE: 152 MMHG | TEMPERATURE: 97.2 F | HEART RATE: 60 BPM | BODY MASS INDEX: 32.9 KG/M2 | HEIGHT: 70 IN | OXYGEN SATURATION: 97 % | DIASTOLIC BLOOD PRESSURE: 68 MMHG

## 2018-08-31 LAB
ANION GAP SERPL CALCULATED.3IONS-SCNC: 10 MEQ/L (ref 8–16)
BUN BLDV-MCNC: 21 MG/DL (ref 7–22)
CALCIUM SERPL-MCNC: 8.9 MG/DL (ref 8.5–10.5)
CHLORIDE BLD-SCNC: 105 MEQ/L (ref 98–111)
CO2: 21 MEQ/L (ref 23–33)
CREAT SERPL-MCNC: 1.3 MG/DL (ref 0.4–1.2)
GFR SERPL CREATININE-BSD FRML MDRD: 52 ML/MIN/1.73M2
GLUCOSE BLD-MCNC: 191 MG/DL (ref 70–108)
GLUCOSE BLD-MCNC: 67 MG/DL (ref 70–108)
GLUCOSE BLD-MCNC: 89 MG/DL (ref 70–108)
POTASSIUM SERPL-SCNC: 4.7 MEQ/L (ref 3.5–5.2)
SODIUM BLD-SCNC: 136 MEQ/L (ref 135–145)

## 2018-08-31 PROCEDURE — 80048 BASIC METABOLIC PNL TOTAL CA: CPT

## 2018-08-31 PROCEDURE — 97116 GAIT TRAINING THERAPY: CPT

## 2018-08-31 PROCEDURE — 97110 THERAPEUTIC EXERCISES: CPT

## 2018-08-31 PROCEDURE — 96361 HYDRATE IV INFUSION ADD-ON: CPT

## 2018-08-31 PROCEDURE — 6370000000 HC RX 637 (ALT 250 FOR IP): Performed by: INTERNAL MEDICINE

## 2018-08-31 PROCEDURE — 2709999900 HC NON-CHARGEABLE SUPPLY

## 2018-08-31 PROCEDURE — 36415 COLL VENOUS BLD VENIPUNCTURE: CPT

## 2018-08-31 PROCEDURE — 82948 REAGENT STRIP/BLOOD GLUCOSE: CPT

## 2018-08-31 PROCEDURE — G0378 HOSPITAL OBSERVATION PER HR: HCPCS

## 2018-08-31 RX ORDER — INSULIN GLARGINE 100 [IU]/ML
26 INJECTION, SOLUTION SUBCUTANEOUS EVERY MORNING
Status: DISCONTINUED | OUTPATIENT
Start: 2018-09-01 | End: 2018-08-31 | Stop reason: HOSPADM

## 2018-08-31 RX ORDER — INSULIN GLARGINE 100 [IU]/ML
26 INJECTION, SOLUTION SUBCUTANEOUS EVERY MORNING
Qty: 1 VIAL | Refills: 3 | Status: ON HOLD
Start: 2018-08-31 | End: 2019-08-09 | Stop reason: HOSPADM

## 2018-08-31 RX ORDER — LEVOTHYROXINE SODIUM 0.07 MG/1
75 TABLET ORAL EVERY MORNING
Qty: 30 TABLET | Refills: 0 | Status: SHIPPED | OUTPATIENT
Start: 2018-09-01

## 2018-08-31 RX ORDER — GLIMEPIRIDE 2 MG/1
2 TABLET ORAL DAILY
Status: DISCONTINUED | OUTPATIENT
Start: 2018-09-01 | End: 2018-08-31 | Stop reason: HOSPADM

## 2018-08-31 RX ORDER — GLIMEPIRIDE 4 MG/1
2 TABLET ORAL DAILY
Qty: 30 TABLET | Refills: 3 | Status: ON HOLD
Start: 2018-08-31 | End: 2019-08-09 | Stop reason: HOSPADM

## 2018-08-31 RX ADMIN — INSULIN GLARGINE 26 UNITS: 100 INJECTION, SOLUTION SUBCUTANEOUS at 11:11

## 2018-08-31 RX ADMIN — GLIMEPIRIDE 4 MG: 4 TABLET ORAL at 09:21

## 2018-08-31 RX ADMIN — HYDRALAZINE HYDROCHLORIDE 25 MG: 25 TABLET ORAL at 09:21

## 2018-08-31 RX ADMIN — CARVEDILOL 3.12 MG: 3.12 TABLET, FILM COATED ORAL at 09:20

## 2018-08-31 RX ADMIN — LEVOTHYROXINE SODIUM 75 MCG: 75 TABLET ORAL at 09:20

## 2018-08-31 RX ADMIN — SODIUM CHLORIDE TAB 1 GM 1 G: 1 TAB at 09:21

## 2018-08-31 ASSESSMENT — PAIN SCALES - GENERAL
PAINLEVEL_OUTOF10: 0
PAINLEVEL_OUTOF10: 3
PAINLEVEL_OUTOF10: 2

## 2018-08-31 NOTE — PROGRESS NOTES
Physical Therapy   [facility] ONC MED 5K - [id number]-48/547-T    Time In: 9388  Time Out: 1038  Timed Code Treatment Minutes: 23 Minutes  Minutes: 23          Date: 2018  Patient Name: Candise Ahumada,  Gender:  male        MRN: [id number]  : 10/6/1928  (80 y.o.)     Referring Practitioner: NOMI Stoner MD  Diagnosis: SHAYAN  Additional Pertinent Hx: Per ED note, pt is a 80 y.o. male who presents to the Emergency Department for the evaluation of hip pain that has been ongoing. Patient was seen in the ED on  where he was diagnosed with a hip contusion. Patient states that he is still experiencing difficulty with ambulation secondary to the pain in his hips and buttocks. He states that he feels weak even when using his walker at home. He rates his pain at a 9/10 in severity.       Past Medical History:   Diagnosis Date    Arthritis     CAD (coronary artery disease)     s/p CABG    [facility] ([facility] [facility]     Diastolic CHF (HCC)     GERD (gastroesophageal reflux disease)     HTN (hypertension) 2013    Hyperlipidemia     Hypothyroid     Panlobular emphysema ([facility] [address] [facility] 2017    Prostate cancer ([facility] [facility]     S/P CABG (coronary artery bypass graft)     Type 2 diabetes mellitus with stage 3 chronic kidney disease, with long-term current use of insulin (HCC)      Past Surgical History:   Procedure Laterality Date    ABDOMEN SURGERY      APPENDECTOMY      CARDIAC SURGERY      COLECTOMY      COLONOSCOPY      COLOSTOMY      CORONARY ARTERY BYPASS GRAFT      triple to Distal RCA, first OM, and LIMA to diagonal by Dr Pura Syed, ESOPHAGUS      EYE SURGERY      bilateral cataracts    JOINT REPLACEMENT      KNEE SURGERY      left total knee and right total knee    TONSILLECTOMY      VASCULAR SURGERY      cabg harvests from legs       Restrictions/Precautions:  General Precautions, Fall Risk                            Prior Level

## 2018-08-31 NOTE — PROGRESS NOTES
Discharge instructions given. He will need assistance with his meds at home. He is aware and was able to teachback his appointment that is scheduled.

## 2018-08-31 NOTE — PROGRESS NOTES
IM Progress Note  Dr. Meier [place]  8/31/2018 10:38 AM      Patient name Franklin Saavedra  [id number]  PCP: Anthony Grimm MD  Admit Date: 8/29/2018  Acct No. [de-identified]    Subjective: Interval History:   Feels better   PT notes noted  Pt declined TCU or rehab and wants to go home    Diet: DIET CARB CONTROL;    I/O last 3 completed shifts: In: 2271 [P.O.:860; I.V.:1411]  Out: 1750 [Urine:975; Stool:775]  No intake/output data recorded. Admission weight: 235 lb (106.6 kg) as of 8/29/2018  7:37 AM  Wt Readings from Last 3 Encounters:   08/29/18 229 lb 12.8 oz (104.2 kg)   08/21/18 235 lb (106.6 kg)   07/31/18 223 lb (101.2 kg)     Body mass index is 32.97 kg/m². ROS   CVS;  no cp or palpitation  Resp: no SOB or cough  Neuro:  No numbness or weakness or dizziness  Abd: no nausea or vomiting or abd pain      Medications:   Scheduled Meds:   sodium chloride flush  10 mL Intravenous 2 times per day    enoxaparin  40 mg Subcutaneous Q24H    levothyroxine  75 mcg Oral QAM    glimepiride  4 mg Oral Daily    sodium chloride  1 g Oral BID    hydrALAZINE  25 mg Oral 3 times per day    carvedilol  3.125 mg Oral BID WC    insulin glargine  32 Units Subcutaneous QAM    insulin lispro  0-12 Units Subcutaneous TID WC    insulin lispro  0-6 Units Subcutaneous Nightly     Continuous Infusions:   sodium chloride 50 mL/hr at 08/30/18 1236    dextrose         Labs :     CBC:   Recent Labs      08/29/18   0819   WBC  9.3   HGB  11.7*   PLT  331     BMP:    Recent Labs      08/29/18   0819  08/30/18   1048  08/31/18   0548   NA  132*  132*  136   K  4.9  4.9  4.7   CL  98  102  105   CO2  22*  20*  21*   BUN  22  21  21   CREATININE  1.5*  1.3*  1.3*   GLUCOSE  138*  226*  67*     Hepatic:   Recent Labs      08/29/18   0819   AST  15   ALT  13   BILITOT  1.1   ALKPHOS  53     Troponin: No results for input(s): TROPONINI in the last 72 hours. BNP: No results for input(s): BNP in the last 72 hours.   Lipids: No results for input(s): CHOL, HDL in the last 72 hours. Invalid input(s): LDLCALCU  INR:   Recent Labs      08/29/18   0819   INR  0.98       Radiology    Objective:   Vitals: BP (!) 154/67   Pulse 61   Temp 98.4 °F (36.9 °C) (Oral)   Resp 18   Ht 5' 10\" (1.778 m)   Wt 229 lb 12.8 oz (104.2 kg)   SpO2 98%   BMI 32.97 kg/m²   HEENT: Head:pupils react  Neck: supple  Lungs: clear to auscultation  Heart: regular rate and rhythm   Abdomen: soft BS heard colostomy noted  Extremities: warm  No edema  Neurologic:  Alert, oriented X3    Impression:   :   1.  Fall says he did not hit his head but has difficulty ambulating. 2.  SHAYAN  3. Hypertension. 4.  COPD.  5.  History of prostate cancer status post radiation. 6.  Ulcerative colitis status post colectomy. 7.  Coronary artery disease status post coronary artery bypass graft. 8.  Insulin-requiring diabetes mellitus. 9.  Hypothyroidism.   10.  Chronic congestive heart failure secondary to diastolic dysfunction    Plan:    Stop fluids  Discharge home with [facility] as he declined inpt therapy    [employer], MD

## 2018-08-31 NOTE — CONSULTS
Patient chart reviewed for complaint of difficulty ambulating s/p recent fall secondary to hip pain. Will see patient on 8/31. From notes patient is pressing to go home - AMA and daughter has stated he would likely not be willing to go to the TCU and more apt to accept Home Health. I will see patient 8/31 to discuss options with him. Thank you for the consult.     [facility] MD [place]

## 2018-08-31 NOTE — PROGRESS NOTES
Patent's blood sugar 67 per this mornings labs. Patient alert and tolerating PO. [place] juice given. Will check blood sugar in 15 minutes.

## 2018-08-31 NOTE — PROGRESS NOTES
Patient's colostomy bag changed 3 times over the night. Patient found picking at the colostomy many times. This nurse reminded the patient to not pick at the bag to prevent leakage.

## 2018-09-04 ENCOUNTER — TELEPHONE (OUTPATIENT)
Dept: PHARMACY | Facility: CLINIC | Age: 83
End: 2018-09-04

## 2018-09-04 DIAGNOSIS — Z79.899 ENCOUNTER FOR MEDICATION REVIEW: Primary | ICD-10-CM

## 2018-09-04 NOTE — TELEPHONE ENCOUNTER
Attempting to reach patient again as below. Reached patient, but very [address] on phone and was unable to understand most of what I was asking re medicines; did place Agusto Miranda, his grandson on the phone. Reggie reports he doesn't know what the patient is taking, but states his mom, patient's daughter Thompson, would know. Advised me to call her on her mobile #. Left message asking Thompson to return call.

## 2018-09-06 NOTE — TELEPHONE ENCOUNTER
Attempting again to reach patient's daughter, Monster Ruano. Left another message and will send letter.

## 2018-09-09 ENCOUNTER — APPOINTMENT (OUTPATIENT)
Dept: CT IMAGING | Age: 83
End: 2018-09-09
Payer: MEDICARE

## 2018-09-09 ENCOUNTER — HOSPITAL ENCOUNTER (EMERGENCY)
Age: 83
Discharge: HOME | End: 2018-09-09
Payer: MEDICARE

## 2018-09-09 VITALS
DIASTOLIC BLOOD PRESSURE: 69 MMHG | SYSTOLIC BLOOD PRESSURE: 163 MMHG | HEART RATE: 66 BPM | HEIGHT: 70 IN | TEMPERATURE: 97.8 F | RESPIRATION RATE: 16 BRPM | BODY MASS INDEX: 32.93 KG/M2 | WEIGHT: 230 LBS | OXYGEN SATURATION: 94 %

## 2018-09-09 DIAGNOSIS — M54.50 ACUTE EXACERBATION OF CHRONIC LOW BACK PAIN: Primary | ICD-10-CM

## 2018-09-09 DIAGNOSIS — G89.29 ACUTE EXACERBATION OF CHRONIC LOW BACK PAIN: Primary | ICD-10-CM

## 2018-09-09 LAB
ALBUMIN SERPL-MCNC: 4.2 G/DL (ref 3.5–5.1)
ALP BLD-CCNC: 58 U/L (ref 38–126)
ALT SERPL-CCNC: 17 U/L (ref 11–66)
ANION GAP SERPL CALCULATED.3IONS-SCNC: 11 MEQ/L (ref 8–16)
AST SERPL-CCNC: 16 U/L (ref 5–40)
BASOPHILS # BLD: 0.5 %
BASOPHILS ABSOLUTE: 0.1 THOU/MM3 (ref 0–0.1)
BILIRUB SERPL-MCNC: 1.3 MG/DL (ref 0.3–1.2)
BILIRUBIN DIRECT: 0.3 MG/DL (ref 0–0.3)
BUN BLDV-MCNC: 25 MG/DL (ref 7–22)
CALCIUM SERPL-MCNC: 9.5 MG/DL (ref 8.5–10.5)
CHLORIDE BLD-SCNC: 97 MEQ/L (ref 98–111)
CO2: 24 MEQ/L (ref 23–33)
CREAT SERPL-MCNC: 1.3 MG/DL (ref 0.4–1.2)
EOSINOPHIL # BLD: 4.2 %
EOSINOPHILS ABSOLUTE: 0.5 THOU/MM3 (ref 0–0.4)
ERYTHROCYTE [DISTWIDTH] IN BLOOD BY AUTOMATED COUNT: 18.1 % (ref 11.5–14.5)
ERYTHROCYTE [DISTWIDTH] IN BLOOD BY AUTOMATED COUNT: 56.2 FL (ref 35–45)
GFR SERPL CREATININE-BSD FRML MDRD: 52 ML/MIN/1.73M2
GLUCOSE BLD-MCNC: 206 MG/DL (ref 70–108)
HCT VFR BLD CALC: 36.2 % (ref 42–52)
HEMOGLOBIN: 12.1 GM/DL (ref 14–18)
IMMATURE GRANS (ABS): 0.08 THOU/MM3 (ref 0–0.07)
IMMATURE GRANULOCYTES: 0.7 %
LYMPHOCYTES # BLD: 17.6 %
LYMPHOCYTES ABSOLUTE: 2 THOU/MM3 (ref 1–4.8)
MCH RBC QN AUTO: 28.5 PG (ref 26–33)
MCHC RBC AUTO-ENTMCNC: 33.4 GM/DL (ref 32.2–35.5)
MCV RBC AUTO: 85.2 FL (ref 80–94)
MONOCYTES # BLD: 11.7 %
MONOCYTES ABSOLUTE: 1.3 THOU/MM3 (ref 0.4–1.3)
NUCLEATED RED BLOOD CELLS: 0 /100 WBC
OSMOLALITY CALCULATION: 274.9 MOSMOL/KG (ref 275–300)
PLATELET # BLD: 342 THOU/MM3 (ref 130–400)
PMV BLD AUTO: 10 FL (ref 9.4–12.4)
POTASSIUM SERPL-SCNC: 5.3 MEQ/L (ref 3.5–5.2)
RBC # BLD: 4.25 MILL/MM3 (ref 4.7–6.1)
SEG NEUTROPHILS: 65.3 %
SEGMENTED NEUTROPHILS ABSOLUTE COUNT: 7.2 THOU/MM3 (ref 1.8–7.7)
SODIUM BLD-SCNC: 132 MEQ/L (ref 135–145)
TOTAL PROTEIN: 6.7 G/DL (ref 6.1–8)
TSH SERPL DL<=0.05 MIU/L-ACNC: 2.59 UIU/ML (ref 0.4–4.2)
WBC # BLD: 11.1 THOU/MM3 (ref 4.8–10.8)

## 2018-09-09 PROCEDURE — 72192 CT PELVIS W/O DYE: CPT

## 2018-09-09 PROCEDURE — 84443 ASSAY THYROID STIM HORMONE: CPT

## 2018-09-09 PROCEDURE — 36415 COLL VENOUS BLD VENIPUNCTURE: CPT

## 2018-09-09 PROCEDURE — 96372 THER/PROPH/DIAG INJ SC/IM: CPT

## 2018-09-09 PROCEDURE — 80053 COMPREHEN METABOLIC PANEL: CPT

## 2018-09-09 PROCEDURE — 72131 CT LUMBAR SPINE W/O DYE: CPT

## 2018-09-09 PROCEDURE — 82248 BILIRUBIN DIRECT: CPT

## 2018-09-09 PROCEDURE — 6360000002 HC RX W HCPCS: Performed by: EMERGENCY MEDICINE

## 2018-09-09 PROCEDURE — 99284 EMERGENCY DEPT VISIT MOD MDM: CPT

## 2018-09-09 PROCEDURE — 85025 COMPLETE CBC W/AUTO DIFF WBC: CPT

## 2018-09-09 RX ORDER — ORPHENADRINE CITRATE 30 MG/ML
60 INJECTION INTRAMUSCULAR; INTRAVENOUS ONCE
Status: COMPLETED | OUTPATIENT
Start: 2018-09-09 | End: 2018-09-09

## 2018-09-09 RX ORDER — LIDOCAINE 50 MG/G
1 PATCH TOPICAL DAILY
Qty: 30 PATCH | Refills: 0 | Status: SHIPPED | OUTPATIENT
Start: 2018-09-09 | End: 2019-07-23 | Stop reason: ALTCHOICE

## 2018-09-09 RX ADMIN — ORPHENADRINE CITRATE 60 MG: 30 INJECTION INTRAMUSCULAR; INTRAVENOUS at 11:14

## 2018-09-09 ASSESSMENT — ENCOUNTER SYMPTOMS
SHORTNESS OF BREATH: 0
NAUSEA: 0
RHINORRHEA: 0
DIARRHEA: 0
VOMITING: 0
EYE DISCHARGE: 0
WHEEZING: 0
ABDOMINAL PAIN: 0
COUGH: 0
SORE THROAT: 0
BACK PAIN: 1
EYE REDNESS: 0

## 2018-09-09 ASSESSMENT — PAIN DESCRIPTION - ORIENTATION: ORIENTATION: RIGHT;LEFT

## 2018-09-09 ASSESSMENT — PAIN SCALES - GENERAL: PAINLEVEL_OUTOF10: 10

## 2018-09-09 ASSESSMENT — PAIN DESCRIPTION - PAIN TYPE: TYPE: ACUTE PAIN;CHRONIC PAIN

## 2018-09-09 ASSESSMENT — PAIN DESCRIPTION - LOCATION: LOCATION: LEG;HIP

## 2018-09-17 NOTE — TELEPHONE ENCOUNTER
CLINICAL PHARMACY NOTE  Post-Discharge Transitions of Care (IAN)    Subjective/Objective:  Prince Carson is a 80 y.o. male. Patient was discharged from [address] [place] [address] on 8/31/18 with a diagnosis of fall, SHAYAN; then ED 9/9 for back pain. Patient outreach to review discharge medications and provide medication review and management. Spoke with Shauna Harden, returning my call. Reviewed patient's home medication bottles. No Known Allergies    Discharge Medications (as per current medication list/AVS):  There are NEW medications for you. START taking them after you leave the hospital:  Current Outpatient Prescriptions   Medication Sig Dispense Refill    lidocaine (LIDODERM) 5 %    (new from ED 9/9)  *reports does NOT have, she's not sure why (if cost, not helping, etc?) Place 1 patch onto the skin daily 12 hours on, 12 hours off.  30 patch 0     You told us you were taking these medications at home, but the amount or how often you take this medication has CHANGED (from 8/31 discharge):  Current Outpatient Prescriptions   Medication Sig Dispense Refill    glimepiride (AMARYL) 4 MG tablet    *confirms 1/2 tab Take 0.5 tablets by mouth daily 30 tablet 3    insulin glargine (LANTUS) 100 UNIT/ML injection vial    *sts \"I don't know\" re his insulin; knows he uses insulin, but unsure dose and if 1 or 2 insulins, etc Inject 26 Units into the skin every morning 1 vial 3    levothyroxine (SYNTHROID) 75 MCG tablet    *confirms 75mcg tab (appears has been 75mcg tab from PCP) Take 1 tablet by mouth every morning 30 tablet 0     These are medications you told us you were taking at home, CONTINUE taking them after you leave the hospital:  Current Outpatient Prescriptions   Medication Sig Dispense Refill    hydrALAZINE (APRESOLINE) 25 MG tablet Take 1 tablet by mouth every 8 hours 90 tablet 0    carvedilol (COREG) 3.125 MG tablet Take 1 tablet by mouth 2 times daily (with meals) 60 tablet 3    sodium chloride 1 g tablet Take 1

## 2018-12-26 ENCOUNTER — APPOINTMENT (OUTPATIENT)
Dept: CT IMAGING | Age: 83
End: 2018-12-26
Payer: MEDICARE

## 2018-12-26 ENCOUNTER — HOSPITAL ENCOUNTER (EMERGENCY)
Age: 83
Discharge: HOME | End: 2018-12-26
Payer: MEDICARE

## 2018-12-26 VITALS
RESPIRATION RATE: 18 BRPM | TEMPERATURE: 97.4 F | OXYGEN SATURATION: 96 % | WEIGHT: 230 LBS | SYSTOLIC BLOOD PRESSURE: 170 MMHG | HEART RATE: 72 BPM | DIASTOLIC BLOOD PRESSURE: 52 MMHG | BODY MASS INDEX: 33 KG/M2

## 2018-12-26 DIAGNOSIS — D72.829 LEUKOCYTOSIS, UNSPECIFIED TYPE: ICD-10-CM

## 2018-12-26 DIAGNOSIS — R10.9 LEFT FLANK PAIN: Primary | ICD-10-CM

## 2018-12-26 DIAGNOSIS — R31.29 OTHER MICROSCOPIC HEMATURIA: ICD-10-CM

## 2018-12-26 LAB
ALBUMIN SERPL-MCNC: 3.7 G/DL (ref 3.5–5.1)
ALP BLD-CCNC: 64 U/L (ref 38–126)
ALT SERPL-CCNC: 11 U/L (ref 11–66)
ANION GAP SERPL CALCULATED.3IONS-SCNC: 14 MEQ/L (ref 8–16)
APTT: 31.5 SECONDS (ref 22–38)
AST SERPL-CCNC: 13 U/L (ref 5–40)
BACTERIA: ABNORMAL /HPF
BASOPHILS # BLD: 0.6 %
BASOPHILS ABSOLUTE: 0.1 THOU/MM3 (ref 0–0.1)
BILIRUB SERPL-MCNC: 0.9 MG/DL (ref 0.3–1.2)
BILIRUBIN DIRECT: < 0.2 MG/DL (ref 0–0.3)
BILIRUBIN URINE: NEGATIVE
BLOOD, URINE: ABNORMAL
BUN BLDV-MCNC: 21 MG/DL (ref 7–22)
CALCIUM SERPL-MCNC: 9.3 MG/DL (ref 8.5–10.5)
CASTS 2: ABNORMAL /LPF
CASTS UA: ABNORMAL /LPF
CHARACTER, URINE: CLEAR
CHLORIDE BLD-SCNC: 102 MEQ/L (ref 98–111)
CO2: 20 MEQ/L (ref 23–33)
COLOR: YELLOW
CREAT SERPL-MCNC: 1.4 MG/DL (ref 0.4–1.2)
CRYSTALS, UA: ABNORMAL
EKG ATRIAL RATE: 63 BPM
EKG P AXIS: 56 DEGREES
EKG P-R INTERVAL: 200 MS
EKG Q-T INTERVAL: 402 MS
EKG QRS DURATION: 84 MS
EKG QTC CALCULATION (BAZETT): 411 MS
EKG R AXIS: -11 DEGREES
EKG T AXIS: 50 DEGREES
EKG VENTRICULAR RATE: 63 BPM
EOSINOPHIL # BLD: 6 %
EOSINOPHILS ABSOLUTE: 0.7 THOU/MM3 (ref 0–0.4)
EPITHELIAL CELLS, UA: ABNORMAL /HPF
ERYTHROCYTE [DISTWIDTH] IN BLOOD BY AUTOMATED COUNT: 18.4 % (ref 11.5–14.5)
ERYTHROCYTE [DISTWIDTH] IN BLOOD BY AUTOMATED COUNT: 62.3 FL (ref 35–45)
ETHYL ALCOHOL, SERUM: < 0.01 %
GFR SERPL CREATININE-BSD FRML MDRD: 48 ML/MIN/1.73M2
GLUCOSE BLD-MCNC: 96 MG/DL (ref 70–108)
GLUCOSE URINE: NEGATIVE MG/DL
HCT VFR BLD CALC: 34.6 % (ref 42–52)
HEMOGLOBIN: 11 GM/DL (ref 14–18)
IMMATURE GRANS (ABS): 0.13 THOU/MM3 (ref 0–0.07)
IMMATURE GRANULOCYTES: 1 %
INR BLD: 0.93 (ref 0.85–1.13)
KETONES, URINE: NEGATIVE
LEUKOCYTE ESTERASE, URINE: NEGATIVE
LIPASE: 11.5 U/L (ref 5.6–51.3)
LYMPHOCYTES # BLD: 23.3 %
LYMPHOCYTES ABSOLUTE: 2.9 THOU/MM3 (ref 1–4.8)
MAGNESIUM: 1.8 MG/DL (ref 1.6–2.4)
MCH RBC QN AUTO: 29.3 PG (ref 26–33)
MCHC RBC AUTO-ENTMCNC: 31.8 GM/DL (ref 32.2–35.5)
MCV RBC AUTO: 92 FL (ref 80–94)
MISCELLANEOUS 2: ABNORMAL
MONOCYTES # BLD: 12.8 %
MONOCYTES ABSOLUTE: 1.6 THOU/MM3 (ref 0.4–1.3)
NITRITE, URINE: NEGATIVE
NUCLEATED RED BLOOD CELLS: 0 /100 WBC
OSMOLALITY CALCULATION: 274.8 MOSMOL/KG (ref 275–300)
PH UA: 5
PLATELET # BLD: 396 THOU/MM3 (ref 130–400)
PMV BLD AUTO: 10.4 FL (ref 9.4–12.4)
POTASSIUM SERPL-SCNC: 4.8 MEQ/L (ref 3.5–5.2)
PRO-BNP: 459.5 PG/ML (ref 0–1800)
PROTEIN UA: NEGATIVE
RBC # BLD: 3.76 MILL/MM3 (ref 4.7–6.1)
RBC URINE: ABNORMAL /HPF
RENAL EPITHELIAL, UA: ABNORMAL
SEG NEUTROPHILS: 56.3 %
SEGMENTED NEUTROPHILS ABSOLUTE COUNT: 7 THOU/MM3 (ref 1.8–7.7)
SODIUM BLD-SCNC: 136 MEQ/L (ref 135–145)
SPECIFIC GRAVITY, URINE: 1.01 (ref 1–1.03)
TOTAL PROTEIN: 6.5 G/DL (ref 6.1–8)
TROPONIN T: < 0.01 NG/ML
TSH SERPL DL<=0.05 MIU/L-ACNC: 4.97 UIU/ML (ref 0.4–4.2)
UROBILINOGEN, URINE: 0.2 EU/DL
WBC # BLD: 12.4 THOU/MM3 (ref 4.8–10.8)
WBC UA: ABNORMAL /HPF
YEAST: ABNORMAL

## 2018-12-26 PROCEDURE — 96374 THER/PROPH/DIAG INJ IV PUSH: CPT

## 2018-12-26 PROCEDURE — G0480 DRUG TEST DEF 1-7 CLASSES: HCPCS

## 2018-12-26 PROCEDURE — 83735 ASSAY OF MAGNESIUM: CPT

## 2018-12-26 PROCEDURE — 96376 TX/PRO/DX INJ SAME DRUG ADON: CPT

## 2018-12-26 PROCEDURE — 80053 COMPREHEN METABOLIC PANEL: CPT

## 2018-12-26 PROCEDURE — 84443 ASSAY THYROID STIM HORMONE: CPT

## 2018-12-26 PROCEDURE — 83690 ASSAY OF LIPASE: CPT

## 2018-12-26 PROCEDURE — 82248 BILIRUBIN DIRECT: CPT

## 2018-12-26 PROCEDURE — 93010 ELECTROCARDIOGRAM REPORT: CPT | Performed by: INTERNAL MEDICINE

## 2018-12-26 PROCEDURE — 93005 ELECTROCARDIOGRAM TRACING: CPT | Performed by: EMERGENCY MEDICINE

## 2018-12-26 PROCEDURE — 6370000000 HC RX 637 (ALT 250 FOR IP): Performed by: EMERGENCY MEDICINE

## 2018-12-26 PROCEDURE — 6360000002 HC RX W HCPCS: Performed by: EMERGENCY MEDICINE

## 2018-12-26 PROCEDURE — 81001 URINALYSIS AUTO W/SCOPE: CPT

## 2018-12-26 PROCEDURE — 84484 ASSAY OF TROPONIN QUANT: CPT

## 2018-12-26 PROCEDURE — 36415 COLL VENOUS BLD VENIPUNCTURE: CPT

## 2018-12-26 PROCEDURE — 99285 EMERGENCY DEPT VISIT HI MDM: CPT

## 2018-12-26 PROCEDURE — 85730 THROMBOPLASTIN TIME PARTIAL: CPT

## 2018-12-26 PROCEDURE — 74177 CT ABD & PELVIS W/CONTRAST: CPT

## 2018-12-26 PROCEDURE — 85610 PROTHROMBIN TIME: CPT

## 2018-12-26 PROCEDURE — 96375 TX/PRO/DX INJ NEW DRUG ADDON: CPT

## 2018-12-26 PROCEDURE — 6360000004 HC RX CONTRAST MEDICATION: Performed by: EMERGENCY MEDICINE

## 2018-12-26 PROCEDURE — 85025 COMPLETE CBC W/AUTO DIFF WBC: CPT

## 2018-12-26 PROCEDURE — 83880 ASSAY OF NATRIURETIC PEPTIDE: CPT

## 2018-12-26 RX ORDER — MORPHINE SULFATE 2 MG/ML
2 INJECTION, SOLUTION INTRAMUSCULAR; INTRAVENOUS ONCE
Status: COMPLETED | OUTPATIENT
Start: 2018-12-26 | End: 2018-12-26

## 2018-12-26 RX ORDER — TRAMADOL HYDROCHLORIDE 50 MG/1
50 TABLET ORAL EVERY 6 HOURS PRN
Qty: 12 TABLET | Refills: 0 | Status: SHIPPED | OUTPATIENT
Start: 2018-12-26 | End: 2018-12-29

## 2018-12-26 RX ORDER — ONDANSETRON 2 MG/ML
4 INJECTION INTRAMUSCULAR; INTRAVENOUS ONCE
Status: COMPLETED | OUTPATIENT
Start: 2018-12-26 | End: 2018-12-26

## 2018-12-26 RX ORDER — CIPROFLOXACIN 500 MG/1
500 TABLET, FILM COATED ORAL 2 TIMES DAILY
Qty: 10 TABLET | Refills: 0 | Status: SHIPPED | OUTPATIENT
Start: 2018-12-26 | End: 2018-12-31

## 2018-12-26 RX ORDER — CIPROFLOXACIN 500 MG/1
500 TABLET, FILM COATED ORAL ONCE
Status: COMPLETED | OUTPATIENT
Start: 2018-12-26 | End: 2018-12-26

## 2018-12-26 RX ADMIN — MORPHINE SULFATE 2 MG: 2 INJECTION, SOLUTION INTRAMUSCULAR; INTRAVENOUS at 06:38

## 2018-12-26 RX ADMIN — CIPROFLOXACIN 500 MG: 500 TABLET, FILM COATED ORAL at 11:38

## 2018-12-26 RX ADMIN — ONDANSETRON 4 MG: 2 INJECTION INTRAMUSCULAR; INTRAVENOUS at 06:52

## 2018-12-26 RX ADMIN — MORPHINE SULFATE 2 MG: 2 INJECTION, SOLUTION INTRAMUSCULAR; INTRAVENOUS at 11:37

## 2018-12-26 RX ADMIN — IOPAMIDOL 80 ML: 755 INJECTION, SOLUTION INTRAVENOUS at 07:44

## 2018-12-26 ASSESSMENT — PAIN SCALES - GENERAL
PAINLEVEL_OUTOF10: 10
PAINLEVEL_OUTOF10: 5
PAINLEVEL_OUTOF10: 4
PAINLEVEL_OUTOF10: 6

## 2018-12-26 ASSESSMENT — PAIN DESCRIPTION - LOCATION
LOCATION: FLANK
LOCATION: FLANK

## 2018-12-26 ASSESSMENT — PAIN DESCRIPTION - PAIN TYPE
TYPE: ACUTE PAIN
TYPE: ACUTE PAIN

## 2018-12-26 ASSESSMENT — ENCOUNTER SYMPTOMS
SORE THROAT: 0
SINUS PAIN: 0
DIARRHEA: 0
CHOKING: 0
BLOOD IN STOOL: 0
BACK PAIN: 1
COLOR CHANGE: 0
COUGH: 0
VOMITING: 0
NAUSEA: 0
ABDOMINAL DISTENTION: 0
SINUS PRESSURE: 0
CONSTIPATION: 0
ABDOMINAL PAIN: 1
SHORTNESS OF BREATH: 0

## 2018-12-26 ASSESSMENT — PAIN DESCRIPTION - ORIENTATION
ORIENTATION: LEFT
ORIENTATION: LEFT

## 2018-12-26 ASSESSMENT — PAIN DESCRIPTION - DESCRIPTORS: DESCRIPTORS: DULL

## 2018-12-26 NOTE — CARE COORDINATION
ED Care Transition    2018    Patient Name: Eladio Andrews   : 10/6/1928  MRN: [id number]    MYRON Score: 3  PCP: Angela Bhatt MD    Active ACC/CTC: no                   Utilization Review:  ED visits:  4  Admissions: 2    Problem List:  Patient Active Problem List   Diagnosis    HTN (hypertension)    Prostate CA ([address]    Colon cancer ([address]    Diabetes mellitus ([address]    SHAYAN (acute kidney injury) ([address]    Hypothyroidism    CAD (coronary artery disease)    Chest pain    Hyponatremia    Anemia    Dyspnea    Type 2 diabetes mellitus with stage 3 chronic kidney disease, with long-term current use of insulin ([address]    S/P CABG (coronary artery bypass graft)    CKD (chronic kidney disease)    Hyperlipidemia    Obesity    Syncope    Pyelonephritis    SIRS (systemic inflammatory response syndrome) (HCC)    Hyponatremia    SHAYAN (acute kidney injury) ([address]    Colostomy status ([address]    Panlobular emphysema (HCC)    Generalized weakness    Traumatic hematoma of scalp    Scalp abrasion    Subdural hematoma ([address]     Summary:  Met with Rakan Rankin and his daughter, introduced self/role. Presented to ED for evaluation of flank pain. Plan is for discharge. Scheduled close ED follow up appointment, per ED provider and Dr. Miles Pena request. Appointment scheduled on 19 at 1200. Daughter and patient informed and written on discharge instructions. Follow up appointments:    No future appointments.     Review Due Health Maintenance:  Health Maintenance Due   Topic Date Due    Shingles Vaccine (1 of 2 - 2 Dose Series) 10/06/1978    Pneumococcal low/med risk (1 of 2 - PCV13) 10/06/1993    Flu vaccine (1) 2018     AURELIA Stuart [address], BSN  [phone]  [phone]

## 2018-12-26 NOTE — ED PROVIDER NOTES
cardiologist.    NSR rate 63 BPM. Normal EKG with no change when compared to 8-    RADIOLOGY: non-plainfilm images(s) such as CT, Ultrasound and MRI are read by the radiologist.    CT ABDOMEN PELVIS W IV CONTRAST Additional Contrast? None   Final Result   Left ureter is mildly prominent and there is mild stranding which could relate to low-grade extravasation. A definite stone is not visible though there are multiple surgical clips near the left ureter. **This report has been created using voice recognition software. It may contain minor errors which are inherent in voice recognition technology. **      Final report electronically signed by Dr. Hailey Kruse on 12/26/2018 8:42 AM          LABS:     Labs Reviewed   CBC WITH AUTO DIFFERENTIAL - Abnormal; Notable for the following:        Result Value    WBC 12.4 (*)     RBC 3.76 (*)     Hemoglobin 11.0 (*)     Hematocrit 34.6 (*)     MCHC 31.8 (*)     RDW-CV 18.4 (*)     RDW-SD 62.3 (*)     Monocytes # 1.6 (*)     Eosinophils # 0.7 (*)     Immature Grans (Abs) 0.13 (*)     All other components within normal limits   BASIC METABOLIC PANEL - Abnormal; Notable for the following:     CO2 20 (*)     CREATININE 1.4 (*)     All other components within normal limits   TSH WITHOUT REFLEX - Abnormal; Notable for the following:     TSH 4.970 (*)     All other components within normal limits   OSMOLALITY - Abnormal; Notable for the following:     Osmolality Calc 274.8 (*)     All other components within normal limits   GLOMERULAR FILTRATION RATE, ESTIMATED - Abnormal; Notable for the following:     Est, Glom Filt Rate 48 (*)     All other components within normal limits   URINE WITH REFLEXED MICRO - Abnormal; Notable for the following:     Blood, Urine TRACE (*)     All other components within normal limits   RAPID INFLUENZA A/B ANTIGENS   PROTIME-INR   APTT   BRAIN NATRIURETIC PEPTIDE   HEPATIC FUNCTION PANEL   LIPASE   TROPONIN   MAGNESIUM   ETHANOL Discharge    PATIENT REFERRED TO:  Daryl Barcenas MD  [address]  [address] [id number] [phone]    Call today        DISCHARGE MEDICATIONS:  Discharge Medication List as of 12/26/2018 11:24 AM      START taking these medications    Details   traMADol (ULTRAM) 50 MG tablet Take 1 tablet by mouth every 6 hours as needed for Pain for up to 3 days. Intended supply: 3 days. Take lowest dose possible to manage pain., Disp-12 tablet, R-0Print      ciprofloxacin (CIPRO) 500 MG tablet Take 1 tablet by mouth 2 times daily for 5 days, Disp-10 tablet, R-0Print             (Please note that portions of this note were completed with a voice recognition program.  Efforts were made to edit the dictations but occasionally words are mis-transcribed.)    The patient was given an opportunity to see the Emergency Attending. The patient voiced understanding that I was a Mid-LevelProvider and was in agreement with being seen independently by myself.           Laney Pack, MY - CNP  12/26/18 [id number]

## 2019-07-23 ENCOUNTER — HOSPITAL ENCOUNTER (INPATIENT)
Age: 84
LOS: 8 days | Discharge: NURSING, REHAB OR LONG-TERM CARE | DRG: 643 | End: 2019-07-31
Attending: EMERGENCY MEDICINE | Admitting: INTERNAL MEDICINE
Payer: MEDICARE

## 2019-07-23 ENCOUNTER — APPOINTMENT (OUTPATIENT)
Dept: GENERAL RADIOLOGY | Age: 84
DRG: 643 | End: 2019-07-23
Payer: MEDICARE

## 2019-07-23 DIAGNOSIS — E87.1 HYPONATREMIA: Primary | ICD-10-CM

## 2019-07-23 LAB
ALBUMIN SERPL-MCNC: 3.9 G/DL (ref 3.5–5.1)
ALP BLD-CCNC: 76 U/L (ref 38–126)
ALT SERPL-CCNC: 13 U/L (ref 11–66)
ANION GAP SERPL CALCULATED.3IONS-SCNC: 10 MEQ/L (ref 8–16)
ANION GAP SERPL CALCULATED.3IONS-SCNC: 14 MEQ/L (ref 8–16)
AST SERPL-CCNC: 15 U/L (ref 5–40)
BASOPHILS # BLD: 0.3 %
BASOPHILS ABSOLUTE: 0 THOU/MM3 (ref 0–0.1)
BILIRUB SERPL-MCNC: 1.2 MG/DL (ref 0.3–1.2)
BILIRUBIN DIRECT: 0.3 MG/DL (ref 0–0.3)
BILIRUBIN URINE: NEGATIVE
BLOOD, URINE: NEGATIVE
BUN BLDV-MCNC: 22 MG/DL (ref 7–22)
BUN BLDV-MCNC: 27 MG/DL (ref 7–22)
CALCIUM SERPL-MCNC: 8.7 MG/DL (ref 8.5–10.5)
CALCIUM SERPL-MCNC: 9.2 MG/DL (ref 8.5–10.5)
CHARACTER, URINE: CLEAR
CHLORIDE BLD-SCNC: 88 MEQ/L (ref 98–111)
CHLORIDE BLD-SCNC: 96 MEQ/L (ref 98–111)
CO2: 23 MEQ/L (ref 23–33)
CO2: 25 MEQ/L (ref 23–33)
COLOR: YELLOW
CREAT SERPL-MCNC: 1.2 MG/DL (ref 0.4–1.2)
CREAT SERPL-MCNC: 1.4 MG/DL (ref 0.4–1.2)
CREATININE URINE: 40.4 MG/DL
EKG ATRIAL RATE: 67 BPM
EKG P AXIS: 62 DEGREES
EKG P-R INTERVAL: 180 MS
EKG Q-T INTERVAL: 404 MS
EKG QRS DURATION: 94 MS
EKG QTC CALCULATION (BAZETT): 426 MS
EKG R AXIS: -25 DEGREES
EKG T AXIS: 75 DEGREES
EKG VENTRICULAR RATE: 67 BPM
EOSINOPHIL # BLD: 2.6 %
EOSINOPHILS ABSOLUTE: 0.3 THOU/MM3 (ref 0–0.4)
ERYTHROCYTE [DISTWIDTH] IN BLOOD BY AUTOMATED COUNT: 16.8 % (ref 11.5–14.5)
ERYTHROCYTE [DISTWIDTH] IN BLOOD BY AUTOMATED COUNT: 53.7 FL (ref 35–45)
GFR SERPL CREATININE-BSD FRML MDRD: 48 ML/MIN/1.73M2
GFR SERPL CREATININE-BSD FRML MDRD: 57 ML/MIN/1.73M2
GLUCOSE BLD-MCNC: 137 MG/DL (ref 70–108)
GLUCOSE BLD-MCNC: 169 MG/DL (ref 70–108)
GLUCOSE BLD-MCNC: 242 MG/DL (ref 70–108)
GLUCOSE BLD-MCNC: 325 MG/DL (ref 70–108)
GLUCOSE URINE: >= 1000 MG/DL
HCT VFR BLD CALC: 34.9 % (ref 42–52)
HEMOGLOBIN: 11.6 GM/DL (ref 14–18)
IMMATURE GRANS (ABS): 0.08 THOU/MM3 (ref 0–0.07)
IMMATURE GRANULOCYTES: 1 %
INR BLD: 0.98 (ref 0.85–1.13)
KETONES, URINE: NEGATIVE
LEUKOCYTE ESTERASE, URINE: NEGATIVE
LIPASE: 8.3 U/L (ref 5.6–51.3)
LYMPHOCYTES # BLD: 18.3 %
LYMPHOCYTES ABSOLUTE: 1.8 THOU/MM3 (ref 1–4.8)
MAGNESIUM: 1.9 MG/DL (ref 1.6–2.4)
MCH RBC QN AUTO: 28.9 PG (ref 26–33)
MCHC RBC AUTO-ENTMCNC: 33.2 GM/DL (ref 32.2–35.5)
MCV RBC AUTO: 87 FL (ref 80–94)
MONOCYTES # BLD: 9.8 %
MONOCYTES ABSOLUTE: 1 THOU/MM3 (ref 0.4–1.3)
NITRITE, URINE: NEGATIVE
NUCLEATED RED BLOOD CELLS: 0 /100 WBC
OSMOLALITY CALCULATION: 269.2 MOSMOL/KG (ref 275–300)
OSMOLALITY URINE: 423 MOSMOL/KG (ref 250–750)
PH UA: 6 (ref 5–9)
PLATELET # BLD: 345 THOU/MM3 (ref 130–400)
PMV BLD AUTO: 9.9 FL (ref 9.4–12.4)
POTASSIUM SERPL-SCNC: 4.6 MEQ/L (ref 3.5–5.2)
POTASSIUM SERPL-SCNC: 5 MEQ/L (ref 3.5–5.2)
PRO-BNP: 616.2 PG/ML (ref 0–1800)
PROCALCITONIN: 0.05 NG/ML (ref 0.01–0.09)
PROTEIN UA: NEGATIVE
RBC # BLD: 4.01 MILL/MM3 (ref 4.7–6.1)
SEG NEUTROPHILS: 68.2 %
SEGMENTED NEUTROPHILS ABSOLUTE COUNT: 6.6 THOU/MM3 (ref 1.8–7.7)
SODIUM BLD-SCNC: 125 MEQ/L (ref 135–145)
SODIUM BLD-SCNC: 127 MEQ/L (ref 135–145)
SODIUM BLD-SCNC: 131 MEQ/L (ref 135–145)
SODIUM BLD-SCNC: 133 MEQ/L (ref 135–145)
SODIUM URINE: 50 MEQ/L
SPECIFIC GRAVITY, URINE: 1.02 (ref 1–1.03)
TOTAL PROTEIN: 6.5 G/DL (ref 6.1–8)
TROPONIN T: < 0.01 NG/ML
TSH SERPL DL<=0.05 MIU/L-ACNC: 3.25 UIU/ML (ref 0.4–4.2)
UROBILINOGEN, URINE: 0.2 EU/DL (ref 0–1)
WBC # BLD: 9.7 THOU/MM3 (ref 4.8–10.8)

## 2019-07-23 PROCEDURE — 80048 BASIC METABOLIC PNL TOTAL CA: CPT

## 2019-07-23 PROCEDURE — 84145 PROCALCITONIN (PCT): CPT

## 2019-07-23 PROCEDURE — 84295 ASSAY OF SERUM SODIUM: CPT

## 2019-07-23 PROCEDURE — 2709999900 HC NON-CHARGEABLE SUPPLY

## 2019-07-23 PROCEDURE — 99221 1ST HOSP IP/OBS SF/LOW 40: CPT | Performed by: NURSE PRACTITIONER

## 2019-07-23 PROCEDURE — 93005 ELECTROCARDIOGRAM TRACING: CPT | Performed by: PHYSICIAN ASSISTANT

## 2019-07-23 PROCEDURE — 82570 ASSAY OF URINE CREATININE: CPT

## 2019-07-23 PROCEDURE — 71045 X-RAY EXAM CHEST 1 VIEW: CPT

## 2019-07-23 PROCEDURE — 1200000003 HC TELEMETRY R&B

## 2019-07-23 PROCEDURE — 81003 URINALYSIS AUTO W/O SCOPE: CPT

## 2019-07-23 PROCEDURE — 82948 REAGENT STRIP/BLOOD GLUCOSE: CPT

## 2019-07-23 PROCEDURE — 6360000002 HC RX W HCPCS: Performed by: PHYSICIAN ASSISTANT

## 2019-07-23 PROCEDURE — 36415 COLL VENOUS BLD VENIPUNCTURE: CPT

## 2019-07-23 PROCEDURE — 85610 PROTHROMBIN TIME: CPT

## 2019-07-23 PROCEDURE — 99285 EMERGENCY DEPT VISIT HI MDM: CPT

## 2019-07-23 PROCEDURE — 84443 ASSAY THYROID STIM HORMONE: CPT

## 2019-07-23 PROCEDURE — 83935 ASSAY OF URINE OSMOLALITY: CPT

## 2019-07-23 PROCEDURE — 83880 ASSAY OF NATRIURETIC PEPTIDE: CPT

## 2019-07-23 PROCEDURE — 6370000000 HC RX 637 (ALT 250 FOR IP): Performed by: INTERNAL MEDICINE

## 2019-07-23 PROCEDURE — 80076 HEPATIC FUNCTION PANEL: CPT

## 2019-07-23 PROCEDURE — 84484 ASSAY OF TROPONIN QUANT: CPT

## 2019-07-23 PROCEDURE — 1200000000 HC SEMI PRIVATE

## 2019-07-23 PROCEDURE — 83690 ASSAY OF LIPASE: CPT

## 2019-07-23 PROCEDURE — 85025 COMPLETE CBC W/AUTO DIFF WBC: CPT

## 2019-07-23 PROCEDURE — 96374 THER/PROPH/DIAG INJ IV PUSH: CPT

## 2019-07-23 PROCEDURE — 83735 ASSAY OF MAGNESIUM: CPT

## 2019-07-23 PROCEDURE — 84300 ASSAY OF URINE SODIUM: CPT

## 2019-07-23 PROCEDURE — 2580000003 HC RX 258: Performed by: PHYSICIAN ASSISTANT

## 2019-07-23 RX ORDER — ALBUTEROL SULFATE 90 UG/1
2 AEROSOL, METERED RESPIRATORY (INHALATION) EVERY 6 HOURS PRN
Status: DISCONTINUED | OUTPATIENT
Start: 2019-07-23 | End: 2019-07-31 | Stop reason: HOSPADM

## 2019-07-23 RX ORDER — CARVEDILOL 3.12 MG/1
3.12 TABLET ORAL 2 TIMES DAILY WITH MEALS
Status: DISCONTINUED | OUTPATIENT
Start: 2019-07-23 | End: 2019-07-25

## 2019-07-23 RX ORDER — 0.9 % SODIUM CHLORIDE 0.9 %
1000 INTRAVENOUS SOLUTION INTRAVENOUS ONCE
Status: COMPLETED | OUTPATIENT
Start: 2019-07-23 | End: 2019-07-23

## 2019-07-23 RX ORDER — ONDANSETRON 2 MG/ML
4 INJECTION INTRAMUSCULAR; INTRAVENOUS ONCE
Status: COMPLETED | OUTPATIENT
Start: 2019-07-23 | End: 2019-07-23

## 2019-07-23 RX ORDER — INSULIN GLARGINE 100 [IU]/ML
26 INJECTION, SOLUTION SUBCUTANEOUS EVERY MORNING
Status: DISCONTINUED | OUTPATIENT
Start: 2019-07-23 | End: 2019-07-31 | Stop reason: HOSPADM

## 2019-07-23 RX ORDER — ACETAMINOPHEN 325 MG/1
650 TABLET ORAL EVERY 4 HOURS PRN
Status: DISCONTINUED | OUTPATIENT
Start: 2019-07-23 | End: 2019-07-31 | Stop reason: HOSPADM

## 2019-07-23 RX ORDER — GLIMEPIRIDE 2 MG/1
2 TABLET ORAL DAILY
Status: DISCONTINUED | OUTPATIENT
Start: 2019-07-23 | End: 2019-07-31 | Stop reason: HOSPADM

## 2019-07-23 RX ORDER — DEXTROSE MONOHYDRATE 50 MG/ML
100 INJECTION, SOLUTION INTRAVENOUS PRN
Status: DISCONTINUED | OUTPATIENT
Start: 2019-07-23 | End: 2019-07-31 | Stop reason: HOSPADM

## 2019-07-23 RX ORDER — NICOTINE POLACRILEX 4 MG
15 LOZENGE BUCCAL PRN
Status: DISCONTINUED | OUTPATIENT
Start: 2019-07-23 | End: 2019-07-31 | Stop reason: HOSPADM

## 2019-07-23 RX ORDER — LEVOTHYROXINE SODIUM 0.07 MG/1
75 TABLET ORAL EVERY MORNING
Status: DISCONTINUED | OUTPATIENT
Start: 2019-07-23 | End: 2019-07-31 | Stop reason: HOSPADM

## 2019-07-23 RX ORDER — LIDOCAINE 4 G/G
1 PATCH TOPICAL DAILY
Status: DISCONTINUED | OUTPATIENT
Start: 2019-07-23 | End: 2019-07-31 | Stop reason: HOSPADM

## 2019-07-23 RX ORDER — HYDRALAZINE HYDROCHLORIDE 25 MG/1
25 TABLET, FILM COATED ORAL EVERY 8 HOURS SCHEDULED
Status: DISCONTINUED | OUTPATIENT
Start: 2019-07-23 | End: 2019-07-27

## 2019-07-23 RX ORDER — SODIUM CHLORIDE 1000 MG
1 TABLET, SOLUBLE MISCELLANEOUS
Status: DISCONTINUED | OUTPATIENT
Start: 2019-07-24 | End: 2019-07-24

## 2019-07-23 RX ORDER — SODIUM CHLORIDE 1000 MG
1 TABLET, SOLUBLE MISCELLANEOUS 2 TIMES DAILY
Status: DISCONTINUED | OUTPATIENT
Start: 2019-07-23 | End: 2019-07-23

## 2019-07-23 RX ORDER — DEXTROSE MONOHYDRATE 25 G/50ML
12.5 INJECTION, SOLUTION INTRAVENOUS PRN
Status: DISCONTINUED | OUTPATIENT
Start: 2019-07-23 | End: 2019-07-31 | Stop reason: HOSPADM

## 2019-07-23 RX ADMIN — INSULIN LISPRO 4 UNITS: 100 INJECTION, SOLUTION INTRAVENOUS; SUBCUTANEOUS at 14:38

## 2019-07-23 RX ADMIN — HYDRALAZINE HYDROCHLORIDE 25 MG: 25 TABLET, FILM COATED ORAL at 14:37

## 2019-07-23 RX ADMIN — INSULIN GLARGINE 26 UNITS: 100 INJECTION, SOLUTION SUBCUTANEOUS at 14:38

## 2019-07-23 RX ADMIN — ONDANSETRON 4 MG: 2 INJECTION INTRAMUSCULAR; INTRAVENOUS at 08:17

## 2019-07-23 RX ADMIN — GLIMEPIRIDE 2 MG: 2 TABLET ORAL at 14:37

## 2019-07-23 RX ADMIN — SODIUM CHLORIDE 1000 ML: 9 INJECTION, SOLUTION INTRAVENOUS at 08:20

## 2019-07-23 RX ADMIN — HYDRALAZINE HYDROCHLORIDE 25 MG: 25 TABLET, FILM COATED ORAL at 20:12

## 2019-07-23 RX ADMIN — SODIUM CHLORIDE TAB 1 GM 1 G: 1 TAB at 14:37

## 2019-07-23 RX ADMIN — CARVEDILOL 3.12 MG: 3.12 TABLET, FILM COATED ORAL at 20:12

## 2019-07-23 RX ADMIN — LEVOTHYROXINE SODIUM 75 MCG: 75 TABLET ORAL at 14:37

## 2019-07-23 ASSESSMENT — ENCOUNTER SYMPTOMS
WHEEZING: 0
VOMITING: 0
EYE REDNESS: 0
BACK PAIN: 0
SHORTNESS OF BREATH: 0
RHINORRHEA: 0
DIARRHEA: 0
ABDOMINAL PAIN: 0
COLOR CHANGE: 0
EYE DISCHARGE: 0
NAUSEA: 1
COUGH: 0
CONSTIPATION: 0
SORE THROAT: 0

## 2019-07-23 ASSESSMENT — PAIN SCALES - GENERAL
PAINLEVEL_OUTOF10: 0
PAINLEVEL_OUTOF10: 0

## 2019-07-23 NOTE — ED PROVIDER NOTES
[facility] Emergency Department    CHIEF COMPLAINT       Chief Complaint   Patient presents with    Other     not feeling well    Emesis       Nurses Notes reviewed and I agree except as noted in the HPI. HISTORY OF PRESENT ILLNESS    Lamar Schneider radha 80 y.o. male who presents to the ED for evaluation of \"not feeling well\". The patient states that he woke up this morning feeling unwell. The patient reports nausea and lightheadedness when standing. Other than this, he is unable to describe or define what he means by stating that he does not feel well. The patient denies any chest pain, shortness of breath, abdominal pain, vomiting, diarrhea, constipation, fever, chills, headache, dizziness, weakness, numbness, or tingling. The patient has a history of colon and prostate cancer with a colostomy in place, CAD, hypertension, diabetes, CKD and CHF. The patient denies any other symptoms or relevant history at this time. HPI was provided by the patient. REVIEW OF SYSTEMS     Review of Systems   Constitutional: Negative for chills, fatigue and fever. HENT: Negative for congestion, ear pain, rhinorrhea and sore throat. Eyes: Negative for discharge and redness. Respiratory: Negative for cough, shortness of breath and wheezing. Cardiovascular: Negative for chest pain and palpitations. Gastrointestinal: Positive for nausea. Negative for abdominal pain, constipation, diarrhea and vomiting. Genitourinary: Negative for decreased urine volume, difficulty urinating and dysuria. Musculoskeletal: Negative for arthralgias, back pain, myalgias, neck pain and neck stiffness. Skin: Negative for color change, pallor and rash. Neurological: Positive for light-headedness (when standing ). Negative for dizziness, syncope, weakness, numbness and headaches. Hematological: Negative for adenopathy. Psychiatric/Behavioral: Negative for agitation, confusion, dysphoric mood and suicidal ideas.  The patient is not nervous/anxious. PAST MEDICAL HISTORY     Past Medical History:   Diagnosis Date    Arthritis     CAD (coronary artery disease)     Colostomy status ([facility]     Diastolic CHF (HCC)     GERD (gastroesophageal reflux disease)     HTN (hypertension) 2013    Hyperlipidemia     Hypothyroid     Panlobular emphysema ([facility] 2017    Prostate cancer ([facility]     S/P CABG (coronary artery bypass graft)     Type 2 diabetes mellitus with stage 3 chronic kidney disease, with long-term current use of insulin (HCC)        SURGICALHISTORY      has a past surgical history that includes Coronary artery bypass graft; knee surgery; colostomy; colectomy; Abdomen surgery; Cardiac surgery; joint replacement; vascular surgery; Tonsillectomy; Colonoscopy; Appendectomy; eye surgery; and Dilatation, esophagus. CURRENT MEDICATIONS       Previous Medications    ACETAMINOPHEN (TYLENOL) 325 MG TABLET    Take 2 tablets by mouth every 4 hours as needed for Pain    ALBUTEROL SULFATE  (90 BASE) MCG/ACT INHALER    Inhale 2 puffs into the lungs every 6 hours as needed for Wheezing    CARVEDILOL (COREG) 3.125 MG TABLET    Take 1 tablet by mouth 2 times daily (with meals)    GLIMEPIRIDE (AMARYL) 4 MG TABLET    Take 0.5 tablets by mouth daily    HYDRALAZINE (APRESOLINE) 25 MG TABLET    Take 1 tablet by mouth every 8 hours    INSULIN ASPART (NOVOLOG) 100 UNIT/ML INJECTION VIAL    Inject 2 Units into the skin 3 times daily (before meals) And sliding scale    INSULIN GLARGINE (LANTUS) 100 UNIT/ML INJECTION VIAL    Inject 26 Units into the skin every morning    LEVOTHYROXINE (SYNTHROID) 75 MCG TABLET    Take 1 tablet by mouth every morning    LIDOCAINE (LIDODERM) 5 %    Place 1 patch onto the skin daily 12 hours on, 12 hours off. SODIUM CHLORIDE 1 G TABLET    Take 1 tablet by mouth 2 times daily       ALLERGIES     has No Known Allergies. FAMILY HISTORY     He indicated that his mother is .  He indicated that his father is . He indicated that his sister is . family history includes Asthma in his father; Cancer in his mother. SOCIAL HISTORY       Social History     Socioeconomic History    Marital status:      Spouse name: Ara Saldana Number of children: 3    Years of education: Not on file    Highest education level: Not on file   Occupational History    Not on file   Social Needs    Financial resource strain: Not on file    Food insecurity:     Worry: Not on file     Inability: Not on file   Cubikal needs:     Medical: Not on file     Non-medical: Not on file   Tobacco Use    Smoking status: Former Smoker     Years: 25.00    Smokeless tobacco: Never Used    Tobacco comment: quit 40 yrs ago   Substance and Sexual Activity    Alcohol use: No    Drug use: No    Sexual activity: Not on file   Lifestyle    Physical activity:     Days per week: Not on file     Minutes per session: Not on file    Stress: Not on file   Relationships    Social connections:     Talks on phone: Not on file     Gets together: Not on file     Attends [employer] service: Not on file     Active member of club or organization: Not on file     Attends meetings of clubs or organizations: Not on file     Relationship status: Not on file    Intimate partner violence:     Fear of current or ex partner: Not on file     Emotionally abused: Not on file     Physically abused: Not on file     Forced sexual activity: Not on file   Other Topics Concern    Not on file   Social History Narrative    Not on file       PHYSICAL EXAM     INITIAL VITALS:  height is 5' 10\" (1.778 m) and weight is 230 lb (104.3 kg). His oral temperature is 97.4 °F (36.3 °C). His blood pressure is 163/56 (abnormal) and his pulse is 67. His respiration is 21 and oxygen saturation is 99%. Physical Exam   Constitutional: He is oriented to person, place, and time. He appears well-developed and well-nourished. No distress. Duration Q-T Interval   07/23/19 06:48:36 07/23/19 06:48:36 67 67 180 94 404       Collection Time Result Time QTc Calculation (Bazett) P Axis R Axis T Axis   07/23/19 06:48:36 07/23/19 06:48:36 426 62 -25 75         Final result                Narrative:    Normal sinus rhythm  Incomplete right bundle branch block  Borderline ECG  When compared with ECG of 26-DEC-2018 05:52,  No significant change was found  Confirmed by [address] [facility] ([id number]) on 7/24/2019 4:59:50 AM              RADIOLOGY: non-plainfilm images(s) such as CT, Ultrasound and MRI are read by the radiologist.  Plain radiographic images are visualized and preliminarily interpreted by the emergency physician unless otherwise stated below. XR CHEST PORTABLE   Final Result   1. There is no acute cardiopulmonary process. **This report has been created using voice recognition software. It may contain minor errors which are inherent in voice recognition technology. **      Final report electronically signed by Dr. Collene Harada on 7/23/2019 7:24 AM      CT HEAD W CONTRAST    (Results Pending)         LABS:   Labs Reviewed   CBC WITH AUTO DIFFERENTIAL - Abnormal; Notable for the following components:       Result Value    RBC 4.01 (*)     Hemoglobin 11.6 (*)     Hematocrit 34.9 (*)     RDW-CV 16.8 (*)     RDW-SD 53.7 (*)     Immature Grans (Abs) 0.08 (*)     All other components within normal limits   BASIC METABOLIC PANEL - Abnormal; Notable for the following components:    Sodium 125 (*)     Chloride 88 (*)     Glucose 325 (*)     BUN 27 (*)     All other components within normal limits   URINE RT REFLEX TO CULTURE - Abnormal; Notable for the following components:    Glucose, Ur >= 1000 (*)     All other components within normal limits   GLOMERULAR FILTRATION RATE, ESTIMATED - Abnormal; Notable for the following components:    Est, Glom Filt Rate 57 (*)     All other components within normal limits   OSMOLALITY - Abnormal; Notable for the

## 2019-07-24 ENCOUNTER — APPOINTMENT (OUTPATIENT)
Dept: CT IMAGING | Age: 84
DRG: 643 | End: 2019-07-24
Payer: MEDICARE

## 2019-07-24 LAB
ANION GAP SERPL CALCULATED.3IONS-SCNC: 12 MEQ/L (ref 8–16)
BUN BLDV-MCNC: 22 MG/DL (ref 7–22)
CALCIUM SERPL-MCNC: 8.9 MG/DL (ref 8.5–10.5)
CHLORIDE BLD-SCNC: 94 MEQ/L (ref 98–111)
CO2: 23 MEQ/L (ref 23–33)
CORTISOL COLLECTION INFO: NORMAL
CORTISOL: 10.56 UG/DL
CREAT SERPL-MCNC: 1.2 MG/DL (ref 0.4–1.2)
GFR SERPL CREATININE-BSD FRML MDRD: 57 ML/MIN/1.73M2
GLUCOSE BLD-MCNC: 127 MG/DL (ref 70–108)
GLUCOSE BLD-MCNC: 128 MG/DL (ref 70–108)
GLUCOSE BLD-MCNC: 147 MG/DL (ref 70–108)
GLUCOSE BLD-MCNC: 172 MG/DL (ref 70–108)
GLUCOSE BLD-MCNC: 177 MG/DL (ref 70–108)
GLUCOSE BLD-MCNC: 180 MG/DL (ref 70–108)
GLUCOSE BLD-MCNC: 187 MG/DL (ref 70–108)
GLUCOSE BLD-MCNC: 196 MG/DL (ref 70–108)
POTASSIUM SERPL-SCNC: 4.4 MEQ/L (ref 3.5–5.2)
SODIUM BLD-SCNC: 129 MEQ/L (ref 135–145)

## 2019-07-24 PROCEDURE — 6370000000 HC RX 637 (ALT 250 FOR IP): Performed by: NURSE PRACTITIONER

## 2019-07-24 PROCEDURE — 6370000000 HC RX 637 (ALT 250 FOR IP): Performed by: INTERNAL MEDICINE

## 2019-07-24 PROCEDURE — 70450 CT HEAD/BRAIN W/O DYE: CPT

## 2019-07-24 PROCEDURE — 82948 REAGENT STRIP/BLOOD GLUCOSE: CPT

## 2019-07-24 PROCEDURE — 93010 ELECTROCARDIOGRAM REPORT: CPT | Performed by: INTERNAL MEDICINE

## 2019-07-24 PROCEDURE — 1200000000 HC SEMI PRIVATE

## 2019-07-24 PROCEDURE — 82533 TOTAL CORTISOL: CPT

## 2019-07-24 PROCEDURE — 36415 COLL VENOUS BLD VENIPUNCTURE: CPT

## 2019-07-24 PROCEDURE — 2709999900 HC NON-CHARGEABLE SUPPLY

## 2019-07-24 PROCEDURE — 99232 SBSQ HOSP IP/OBS MODERATE 35: CPT | Performed by: INTERNAL MEDICINE

## 2019-07-24 PROCEDURE — 80048 BASIC METABOLIC PNL TOTAL CA: CPT

## 2019-07-24 PROCEDURE — 99232 SBSQ HOSP IP/OBS MODERATE 35: CPT | Performed by: NURSE PRACTITIONER

## 2019-07-24 RX ORDER — SODIUM CHLORIDE 1000 MG
2 TABLET, SOLUBLE MISCELLANEOUS 2 TIMES DAILY WITH MEALS
Status: DISCONTINUED | OUTPATIENT
Start: 2019-07-24 | End: 2019-07-26

## 2019-07-24 RX ORDER — ONDANSETRON 2 MG/ML
8 INJECTION INTRAMUSCULAR; INTRAVENOUS EVERY 6 HOURS PRN
Status: DISCONTINUED | OUTPATIENT
Start: 2019-07-24 | End: 2019-07-31 | Stop reason: HOSPADM

## 2019-07-24 RX ORDER — SODIUM CHLORIDE 1000 MG
1 TABLET, SOLUBLE MISCELLANEOUS ONCE
Status: COMPLETED | OUTPATIENT
Start: 2019-07-24 | End: 2019-07-24

## 2019-07-24 RX ADMIN — INSULIN GLARGINE 26 UNITS: 100 INJECTION, SOLUTION SUBCUTANEOUS at 08:48

## 2019-07-24 RX ADMIN — INSULIN LISPRO 2 UNITS: 100 INJECTION, SOLUTION INTRAVENOUS; SUBCUTANEOUS at 08:48

## 2019-07-24 RX ADMIN — SODIUM CHLORIDE TAB 1 GM 1 G: 1 TAB at 13:14

## 2019-07-24 RX ADMIN — INSULIN LISPRO 4 UNITS: 100 INJECTION, SOLUTION INTRAVENOUS; SUBCUTANEOUS at 17:31

## 2019-07-24 RX ADMIN — GLIMEPIRIDE 2 MG: 2 TABLET ORAL at 08:38

## 2019-07-24 RX ADMIN — INSULIN LISPRO 4 UNITS: 100 INJECTION, SOLUTION INTRAVENOUS; SUBCUTANEOUS at 13:12

## 2019-07-24 RX ADMIN — CARVEDILOL 3.12 MG: 3.12 TABLET, FILM COATED ORAL at 08:38

## 2019-07-24 RX ADMIN — HYDRALAZINE HYDROCHLORIDE 25 MG: 25 TABLET, FILM COATED ORAL at 20:28

## 2019-07-24 RX ADMIN — INSULIN LISPRO 4 UNITS: 100 INJECTION, SOLUTION INTRAVENOUS; SUBCUTANEOUS at 08:50

## 2019-07-24 RX ADMIN — INSULIN LISPRO 1 UNITS: 100 INJECTION, SOLUTION INTRAVENOUS; SUBCUTANEOUS at 20:28

## 2019-07-24 RX ADMIN — HYDRALAZINE HYDROCHLORIDE 25 MG: 25 TABLET, FILM COATED ORAL at 15:20

## 2019-07-24 RX ADMIN — INSULIN LISPRO 2 UNITS: 100 INJECTION, SOLUTION INTRAVENOUS; SUBCUTANEOUS at 13:13

## 2019-07-24 RX ADMIN — LEVOTHYROXINE SODIUM 75 MCG: 75 TABLET ORAL at 08:38

## 2019-07-24 RX ADMIN — HYDRALAZINE HYDROCHLORIDE 25 MG: 25 TABLET, FILM COATED ORAL at 08:38

## 2019-07-24 RX ADMIN — SODIUM CHLORIDE TAB 1 GM 1 G: 1 TAB at 08:38

## 2019-07-24 RX ADMIN — SODIUM CHLORIDE TAB 1 GM 2 G: 1 TAB at 17:29

## 2019-07-24 RX ADMIN — CARVEDILOL 3.12 MG: 3.12 TABLET, FILM COATED ORAL at 17:29

## 2019-07-24 ASSESSMENT — PAIN SCALES - GENERAL
PAINLEVEL_OUTOF10: 0
PAINLEVEL_OUTOF10: 0

## 2019-07-24 NOTE — PROGRESS NOTES
bilaterally. Equal breath sounds. No wheezes. No shortness of breath noted at rest.  ABDOMEN: soft, non tender  NEUROLOGICAL: Patient is alert and oriented to person, place. Recent and remote memory partially intact. Thought is coherant. SKIN: no rash, No significant bruises on exposed surfaces  MUSCULOSKELETAL: Movement is coordinated. Moves all extremities   EXTREMITIES: Distal lower extremity temp is warm, No lower extremity edema. PSYCHIATRIC: mood and affect appropriate. Medications:   Med reviewed  Scheduled Meds:   carvedilol  3.125 mg Oral BID WC    glimepiride  2 mg Oral Daily    hydrALAZINE  25 mg Oral 3 times per day    insulin lispro  4 Units Subcutaneous TID WC    insulin glargine  26 Units Subcutaneous QAM    levothyroxine  75 mcg Oral QAM    lidocaine  1 patch Topical Daily    sodium chloride  1 g Oral TID WC    insulin lispro  0-12 Units Subcutaneous TID WC    insulin lispro  0-6 Units Subcutaneous Nightly     Labs:   Labs reviewed  Recent Labs     07/23/19  0650  07/23/19  1812 07/23/19  2223 07/24/19  0650   *   < > 133* 131* 129*   K 5.0  --   --  4.6 4.4   CL 88*  --   --  96* 94*   CO2 23  --   --  25 23   BUN 27*  --   --  22 22   CREATININE 1.2  --   --  1.4* 1.2   LABGLOM 57*  --   --  48* 57*   GLUCOSE 325*  --   --  169* 177*   MG 1.9  --   --   --   --    CALCIUM 9.2  --   --  8.7 8.9    < > = values in this interval not displayed. Recent Labs     07/23/19  0650   WBC 9.7   RBC 4.01*   HGB 11.6*   HCT 34.9*   MCV 87.0   MCH 28.9        ASSESSMENT:    1. 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 Increase salt tabs to 2 gm 2x/d. Repeat BMP in AM.  Monitor ostomy output. 2. Essential Hypertension, BP lower overnight. Check Orthostatic BP, Hold hydralazine if BP <130/  3. Chronic Kidney Disease Stage III, creatinine at or better than baseline  4.  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 Memory loss  6. Hx ulcerative colitis with colostomy  7. Coronary artery disease S/P CABG  8. Hx prostate Ca  BMP in AM  Active Problems:    Hyponatremia  Resolved Problems:    * No resolved hospital problems.  YM Rodriguez - CNP 8:00 AM 7/24/2019

## 2019-07-24 NOTE — PROGRESS NOTES
IM Progress Note  Dr. Anastasia Vaughan  7/24/2019 3:34 PM      Patient name Eugenia Daniels  [id number]  PCP: Blair Saldana MD  Admit Date: 7/23/2019  Acct No. [de-identified]    Subjective: Interval History:   Lying in bed  No headache    Diet: DIET CARB CONTROL; Daily Fluid Restriction: 1500 ml    I/O last 3 completed shifts: In: 0 [P.O.:1380]  Out: 450 [Urine:250; Stool:200]  No intake/output data recorded. Admission weight: 230 lb (104.3 kg) as of 7/23/2019  6:42 AM  Wt Readings from Last 3 Encounters:   07/23/19 211 lb 9.6 oz (96 kg)   12/26/18 230 lb (104.3 kg)   09/09/18 230 lb (104.3 kg)     Body mass index is 29.51 kg/m². ROS   CVS;  no cp or palpitation  Resp: no SOB or cough  Neuro:  No numbness or weakness or dizziness  Abd: no nausea or vomiting or abd pain      Medications:   Scheduled Meds:   sodium chloride  2 g Oral BID WC    carvedilol  3.125 mg Oral BID WC    glimepiride  2 mg Oral Daily    hydrALAZINE  25 mg Oral 3 times per day    insulin lispro  4 Units Subcutaneous TID WC    insulin glargine  26 Units Subcutaneous QAM    levothyroxine  75 mcg Oral QAM    lidocaine  1 patch Topical Daily    insulin lispro  0-12 Units Subcutaneous TID WC    insulin lispro  0-6 Units Subcutaneous Nightly     Continuous Infusions:   dextrose         Labs :     CBC:   Recent Labs     07/23/19  0650   WBC 9.7   HGB 11.6*        BMP:    Recent Labs     07/23/19  0650  07/23/19  1812 07/23/19  2223 07/24/19  0650   *   < > 133* 131* 129*   K 5.0  --   --  4.6 4.4   CL 88*  --   --  96* 94*   CO2 23  --   --  25 23   BUN 27*  --   --  22 22   CREATININE 1.2  --   --  1.4* 1.2   GLUCOSE 325*  --   --  169* 177*    < > = values in this interval not displayed. Hepatic:   Recent Labs     07/23/19  0650   AST 15   ALT 13   BILITOT 1.2   ALKPHOS 76     Troponin: No results for input(s): TROPONINI in the last 72 hours.   BNP: No results for input(s): BNP in the last 72

## 2019-07-24 NOTE — H&P
place him on fluid restrictions. Follow up with his sodium level; if continues to improve, no need of IV  fluids. 2.  Social Service consult. 3.  PT/OT consult. 4.  Discuss code status with the patient's daughter and with the  patient. He is oriented to person and place, but could not tell the  month, but agreed no resuscitation.         Caryn Platt M.D.    D: 07/23/2019 18:24:36       T: 07/23/2019 21:25:03     [email or web address]  Job#: [id number]     Doc#: [id number]    CC:

## 2019-07-25 LAB
ANION GAP SERPL CALCULATED.3IONS-SCNC: 13 MEQ/L (ref 8–16)
BUN BLDV-MCNC: 22 MG/DL (ref 7–22)
CALCIUM SERPL-MCNC: 8.8 MG/DL (ref 8.5–10.5)
CHLORIDE BLD-SCNC: 96 MEQ/L (ref 98–111)
CO2: 23 MEQ/L (ref 23–33)
CREAT SERPL-MCNC: 1.1 MG/DL (ref 0.4–1.2)
GFR SERPL CREATININE-BSD FRML MDRD: 63 ML/MIN/1.73M2
GLUCOSE BLD-MCNC: 106 MG/DL (ref 70–108)
GLUCOSE BLD-MCNC: 125 MG/DL (ref 70–108)
GLUCOSE BLD-MCNC: 140 MG/DL (ref 70–108)
GLUCOSE BLD-MCNC: 141 MG/DL (ref 70–108)
GLUCOSE BLD-MCNC: 150 MG/DL (ref 70–108)
GLUCOSE BLD-MCNC: 180 MG/DL (ref 70–108)
POTASSIUM SERPL-SCNC: 4.2 MEQ/L (ref 3.5–5.2)
SODIUM BLD-SCNC: 132 MEQ/L (ref 135–145)

## 2019-07-25 PROCEDURE — 6370000000 HC RX 637 (ALT 250 FOR IP): Performed by: NURSE PRACTITIONER

## 2019-07-25 PROCEDURE — 80048 BASIC METABOLIC PNL TOTAL CA: CPT

## 2019-07-25 PROCEDURE — 1200000000 HC SEMI PRIVATE

## 2019-07-25 PROCEDURE — 99232 SBSQ HOSP IP/OBS MODERATE 35: CPT | Performed by: INTERNAL MEDICINE

## 2019-07-25 PROCEDURE — 2709999900 HC NON-CHARGEABLE SUPPLY

## 2019-07-25 PROCEDURE — 6370000000 HC RX 637 (ALT 250 FOR IP): Performed by: INTERNAL MEDICINE

## 2019-07-25 PROCEDURE — 97530 THERAPEUTIC ACTIVITIES: CPT

## 2019-07-25 PROCEDURE — 97162 PT EVAL MOD COMPLEX 30 MIN: CPT

## 2019-07-25 PROCEDURE — 82948 REAGENT STRIP/BLOOD GLUCOSE: CPT

## 2019-07-25 PROCEDURE — 36415 COLL VENOUS BLD VENIPUNCTURE: CPT

## 2019-07-25 PROCEDURE — 6360000002 HC RX W HCPCS: Performed by: INTERNAL MEDICINE

## 2019-07-25 RX ORDER — MORPHINE SULFATE 2 MG/ML
1 INJECTION, SOLUTION INTRAMUSCULAR; INTRAVENOUS ONCE
Status: COMPLETED | OUTPATIENT
Start: 2019-07-25 | End: 2019-07-25

## 2019-07-25 RX ORDER — PANTOPRAZOLE SODIUM 40 MG/1
40 TABLET, DELAYED RELEASE ORAL
Status: DISCONTINUED | OUTPATIENT
Start: 2019-07-25 | End: 2019-07-31 | Stop reason: HOSPADM

## 2019-07-25 RX ORDER — CARVEDILOL 6.25 MG/1
6.25 TABLET ORAL 2 TIMES DAILY WITH MEALS
Status: DISCONTINUED | OUTPATIENT
Start: 2019-07-25 | End: 2019-07-31 | Stop reason: HOSPADM

## 2019-07-25 RX ORDER — HYDRALAZINE HYDROCHLORIDE 25 MG/1
25 TABLET, FILM COATED ORAL ONCE
Status: COMPLETED | OUTPATIENT
Start: 2019-07-25 | End: 2019-07-25

## 2019-07-25 RX ADMIN — SODIUM CHLORIDE TAB 1 GM 2 G: 1 TAB at 17:34

## 2019-07-25 RX ADMIN — SODIUM CHLORIDE TAB 1 GM 2 G: 1 TAB at 08:50

## 2019-07-25 RX ADMIN — HYDRALAZINE HYDROCHLORIDE 25 MG: 25 TABLET, FILM COATED ORAL at 21:10

## 2019-07-25 RX ADMIN — HYDRALAZINE HYDROCHLORIDE 25 MG: 25 TABLET, FILM COATED ORAL at 15:43

## 2019-07-25 RX ADMIN — INSULIN LISPRO 2 UNITS: 100 INJECTION, SOLUTION INTRAVENOUS; SUBCUTANEOUS at 08:52

## 2019-07-25 RX ADMIN — ACETAMINOPHEN 650 MG: 325 TABLET ORAL at 08:56

## 2019-07-25 RX ADMIN — HYDRALAZINE HYDROCHLORIDE 25 MG: 25 TABLET, FILM COATED ORAL at 08:16

## 2019-07-25 RX ADMIN — PANTOPRAZOLE SODIUM 40 MG: 40 TABLET, DELAYED RELEASE ORAL at 15:43

## 2019-07-25 RX ADMIN — HYDRALAZINE HYDROCHLORIDE 25 MG: 25 TABLET, FILM COATED ORAL at 12:33

## 2019-07-25 RX ADMIN — CARVEDILOL 6.25 MG: 6.25 TABLET, FILM COATED ORAL at 17:34

## 2019-07-25 RX ADMIN — CARVEDILOL 3.12 MG: 3.12 TABLET, FILM COATED ORAL at 08:50

## 2019-07-25 RX ADMIN — ONDANSETRON 8 MG: 2 INJECTION INTRAMUSCULAR; INTRAVENOUS at 00:41

## 2019-07-25 RX ADMIN — INSULIN LISPRO 2 UNITS: 100 INJECTION, SOLUTION INTRAVENOUS; SUBCUTANEOUS at 13:11

## 2019-07-25 RX ADMIN — LEVOTHYROXINE SODIUM 75 MCG: 75 TABLET ORAL at 08:50

## 2019-07-25 RX ADMIN — MORPHINE SULFATE 1 MG: 2 INJECTION, SOLUTION INTRAMUSCULAR; INTRAVENOUS at 03:06

## 2019-07-25 RX ADMIN — INSULIN LISPRO 4 UNITS: 100 INJECTION, SOLUTION INTRAVENOUS; SUBCUTANEOUS at 08:56

## 2019-07-25 RX ADMIN — ONDANSETRON 8 MG: 2 INJECTION INTRAMUSCULAR; INTRAVENOUS at 09:03

## 2019-07-25 RX ADMIN — INSULIN LISPRO 4 UNITS: 100 INJECTION, SOLUTION INTRAVENOUS; SUBCUTANEOUS at 13:14

## 2019-07-25 RX ADMIN — INSULIN GLARGINE 26 UNITS: 100 INJECTION, SOLUTION SUBCUTANEOUS at 08:53

## 2019-07-25 RX ADMIN — GLIMEPIRIDE 2 MG: 2 TABLET ORAL at 08:50

## 2019-07-25 ASSESSMENT — PAIN SCALES - GENERAL
PAINLEVEL_OUTOF10: 0
PAINLEVEL_OUTOF10: 3
PAINLEVEL_OUTOF10: 5
PAINLEVEL_OUTOF10: 0
PAINLEVEL_OUTOF10: 5

## 2019-07-25 ASSESSMENT — PAIN DESCRIPTION - LOCATION: LOCATION: ABDOMEN

## 2019-07-25 ASSESSMENT — PAIN DESCRIPTION - PAIN TYPE: TYPE: ACUTE PAIN

## 2019-07-25 NOTE — PROGRESS NOTES
Assistance  Static Standing Balance: Minimal Assistance    Bed Mobility:  Supine to Sit: Minimal Assistance, X 1, with verbal cues   Sit to Supine: Contact Guard Assistance     Transfers:  Sit to Stand: Minimal Assistance, X 1, with verbal cues  Stand to Sit:Minimal Assistance, X 1     -deferred gait at this time for safety due to significant dizziness that did not subside with time. B  BP was 189/77, O2 sats 95%      Functional Outcome Measures: Not completed  AM-PAC Inpatient Mobility without Stair Climbing Raw Score : 15  AM-PAC Inpatient without Stair Climbing T-Scale Score : 43.03    ASSESSMENT:  Activity Tolerance:  Patient tolerance of  treatment: fair. .      Treatment Initiated: Treatment and education initiated within context of evaluation. Evaluation time included review of current medical information, gathering information related to past medical, social and functional history, completion of standardized testing, formal and informal observation of tasks, assessment of data and development of plan of care and goals. Treatment time included skilled education and facilitation of tasks to increase safety and independence with functional mobility for improved independence and quality of life. Assessment: Body structures, Functions, Activity limitations: Decreased functional mobility , Decreased strength, Decreased endurance, Decreased balance  Assessment: Patient demos fair-poor tolerance to PT session, limited by dizziness with increased activity. He currently presents with impairments in strength, balance and endurance, thus he will benefit from continued PT services. He is not safe to d/c home at this time.   Prognosis: Good    REQUIRES PT FOLLOW UP: Yes    Discharge Recommendations:  Discharge Recommendations: Continue to assess pending progress, Subacute/Skilled Nursing Facility    Patient Education:   role of PT, POC, transfers, safety    Equipment Recommendations:  Equipment Needed: (continue to

## 2019-07-25 NOTE — PROGRESS NOTES
Present to pt room for established colostomy consult. Pt in room with therapy. Pt has 2 piece Du pouching system in place. Pt states it was recently changed and does not need changed. Left ostomy supplies in room for pt. Pt denies any issues or concerns. Bed in low, call light in reach.

## 2019-07-25 NOTE — PROGRESS NOTES
the last 72 hours. Invalid input(s): LDLCALCU  INR:   Recent Labs     07/23/19  0650   INR 0.98       Radiology    Objective:   Vitals: BP (!) 150/70   Pulse 50   Temp 97.5 °F (36.4 °C) (Oral)   Resp 20   Ht 5' 11\" (1.803 m)   Wt 211 lb 9.6 oz (96 kg)   SpO2 97%   BMI 29.51 kg/m²   HEENT: Head:pupils react  Neck: supple  Lungs: clear to auscultation  Heart: regular rate and rhythm   Abdomen: soft BS heard   Extremities: warm  edema  Neurologic:  Alert, oriented X3    Impression:   :   1.  Fall probably from hyponatremia. 2.  Diabetes, uncontrolled. Noncompliance issue because of memory issues now. 3.  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 Apparently, he has not been taking his sodium tablets. 4.  Hypertension. improving now   5. Insulin-requiring diabetes mellitus. 6.  History of coronary artery disease, status post CABG. 7.  History of Crohn's. 8.  History of ulcerative colitis, status post colectomy. 9.  History of prostate cancer, status post radiation. 10.  COPD. 11.  Hypothyroidism. 12.  Hyperlipidemia. 13.  Acid reflux. 14.  History of chronic congestive heart failure secondary to diastolic dysfunction.     Plan:    TCU eval   Cont PT OT  PPI added  Watch BP trend      Adrianne Orozco MD

## 2019-07-26 LAB
ANION GAP SERPL CALCULATED.3IONS-SCNC: 13 MEQ/L (ref 8–16)
BUN BLDV-MCNC: 23 MG/DL (ref 7–22)
CALCIUM SERPL-MCNC: 8.7 MG/DL (ref 8.5–10.5)
CHLORIDE BLD-SCNC: 93 MEQ/L (ref 98–111)
CO2: 22 MEQ/L (ref 23–33)
CREAT SERPL-MCNC: 1.3 MG/DL (ref 0.4–1.2)
GFR SERPL CREATININE-BSD FRML MDRD: 52 ML/MIN/1.73M2
GLUCOSE BLD-MCNC: 109 MG/DL (ref 70–108)
GLUCOSE BLD-MCNC: 114 MG/DL (ref 70–108)
GLUCOSE BLD-MCNC: 124 MG/DL (ref 70–108)
GLUCOSE BLD-MCNC: 136 MG/DL (ref 70–108)
GLUCOSE BLD-MCNC: 75 MG/DL (ref 70–108)
POTASSIUM SERPL-SCNC: 4.2 MEQ/L (ref 3.5–5.2)
SODIUM BLD-SCNC: 127 MEQ/L (ref 135–145)
SODIUM BLD-SCNC: 128 MEQ/L (ref 135–145)

## 2019-07-26 PROCEDURE — 6370000000 HC RX 637 (ALT 250 FOR IP): Performed by: INTERNAL MEDICINE

## 2019-07-26 PROCEDURE — 97530 THERAPEUTIC ACTIVITIES: CPT

## 2019-07-26 PROCEDURE — 99232 SBSQ HOSP IP/OBS MODERATE 35: CPT | Performed by: INTERNAL MEDICINE

## 2019-07-26 PROCEDURE — 6370000000 HC RX 637 (ALT 250 FOR IP): Performed by: NURSE PRACTITIONER

## 2019-07-26 PROCEDURE — 97535 SELF CARE MNGMENT TRAINING: CPT

## 2019-07-26 PROCEDURE — 82948 REAGENT STRIP/BLOOD GLUCOSE: CPT

## 2019-07-26 PROCEDURE — 1200000000 HC SEMI PRIVATE

## 2019-07-26 PROCEDURE — 36415 COLL VENOUS BLD VENIPUNCTURE: CPT

## 2019-07-26 PROCEDURE — 80048 BASIC METABOLIC PNL TOTAL CA: CPT

## 2019-07-26 PROCEDURE — 2709999900 HC NON-CHARGEABLE SUPPLY

## 2019-07-26 PROCEDURE — 84295 ASSAY OF SERUM SODIUM: CPT

## 2019-07-26 PROCEDURE — 97166 OT EVAL MOD COMPLEX 45 MIN: CPT

## 2019-07-26 PROCEDURE — APPSS30 APP SPLIT SHARED TIME 16-30 MINUTES: Performed by: NURSE PRACTITIONER

## 2019-07-26 PROCEDURE — 97110 THERAPEUTIC EXERCISES: CPT

## 2019-07-26 RX ORDER — SODIUM CHLORIDE 1000 MG
2 TABLET, SOLUBLE MISCELLANEOUS
Status: DISCONTINUED | OUTPATIENT
Start: 2019-07-27 | End: 2019-07-28

## 2019-07-26 RX ADMIN — HYDRALAZINE HYDROCHLORIDE 25 MG: 25 TABLET, FILM COATED ORAL at 16:21

## 2019-07-26 RX ADMIN — LEVOTHYROXINE SODIUM 75 MCG: 75 TABLET ORAL at 08:30

## 2019-07-26 RX ADMIN — HYDRALAZINE HYDROCHLORIDE 25 MG: 25 TABLET, FILM COATED ORAL at 04:58

## 2019-07-26 RX ADMIN — HYDRALAZINE HYDROCHLORIDE 25 MG: 25 TABLET, FILM COATED ORAL at 22:41

## 2019-07-26 RX ADMIN — CARVEDILOL 6.25 MG: 6.25 TABLET, FILM COATED ORAL at 16:22

## 2019-07-26 RX ADMIN — INSULIN LISPRO 4 UNITS: 100 INJECTION, SOLUTION INTRAVENOUS; SUBCUTANEOUS at 18:00

## 2019-07-26 RX ADMIN — SODIUM CHLORIDE TAB 1 GM 2 G: 1 TAB at 16:21

## 2019-07-26 RX ADMIN — GLIMEPIRIDE 2 MG: 2 TABLET ORAL at 08:30

## 2019-07-26 RX ADMIN — PANTOPRAZOLE SODIUM 40 MG: 40 TABLET, DELAYED RELEASE ORAL at 04:58

## 2019-07-26 RX ADMIN — INSULIN LISPRO 4 UNITS: 100 INJECTION, SOLUTION INTRAVENOUS; SUBCUTANEOUS at 08:30

## 2019-07-26 RX ADMIN — CARVEDILOL 6.25 MG: 6.25 TABLET, FILM COATED ORAL at 08:30

## 2019-07-26 RX ADMIN — INSULIN GLARGINE 26 UNITS: 100 INJECTION, SOLUTION SUBCUTANEOUS at 08:30

## 2019-07-26 RX ADMIN — SODIUM CHLORIDE TAB 1 GM 2 G: 1 TAB at 08:30

## 2019-07-26 RX ADMIN — INSULIN LISPRO 4 UNITS: 100 INJECTION, SOLUTION INTRAVENOUS; SUBCUTANEOUS at 12:48

## 2019-07-26 ASSESSMENT — PAIN SCALES - GENERAL
PAINLEVEL_OUTOF10: 0

## 2019-07-26 NOTE — PROGRESS NOTES
Nephrology Progress Note    Patient - Ester Blanco   MRN -  [id number]   Acct # - [de-identified]      - 10/6/1928    80 y.o. Admit Date: 2019  Hospital Day: 3  Location: -020-A  Date of evaluation -  2019    Subjective:   CC: did not feel good, fall  Denies sob. Room air  BP improved  +/24  Cathy 250/24 recorded  Good appetite  BP Range: Systolic (24MMX), UQE:356 , Min:140 , VMF:866      Diastolic (23DJG), OYA:17, Min:62, Max:81    Objective:   VITALS:  BP (!) 142/68   Pulse 65   Temp 98 °F (36.7 °C) (Oral)   Resp 18   Ht 5' 11\" (1.803 m)   Wt 208 lb 6.4 oz (94.5 kg)   SpO2 96%   BMI 29.07 kg/m²    Patient Vitals for the past 24 hrs:   BP Temp Temp src Pulse Resp SpO2 Weight   19 0815 -- 98 °F (36.7 °C) Oral 65 18 96 % --   19 0445 -- -- -- -- -- -- 208 lb 6.4 oz (94.5 kg)   19 0347 (!) 142/68 97.6 °F (36.4 °C) Oral 66 18 98 % --   19 0015 (!) 140/62 97.9 °F (36.6 °C) Oral 56 18 97 % --   19 2040 (!) 162/64 98.4 °F (36.9 °C) Oral 58 18 96 % --   19 1543 (!) 160/80 -- -- -- -- -- --   19 1317 (!) 150/70 -- -- -- -- -- --   19 1201 (!) 192/81 -- -- -- -- -- --          Intake/Output Summary (Last 24 hours) at 2019 0948  Last data filed at 2019 9158  Gross per 24 hour   Intake 800 ml   Output 750 ml   Net 50 ml       Admission weight: 230 lb (104.3 kg)    Wt Readings from Last 3 Encounters:   19 208 lb 6.4 oz (94.5 kg)   18 230 lb (104.3 kg)   18 230 lb (104.3 kg)     Body mass index is 29.07 kg/m². EXAM:  CONSTITUTIONAL:  No acute distress. Pleasant  HEENT:  Head is normocephalic, Extraocular movement intact. Neck is supple. Voice is clear. CARDIOVASCULAR:  S1, S2  regular rate and rhythm. RESPIRATORY: Clear to ausculation bilaterally. Equal breath sounds. No wheezes. No shortness of breath noted at rest.  ABDOMEN: soft, non tender  NEUROLOGICAL: Patient is alert and oriented to person, place.  Recent and

## 2019-07-26 NOTE — PROGRESS NOTES
without Stair Climbing Raw Score : 15  AM-PAC Inpatient without Stair Climbing T-Scale Score : 43.03    ASSESSMENT:  Assessment: Patient progressing toward established goals. Activity Tolerance:  Patient tolerance of  treatment: fair. Limited by dizziness when up       Equipment Recommendations:Equipment Needed: No  Other: has RW  Discharge Recommendations:    Discharge Recommendations: Continue to assess pending progress, Subacute/Skilled Nursing Facility    Plan: Times per week: 5 x GM  Current Treatment Recommendations: Strengthening, Equipment Evaluation, Education, & procurement, Functional Mobility Training, Transfer Training, Gait Training, Safety Education & Training, Balance Training, Patient/Caregiver Education & Training, Endurance Training, Stair training    Patient Education  Patient Education: Transfers, Gait    Goals:  Patient goals : Go home  Short term goals  Time Frame for Short term goals: 2 weeks  Short term goal 1: Patient will transfer supine <--> sit with SBA in order to get into/out of bed. Short term goal 2: Patient will transfer sit <--> stand with SBA in order to get up to ambulate. Short term goal 3: Pt to ambulate >25 feet with RW SBA for household ambulation. Long term goals  Time Frame for Long term goals : no LTGs due to short ELOS    Following session, patient left in safe position with all fall risk precautions in place.

## 2019-07-26 NOTE — PROGRESS NOTES
2101: Message left with Jabari Mendosa (daughter) per patient's request. Dominic Gilbert to return call to facility. Patient requests to talk to family member and requesting to get out of facility. Patient remains disoriented. 2106: Called patient's wife Jorden Denver. Attempting to reorient patient at this time.

## 2019-07-26 NOTE — PROGRESS NOTES
Nicksoilaperri Gregory 60  INPATIENT OCCUPATIONAL THERAPY  [facility] ONC MED 5K  EVALUATION    Time:   Time In: 1535  Time Out: 1615  Timed Code Treatment Minutes: 25 Minutes  Minutes: 40          Date: 2019  Patient Name: Enid Hernández,   Gender: male      MRN: [id number]  : 10/6/1928  ([address]  [address]  Referring Practitioner: Dr. Jelena Robledo MD  Diagnosis: Hyponatremia  Additional Pertinent Hx: Pt admitted s/p fall at home with hyponatremia. Restrictions/Precautions:  Restrictions/Precautions: Fall Risk    Subjective  Chart Reviewed: Yes, History and Physical, Orders, Other (comment)(PT evaluation)  Patient assessed for rehabilitation services?: Yes  Response to previous treatment: Patient with no complaints from previous session  Family / Caregiver Present: No    Subjective: Pleasant and cooperative  Comments: RN approved session. Pt agreed to do his self care. He denied any pain. He only complains of dizziness. Cues needed to move slowly and wait for dizziness to ease up. Pain:  Pain Assessment  Patient Currently in Pain: Denies    Social/Functional History:  Lives With: Spouse(daughter, son-in-law and grandson live with him)  Type of Home: House  Home Layout: One level  Home Access: Stairs to enter with rails  Entrance Stairs - Rails: Both  Home Equipment: Rolling walker, Cane   Bathroom Shower/Tub: Tub/Shower unit  Bathroom Toilet: Standard  Bathroom Equipment: Grab bars in shower  Bathroom Accessibility: Accessible  IADL Comments: Daughter completes the IADLs. Receives Help From: Family  ADL Assistance: Independent  Homemaking Assistance: Needs assistance  Homemaking Responsibilities: No  Ambulation Assistance: Independent  Transfer Assistance: Independent    Active : No  Type of occupation: Had served in the [employer]. Leisure & Hobbies: Watching news on TV. Additional Comments: Pt has help from family with all housekeeping. He was not using any AD for ambulation. 4-/5  RUE Strength Comment: 3+/5 deltoid; 3+/5 pectoral; 4-/5 biceps/triceps      Sensation  Overall Sensation Status: WFL  Fine Motor Skills  Fine Motor Comment: Pt did his grooming using his R hand. He did have to use his L hand to assist lifting his RUE up to reach his face or head  Coordination and Movement description: Right UE, Gross motor impairments  Comment: Pt used his L hand to assist his R hand while shaving by holding the R hand at the wrist       Activity Tolerance: Patient limited by fatigue  Pt was needing rest breaks between activities. He remained in the chair for his self care. Assessment:  Assessment: Patient would benefit from continued skilled OT services to address above deficits. He presents with decreased endurance, strength, limited functional mobility and ADLs secondary to admission for hyponatremia. He was not very active prior to admission. He did not use any AD for ambulating, per pt. Pt needed CGA for use of the rolling walker for short distance walking at this time. He did his self care but struggles with his R shoulder ROM and has to lift up his R arm while assisting with his L hand during grooming. Pt has poor recall of details about this admission. Will need continued education along with a family member regarding his safe functioning at home. Performance deficits / Impairments: Decreased functional mobility , Decreased ADL status, Decreased balance, Decreased strength, Decreased endurance, Decreased safe awareness  Prognosis: Good  REQUIRES OT FOLLOW UP: Yes  Decision Making: Medium Complexity  Safety Devices in place: Yes  Type of devices: Patient at risk for falls, Gait belt, Nurse notified, Left in chair, Chair alarm in place, Call light within reach    Treatment Initiated: Treatment and education initiated within context of evaluation.   Evaluation time included review of current medical information, gathering information related to past medical, social and increase his independence with self care. Short term goal 4: Pt will complete BUE ROM and low resistance exercises while following any handout needeed to increase his endurance and strength for ease of doing self care. Long term goals  Time Frame for Long term goals : None secondary to short estimated length of stay. Following session, patient left in safe position with all fall risk precautions in place.

## 2019-07-27 LAB
ANION GAP SERPL CALCULATED.3IONS-SCNC: 11 MEQ/L (ref 8–16)
BUN BLDV-MCNC: 25 MG/DL (ref 7–22)
CALCIUM SERPL-MCNC: 8.9 MG/DL (ref 8.5–10.5)
CHLORIDE BLD-SCNC: 97 MEQ/L (ref 98–111)
CO2: 24 MEQ/L (ref 23–33)
CREAT SERPL-MCNC: 1.4 MG/DL (ref 0.4–1.2)
GFR SERPL CREATININE-BSD FRML MDRD: 48 ML/MIN/1.73M2
GLUCOSE BLD-MCNC: 101 MG/DL (ref 70–108)
GLUCOSE BLD-MCNC: 109 MG/DL (ref 70–108)
GLUCOSE BLD-MCNC: 128 MG/DL (ref 70–108)
GLUCOSE BLD-MCNC: 186 MG/DL (ref 70–108)
GLUCOSE BLD-MCNC: 94 MG/DL (ref 70–108)
POTASSIUM SERPL-SCNC: 4.3 MEQ/L (ref 3.5–5.2)
SODIUM BLD-SCNC: 132 MEQ/L (ref 135–145)

## 2019-07-27 PROCEDURE — 36415 COLL VENOUS BLD VENIPUNCTURE: CPT

## 2019-07-27 PROCEDURE — 1200000000 HC SEMI PRIVATE

## 2019-07-27 PROCEDURE — 80048 BASIC METABOLIC PNL TOTAL CA: CPT

## 2019-07-27 PROCEDURE — 99232 SBSQ HOSP IP/OBS MODERATE 35: CPT | Performed by: INTERNAL MEDICINE

## 2019-07-27 PROCEDURE — 82948 REAGENT STRIP/BLOOD GLUCOSE: CPT

## 2019-07-27 PROCEDURE — 6370000000 HC RX 637 (ALT 250 FOR IP): Performed by: NURSE PRACTITIONER

## 2019-07-27 PROCEDURE — 6370000000 HC RX 637 (ALT 250 FOR IP): Performed by: INTERNAL MEDICINE

## 2019-07-27 RX ORDER — SODIUM CHLORIDE 1000 MG
2 TABLET, SOLUBLE MISCELLANEOUS
Qty: 90 TABLET | Refills: 3 | Status: CANCELLED | OUTPATIENT
Start: 2019-07-27

## 2019-07-27 RX ORDER — HYDRALAZINE HYDROCHLORIDE 50 MG/1
50 TABLET, FILM COATED ORAL EVERY 8 HOURS SCHEDULED
Status: DISCONTINUED | OUTPATIENT
Start: 2019-07-27 | End: 2019-07-28

## 2019-07-27 RX ORDER — HYDRALAZINE HYDROCHLORIDE 25 MG/1
50 TABLET, FILM COATED ORAL EVERY 8 HOURS SCHEDULED
Qty: 90 TABLET | Refills: 3 | Status: CANCELLED | OUTPATIENT
Start: 2019-07-27

## 2019-07-27 RX ORDER — PANTOPRAZOLE SODIUM 40 MG/1
40 TABLET, DELAYED RELEASE ORAL
Qty: 30 TABLET | Refills: 3 | Status: CANCELLED | OUTPATIENT
Start: 2019-07-28

## 2019-07-27 RX ORDER — CARVEDILOL 6.25 MG/1
6.25 TABLET ORAL 2 TIMES DAILY WITH MEALS
Qty: 60 TABLET | Refills: 3 | Status: CANCELLED | OUTPATIENT
Start: 2019-07-27

## 2019-07-27 RX ORDER — NICOTINE POLACRILEX 4 MG
15 LOZENGE BUCCAL PRN
Qty: 45 G | Refills: 1 | Status: CANCELLED | OUTPATIENT
Start: 2019-07-27

## 2019-07-27 RX ORDER — LIDOCAINE 50 MG/G
1 PATCH TOPICAL DAILY
Qty: 30 PATCH | Refills: 0 | Status: CANCELLED | OUTPATIENT
Start: 2019-07-27

## 2019-07-27 RX ADMIN — PANTOPRAZOLE SODIUM 40 MG: 40 TABLET, DELAYED RELEASE ORAL at 05:14

## 2019-07-27 RX ADMIN — CARVEDILOL 6.25 MG: 6.25 TABLET, FILM COATED ORAL at 08:40

## 2019-07-27 RX ADMIN — SODIUM CHLORIDE TAB 1 GM 2 G: 1 TAB at 08:40

## 2019-07-27 RX ADMIN — HYDRALAZINE HYDROCHLORIDE 50 MG: 50 TABLET, FILM COATED ORAL at 14:46

## 2019-07-27 RX ADMIN — INSULIN LISPRO 4 UNITS: 100 INJECTION, SOLUTION INTRAVENOUS; SUBCUTANEOUS at 12:25

## 2019-07-27 RX ADMIN — SODIUM CHLORIDE TAB 1 GM 2 G: 1 TAB at 12:31

## 2019-07-27 RX ADMIN — HYDRALAZINE HYDROCHLORIDE 50 MG: 50 TABLET, FILM COATED ORAL at 21:36

## 2019-07-27 RX ADMIN — CARVEDILOL 6.25 MG: 6.25 TABLET, FILM COATED ORAL at 16:49

## 2019-07-27 RX ADMIN — GLIMEPIRIDE 2 MG: 2 TABLET ORAL at 08:40

## 2019-07-27 RX ADMIN — INSULIN GLARGINE 26 UNITS: 100 INJECTION, SOLUTION SUBCUTANEOUS at 08:45

## 2019-07-27 RX ADMIN — INSULIN LISPRO 4 UNITS: 100 INJECTION, SOLUTION INTRAVENOUS; SUBCUTANEOUS at 08:45

## 2019-07-27 RX ADMIN — LEVOTHYROXINE SODIUM 75 MCG: 75 TABLET ORAL at 08:40

## 2019-07-27 RX ADMIN — INSULIN LISPRO 4 UNITS: 100 INJECTION, SOLUTION INTRAVENOUS; SUBCUTANEOUS at 16:49

## 2019-07-27 RX ADMIN — SODIUM CHLORIDE TAB 1 GM 2 G: 1 TAB at 16:49

## 2019-07-27 RX ADMIN — HYDRALAZINE HYDROCHLORIDE 25 MG: 25 TABLET, FILM COATED ORAL at 05:12

## 2019-07-27 RX ADMIN — INSULIN LISPRO 1 UNITS: 100 INJECTION, SOLUTION INTRAVENOUS; SUBCUTANEOUS at 20:18

## 2019-07-27 ASSESSMENT — PAIN SCALES - GENERAL
PAINLEVEL_OUTOF10: 0

## 2019-07-27 NOTE — PROGRESS NOTES
mg Oral BID WC    pantoprazole  40 mg Oral QAM AC    glimepiride  2 mg Oral Daily    hydrALAZINE  25 mg Oral 3 times per day    insulin lispro  4 Units Subcutaneous TID WC    insulin glargine  26 Units Subcutaneous QAM    levothyroxine  75 mcg Oral QAM    lidocaine  1 patch Topical Daily    insulin lispro  0-12 Units Subcutaneous TID WC    insulin lispro  0-6 Units Subcutaneous Nightly     Meds prn: ondansetron, acetaminophen, albuterol sulfate HFA, glucose, dextrose, glucagon (rDNA), dextrose       Impression and Plan:  1. Hyponatremia: stable/improved  - continue with salt tablets as per orders  - check Sodium in am    2. CKd III: overall stable at baseline  3  HTN: continue with coreg and hydralazine, readings reviewed, high at times, increase hydralazine 50 mg po TID  4.  IDDM    D/W patient and RN    Navid Krishna MD  [facility]

## 2019-07-27 NOTE — PROGRESS NOTES
dextrose, glucagon (rDNA), dextrose        Allergies:  Patient has no known allergies. OBJECTIVE:    Vitals:   Vitals:    07/27/19 0830   BP: (!) 186/79   Pulse: 57   Resp: 18   Temp: 98.3 °F (36.8 °C)   SpO2: 97%      BMI: Body mass index is 29.07 kg/m². PHYSICAL EXAMINATION:          HEENT:  not pale anicteric,    Buccal mucosa is moist.  NECK:  Supple. HEART:  audible S1 and S2 heard with a regular rhythm. no gallops  LUNGS:  Air entry is diminished bilaterally, but no rales or rhonchi. ABDOMEN:  Soft. Bowel sounds heard. No guarding. No tenderness. EXTREMITIES:  Warm. NEUROLOGIC:  Oriented to person, place, and time and moving all  extremities. Intact sensation to pain.   Review of Labs and Diagnostic Testing:    Recent Results (from the past 24 hour(s))   POCT glucose    Collection Time: 07/26/19 12:12 PM   Result Value Ref Range    POC Glucose 136 (H) 70 - 108 mg/dl   Sodium    Collection Time: 07/26/19  3:52 PM   Result Value Ref Range    Sodium 127 (L) 135 - 145 meq/L   POCT Glucose    Collection Time: 07/26/19  5:24 PM   Result Value Ref Range    POC Glucose 124 (H) 70 - 108 mg/dl   POCT Glucose    Collection Time: 07/26/19  8:43 PM   Result Value Ref Range    POC Glucose 75 70 - 108 mg/dl   Basic Metabolic Panel    Collection Time: 07/27/19  6:20 AM   Result Value Ref Range    Sodium 132 (L) 135 - 145 meq/L    Potassium 4.3 3.5 - 5.2 meq/L    Chloride 97 (L) 98 - 111 meq/L    CO2 24 23 - 33 meq/L    Glucose 101 70 - 108 mg/dL    BUN 25 (H) 7 - 22 mg/dL    CREATININE 1.4 (H) 0.4 - 1.2 mg/dL    Calcium 8.9 8.5 - 10.5 mg/dL   Anion Gap    Collection Time: 07/27/19  6:20 AM   Result Value Ref Range    Anion Gap 11.0 8.0 - 16.0 meq/L   Glomerular Filtration Rate, Estimated    Collection Time: 07/27/19  6:20 AM   Result Value Ref Range    Est, Glom Filt Rate 48 (A) ml/min/1.73m2   POCT glucose    Collection Time: 07/27/19  8:00 AM   Result Value Ref Range    POC Glucose 94 70 - 108 mg/dl errors which are inherent in voice recognition technology. ** Final report electronically signed by Dr. Deanna Mahajan on 7/24/2019 11:33 AM    Xr Chest Portable    Result Date: 7/23/2019  PROCEDURE: XR CHEST PORTABLE CLINICAL INFORMATION: Not feeling well. COMPARISON: Chest radiographs dated 8/29/2018 at 1 6/5/2018. TECHNIQUE: AP portable chest radiograph performed. FINDINGS: POSTSURGICAL CHANGES: 1. There are stable median sternotomy wires. 2. There are postop changes at the right shoulder. There is also widening of the right acromioclavicular articulation which most likely is postsurgical. LINES/TUBES/MECHANICAL DEVICES: None. TRACHEA/HEART/MEDIASTINUM/HILUM: 1. There is stable mild enlargement of the cardiomediastinal silhouette. 2. There is minimal atheromatous calcification at the aortic arch. LUNG SAMPSON: 1. There is no consolidation or infiltrates. There is no pleural effusion or pulmonary vascular congestion. 2. Stable cluster of small dense nodules laterally within the right upper chest which most commonly are related to calcified granuloma. OTHER: None. PNEUMOTHORAX:  None. OSSEOUS STRUCTURES: 1. No acute osseous abnormality. 2. There is loss of the acromiohumeral interval bilaterally which can be associated with rotator cuff degeneration/tearing. There are associated degenerative changes of the acromion and the humeral head. 3. Small marginal osteophytes at the glenohumeral articulation bilaterally. 4. Moderate hypertrophic degenerative changes at the left acromioclavicular articulation. 5. Widening of the right acromioclavicular articulation which most likely is postsurgical.     1. There is no acute cardiopulmonary process. **This report has been created using voice recognition software. It may contain minor errors which are inherent in voice recognition technology. ** Final report electronically signed by Dr. William Bradford on 7/23/2019 7:24 AM        ASSESMENT:      Active Problems:

## 2019-07-28 LAB
ANION GAP SERPL CALCULATED.3IONS-SCNC: 15 MEQ/L (ref 8–16)
BUN BLDV-MCNC: 25 MG/DL (ref 7–22)
CALCIUM SERPL-MCNC: 9 MG/DL (ref 8.5–10.5)
CHLORIDE BLD-SCNC: 98 MEQ/L (ref 98–111)
CO2: 21 MEQ/L (ref 23–33)
CREAT SERPL-MCNC: 1.4 MG/DL (ref 0.4–1.2)
GFR SERPL CREATININE-BSD FRML MDRD: 48 ML/MIN/1.73M2
GLUCOSE BLD-MCNC: 101 MG/DL (ref 70–108)
GLUCOSE BLD-MCNC: 109 MG/DL (ref 70–108)
GLUCOSE BLD-MCNC: 115 MG/DL (ref 70–108)
GLUCOSE BLD-MCNC: 116 MG/DL (ref 70–108)
GLUCOSE BLD-MCNC: 119 MG/DL (ref 70–108)
POTASSIUM SERPL-SCNC: 4.4 MEQ/L (ref 3.5–5.2)
SODIUM BLD-SCNC: 134 MEQ/L (ref 135–145)

## 2019-07-28 PROCEDURE — 6370000000 HC RX 637 (ALT 250 FOR IP): Performed by: INTERNAL MEDICINE

## 2019-07-28 PROCEDURE — 36415 COLL VENOUS BLD VENIPUNCTURE: CPT

## 2019-07-28 PROCEDURE — 80048 BASIC METABOLIC PNL TOTAL CA: CPT

## 2019-07-28 PROCEDURE — 1200000000 HC SEMI PRIVATE

## 2019-07-28 PROCEDURE — 99232 SBSQ HOSP IP/OBS MODERATE 35: CPT | Performed by: INTERNAL MEDICINE

## 2019-07-28 PROCEDURE — 82948 REAGENT STRIP/BLOOD GLUCOSE: CPT

## 2019-07-28 RX ORDER — SODIUM CHLORIDE 1000 MG
1 TABLET, SOLUBLE MISCELLANEOUS
Status: DISCONTINUED | OUTPATIENT
Start: 2019-07-28 | End: 2019-07-31 | Stop reason: HOSPADM

## 2019-07-28 RX ORDER — HYDRALAZINE HYDROCHLORIDE 50 MG/1
100 TABLET, FILM COATED ORAL EVERY 8 HOURS SCHEDULED
Status: DISCONTINUED | OUTPATIENT
Start: 2019-07-28 | End: 2019-07-31 | Stop reason: HOSPADM

## 2019-07-28 RX ADMIN — PANTOPRAZOLE SODIUM 40 MG: 40 TABLET, DELAYED RELEASE ORAL at 05:17

## 2019-07-28 RX ADMIN — HYDRALAZINE HYDROCHLORIDE 50 MG: 50 TABLET, FILM COATED ORAL at 05:17

## 2019-07-28 RX ADMIN — SODIUM CHLORIDE TAB 1 GM 1 G: 1 TAB at 16:44

## 2019-07-28 RX ADMIN — SODIUM CHLORIDE TAB 1 GM 2 G: 1 TAB at 08:21

## 2019-07-28 RX ADMIN — HYDRALAZINE HYDROCHLORIDE 100 MG: 50 TABLET, FILM COATED ORAL at 13:53

## 2019-07-28 RX ADMIN — SODIUM CHLORIDE TAB 1 GM 1 G: 1 TAB at 12:56

## 2019-07-28 RX ADMIN — INSULIN LISPRO 4 UNITS: 100 INJECTION, SOLUTION INTRAVENOUS; SUBCUTANEOUS at 12:56

## 2019-07-28 RX ADMIN — INSULIN LISPRO 4 UNITS: 100 INJECTION, SOLUTION INTRAVENOUS; SUBCUTANEOUS at 16:44

## 2019-07-28 RX ADMIN — GLIMEPIRIDE 2 MG: 2 TABLET ORAL at 09:33

## 2019-07-28 RX ADMIN — INSULIN LISPRO 4 UNITS: 100 INJECTION, SOLUTION INTRAVENOUS; SUBCUTANEOUS at 08:21

## 2019-07-28 RX ADMIN — CARVEDILOL 6.25 MG: 6.25 TABLET, FILM COATED ORAL at 08:21

## 2019-07-28 RX ADMIN — INSULIN GLARGINE 26 UNITS: 100 INJECTION, SOLUTION SUBCUTANEOUS at 09:33

## 2019-07-28 RX ADMIN — LEVOTHYROXINE SODIUM 75 MCG: 75 TABLET ORAL at 09:33

## 2019-07-28 RX ADMIN — CARVEDILOL 6.25 MG: 6.25 TABLET, FILM COATED ORAL at 16:44

## 2019-07-28 RX ADMIN — HYDRALAZINE HYDROCHLORIDE 100 MG: 50 TABLET, FILM COATED ORAL at 21:17

## 2019-07-28 ASSESSMENT — PAIN DESCRIPTION - PROGRESSION
CLINICAL_PROGRESSION: RESOLVED
CLINICAL_PROGRESSION: RESOLVED

## 2019-07-28 ASSESSMENT — PAIN - FUNCTIONAL ASSESSMENT: PAIN_FUNCTIONAL_ASSESSMENT: PREVENTS OR INTERFERES SOME ACTIVE ACTIVITIES AND ADLS

## 2019-07-28 ASSESSMENT — PAIN SCALES - GENERAL
PAINLEVEL_OUTOF10: 0

## 2019-07-28 ASSESSMENT — PAIN DESCRIPTION - ONSET: ONSET: GRADUAL

## 2019-07-28 ASSESSMENT — PAIN DESCRIPTION - FREQUENCY: FREQUENCY: INTERMITTENT

## 2019-07-28 NOTE — PROGRESS NOTES
INTERNAL MEDICINE SPECIALTIES  Progress Note Dr Henok Min covering for Dr Georgette Hoover     Patient:  Hung Prado  YOB: 1928  Date of Service: 7/28/2019  MRN: [id number]   Acct:  [de-identified]   Primary Care Physician: Alice Cordoba MD    SUBJECTIVE: no changes    Home Medications:   No current facility-administered medications on file prior to encounter.       Current Outpatient Medications on File Prior to Encounter   Medication Sig Dispense Refill    glimepiride (AMARYL) 4 MG tablet Take 0.5 tablets by mouth daily (Patient taking differently: Take 4 mg by mouth daily ) 30 tablet 3    insulin glargine (LANTUS) 100 UNIT/ML injection vial Inject 26 Units into the skin every morning 1 vial 3    levothyroxine (SYNTHROID) 75 MCG tablet Take 1 tablet by mouth every morning 30 tablet 0    hydrALAZINE (APRESOLINE) 25 MG tablet Take 1 tablet by mouth every 8 hours 90 tablet 0    carvedilol (COREG) 3.125 MG tablet Take 1 tablet by mouth 2 times daily (with meals) 60 tablet 3    sodium chloride 1 g tablet Take 1 tablet by mouth 2 times daily 90 tablet 3    insulin aspart (NOVOLOG) 100 UNIT/ML injection vial Inject 2 Units into the skin 3 times daily (before meals) And sliding scale 1 vial 3    acetaminophen (TYLENOL) 325 MG tablet Take 2 tablets by mouth every 4 hours as needed for Pain 120 tablet 3         Scheduled Meds:   hydrALAZINE  50 mg Oral 3 times per day    sodium chloride  2 g Oral TID WC    carvedilol  6.25 mg Oral BID WC    pantoprazole  40 mg Oral QAM AC    glimepiride  2 mg Oral Daily    insulin lispro  4 Units Subcutaneous TID WC    insulin glargine  26 Units Subcutaneous QAM    levothyroxine  75 mcg Oral QAM    lidocaine  1 patch Topical Daily    insulin lispro  0-12 Units Subcutaneous TID WC    insulin lispro  0-6 Units Subcutaneous Nightly     Continuous Infusions:   dextrose       PRN Meds:ondansetron, acetaminophen, albuterol sulfate HFA, glucose, dextrose,

## 2019-07-29 LAB
ANION GAP SERPL CALCULATED.3IONS-SCNC: 12 MEQ/L (ref 8–16)
BUN BLDV-MCNC: 23 MG/DL (ref 7–22)
CALCIUM SERPL-MCNC: 8.8 MG/DL (ref 8.5–10.5)
CHLORIDE BLD-SCNC: 98 MEQ/L (ref 98–111)
CO2: 23 MEQ/L (ref 23–33)
CREAT SERPL-MCNC: 1.4 MG/DL (ref 0.4–1.2)
GFR SERPL CREATININE-BSD FRML MDRD: 48 ML/MIN/1.73M2
GLUCOSE BLD-MCNC: 110 MG/DL (ref 70–108)
GLUCOSE BLD-MCNC: 136 MG/DL (ref 70–108)
GLUCOSE BLD-MCNC: 143 MG/DL (ref 70–108)
GLUCOSE BLD-MCNC: 144 MG/DL (ref 70–108)
GLUCOSE BLD-MCNC: 162 MG/DL (ref 70–108)
GLUCOSE BLD-MCNC: 168 MG/DL (ref 70–108)
GLUCOSE BLD-MCNC: 186 MG/DL (ref 70–108)
POTASSIUM SERPL-SCNC: 4.1 MEQ/L (ref 3.5–5.2)
SODIUM BLD-SCNC: 133 MEQ/L (ref 135–145)

## 2019-07-29 PROCEDURE — 6370000000 HC RX 637 (ALT 250 FOR IP): Performed by: INTERNAL MEDICINE

## 2019-07-29 PROCEDURE — 97110 THERAPEUTIC EXERCISES: CPT

## 2019-07-29 PROCEDURE — 82948 REAGENT STRIP/BLOOD GLUCOSE: CPT

## 2019-07-29 PROCEDURE — 2709999900 HC NON-CHARGEABLE SUPPLY

## 2019-07-29 PROCEDURE — 97116 GAIT TRAINING THERAPY: CPT

## 2019-07-29 PROCEDURE — APPSS30 APP SPLIT SHARED TIME 16-30 MINUTES: Performed by: NURSE PRACTITIONER

## 2019-07-29 PROCEDURE — 36415 COLL VENOUS BLD VENIPUNCTURE: CPT

## 2019-07-29 PROCEDURE — 1200000000 HC SEMI PRIVATE

## 2019-07-29 PROCEDURE — 99231 SBSQ HOSP IP/OBS SF/LOW 25: CPT | Performed by: INTERNAL MEDICINE

## 2019-07-29 PROCEDURE — 80048 BASIC METABOLIC PNL TOTAL CA: CPT

## 2019-07-29 RX ADMIN — INSULIN LISPRO 2 UNITS: 100 INJECTION, SOLUTION INTRAVENOUS; SUBCUTANEOUS at 09:50

## 2019-07-29 RX ADMIN — SODIUM CHLORIDE TAB 1 GM 1 G: 1 TAB at 12:07

## 2019-07-29 RX ADMIN — PANTOPRAZOLE SODIUM 40 MG: 40 TABLET, DELAYED RELEASE ORAL at 05:54

## 2019-07-29 RX ADMIN — CARVEDILOL 6.25 MG: 6.25 TABLET, FILM COATED ORAL at 09:40

## 2019-07-29 RX ADMIN — INSULIN LISPRO 2 UNITS: 100 INJECTION, SOLUTION INTRAVENOUS; SUBCUTANEOUS at 12:08

## 2019-07-29 RX ADMIN — SODIUM CHLORIDE TAB 1 GM 1 G: 1 TAB at 16:44

## 2019-07-29 RX ADMIN — CARVEDILOL 6.25 MG: 6.25 TABLET, FILM COATED ORAL at 16:44

## 2019-07-29 RX ADMIN — LEVOTHYROXINE SODIUM 75 MCG: 75 TABLET ORAL at 09:40

## 2019-07-29 RX ADMIN — INSULIN LISPRO 1 UNITS: 100 INJECTION, SOLUTION INTRAVENOUS; SUBCUTANEOUS at 20:32

## 2019-07-29 RX ADMIN — HYDRALAZINE HYDROCHLORIDE 100 MG: 50 TABLET, FILM COATED ORAL at 14:43

## 2019-07-29 RX ADMIN — GLIMEPIRIDE 2 MG: 2 TABLET ORAL at 09:40

## 2019-07-29 RX ADMIN — HYDRALAZINE HYDROCHLORIDE 100 MG: 50 TABLET, FILM COATED ORAL at 05:54

## 2019-07-29 RX ADMIN — INSULIN LISPRO 4 UNITS: 100 INJECTION, SOLUTION INTRAVENOUS; SUBCUTANEOUS at 09:51

## 2019-07-29 RX ADMIN — SODIUM CHLORIDE TAB 1 GM 1 G: 1 TAB at 09:40

## 2019-07-29 RX ADMIN — INSULIN LISPRO 4 UNITS: 100 INJECTION, SOLUTION INTRAVENOUS; SUBCUTANEOUS at 12:07

## 2019-07-29 RX ADMIN — INSULIN GLARGINE 26 UNITS: 100 INJECTION, SOLUTION SUBCUTANEOUS at 09:50

## 2019-07-29 ASSESSMENT — PAIN SCALES - GENERAL
PAINLEVEL_OUTOF10: 0

## 2019-07-29 NOTE — PROGRESS NOTES
EXTREMITIES: Distal lower extremity temp is warm, No lower extremity edema. PSYCHIATRIC: mood and affect appropriate. Medications:   Med reviewed  Scheduled Meds:   hydrALAZINE  100 mg Oral 3 times per day    sodium chloride  1 g Oral TID WC    carvedilol  6.25 mg Oral BID WC    pantoprazole  40 mg Oral QAM AC    glimepiride  2 mg Oral Daily    insulin lispro  4 Units Subcutaneous TID WC    insulin glargine  26 Units Subcutaneous QAM    levothyroxine  75 mcg Oral QAM    lidocaine  1 patch Topical Daily    insulin lispro  0-12 Units Subcutaneous TID WC    insulin lispro  0-6 Units Subcutaneous Nightly     Labs:   Labs reviewed  Recent Labs     07/27/19  0620 07/28/19  0639 07/29/19  0634   * 134* 133*   K 4.3 4.4 4.1   CL 97* 98 98   CO2 24 21* 23   BUN 25* 25* 23*   CREATININE 1.4* 1.4* 1.4*   LABGLOM 48* 48* 48*   GLUCOSE 101 109* 143*   CALCIUM 8.9 9.0 8.8     No results for input(s): WBC, RBC, HGB, HCT, MCV, MCH, RDW, PLT in the last 72 hours. ASSESSMENT:    1. 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 Continue salt tabs to 1 gm 3x/d. 2. Essential Hypertension, Improved control  3. Chronic Kidney Disease Stage III, creatinine at or better than baseline  4. 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 Memory loss  6. Hx ulcerative colitis with colostomy  7. Coronary artery disease S/P CABG  8. Hx prostate Ca  9. Deconditioning, TCU/ ECF eval  10. [id number] Debby Ellis for discharge from renal aspect, FU in one month with BMP  BMP in AM  Active Problems:    Hyponatremia  Resolved Problems:    * No resolved hospital problems.  MY Pfeiffer - CNP 9:29 AM 7/29/2019

## 2019-07-29 NOTE — PROGRESS NOTES
fair. Pt lacks motivation for therapy and needs lots of encouragement to get up to chair. Equipment Recommendations:Equipment Needed: No  Other: has RW  Discharge Recommendations:    Discharge Recommendations: Continue to assess pending progress, Subacute/Skilled Nursing Facility    Plan: Times per week: 5 x GM  Current Treatment Recommendations: Strengthening, Equipment Evaluation, Education, & procurement, Functional Mobility Training, Transfer Training, Gait Training, Safety Education & Training, Balance Training, Patient/Caregiver Education & Training, Endurance Training, Stair training    Patient Education  Patient Education: Plan of Care, Transfers, Gait    Goals:  Patient goals : Go home  Short term goals  Time Frame for Short term goals: 2 weeks  Short term goal 1: Patient will transfer supine <--> sit with SBA in order to get into/out of bed. Short term goal 2: Patient will transfer sit <--> stand with SBA in order to get up to ambulate. Short term goal 3: Pt to ambulate >25 feet with RW SBA for household ambulation. Long term goals  Time Frame for Long term goals : no LTGs due to short ELOS    Following session, patient left in safe position with all fall risk precautions in place.

## 2019-07-29 NOTE — PROGRESS NOTES
Spoke with patient and his son about TCU. Both are agreeable to TCU. Precert initiated. Goldie WILKERSON made aware.

## 2019-07-30 LAB
ANION GAP SERPL CALCULATED.3IONS-SCNC: 10 MEQ/L (ref 8–16)
BILIRUBIN URINE: NEGATIVE
BLOOD, URINE: NEGATIVE
BUN BLDV-MCNC: 24 MG/DL (ref 7–22)
CALCIUM SERPL-MCNC: 8.9 MG/DL (ref 8.5–10.5)
CHARACTER, URINE: CLEAR
CHLORIDE BLD-SCNC: 100 MEQ/L (ref 98–111)
CO2: 22 MEQ/L (ref 23–33)
COLOR: YELLOW
CREAT SERPL-MCNC: 1.3 MG/DL (ref 0.4–1.2)
GFR SERPL CREATININE-BSD FRML MDRD: 52 ML/MIN/1.73M2
GLUCOSE BLD-MCNC: 103 MG/DL (ref 70–108)
GLUCOSE BLD-MCNC: 110 MG/DL (ref 70–108)
GLUCOSE BLD-MCNC: 130 MG/DL (ref 70–108)
GLUCOSE BLD-MCNC: 181 MG/DL (ref 70–108)
GLUCOSE BLD-MCNC: 205 MG/DL (ref 70–108)
GLUCOSE BLD-MCNC: 213 MG/DL (ref 70–108)
GLUCOSE URINE: 250 MG/DL
KETONES, URINE: NEGATIVE
LEUKOCYTE ESTERASE, URINE: NEGATIVE
NITRITE, URINE: NEGATIVE
PH UA: 5 (ref 5–9)
POTASSIUM SERPL-SCNC: 4.4 MEQ/L (ref 3.5–5.2)
PROTEIN UA: NEGATIVE
SODIUM BLD-SCNC: 132 MEQ/L (ref 135–145)
SPECIFIC GRAVITY, URINE: 1.02 (ref 1–1.03)
UROBILINOGEN, URINE: 0.2 EU/DL (ref 0–1)

## 2019-07-30 PROCEDURE — 97110 THERAPEUTIC EXERCISES: CPT

## 2019-07-30 PROCEDURE — 6370000000 HC RX 637 (ALT 250 FOR IP): Performed by: INTERNAL MEDICINE

## 2019-07-30 PROCEDURE — 1200000000 HC SEMI PRIVATE

## 2019-07-30 PROCEDURE — APPSS15 APP SPLIT SHARED TIME 0-15 MINUTES: Performed by: NURSE PRACTITIONER

## 2019-07-30 PROCEDURE — 82948 REAGENT STRIP/BLOOD GLUCOSE: CPT

## 2019-07-30 PROCEDURE — 99231 SBSQ HOSP IP/OBS SF/LOW 25: CPT | Performed by: INTERNAL MEDICINE

## 2019-07-30 PROCEDURE — 81003 URINALYSIS AUTO W/O SCOPE: CPT

## 2019-07-30 PROCEDURE — 80048 BASIC METABOLIC PNL TOTAL CA: CPT

## 2019-07-30 PROCEDURE — 6370000000 HC RX 637 (ALT 250 FOR IP): Performed by: PSYCHIATRY & NEUROLOGY

## 2019-07-30 PROCEDURE — 90792 PSYCH DIAG EVAL W/MED SRVCS: CPT | Performed by: PSYCHIATRY & NEUROLOGY

## 2019-07-30 PROCEDURE — 36415 COLL VENOUS BLD VENIPUNCTURE: CPT

## 2019-07-30 PROCEDURE — 97116 GAIT TRAINING THERAPY: CPT

## 2019-07-30 RX ORDER — QUETIAPINE FUMARATE 25 MG/1
25 TABLET, FILM COATED ORAL 2 TIMES DAILY PRN
Status: DISCONTINUED | OUTPATIENT
Start: 2019-07-30 | End: 2019-07-31 | Stop reason: HOSPADM

## 2019-07-30 RX ADMIN — PANTOPRAZOLE SODIUM 40 MG: 40 TABLET, DELAYED RELEASE ORAL at 05:26

## 2019-07-30 RX ADMIN — CARVEDILOL 6.25 MG: 6.25 TABLET, FILM COATED ORAL at 16:25

## 2019-07-30 RX ADMIN — INSULIN LISPRO 2 UNITS: 100 INJECTION, SOLUTION INTRAVENOUS; SUBCUTANEOUS at 12:32

## 2019-07-30 RX ADMIN — HYDRALAZINE HYDROCHLORIDE 100 MG: 50 TABLET, FILM COATED ORAL at 15:34

## 2019-07-30 RX ADMIN — QUETIAPINE FUMARATE 25 MG: 25 TABLET ORAL at 21:42

## 2019-07-30 RX ADMIN — CARVEDILOL 6.25 MG: 6.25 TABLET, FILM COATED ORAL at 08:28

## 2019-07-30 RX ADMIN — INSULIN LISPRO 4 UNITS: 100 INJECTION, SOLUTION INTRAVENOUS; SUBCUTANEOUS at 12:33

## 2019-07-30 RX ADMIN — GLIMEPIRIDE 2 MG: 2 TABLET ORAL at 08:28

## 2019-07-30 RX ADMIN — INSULIN LISPRO 4 UNITS: 100 INJECTION, SOLUTION INTRAVENOUS; SUBCUTANEOUS at 18:05

## 2019-07-30 RX ADMIN — SODIUM CHLORIDE TAB 1 GM 1 G: 1 TAB at 16:25

## 2019-07-30 RX ADMIN — HYDRALAZINE HYDROCHLORIDE 100 MG: 50 TABLET, FILM COATED ORAL at 21:42

## 2019-07-30 RX ADMIN — SODIUM CHLORIDE TAB 1 GM 1 G: 1 TAB at 08:28

## 2019-07-30 RX ADMIN — INSULIN GLARGINE 26 UNITS: 100 INJECTION, SOLUTION SUBCUTANEOUS at 09:36

## 2019-07-30 RX ADMIN — SODIUM CHLORIDE TAB 1 GM 1 G: 1 TAB at 12:33

## 2019-07-30 RX ADMIN — LEVOTHYROXINE SODIUM 75 MCG: 75 TABLET ORAL at 08:28

## 2019-07-30 RX ADMIN — INSULIN LISPRO 4 UNITS: 100 INJECTION, SOLUTION INTRAVENOUS; SUBCUTANEOUS at 18:03

## 2019-07-30 RX ADMIN — HYDRALAZINE HYDROCHLORIDE 100 MG: 50 TABLET, FILM COATED ORAL at 05:25

## 2019-07-30 ASSESSMENT — PAIN SCALES - GENERAL
PAINLEVEL_OUTOF10: 0

## 2019-07-30 NOTE — PROGRESS NOTES
Nutrition Assessment (Low Risk)    Type and Reason for Visit: Initial(Length of Stay Review)    Nutrition Recommendations:   Continue current diet. Nutrition Assessment:  Patient assessed for nutritional risk. Deemed to be at low risk at this time. He reports good appetite prior to admit and consuming % of most meals. No significant weight loss over the last year per EMR. 450 mls colostomy output. He denied any diet questions or needs. Missing some teeth but tolerates most foods just fine. Will continue to monitor for changes in status.       Malnutrition Assessment:  · Malnutrition Status: No malnutrition    Nutrition Risk Level   Risk Level: Low    Nutrition Diagnosis:   ·  No nutrition diagnosis at this time    Nutrition Intervention:  Food and/or Delivery: Continue current diet  Nutrition Education/Counseling/Coordination of Care:  Continued Inpatient Monitoring, Education not appropriate at this time, Coordination of Care      Electronically signed by Marixa Pickett RD, AB on 7/30/19 at 3:30 PM    Contact Number: [phone]

## 2019-07-30 NOTE — PROGRESS NOTES
coordinated. Moves all extremities   EXTREMITIES: Distal lower extremity temp is warm, No lower extremity edema. PSYCHIATRIC: mood and affect appropriate. Medications:   Med reviewed  Scheduled Meds:   hydrALAZINE  100 mg Oral 3 times per day    sodium chloride  1 g Oral TID WC    carvedilol  6.25 mg Oral BID WC    pantoprazole  40 mg Oral QAM AC    glimepiride  2 mg Oral Daily    insulin lispro  4 Units Subcutaneous TID WC    insulin glargine  26 Units Subcutaneous QAM    levothyroxine  75 mcg Oral QAM    lidocaine  1 patch Topical Daily    insulin lispro  0-12 Units Subcutaneous TID WC    insulin lispro  0-6 Units Subcutaneous Nightly     Labs:   Labs reviewed  Recent Labs     07/28/19  0639 07/29/19  0634 07/30/19  0653   * 133* 132*   K 4.4 4.1 4.4   CL 98 98 100   CO2 21* 23 22*   BUN 25* 23* 24*   CREATININE 1.4* 1.4* 1.3*   LABGLOM 48* 48* 52*   GLUCOSE 109* 143* 103   CALCIUM 9.0 8.8 8.9       ASSESSMENT:    1. 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 Continue 1500 fluid restriction. Continue salt tabs to 1 gm 3x/d. 2. Essential Hypertension, At fair control  3. Chronic Kidney Disease Stage III, creatinine better than baseline  4. 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 Memory loss  6. Hx ulcerative colitis with colostomy  7. Coronary artery disease S/P CABG  8. Hx prostate Ca  9. Deconditioning, TCU/ ECF eval  Ok for discharge from Renal aspect. FU in one month with BMP    Active Problems:    Hyponatremia    CKD (chronic kidney disease) stage 3, GFR 30-59 ml/min ([facility])  Resolved Problems:    * No resolved hospital problems.  MY Tapia - CNP 8:05 AM 7/30/2019

## 2019-07-30 NOTE — PROGRESS NOTES
last 72 hours. Radiology    Objective:   Vitals: BP (!) 140/65   Pulse 59   Temp 97.6 °F (36.4 °C) (Oral)   Resp 16   Ht 5' 11\" (1.803 m)   Wt 209 lb 1.6 oz (94.8 kg)   SpO2 97%   BMI 29.16 kg/m²   HEENT: Head:pupils react  Neck: supple  Lungs: clear to auscultation  Heart: regular rate and rhythm   Abdomen: soft BS heard   Extremities: warm    Neurologic:  Alert, oriented to person and place    Impression:   :   1.  Fall probably from hyponatremia. 2.  Diabetes, uncontrolled. Noncompliance issue because of memory issues now. 3.  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 Apparently, he has not been taking his sodium tablets. 4.  Hypertension. improving now   5. Insulin-requiring diabetes mellitus. 6.  History of coronary artery disease, status post CABG. 7.  History of Crohn's. 8.  History of ulcerative colitis, status post colectomy. 9.  History of prostate cancer, status post radiation. 10.  COPD. 11.  Hypothyroidism. 12.  Hyperlipidemia. 13.  Acid reflux. 14.  History of chronic congestive heart failure secondary to diastolic dysfunction.   15 Confusion    Plan:    Pre cert started for TCU   Consult psychiatry  Suspect dementia playing a role  Check UA also     Jahaira Arboleda MD

## 2019-07-31 ENCOUNTER — HOSPITAL ENCOUNTER (INPATIENT)
Age: 84
LOS: 9 days | Discharge: HOME HEALTH | DRG: 948 | End: 2019-08-09
Attending: INTERNAL MEDICINE | Admitting: FAMILY MEDICINE
Payer: MEDICARE

## 2019-07-31 VITALS
TEMPERATURE: 98.3 F | HEART RATE: 65 BPM | BODY MASS INDEX: 29.27 KG/M2 | RESPIRATION RATE: 20 BRPM | DIASTOLIC BLOOD PRESSURE: 67 MMHG | HEIGHT: 71 IN | SYSTOLIC BLOOD PRESSURE: 130 MMHG | WEIGHT: 209.1 LBS | OXYGEN SATURATION: 98 %

## 2019-07-31 DIAGNOSIS — Z79.4 TYPE 2 DIABETES MELLITUS WITH STAGE 3 CHRONIC KIDNEY DISEASE, WITH LONG-TERM CURRENT USE OF INSULIN (HCC): Primary | ICD-10-CM

## 2019-07-31 DIAGNOSIS — E11.22 TYPE 2 DIABETES MELLITUS WITH STAGE 3 CHRONIC KIDNEY DISEASE, WITH LONG-TERM CURRENT USE OF INSULIN (HCC): Primary | ICD-10-CM

## 2019-07-31 DIAGNOSIS — N18.30 TYPE 2 DIABETES MELLITUS WITH STAGE 3 CHRONIC KIDNEY DISEASE, WITH LONG-TERM CURRENT USE OF INSULIN (HCC): Primary | ICD-10-CM

## 2019-07-31 DIAGNOSIS — E22.2 SIADH (SYNDROME OF INAPPROPRIATE ADH PRODUCTION) (HCC): ICD-10-CM

## 2019-07-31 PROBLEM — R53.81 PHYSICAL DECONDITIONING: Status: ACTIVE | Noted: 2019-07-31

## 2019-07-31 LAB
ANION GAP SERPL CALCULATED.3IONS-SCNC: 13 MEQ/L (ref 8–16)
BUN BLDV-MCNC: 24 MG/DL (ref 7–22)
CALCIUM SERPL-MCNC: 8.8 MG/DL (ref 8.5–10.5)
CHLORIDE BLD-SCNC: 101 MEQ/L (ref 98–111)
CO2: 20 MEQ/L (ref 23–33)
CREAT SERPL-MCNC: 1.3 MG/DL (ref 0.4–1.2)
GFR SERPL CREATININE-BSD FRML MDRD: 52 ML/MIN/1.73M2
GLUCOSE BLD-MCNC: 116 MG/DL (ref 70–108)
GLUCOSE BLD-MCNC: 127 MG/DL (ref 70–108)
GLUCOSE BLD-MCNC: 160 MG/DL (ref 70–108)
GLUCOSE BLD-MCNC: 261 MG/DL (ref 70–108)
POTASSIUM SERPL-SCNC: 4.5 MEQ/L (ref 3.5–5.2)
SODIUM BLD-SCNC: 134 MEQ/L (ref 135–145)

## 2019-07-31 PROCEDURE — 6370000000 HC RX 637 (ALT 250 FOR IP): Performed by: INTERNAL MEDICINE

## 2019-07-31 PROCEDURE — 82948 REAGENT STRIP/BLOOD GLUCOSE: CPT

## 2019-07-31 PROCEDURE — APPSS15 APP SPLIT SHARED TIME 0-15 MINUTES: Performed by: NURSE PRACTITIONER

## 2019-07-31 PROCEDURE — 0220000000 HC SKILLED NURSING FACILITY

## 2019-07-31 PROCEDURE — 36415 COLL VENOUS BLD VENIPUNCTURE: CPT

## 2019-07-31 PROCEDURE — 97535 SELF CARE MNGMENT TRAINING: CPT

## 2019-07-31 PROCEDURE — 6360000002 HC RX W HCPCS: Performed by: FAMILY MEDICINE

## 2019-07-31 PROCEDURE — 99231 SBSQ HOSP IP/OBS SF/LOW 25: CPT | Performed by: INTERNAL MEDICINE

## 2019-07-31 PROCEDURE — 80048 BASIC METABOLIC PNL TOTAL CA: CPT

## 2019-07-31 PROCEDURE — 2709999900 HC NON-CHARGEABLE SUPPLY

## 2019-07-31 PROCEDURE — 6370000000 HC RX 637 (ALT 250 FOR IP): Performed by: PSYCHIATRY & NEUROLOGY

## 2019-07-31 PROCEDURE — 97530 THERAPEUTIC ACTIVITIES: CPT

## 2019-07-31 PROCEDURE — 1290000000 HC SEMI PRIVATE OTHER R&B

## 2019-07-31 RX ORDER — QUETIAPINE FUMARATE 25 MG/1
25 TABLET, FILM COATED ORAL 2 TIMES DAILY PRN
Status: DISCONTINUED | OUTPATIENT
Start: 2019-07-31 | End: 2019-08-02

## 2019-07-31 RX ORDER — DEXTROSE MONOHYDRATE 25 G/50ML
12.5 INJECTION, SOLUTION INTRAVENOUS PRN
Status: DISCONTINUED | OUTPATIENT
Start: 2019-07-31 | End: 2019-08-09 | Stop reason: HOSPADM

## 2019-07-31 RX ORDER — QUETIAPINE FUMARATE 25 MG/1
25 TABLET, FILM COATED ORAL 2 TIMES DAILY PRN
Status: CANCELLED | OUTPATIENT
Start: 2019-07-31

## 2019-07-31 RX ORDER — DEXTROSE MONOHYDRATE 50 MG/ML
100 INJECTION, SOLUTION INTRAVENOUS PRN
Status: DISCONTINUED | OUTPATIENT
Start: 2019-07-31 | End: 2019-08-09 | Stop reason: HOSPADM

## 2019-07-31 RX ORDER — ACETAMINOPHEN 325 MG/1
650 TABLET ORAL EVERY 4 HOURS PRN
Status: CANCELLED | OUTPATIENT
Start: 2019-07-31

## 2019-07-31 RX ORDER — CARVEDILOL 6.25 MG/1
6.25 TABLET ORAL 2 TIMES DAILY WITH MEALS
Status: CANCELLED | OUTPATIENT
Start: 2019-07-31

## 2019-07-31 RX ORDER — LEVOTHYROXINE SODIUM 0.07 MG/1
75 TABLET ORAL EVERY MORNING
Status: CANCELLED | OUTPATIENT
Start: 2019-08-01

## 2019-07-31 RX ORDER — GLIMEPIRIDE 2 MG/1
2 TABLET ORAL DAILY
Status: CANCELLED | OUTPATIENT
Start: 2019-08-01

## 2019-07-31 RX ORDER — LIDOCAINE 4 G/G
1 PATCH TOPICAL DAILY
Status: CANCELLED | OUTPATIENT
Start: 2019-08-01

## 2019-07-31 RX ORDER — LIDOCAINE 4 G/G
1 PATCH TOPICAL DAILY
Status: DISCONTINUED | OUTPATIENT
Start: 2019-08-01 | End: 2019-08-09 | Stop reason: HOSPADM

## 2019-07-31 RX ORDER — DEXTROSE MONOHYDRATE 25 G/50ML
12.5 INJECTION, SOLUTION INTRAVENOUS PRN
Status: CANCELLED | OUTPATIENT
Start: 2019-07-31

## 2019-07-31 RX ORDER — NICOTINE POLACRILEX 4 MG
15 LOZENGE BUCCAL PRN
Status: DISCONTINUED | OUTPATIENT
Start: 2019-07-31 | End: 2019-08-09 | Stop reason: HOSPADM

## 2019-07-31 RX ORDER — HYDRALAZINE HYDROCHLORIDE 50 MG/1
100 TABLET, FILM COATED ORAL EVERY 8 HOURS SCHEDULED
Status: DISCONTINUED | OUTPATIENT
Start: 2019-07-31 | End: 2019-08-07

## 2019-07-31 RX ORDER — NICOTINE POLACRILEX 4 MG
15 LOZENGE BUCCAL PRN
Status: CANCELLED | OUTPATIENT
Start: 2019-07-31

## 2019-07-31 RX ORDER — ALBUTEROL SULFATE 90 UG/1
2 AEROSOL, METERED RESPIRATORY (INHALATION) EVERY 6 HOURS PRN
Status: CANCELLED | OUTPATIENT
Start: 2019-07-31

## 2019-07-31 RX ORDER — ALBUTEROL SULFATE 90 UG/1
2 AEROSOL, METERED RESPIRATORY (INHALATION) EVERY 6 HOURS PRN
Status: DISCONTINUED | OUTPATIENT
Start: 2019-07-31 | End: 2019-08-09 | Stop reason: HOSPADM

## 2019-07-31 RX ORDER — GLIMEPIRIDE 2 MG/1
2 TABLET ORAL DAILY
Status: DISCONTINUED | OUTPATIENT
Start: 2019-08-01 | End: 2019-08-01

## 2019-07-31 RX ORDER — CARVEDILOL 6.25 MG/1
6.25 TABLET ORAL 2 TIMES DAILY WITH MEALS
Status: DISCONTINUED | OUTPATIENT
Start: 2019-07-31 | End: 2019-08-09 | Stop reason: HOSPADM

## 2019-07-31 RX ORDER — PANTOPRAZOLE SODIUM 40 MG/1
40 TABLET, DELAYED RELEASE ORAL
Status: DISCONTINUED | OUTPATIENT
Start: 2019-08-01 | End: 2019-08-01

## 2019-07-31 RX ORDER — ACETAMINOPHEN 325 MG/1
650 TABLET ORAL EVERY 4 HOURS PRN
Status: DISCONTINUED | OUTPATIENT
Start: 2019-07-31 | End: 2019-08-09 | Stop reason: HOSPADM

## 2019-07-31 RX ORDER — SODIUM CHLORIDE 1000 MG
1 TABLET, SOLUBLE MISCELLANEOUS
Status: DISCONTINUED | OUTPATIENT
Start: 2019-07-31 | End: 2019-08-09 | Stop reason: HOSPADM

## 2019-07-31 RX ORDER — PANTOPRAZOLE SODIUM 40 MG/1
40 TABLET, DELAYED RELEASE ORAL
Status: CANCELLED | OUTPATIENT
Start: 2019-08-01

## 2019-07-31 RX ORDER — HYDRALAZINE HYDROCHLORIDE 50 MG/1
100 TABLET, FILM COATED ORAL EVERY 8 HOURS SCHEDULED
Status: CANCELLED | OUTPATIENT
Start: 2019-07-31

## 2019-07-31 RX ORDER — INSULIN GLARGINE 100 [IU]/ML
26 INJECTION, SOLUTION SUBCUTANEOUS EVERY MORNING
Status: DISCONTINUED | OUTPATIENT
Start: 2019-08-01 | End: 2019-08-03

## 2019-07-31 RX ORDER — LEVOTHYROXINE SODIUM 0.07 MG/1
75 TABLET ORAL EVERY MORNING
Status: DISCONTINUED | OUTPATIENT
Start: 2019-08-01 | End: 2019-08-09 | Stop reason: HOSPADM

## 2019-07-31 RX ORDER — SODIUM CHLORIDE 1000 MG
1 TABLET, SOLUBLE MISCELLANEOUS
Status: CANCELLED | OUTPATIENT
Start: 2019-07-31

## 2019-07-31 RX ORDER — INSULIN GLARGINE 100 [IU]/ML
26 INJECTION, SOLUTION SUBCUTANEOUS EVERY MORNING
Status: CANCELLED | OUTPATIENT
Start: 2019-08-01

## 2019-07-31 RX ORDER — DEXTROSE MONOHYDRATE 50 MG/ML
100 INJECTION, SOLUTION INTRAVENOUS PRN
Status: CANCELLED | OUTPATIENT
Start: 2019-07-31

## 2019-07-31 RX ADMIN — INSULIN GLARGINE 26 UNITS: 100 INJECTION, SOLUTION SUBCUTANEOUS at 09:50

## 2019-07-31 RX ADMIN — INSULIN LISPRO 4 UNITS: 100 INJECTION, SOLUTION INTRAVENOUS; SUBCUTANEOUS at 17:25

## 2019-07-31 RX ADMIN — INSULIN LISPRO 2 UNITS: 100 INJECTION, SOLUTION INTRAVENOUS; SUBCUTANEOUS at 17:28

## 2019-07-31 RX ADMIN — SODIUM CHLORIDE TAB 1 GM 1 G: 1 TAB at 13:58

## 2019-07-31 RX ADMIN — GLIMEPIRIDE 2 MG: 2 TABLET ORAL at 09:46

## 2019-07-31 RX ADMIN — CARVEDILOL 6.25 MG: 6.25 TABLET, FILM COATED ORAL at 09:46

## 2019-07-31 RX ADMIN — CARVEDILOL 6.25 MG: 6.25 TABLET, FILM COATED ORAL at 17:25

## 2019-07-31 RX ADMIN — INSULIN LISPRO 4 UNITS: 100 INJECTION, SOLUTION INTRAVENOUS; SUBCUTANEOUS at 13:55

## 2019-07-31 RX ADMIN — HYDRALAZINE HYDROCHLORIDE 100 MG: 50 TABLET, FILM COATED ORAL at 22:43

## 2019-07-31 RX ADMIN — SODIUM CHLORIDE TAB 1 GM 1 G: 1 TAB at 17:25

## 2019-07-31 RX ADMIN — HYDRALAZINE HYDROCHLORIDE 100 MG: 50 TABLET, FILM COATED ORAL at 05:08

## 2019-07-31 RX ADMIN — INSULIN LISPRO 1 UNITS: 100 INJECTION, SOLUTION INTRAVENOUS; SUBCUTANEOUS at 22:52

## 2019-07-31 RX ADMIN — INSULIN LISPRO 6 UNITS: 100 INJECTION, SOLUTION INTRAVENOUS; SUBCUTANEOUS at 13:55

## 2019-07-31 RX ADMIN — QUETIAPINE FUMARATE 25 MG: 25 TABLET ORAL at 09:46

## 2019-07-31 RX ADMIN — SODIUM CHLORIDE TAB 1 GM 1 G: 1 TAB at 09:46

## 2019-07-31 RX ADMIN — LEVOTHYROXINE SODIUM 75 MCG: 75 TABLET ORAL at 09:47

## 2019-07-31 RX ADMIN — QUETIAPINE FUMARATE 25 MG: 25 TABLET ORAL at 22:43

## 2019-07-31 RX ADMIN — PANTOPRAZOLE SODIUM 40 MG: 40 TABLET, DELAYED RELEASE ORAL at 05:08

## 2019-07-31 RX ADMIN — ENOXAPARIN SODIUM 40 MG: 40 INJECTION SUBCUTANEOUS at 22:43

## 2019-07-31 RX ADMIN — INSULIN LISPRO 4 UNITS: 100 INJECTION, SOLUTION INTRAVENOUS; SUBCUTANEOUS at 09:49

## 2019-07-31 ASSESSMENT — PAIN SCALES - GENERAL
PAINLEVEL_OUTOF10: 0

## 2019-07-31 ASSESSMENT — PAIN DESCRIPTION - FREQUENCY
FREQUENCY: INTERMITTENT
FREQUENCY: INTERMITTENT

## 2019-07-31 ASSESSMENT — PAIN DESCRIPTION - PAIN TYPE: TYPE: ACUTE PAIN

## 2019-07-31 ASSESSMENT — PAIN DESCRIPTION - PROGRESSION: CLINICAL_PROGRESSION: RESOLVED

## 2019-07-31 ASSESSMENT — PAIN DESCRIPTION - LOCATION: LOCATION: ABDOMEN

## 2019-07-31 ASSESSMENT — PAIN - FUNCTIONAL ASSESSMENT
PAIN_FUNCTIONAL_ASSESSMENT: PREVENTS OR INTERFERES SOME ACTIVE ACTIVITIES AND ADLS
PAIN_FUNCTIONAL_ASSESSMENT: ACTIVITIES ARE NOT PREVENTED

## 2019-07-31 ASSESSMENT — PAIN DESCRIPTION - ONSET: ONSET: GRADUAL

## 2019-07-31 NOTE — PROGRESS NOTES
Rolling Walker  Assist Level:  Contact Guard Assistance. Distance: Completed functional mobility within pt room at slow pace, no LOB noted. Pt requires mod cues for walker safety- lengthy seated rest break after trial of mobility, min fatigue noted. ASSESSMENT:  Activity Tolerance:  Patient tolerance of  treatment: fair. Pt limited by fatigue. Discharge Recommendations: Continue to assess pending progress, Patient would benefit from continued therapy after discharge, Subacute/Skilled Nursing Facility  Equipment Recommendations: Equipment Needed: No  Other: Will continue to monitor  Plan: Times per week: 3-5x  Specific instructions for Next Treatment: Functional mobility; ADLs and standing balance; upper body exercises as tolerated  Current Treatment Recommendations: Self-Care / ADL, Endurance Training, Functional Mobility Training, Safety Education & Training, Balance Training, Strengthening  Plan Comment: Pt would benefit from continued OT when medically stable and discharged from Acute. A short stay on TCU is recommended. Patient Education  Patient Education: Safety with transfers and mobility, ADL strategies, walker safety. Goals  Short term goals  Time Frame for Short term goals: 2-4 tx  Short term goal 1: Pt will demonstrate functional mobility walking to/from he bathroom with a rolling walker and SBA with cues for posture to prepare for doing self care at the sink. Short term goal 2: Pt will complete simple ADLs while standing for over 4 minute duration with SBA and using 1-2 hand release to increase his safety and endurance for ease of doing self care. Short term goal 3: Pt will complete a spongebath or lower body dressing with SBA and min cues for safey to increase his independence with self care. Short term goal 4: Pt will complete BUE ROM and low resistance exercises while following any handout needeed to increase his endurance and strength for ease of doing self care.    Long

## 2019-07-31 NOTE — PLAN OF CARE
Problem: DISCHARGE BARRIERS  Goal: Patient's continuum of care needs are met  Outcome: Met This Shift  Note:   Pt care needs continue to be met this shift. Will continue monitoring. Problem: Falls - Risk of:  Goal: Will remain free from falls  Description  Will remain free from falls  Outcome: Ongoing  Note:   Pt remain free from falls this shift. Fall prevention measures in place. Problem: Risk for Impaired Skin Integrity  Goal: Tissue integrity - skin and mucous membranes  Description  Structural intactness and normal physiological function of skin and  mucous membranes. Outcome: Ongoing  Note:   No new skin issue noted today. Pt able to reposition self in bed. Problem: Injury - Risk of, Physical Injury:  Goal: Will remain free from falls  Description  Will remain free from falls  Outcome: Ongoing  Note:   Pt remain free from falls this shift. Fall prevention measures in place. Problem: Mood - Altered:  Goal: Mood stable  Description  Mood stable  Outcome: Ongoing  Note:   Pt anxious and cooperative this shift. Problem: Pain:  Goal: Pain level will decrease  Description  Pain level will decrease  Outcome: Ongoing  Note:   Pt denies pain this shift. Pt uses non pharmacological pain relieve strategies no achieve his pain goal of no pain. Problem: Musculor/Skeletal Functional Status  Goal: Highest potential functional level  Outcome: Ongoing  Note:   Pt able to stand up with one assistance. Pt working PT/OT  Care plan reviewed with patient. Patient verbalizes understanding of the plan of care and contribute to goal setting.
Problem: Falls - Risk of:  Goal: Absence of physical injury  Description  Absence of physical injury  7/28/2019 1459 by Alvarez Lay RN  Outcome: Met This Shift     Problem: Injury - Risk of, Physical Injury:  Goal: Absence of physical injury  Description  Absence of physical injury  7/28/2019 1459 by Alvarez Lay RN  Outcome: Met This Shift     Problem: Falls - Risk of:  Goal: Will remain free from falls  Description  Will remain free from falls  7/28/2019 1459 by Alvarez Lay RN  Outcome: Ongoing  Note:   Fall sign posted. Arm band in place. Non-skid slippers on. Bed alarm on. Patient agreeable to use of call light. Call light within reach. Bed in lowest position. Problem: Risk for Impaired Skin Integrity  Goal: Tissue integrity - skin and mucous membranes  Description  Structural intactness and normal physiological function of skin and  mucous membranes. 7/28/2019 1459 by Alvarez Lay RN  Outcome: Ongoing  Note:   No apparent issues with skin integrity. Skin kept clean and dry. Colostomy intact. Will continue to monitor skin for new breakdown. Problem: Confusion - Acute:  Goal: Mental status will be restored to baseline  Description  Mental status will be restored to baseline  7/28/2019 1459 by Alvarez Lay RN  Outcome: Ongoing  Note:   Patient remains confused throughout shift. Repeats questions multiple times. Will continue to reorient and and redirect to current situation. Problem: Discharge Planning:  Goal: Participates in care planning  Description  Participates in care planning  7/28/2019 1459 by Alvarez Lay RN  Outcome: Ongoing  Note:   Patient states multiple times he wants to go and does not want to go rehab. Continue to remind him of discussions that we've had with the doctors and family members. Patient is not active in participating in plan of care.       Problem: Injury - Risk of, Physical Injury:  Goal: Will remain free from falls  Description  Will remain free
Problem: Falls - Risk of:  Goal: Will remain free from falls  Description  Will remain free from falls  7/28/2019 0142 by Josse Mitchell RN  Outcome: Ongoing  Note:   No falls this shift. Pt using call light appropriately to call for assistance with ambulation to the bathroom and to chair. Pt is also compliant with use of non-skid slippers. Pt reports understanding of fall prevention when discussed. Problem: Falls - Risk of:  Goal: Absence of physical injury  Description  Absence of physical injury  Outcome: Ongoing  Note:   Pt free from injury this shift. Uses call light appropriately     Problem: Risk for Impaired Skin Integrity  Goal: Tissue integrity - skin and mucous membranes  Description  Structural intactness and normal physiological function of skin and  mucous membranes. 7/28/2019 0142 by Josse Mitchell RN  Outcome: Ongoing  Note:   No skin breakdown noted. Pt turns and repositions self in bed. Problem: Confusion - Acute:  Goal: Absence of continued neurological deterioration signs and symptoms  Description  Absence of continued neurological deterioration signs and symptoms  7/28/2019 0142 by Josse Mitchell RN  Outcome: Ongoing  Note:   Pt confused at times as to why he is in the hospital      Problem: Confusion - Acute:  Goal: Mental status will be restored to baseline  Description  Mental status will be restored to baseline  Outcome: Ongoing  Note:   Pt alert to name.  Disoriented to time, situation, and place at times     Problem: Discharge Planning:  Goal: Ability to perform activities of daily living will improve  Description  Ability to perform activities of daily living will improve  Outcome: Ongoing  Note:   Pt needs some assistance with ADLs     Problem: Discharge Planning:  Goal: Participates in care planning  Description  Participates in care planning  7/28/2019 0142 by Josse Mitchell RN  Outcome: Ongoing  Note:   Pt wants to go home, but discharge plan is TCU vs SNF vs home with
Problem: Falls - Risk of:  Goal: Will remain free from falls  Description  Will remain free from falls  7/29/2019 1304 by Dora Landis RN  Outcome: Ongoing  Note:   No falls     Problem: Falls - Risk of:  Goal: Absence of physical injury  Description  Absence of physical injury  7/29/2019 1304 by Dora Landis RN  Outcome: Ongoing  Note:   No injury     Problem: Risk for Impaired Skin Integrity  Goal: Tissue integrity - skin and mucous membranes  Description  Structural intactness and normal physiological function of skin and  mucous membranes. 7/29/2019 1304 by Dora Landis RN  Outcome: Ongoing  Note:   Patient sitting up in chair. Turns self in bed     Problem: Confusion - Acute:  Goal: Absence of continued neurological deterioration signs and symptoms  Description  Absence of continued neurological deterioration signs and symptoms  7/29/2019 1304 by Dora Landis RN  Outcome: Ongoing  Note:   Intermittent confusion at times. Disoriented to time. Problem: Discharge Planning:  Goal: Ability to perform activities of daily living will improve  Description  Ability to perform activities of daily living will improve  7/29/2019 1304 by Dora Landis RN  Outcome: Ongoing     Problem: Injury - Risk of, Physical Injury:  Goal: Will remain free from falls  Description  Will remain free from falls  7/29/2019 1304 by Dora Landis RN  Outcome: Ongoing  Note:   No falls     Problem: Injury - Risk of, Physical Injury:  Goal: Absence of physical injury  Description  Absence of physical injury  7/29/2019 1304 by Dora Landis RN  Outcome: Ongoing  Note:   No injury     Problem: Mood - Altered:  Goal: Mood stable  Description  Mood stable  7/29/2019 1304 by Dora Landis RN  Outcome: Ongoing  Note:   Patient can get angry at times. Care plan reviewed with patient and patient verbalizes understanding of the plan of care and contribute to goal setting.
Problem: Falls - Risk of:  Goal: Will remain free from falls  Description  Will remain free from falls  Outcome: Ongoing  Note:   Patient free from falls this shift. Patient ambulates with a walker and one assist.      Problem: Risk for Impaired Skin Integrity  Goal: Tissue integrity - skin and mucous membranes  Description  Structural intactness and normal physiological function of skin and  mucous membranes. Outcome: Ongoing  Note:   No skin breakdown noted at this time. Problem: Confusion - Acute:  Goal: Absence of continued neurological deterioration signs and symptoms  Description  Absence of continued neurological deterioration signs and symptoms  Outcome: Ongoing  Note:   Patient oriented to person only. Problem: Discharge Planning:  Goal: Ability to perform activities of daily living will improve  Description  Ability to perform activities of daily living will improve  Outcome: Ongoing  Note:   Plan is home vs ecf. Problem: Injury - Risk of, Physical Injury:  Goal: Will remain free from falls  Description  Will remain free from falls  Outcome: Ongoing  Note:   Patient free from falls this shift. Patient ambulates with a walker and one assist.    Care plan reviewed with patient. Patient verbalizes understanding of the plan of care and contribute to goal setting.
decrease  Description  Pain level will decrease  Outcome: Ongoing  Patient states pain relief from PRN pain medications. Pain reassessed one hour post PRN pain medication given. Patient rates pain 3 on ISAIAH 0-10 scale. Problem: Musculor/Skeletal Functional Status  Goal: Highest potential functional level  Outcome: Ongoing  Patient ambulates with 2 assist. Gait unsteady. Patient able to perform passive ROM. Problem: DISCHARGE BARRIERS  Goal: Patient's continuum of care needs are met  7/25/2019 2022 by Jluis Arita RN  Outcome: Ongoing  Discharge plan is in process. Plan discharge to ECF. Care plan reviewed with patient and family  Patient and family verbalize understanding of the plan of care and contribute to goal setting.
reach. Side rails were up 2/4 and the bed was in lowest position. Problem: Pain:  Goal: Pain level will decrease  Description  Pain level will decrease  Outcome: Ongoing  Note:   Pain goal: 3  Patient complained of a pain level of   0  on the 0-10 pain scale. Will continue to monitor. Problem: DISCHARGE BARRIERS  Goal: Patient's continuum of care needs are met  Outcome: Ongoing  Note:   The patient was allowed to remain at the highest level of independent function in anticipation of future discharge. The patient's ADL's were performed by the patient as much as possible and the patient is an active participant in the plan of care. Care plan reviewed with patient. Patient verbalized understanding of the plan of care and contributes to goal setting.     Electronically signed by Tati Shoemaker RN on 7/31/2019 at 6:40 AM
Status  Goal: Highest potential functional level  Outcome: Ongoing  Note:   Up with 1 assist, walker, and gait belt, and tolerates ambulation fairly well. Tolerates working with PT and OT fairly well. Problem: DISCHARGE BARRIERS  Goal: Patient's continuum of care needs are met  7/28/2019 2323 by Charlene Recinos RN  Outcome: Ongoing  Note:   Pt plans Rehab at discharge. Care manager and social working helping with discharge needs. Care plan reviewed with patient. Patient verbalizes understanding of the plan of care and contributes to goal setting.

## 2019-07-31 NOTE — CARE COORDINATION
7/25/19, 3:23 PM    DISCHARGE BARRIERS    Spoke with patient again about conversation with Dr Alex Altamirano. He does not see why he can not go home. SW advised him that he is not able to ambulate independently and can not care for himself. SW explained TCU and that staff will be reviewing his imformation to see if he is appropriate for that unit. If not, he will need ECF until he is able to ambulate and care for self. SW provided list of facilities-patient firmly stated \"no\". SW advised him that we would talk again tomorrow. As SW was leaving room-patient stated \"[facility]".
7/26/19, 2:32 PM      Fabiano Suh 1998 day: 3  Location: [facility]20/020-A Reason for admit: Hyponatremia [E87.1]   Procedure: N/A  Treatment Plan of Care: Na 128, Creatinine 1.3, Nephrology following, fluid restriction, Salt tabs, DM management, PT/OT. PCP: Snow Fernandez MD  Readmission Risk Score: 20%  Discharge Plan: TCU consult in place- OT to see. Patient is from home with his wife and daughter. TCU vs. SNF vs. Home with HH.
Notified OT of patient needing to be seen. He was a missed treat and had not been seen over the weekend. He is a pre-cert. Spoke to [place].  Electronically signed by Niki Nolasco RN on 7/31/19 at 7:23 AM
stay, etc. He stated that he would think about it. Spoke with Dr. Brian Noriega has spoken with patient re: need for continued therapy and has placed order for TCU eval. If this is not an option, patient will need to go to a nursing home as he is not safe to return home.      Electronically signed by SAFIA Zuluaga on 7/25/2019 at 8:11 AM

## 2019-07-31 NOTE — PROGRESS NOTES
Skin assessment done with this nurse and Rayivett Gilbert JARRETT. Skin flakey, dry. Many areas of small scabbed abrasions noted. No open areas. Left upper side colostomy noted.

## 2019-08-01 PROBLEM — E22.2 SIADH (SYNDROME OF INAPPROPRIATE ADH PRODUCTION) (HCC): Status: ACTIVE | Noted: 2019-08-01

## 2019-08-01 PROBLEM — I67.9 CEREBROVASCULAR DISEASE: Status: ACTIVE | Noted: 2019-08-01

## 2019-08-01 LAB
ANION GAP SERPL CALCULATED.3IONS-SCNC: 13 MEQ/L (ref 8–16)
BUN BLDV-MCNC: 32 MG/DL (ref 7–22)
CALCIUM SERPL-MCNC: 9.1 MG/DL (ref 8.5–10.5)
CHLORIDE BLD-SCNC: 101 MEQ/L (ref 98–111)
CO2: 20 MEQ/L (ref 23–33)
CREAT SERPL-MCNC: 1.6 MG/DL (ref 0.4–1.2)
GFR SERPL CREATININE-BSD FRML MDRD: 41 ML/MIN/1.73M2
GLUCOSE BLD-MCNC: 138 MG/DL (ref 70–108)
GLUCOSE BLD-MCNC: 142 MG/DL (ref 70–108)
GLUCOSE BLD-MCNC: 166 MG/DL (ref 70–108)
GLUCOSE BLD-MCNC: 67 MG/DL (ref 70–108)
POTASSIUM SERPL-SCNC: 4.6 MEQ/L (ref 3.5–5.2)
REASON FOR REJECTION: NORMAL
REJECTED TEST: NORMAL
SODIUM BLD-SCNC: 134 MEQ/L (ref 135–145)

## 2019-08-01 PROCEDURE — 6370000000 HC RX 637 (ALT 250 FOR IP): Performed by: NURSE PRACTITIONER

## 2019-08-01 PROCEDURE — 6360000002 HC RX W HCPCS: Performed by: FAMILY MEDICINE

## 2019-08-01 PROCEDURE — 82948 REAGENT STRIP/BLOOD GLUCOSE: CPT

## 2019-08-01 PROCEDURE — 97530 THERAPEUTIC ACTIVITIES: CPT

## 2019-08-01 PROCEDURE — 97116 GAIT TRAINING THERAPY: CPT

## 2019-08-01 PROCEDURE — 97166 OT EVAL MOD COMPLEX 45 MIN: CPT

## 2019-08-01 PROCEDURE — 80048 BASIC METABOLIC PNL TOTAL CA: CPT

## 2019-08-01 PROCEDURE — 6370000000 HC RX 637 (ALT 250 FOR IP): Performed by: FAMILY MEDICINE

## 2019-08-01 PROCEDURE — 97162 PT EVAL MOD COMPLEX 30 MIN: CPT

## 2019-08-01 PROCEDURE — 2500000003 HC RX 250 WO HCPCS: Performed by: INTERNAL MEDICINE

## 2019-08-01 PROCEDURE — 97535 SELF CARE MNGMENT TRAINING: CPT

## 2019-08-01 PROCEDURE — 36415 COLL VENOUS BLD VENIPUNCTURE: CPT

## 2019-08-01 PROCEDURE — 1290000000 HC SEMI PRIVATE OTHER R&B

## 2019-08-01 PROCEDURE — 6370000000 HC RX 637 (ALT 250 FOR IP): Performed by: INTERNAL MEDICINE

## 2019-08-01 PROCEDURE — 99232 SBSQ HOSP IP/OBS MODERATE 35: CPT | Performed by: NURSE PRACTITIONER

## 2019-08-01 RX ORDER — POLYETHYLENE GLYCOL 3350 17 G/17G
17 POWDER, FOR SOLUTION ORAL DAILY PRN
Status: DISCONTINUED | OUTPATIENT
Start: 2019-08-01 | End: 2019-08-09 | Stop reason: HOSPADM

## 2019-08-01 RX ORDER — PIOGLITAZONEHYDROCHLORIDE 30 MG/1
30 TABLET ORAL DAILY
Status: DISCONTINUED | OUTPATIENT
Start: 2019-08-01 | End: 2019-08-09 | Stop reason: HOSPADM

## 2019-08-01 RX ORDER — SENNA PLUS 8.6 MG/1
1 TABLET ORAL NIGHTLY PRN
Status: DISCONTINUED | OUTPATIENT
Start: 2019-08-01 | End: 2019-08-09 | Stop reason: HOSPADM

## 2019-08-01 RX ORDER — FAMOTIDINE 20 MG/1
20 TABLET, FILM COATED ORAL DAILY
Status: DISCONTINUED | OUTPATIENT
Start: 2019-08-01 | End: 2019-08-09 | Stop reason: HOSPADM

## 2019-08-01 RX ADMIN — GLIMEPIRIDE 2 MG: 2 TABLET ORAL at 08:31

## 2019-08-01 RX ADMIN — PANTOPRAZOLE SODIUM 40 MG: 40 TABLET, DELAYED RELEASE ORAL at 08:31

## 2019-08-01 RX ADMIN — HYDRALAZINE HYDROCHLORIDE 100 MG: 50 TABLET, FILM COATED ORAL at 07:39

## 2019-08-01 RX ADMIN — SODIUM CHLORIDE TAB 1 GM 1 G: 1 TAB at 08:31

## 2019-08-01 RX ADMIN — HYDRALAZINE HYDROCHLORIDE 100 MG: 50 TABLET, FILM COATED ORAL at 21:06

## 2019-08-01 RX ADMIN — INSULIN GLARGINE 26 UNITS: 100 INJECTION, SOLUTION SUBCUTANEOUS at 08:47

## 2019-08-01 RX ADMIN — CARVEDILOL 6.25 MG: 6.25 TABLET, FILM COATED ORAL at 08:30

## 2019-08-01 RX ADMIN — HYDRALAZINE HYDROCHLORIDE 100 MG: 50 TABLET, FILM COATED ORAL at 13:08

## 2019-08-01 RX ADMIN — SODIUM CHLORIDE TAB 1 GM 1 G: 1 TAB at 12:23

## 2019-08-01 RX ADMIN — FAMOTIDINE 20 MG: 20 TABLET ORAL at 12:23

## 2019-08-01 RX ADMIN — PIOGLITAZONE 30 MG: 30 TABLET ORAL at 12:24

## 2019-08-01 RX ADMIN — ENOXAPARIN SODIUM 40 MG: 40 INJECTION SUBCUTANEOUS at 21:07

## 2019-08-01 RX ADMIN — Medication 5 UNITS: at 12:32

## 2019-08-01 RX ADMIN — SODIUM CHLORIDE TAB 1 GM 1 G: 1 TAB at 17:20

## 2019-08-01 RX ADMIN — QUETIAPINE FUMARATE 25 MG: 25 TABLET ORAL at 21:06

## 2019-08-01 RX ADMIN — LEVOTHYROXINE SODIUM 75 MCG: 75 TABLET ORAL at 08:31

## 2019-08-01 RX ADMIN — QUETIAPINE FUMARATE 25 MG: 25 TABLET ORAL at 08:31

## 2019-08-01 RX ADMIN — CARVEDILOL 6.25 MG: 6.25 TABLET, FILM COATED ORAL at 17:20

## 2019-08-01 ASSESSMENT — PAIN SCALES - GENERAL
PAINLEVEL_OUTOF10: 0

## 2019-08-01 ASSESSMENT — PAIN DESCRIPTION - PROGRESSION: CLINICAL_PROGRESSION: NOT CHANGED

## 2019-08-01 NOTE — PROGRESS NOTES
time.    Treatment Initiated: Treatment and education initiated within context of evaluation. Evaluation time included review of current medical information, gathering information related to past medical, social and functional history, completion of standardized testing, formal and informal observation of tasks, assessment of data and development of plan of care and goals. Treatment time included skilled education and facilitation of tasks to increase safety and independence with functional mobility for improved independence and quality of life. Assessment: Body structures, Functions, Activity limitations: Decreased functional mobility , Decreased strength, Decreased endurance, Decreased balance, Decreased safe awareness  Assessment: Patient tolerated PT session fairly well. He currently presents with impairments in strength, balance, and endurance. He also lacks safety awareness requiring cues throughout. He is normally independent and currently requires CGA to safely complete mobility due to these impairments. He is in need of continued PT services to address this. Discharge Recommendations:  Discharge Recommendations: Continue to assess pending progress    Patient Education:  PT Education: PT Role, Plan of Care, Transfer Training, Gait Training, General Safety    Equipment Recommendations:  Equipment Needed: No(has a RW)    Plan:  Times per week: 6x  Current Treatment Recommendations: Strengthening, Functional Mobility Training, Equipment Evaluation, Education, & procurement, Transfer Training, Gait Training, Safety Education & Training, Patient/Caregiver Education & Training, Stair training, Endurance Training, Balance Training    Goals:  Patient goals : Go home  Short term goals  Time Frame for Short term goals: 1 week  Short term goal 1: Patient will transfer supine <--> sit with mod I in order to get into/out of bed.   Short term goal 2: Patient will transfer sit <--> stand with SBA from various surfaces, in order to get up to ambulate. Short term goal 3: Patient will ambulate >50' with a RW and SBA for household distances. Short term goal 4: Patient will negotiate 3 steps with B hand rails and SBA for home entry. Short term goal 5: Patient will complete car transfer with CGA for transportation needs. Short term goal 6: Patient will complete TUG in <30 seconds which indicates decreased falls risk. Short term goal 6: Patient will complete TUG in <30 seconds which indicates decreased falls risk. Long term goals  Time Frame for Long term goals : 2 weeks  Long term goal 1: Patient will transfer sit <--> stand with mod I from various surfaces, in order to get up to ambulate. Long term goal 2: Patient will ambulate >80' with a RW and mod I for community distances. Long term goal 3: Patient will negotiate 3 steps with B hand rails and supervision for home entry. Long term goal 4: Patient will complete car transfer with SBA for transportation needs. Long term goal 5: Patient will complete TUG in <20 seconds which indicates decreased falls risk. Following session, patient left in safe position with all fall risk precautions in place.     Elijah Davis, PT, DPT

## 2019-08-01 NOTE — PROGRESS NOTES
[facility]  Transitional Care Unit Preadmission Assessment    Patient Name: Buck Vallejo        MRN: [id number]    [place]: [de-identified]    : 10/6/1928  (80 y.o.)  Gender: male     Admitted From:  [x][facility] []Outside Admission - Location:  Date of Admission to the hospital:2019  Date patient eligible for admission:  Primary Diagnosis Deconditioning   Did patient have surgery?   [] Yes- Explain:   [x] No    Physicians:, Hospitalist  Risk for clinical complications/co-morbidities:   Past Medical History:   Diagnosis Date    Arthritis     CAD (coronary artery disease)     Colostomy status ([facility]     Diastolic CHF (HCC)     GERD (gastroesophageal reflux disease)     HTN (hypertension) 2013    Hyperlipidemia     Hypothyroid     Panlobular emphysema ([facility] 2017    Prostate cancer ([facility]     S/P CABG (coronary artery bypass graft)     Type 2 diabetes mellitus with stage 3 chronic kidney disease, with long-term current use of insulin ([facility]      Financial Information  Primary insurance:  []Medicare [x] Medicare HMO  []Commercial insurance    []Medicaid   []Workers Compensation      Secondary Insurance:  []Medicare [] Medicare HMO  []Commercial insurance    []Medicaid   []Workers Compensation [x]None    Precautions:   []Cardiac Precautions  []Total hip precautions    []Weight Bearing status:  [x]Safety Precautions/Concerns fall precautions  []Visually impaired   []Hard of Hearing     Isolation Precautions:         []Yes  [x]No  If Yes:  [] Droplet  []Contact []Airborne                   []VRE          []MRSA               []C-diff         [] TB  [] Other:       Skills:   [x]Physical therapy     [x]Occupational Therapy   []IV Antibiotics   [] IV meds   [] TPN     []PEG tube Feedings      []New Colostomy   [] Ileostomy care/teaching    [] Speech Therapy   []Wound Vac   []Complex Dressing Changes    []Terminal care    []Other       Has patient had Prior Skilled care in the Last 60 days:  []Yes  [x]NO   If Yes:   Skilled Facility:    How many skilled days were used?

## 2019-08-01 NOTE — PROGRESS NOTES
awareness, decreased insight, increased agitation    ADL;s:  Feeding: Stand by assistance(seated EOB for breakfast)  Grooming: Setup, Increased time to complete, Verbal cueing, Contact guard assistance(oral care with setup in supine d/t pt refusal to complete in any other position; max set up with significant difficulty noted for applying toothpaste to toothbrush, pt unable to open toothpaste)  UE Bathing: Verbal cueing, Increased time to complete, Contact guard assistance, Setup(CGA at EOB with cues for thoroughness)  LE Bathing: Moderate assistance, Setup, Verbal cueing, Increased time to complete(assist for distal BLE, able to wash artie in standing with 1ue release from RW with CGA)  UE Dressing: Moderate assistance(mod A for doffing shirt, assist for threading RUE into shirt, assist for down in back)  LE Dressing: Maximum assistance(assist for doffing and donning socks, threading BLE into shorts and mod A for up over hips, assist for clothing mangament with colostomy)  Toileting: None(pt refusal)       Functional Mobility:  Bed mobility  Supine to Sit: Contact guard assistance  Sit to Supine:  Moderate assistance(at BLE)  Scooting: Contact guard assistance    Functional Mobility  Functional Mobility Comments: OTR to assess     Balance:  Balance  Sitting Balance: Contact guard assistance  Standing Balance: Contact guard assistance  Standing Balance  Time: 1 min x2 events  Activity: within ADLs    Transfers:  Sit to stand: Contact guard assistance  Stand to sit: Contact guard assistance       Upper Extremity Assessment:   LUE General AROM: appears limited at shoulder d/t pain, guarded throughout ADL tasks  RUE General AROM: appears limited at shoulder d/t pain, guarded throughout ADL tasks    LUE Strength  LUE Strength Comment: gross deconditioning noted throughout ADL tasks  RUE Strength  RUE Strength Comment: gross deconditioning noted throughout ADL tasks            Activity Tolerance: Patient limited by functional mobility by OTR  Short term goal 2: Pt will demo dynamic standing tasks with 1-2 UE for greater than 2 mins with no greater than CGA for increased indep with clothing mangement  Short term goal 3: Pt will complete various ADL transfers with no greater than SBA and min cues for safety for inc indep with toileting  Short term goal 4: Pt will complete BUE AROM and low resistance strengthening to increase his endurance and strength for ease of doing self care. Long term goals  Time Frame for Long term goals : 3-4 weeks  Long term goal 1: Pt to demonstrate standing tolerance greater than 6 mins with SBA for increased indep with preferred occupations  Long term goal 2: Pt to demonstate BADL routine using any DME necessary and no greater than SBA for increased indep with self cares    Following session, patient left in safe position with all fall risk precautions in place.

## 2019-08-01 NOTE — PLAN OF CARE
Problem: Activity:  Goal: Ability to tolerate increased activity will improve  Description  Ability to tolerate increased activity will improve  Outcome: Ongoing  Note:   Up with one assist and a walker. Problem: Falls - Risk of:  Goal: Will remain free from falls  Description  Will remain free from falls  Outcome: Ongoing  Goal: Absence of physical injury  Description  Absence of physical injury  Outcome: Ongoing  Note:   Patient verbalizes understanding of fall precautions, uses call light appropriately. Problem: Pain:  Goal: Pain level will decrease  Description  Pain level will decrease  Outcome: Ongoing  Goal: Control of acute pain  Description  Control of acute pain  Outcome: Ongoing  Goal: Control of chronic pain  Description  Control of chronic pain  Outcome: Ongoing  Note:   Patient satisfied with pain control, using rest and re-positioning to help relieve pain. Problem: Agitation  Goal: Able to control aggressive behavior  Outcome: Ongoing  Goal: Appropriate behavior observed  Outcome: Ongoing  Note:   Patient  behavior remains appropriate this shift. Problem: Risk for Impaired Skin Integrity  Goal: Tissue integrity - skin and mucous membranes  Description  Structural intactness and normal physiological function of skin and  mucous membranes. Outcome: Ongoing  Note:   Skin remains clean dry and intact. Problem: Bowel/Gastric:  Goal: Complications related to the disease process, condition or treatment will be avoided or minimized  Outcome: Ongoing  Goal: Will be independent with ostomy care  Outcome: Ongoing  Note:   Ostomy remains without complications.

## 2019-08-01 NOTE — PROGRESS NOTES
Alert and oriented to person and birth date. WES 3mm to 2mm brisk. Hair dry and shiny. Mucous membranes pink and moist. Skin warm and dry. Speech clear, has difficulty hearing what is being said to him. Denies difficulty swallowing. Lung sounds clear throughout. Respirations deep and unlabored. No cough noted. Heart sounds distant and irregular. Bowel sounds active in all four quadrants, patient has a colostomy in the left lower quadrant, Brown liquid drainage in colostomy bag, stoma no swelling or inflammation. Denies any difficulty or pain with urination. Radial pulses strong bilaterally. Refused arm drift and hand grasp. INT in the right hand, catheter in tact, no redness, swelling or pain at the site. Dressing is clean, dry, and intact. Capillary refill less than 3 seconds. Skin turgor brisk. No edema noted. Patient denied doing Pedal push and pull, homans sign, and leg lift bilaterally. Denies any numbness or tingling. Patient in bed, semi fowlers, resting, eyes closed. Wheels locked, call light in reach, over bed table in reach, side rales up times 2.

## 2019-08-01 NOTE — PROGRESS NOTES
Nephrology Progress Note    Patient - Alveria Cogan   MRN -  [id number]   Acct # - [de-identified]      - 10/6/1928    80 y.o. Admit Date: 2019  Hospital Day: 1  Location: [address] [place] [address]  Date of evaluation -  2019    Subjective:   CC: did not feel good, fall  Denies sob. Room air  Fair appetite  Seroquel given last mario and this AM for agitation, now drowsy. Awoke with name   BP Range: Systolic (03FGC), DSW:077 , Min:130 , ZBC:495      Diastolic (40CVY), ILL:27, Min:60, Max:74    Objective:   VITALS:  BP (!) 140/60   Pulse 62   Temp 97.6 °F (36.4 °C) (Oral)   Resp 14   Ht 5' 10\" (1.778 m)   Wt 213 lb 4.8 oz (96.8 kg)   SpO2 99%   BMI 30.61 kg/m²    Patient Vitals for the past 24 hrs:   BP Temp Temp src Pulse Resp SpO2 Height Weight   19 0830 (!) 140/60 -- -- 62 -- -- -- --   19 0815 (!) 140/60 97.6 °F (36.4 °C) Oral 62 14 99 % -- --   19 0739 (!) 169/74 -- -- -- -- -- -- --   19 0629 -- -- -- -- -- -- -- 213 lb 4.8 oz (96.8 kg)   19 2238 (!) 153/66 98 °F (36.7 °C) Oral 57 16 97 % -- --   19 1550 (!) 153/73 98.5 °F (36.9 °C) Oral 57 16 98 % -- --   19 1540 -- -- -- -- -- -- 5' 10\" (1.778 m) 214 lb (97.1 kg)          Intake/Output Summary (Last 24 hours) at 2019 1047  Last data filed at 2019 0815  Gross per 24 hour   Intake 500 ml   Output 900 ml   Net -400 ml       Admission weight: 214 lb (97.1 kg)    Wt Readings from Last 3 Encounters:   19 213 lb 4.8 oz (96.8 kg)   19 209 lb 1.6 oz (94.8 kg)   18 230 lb (104.3 kg)     Body mass index is 30.61 kg/m². EXAM:  CONSTITUTIONAL:  No acute distress. HEENT:  Head is normocephalic, Extraocular movement intact. Neck is supple. Voice is clear. CARDIOVASCULAR:  S1, S2  regular rate and rhythm. RESPIRATORY: Clear to ausculation bilaterally. Equal breath sounds. No wheezes.  No shortness of breath noted at rest.  ABDOMEN: soft, non tender  NEUROLOGICAL: Patient is drowsy and oriented to person. Recent and remote memory partially intact. Thought is not always coherant. SKIN: no rash, No significant bruises on exposed surfaces  MUSCULOSKELETAL: Movement is coordinated. Moves all extremities   EXTREMITIES: Distal lower extremity temp is warm, No lower extremity edema. PSYCHIATRIC: mood and affect appropriate at present. Medications:   Med reviewed  Scheduled Meds:   pioglitazone  30 mg Oral Daily    carvedilol  6.25 mg Oral BID WC    hydrALAZINE  100 mg Oral 3 times per day    insulin glargine  26 Units Subcutaneous QAM    levothyroxine  75 mcg Oral QAM    lidocaine  1 patch Topical Daily    pantoprazole  40 mg Oral QAM AC    [START ON 8/3/2019] ppd   Does not apply Weekly    sodium chloride  1 g Oral TID WC    tuberculin  5 Units Intradermal Weekly    enoxaparin  40 mg Subcutaneous Q24H     Labs:   Labs reviewed  Recent Labs     07/30/19  0653 07/31/19  0541 08/01/19  0551   * 134* 134*   K 4.4 4.5 4.6    101 101   CO2 22* 20* 20*   BUN 24* 24* 32*   CREATININE 1.3* 1.3* 1.6*   LABGLOM 52* 52* 41*   GLUCOSE 103 116* 166*   CALCIUM 8.9 8.8 9.1       ASSESSMENT:  1. 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 Continue 1500 fluid restriction. Continue salt tabs to 1 gm 3x/d. Monitor Na while on prn Seroquel for agitation. 2. Acute Kidney Injury, etiology unclear, although consider Acute Interstitial Nephritis due to Protonix. Change to Pepcid once daily  3. Essential Hypertension, At control  4. Chronic Kidney Disease Stage III, creatinine better than baseline  5. 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 Memory loss  7. Hx ulcerative colitis with colostomy  8. Coronary artery disease S/P CABG  9. Hx prostate Ca  10.  Deconditioning    Principal Problem:    SIADH (syndrome of inappropriate ADH production) ([address]  Active Problems:    Essential hypertension    Prostate CA ([facility]    Colon cancer ([facility] Hypothyroidism    CAD (coronary artery disease)    Type 2 diabetes mellitus with stage 3 chronic kidney disease, with long-term current use of insulin (HCC)    S/P CABG (coronary artery bypass graft)    Hyperlipidemia    Class 1 obesity due to excess calories with serious comorbidity and body mass index (BMI) of 30.0 to 30.9 in adult    Chronic hyponatremia    Colostomy status (HCC)    Panlobular emphysema (HCC)    Generalized weakness    Acute metabolic encephalopathy    Physical deconditioning    Cerebrovascular disease  Resolved Problems:    * No resolved hospital problems.  MY Munson CNP 10:47 AM 8/1/2019

## 2019-08-01 NOTE — PROGRESS NOTES
Certification for TCU Admission    Name:  Ester Blanco    MR#:    [id number]  [place]: [de-identified]      :   10/6/1928    [facility] Type: Transitional Care Unit     Dx: Debility     Patient Active Problem List   Diagnosis    HTN (hypertension)    [facility]    Colon cancer ([facility]    Diabetes mellitus ([facility]    SHAYAN (acute kidney injury) ([facility]    Hypothyroidism    CAD (coronary artery disease)    Chest pain    Hyponatremia    Anemia    Dyspnea    Type 2 diabetes mellitus with stage 3 chronic kidney disease, with long-term current use of insulin ([facility]    S/P CABG (coronary artery bypass graft)    CKD (chronic kidney disease)    Hyperlipidemia    Obesity    Syncope    Pyelonephritis    SIRS (systemic inflammatory response syndrome) ([facility])    Hyponatremia    SHAYAN (acute kidney injury) ([facility]    Colostomy status ([facility])    Panlobular emphysema (HCC)    Generalized weakness    Traumatic hematoma of scalp    Scalp abrasion    Subdural hematoma (HCC)    CKD (chronic kidney disease) stage 3, GFR 30-59 ml/min ([facility])    Acute metabolic encephalopathy    Physical deconditioning       Code Status: Full Code      Rehabilitation Potential: Good     Prognosis: Good     Stability: Stable     History & Physical current: Yes   If No, note changes in H&P update     Level of Care Recommendation/Request: Skilled     Physician Certification: I certify that I have reviewed the information contained in the discharge summary and that the information is a true and accurate reflection of the individual's condition. I certify this resident is appropriate for skilled services provided in the TCU/ECF for a condition which the individual received inpatient care. Certification: I certify the orders, information, and transfer of said patient is necessary for the continuing treatment of the diagnosis listed in a skilled care setting providing skilled nursing and/or skilled rehabilitative services. The patient will require on a daily basis SNF covered care.     Electronically signed by Lizette Cabrera MD on 8/1/2019 at 8:09 AM

## 2019-08-01 NOTE — DISCHARGE SUMMARY
Stable.         Elizabeth Fuentes M.D.    D: 07/31/2019 13:48:03       T: 08/01/2019 1:40:36     [email or web address]  Job#: [id number]     Doc#: [id number]    CC:

## 2019-08-02 LAB
ANION GAP SERPL CALCULATED.3IONS-SCNC: 13 MEQ/L (ref 8–16)
BUN BLDV-MCNC: 36 MG/DL (ref 7–22)
CALCIUM SERPL-MCNC: 9 MG/DL (ref 8.5–10.5)
CHLORIDE BLD-SCNC: 102 MEQ/L (ref 98–111)
CO2: 19 MEQ/L (ref 23–33)
CREAT SERPL-MCNC: 1.7 MG/DL (ref 0.4–1.2)
GFR SERPL CREATININE-BSD FRML MDRD: 38 ML/MIN/1.73M2
GLUCOSE BLD-MCNC: 118 MG/DL (ref 70–108)
GLUCOSE BLD-MCNC: 122 MG/DL (ref 70–108)
POTASSIUM SERPL-SCNC: 4.5 MEQ/L (ref 3.5–5.2)
SODIUM BLD-SCNC: 134 MEQ/L (ref 135–145)

## 2019-08-02 PROCEDURE — 36415 COLL VENOUS BLD VENIPUNCTURE: CPT

## 2019-08-02 PROCEDURE — 6360000002 HC RX W HCPCS: Performed by: FAMILY MEDICINE

## 2019-08-02 PROCEDURE — 82948 REAGENT STRIP/BLOOD GLUCOSE: CPT

## 2019-08-02 PROCEDURE — 97116 GAIT TRAINING THERAPY: CPT

## 2019-08-02 PROCEDURE — 6360000002 HC RX W HCPCS: Performed by: PSYCHIATRY & NEUROLOGY

## 2019-08-02 PROCEDURE — 6370000000 HC RX 637 (ALT 250 FOR IP): Performed by: PSYCHIATRY & NEUROLOGY

## 2019-08-02 PROCEDURE — 6370000000 HC RX 637 (ALT 250 FOR IP): Performed by: NURSE PRACTITIONER

## 2019-08-02 PROCEDURE — 99232 SBSQ HOSP IP/OBS MODERATE 35: CPT | Performed by: INTERNAL MEDICINE

## 2019-08-02 PROCEDURE — 1290000000 HC SEMI PRIVATE OTHER R&B

## 2019-08-02 PROCEDURE — 80048 BASIC METABOLIC PNL TOTAL CA: CPT

## 2019-08-02 PROCEDURE — 6370000000 HC RX 637 (ALT 250 FOR IP): Performed by: INTERNAL MEDICINE

## 2019-08-02 PROCEDURE — 97110 THERAPEUTIC EXERCISES: CPT

## 2019-08-02 PROCEDURE — 97535 SELF CARE MNGMENT TRAINING: CPT

## 2019-08-02 PROCEDURE — 92523 SPEECH SOUND LANG COMPREHEN: CPT

## 2019-08-02 PROCEDURE — 6370000000 HC RX 637 (ALT 250 FOR IP): Performed by: FAMILY MEDICINE

## 2019-08-02 RX ORDER — HALOPERIDOL 5 MG/ML
1 INJECTION INTRAMUSCULAR ONCE
Status: COMPLETED | OUTPATIENT
Start: 2019-08-02 | End: 2019-08-02

## 2019-08-02 RX ORDER — QUETIAPINE FUMARATE 25 MG/1
25 TABLET, FILM COATED ORAL 3 TIMES DAILY PRN
Status: DISCONTINUED | OUTPATIENT
Start: 2019-08-02 | End: 2019-08-03

## 2019-08-02 RX ADMIN — CARVEDILOL 6.25 MG: 6.25 TABLET, FILM COATED ORAL at 17:49

## 2019-08-02 RX ADMIN — FAMOTIDINE 20 MG: 20 TABLET ORAL at 09:37

## 2019-08-02 RX ADMIN — PIOGLITAZONE 30 MG: 30 TABLET ORAL at 09:36

## 2019-08-02 RX ADMIN — SODIUM CHLORIDE TAB 1 GM 1 G: 1 TAB at 12:02

## 2019-08-02 RX ADMIN — LEVOTHYROXINE SODIUM 75 MCG: 75 TABLET ORAL at 09:36

## 2019-08-02 RX ADMIN — CARVEDILOL 6.25 MG: 6.25 TABLET, FILM COATED ORAL at 09:36

## 2019-08-02 RX ADMIN — INSULIN GLARGINE 26 UNITS: 100 INJECTION, SOLUTION SUBCUTANEOUS at 09:38

## 2019-08-02 RX ADMIN — SODIUM CHLORIDE TAB 1 GM 1 G: 1 TAB at 17:49

## 2019-08-02 RX ADMIN — QUETIAPINE FUMARATE 25 MG: 25 TABLET ORAL at 17:49

## 2019-08-02 RX ADMIN — HALOPERIDOL LACTATE 1 MG: 5 INJECTION INTRAMUSCULAR at 18:37

## 2019-08-02 RX ADMIN — HYDRALAZINE HYDROCHLORIDE 100 MG: 50 TABLET, FILM COATED ORAL at 12:02

## 2019-08-02 RX ADMIN — SODIUM CHLORIDE TAB 1 GM 1 G: 1 TAB at 09:36

## 2019-08-02 RX ADMIN — HYDRALAZINE HYDROCHLORIDE 100 MG: 50 TABLET, FILM COATED ORAL at 23:06

## 2019-08-02 RX ADMIN — ENOXAPARIN SODIUM 40 MG: 40 INJECTION SUBCUTANEOUS at 23:06

## 2019-08-02 RX ADMIN — HYDRALAZINE HYDROCHLORIDE 100 MG: 50 TABLET, FILM COATED ORAL at 06:26

## 2019-08-02 RX ADMIN — QUETIAPINE FUMARATE 25 MG: 25 TABLET ORAL at 09:34

## 2019-08-02 ASSESSMENT — PAIN SCALES - GENERAL
PAINLEVEL_OUTOF10: 0

## 2019-08-02 ASSESSMENT — PAIN - FUNCTIONAL ASSESSMENT: PAIN_FUNCTIONAL_ASSESSMENT: ACTIVITIES ARE NOT PREVENTED

## 2019-08-02 NOTE — PROGRESS NOTES
Renal Progress Note    Assessment and Plan:    1. Hyponatremia improved and stable  2. Stage IIIb chronic kidney disease stable  3. Primary hypertension  4. Deconditioning  5. Normocytic anemia  6.   Diabetes mellitus type 2   PLAN:   Labs reviewed  Serum sodium is stable  Serum creatinine is mostly stable  Medications reviewed  No changes  BMP on 5 August 2019  We will follow    Patient Active Problem List:     Essential hypertension     Prostate CA ([facility]     Colon cancer ([facility]     Diabetes mellitus ([facility]     SHAYAN (acute kidney injury) ([facility]     Hypothyroidism     CAD (coronary artery disease)     Chest pain     Hyponatremia     Anemia     Dyspnea     Type 2 diabetes mellitus with stage 3 chronic kidney disease, with long-term current use of insulin ([facility])     S/P CABG (coronary artery bypass graft)     CKD (chronic kidney disease)     Hyperlipidemia     Class 1 obesity due to excess calories with serious comorbidity and body mass index (BMI) of 30.0 to 30.9 in adult     Syncope     Pyelonephritis     SIRS (systemic inflammatory response syndrome) ([facility])     Chronic hyponatremia     SHAYAN (acute kidney injury) ([facility]     Colostomy status ([facility])     Panlobular emphysema ([facility])     Generalized weakness     Traumatic hematoma of scalp     Scalp abrasion     Subdural hematoma ([facility])     CKD (chronic kidney disease) stage 3, GFR 30-59 ml/min ([facility])     Acute metabolic encephalopathy     Physical deconditioning     SIADH (syndrome of inappropriate ADH production) ([facility]     Cerebrovascular disease      Subjective:   Admit Date: 7/31/2019    Interval History:   Seen for hyponatremia and chronic kidney disease  Very sleepy this morning  Updated by the staff  No new issues  Blood pressure is very variable  Okay urine output        Medications:   Scheduled Meds:   pioglitazone  30 mg Oral Daily    famotidine  20 mg Oral Daily    carvedilol  6.25 mg Oral BID WC    hydrALAZINE  100 mg Oral 3 times per day    insulin glargine  26 Units Subcutaneous QAM    levothyroxine  75 mcg Oral QAM    lidocaine  1 patch Topical Daily    [START ON 8/3/2019] ppd   Does not apply Weekly    sodium chloride  1 g Oral TID WC    tuberculin  5 Units Intradermal Weekly    enoxaparin  40 mg Subcutaneous Q24H     Continuous Infusions:   dextrose         CBC: No results for input(s): WBC, HGB, PLT in the last 72 hours. CMP:    Recent Labs     07/31/19  0541 08/01/19  0551 08/02/19  0442   * 134* 134*   K 4.5 4.6 4.5    101 102   CO2 20* 20* 19*   BUN 24* 32* 36*   CREATININE 1.3* 1.6* 1.7*   GLUCOSE 116* 166* 122*   CALCIUM 8.8 9.1 9.0   LABGLOM 52* 41* 38*     Troponin: No results for input(s): TROPONINI in the last 72 hours. BNP: No results for input(s): BNP in the last 72 hours. INR: No results for input(s): INR in the last 72 hours. Lipids: No results for input(s): CHOL, LDLDIRECT, TRIG, HDL, AMYLASE, LIPASE in the last 72 hours. Liver: No results for input(s): AST, ALT, ALKPHOS, PROT, LABALBU, BILITOT in the last 72 hours. Invalid input(s): BILDIR  Iron:  No results for input(s): IRONS, FERRITIN in the last 72 hours. Invalid input(s): LABIRONS    Objective:   Vitals: BP (!) 140/60   Pulse 65   Temp 97.7 °F (36.5 °C) (Oral)   Resp 14   Ht 5' 10\" (1.778 m)   Wt 214 lb 6.4 oz (97.3 kg)   SpO2 98%   BMI 30.76 kg/m²    Wt Readings from Last 3 Encounters:   08/02/19 214 lb 6.4 oz (97.3 kg)   07/30/19 209 lb 1.6 oz (94.8 kg)   12/26/18 230 lb (104.3 kg)      24HR INTAKE/OUTPUT:      Intake/Output Summary (Last 24 hours) at 8/2/2019 1059  Last data filed at 8/2/2019 4250  Gross per 24 hour   Intake 690 ml   Output 800 ml   Net -110 ml       Constitutional: Comfortably asleep  Skin:normal   HEENT:Pupils are reactive . Throat is clear   Neck:supple with no thyromegaly  Cardiovascular:  S1, S2 without murmur rubs  Respiratory: Clear to auscultation  Abdomen: +bs, soft,   Ext: No LE edema  Musculoskeletal:Intact  Neuro: Deferred      Electronically signed by Everett Bailey MD on 8/2/2019 at 10:59 AM

## 2019-08-02 NOTE — PROGRESS NOTES
[address] [address]  [address]  Occupational Therapy  Daily Note  Time:   Time In: 4270  Time Out: 0902  Timed Code Treatment Minutes: 37 Minutes  Minutes: 43          Date: 2019  Patient Name: Gabe Flores,   Gender: male      Room[facility]/056-A  MRN: [id number]  : 10/6/1928  (80 y.o.)  Referring Practitioner: Ordering: Adrianne Orozco MD Attending: Dr. Zainab Metcalf  Diagnosis: Physical deconditioning  Additional Pertinent Hx: Per H&P: 80 y.o. male admitted to the transitional care unit on 2019. He came to the ED with concern of weakness and mental status changes. He was found to have a worsening of the chronic hyponatremia and a uti. He has become medically stable enough to now come to the TCU before a final choice for disposition is made    Restrictions/Precautions:  Restrictions/Precautions: Fall Risk, General Precautions    SUBJECTIVE: increased cooperation noted d/t family present, pt does require encouragement however mostly pleasant this date    PAIN: 0/8: pt denied pain    COGNITION: Decreased Judgment, Decreased Problem Solving, Decreased Insight, Decreased Initiation and Decreased Sequencing    ADL:   Feeding: Modified Independent. breakfast reclined in chair  [address] and Moderate Assistance. CGA with cues provided for standing sinkside for shaving. pt did demo 2 minor LOB requiring min A of OT to prevent fall and assist with setup of task. pt required mod A for combing hair d/t knots located on pt left side. did not pt using LUE to assist with supporting RUE throughout tasks, pt required encouragment and education on need to perform tasks with overall decreased initiation, cues for thoroughness and problem solving through familiar tasks  Upper Extremity Dressing: Moderate Assistance.   assist for threading 2nd UE and for buttoning shirt, pt endorses difficulty with tasks, family affirms  Lower Extremity Dressing: Minimal Assistance. for threading 2nd LE, CGA for standing balance and up over hips  Toileting: CGA no hygiene. STS  Toilet Transfer: Minimal Assistance. STS. Confirmed pt PLOF with family this date. Pt jung have a tub/shower however does not have grab bars or shower chair. Pt was sponge bathing PTA. Pt was able to dress without assistance however would only button one button d/t difficutly with task. Pt was able to manage own colostomy. Pt was driving however family reports his keys have now been taken away. Pt's family reports he is close to his PLOF however would like to see him be stronger, have more balance, have improved [facility] and improved temper. BALANCE:  Sitting Balance:  Stand By Assistance at edge of chair  Standing Balance: Contact Guard Assistance, with 2 minor LOB requiring min A    TRANSFERS:  Sit to Stand:  Contact Guard Assistance. reciner  Stand to Sit: [employer]. reckiner    FUNCTIONAL MOBILITY:  Assistive Device: Rolling Walker  Assist Level:  Contact Guard Assistance and with verbal cues . Distance: To and from bathroom  VCs required for safety and use of RW          ASSESSMENT:  Activity Tolerance:  Patient tolerance of  treatment: fair. Discharge Recommendations: Continue to assess pending progress, 24 hour supervision or assist  Equipment Recommendations:  Other: monitor needs  Plan: Times per week: 6x  Current Treatment Recommendations: Self-Care / ADL, Endurance Training, Functional Mobility Training, Safety Education & Training, Balance Training, Strengthening, Patient/Caregiver Education & Training, Equipment Evaluation, Education, & procurement, Home Management Training    Patient Education  Patient Education: Role of OT, Plan of Care, Adaptive ADL Techniques, Family Education, Equipment Education, Importance of Increasing Activity and Assistive Device Safety    Goals  Short term goals  Time Frame for Short term goals: 1 week  Short term goal 1: Pt will tolerate further assessment of functional mobility by OTR  Short term goal 2: Pt will demo dynamic standing tasks with 1-2 UE for greater than 2 mins with no greater than CGA for increased indep with clothing mangement  Short term goal 3: Pt will complete various ADL transfers with no greater than SBA and min cues for safety for inc indep with toileting  Short term goal 4: Pt will complete BUE AROM and low resistance strengthening to increase his endurance and strength for ease of doing self care. Long term goals  Time Frame for Long term goals : 3-4 weeks  Long term goal 1: Pt to demonstrate standing tolerance greater than 6 mins with SBA for increased indep with preferred occupations  Long term goal 2: Pt to demonstate BADL routine using any DME necessary and no greater than SBA for increased indep with self cares    Revised Short-Term Goals  Short term goals  Time Frame for Short term goals: 1 week  Short term goal 1: Pt will demonstrate HH distance functional mobility with no greater than SBA for inc indep with accessing environment  Short term goal 2: Pt will demo dynamic standing tasks with 1-2 UE for greater than 2 mins with no greater than SBA with 0 LOB for increased indep with clothing mangement  Short term goal 3: Pt will complete various ADL transfers with no greater than SBA and min cues for safety for inc indep with toileting  Short term goal 4: Pt will complete BUE AROM and [address] activities to increase his endurance, strength and coordination for ease of doing self care. Long term goals  Time Frame for Long term goals : 3-4 weeks  Long term goal 1: Pt to demonstrate standing tolerance greater than 6 mins with SBA for increased indep with preferred occupations  Long term goal 2: Pt to demonstate BADL routine using any DME necessary and no greater than SBA for increased indep with self cares      Following session, patient left in safe position with all fall risk precautions in place.

## 2019-08-02 NOTE — PROGRESS NOTES
[address] [address]  INPATIENT PHYSICAL THERAPY  DAILY NOTE  - [facility]UR[facility] UNIT - 8E-56/056-A    Time In: 0700  Time Out: 9715  Timed Code Treatment Minutes: 55 Minutes  Minutes: 46          Date: 2019  Patient Name: Radames Dugan,  Gender:  male        MRN: [id number]  : 10/6/1928  (80 y.o.)  Referral Date : 19  Referring Practitioner: ANTONI Rivera MD  Diagnosis: Physical deconditioning  Additional Pertinent Hx: 80 y.o. male admitted to the transitional care unit on 2019. He came to the ED with concern of weakness and mental status changes. He was found to have a worsening of the chronic hyponatremia and a uti. He has become medically stable enough to now come to the TCU before a final choice for disposition is made     Prior Level of Function:  Lives With: Spouse, Daughter  Type of Home: House  Home Layout: One level  Home Access: Stairs to enter without rails  Entrance Stairs - Number of Steps: 3  Entrance Stairs - Rails: Both  Home Equipment: Rolling walker, Cane    Restrictions/Precautions:  Restrictions/Precautions: Fall Risk, General Precautions    SUBJECTIVE: pt. In bed, agitated initially \"It's too early for this! \" with some persuasion pt. Agreed to there. Ex and getting up for breakfast and by the end of session, pt. was smiling rather pleasant. PAIN: no    OBJECTIVE:  Bed Mobility:  Rolling to Right: Modified Independent, with head of bed raised, with rail   Supine to Sit: Supervision, with head of bed raised, with increased time for completion, with rail  Scooting: Modified Independent, scoot to edge of bed and str.  back arm chair in gym area    Transfers:  Sit to Stand: Stand By Assistance, with verbal cues, to/from chair with arms, vc for hand placement, pt. does not follow instructions  Stand to [address] [place] [address], [employer], with verbal cues, sba-> lt. cga vc for hand placement    Ambulation:  Stand By Assistance, with verbal cues 4: Patient will negotiate 3 steps with B hand rails and SBA for home entry. Short term goal 5: Patient will complete car transfer with CGA for transportation needs. Short term goal 6: Patient will complete TUG in <30 seconds which indicates decreased falls risk. Short term goal 6: Patient will complete TUG in <30 seconds which indicates decreased falls risk. Long term goals  Time Frame for Long term goals : 2 weeks  Long term goal 1: Patient will transfer sit <--> stand with mod I from various surfaces, in order to get up to ambulate. Long term goal 2: Patient will ambulate >80' with a RW and mod I for community distances. Long term goal 3: Patient will negotiate 3 steps with B hand rails and supervision for home entry. Long term goal 4: Patient will complete car transfer with SBA for transportation needs. Long term goal 5: Patient will complete TUG in <20 seconds which indicates decreased falls risk. Following session, patient left in safe position with all fall risk precautions in place.

## 2019-08-02 NOTE — PROGRESS NOTES
Nutrition Assessment    Type and Reason for Visit: (S) Initial, Consult(new TCU admit)    Nutrition Recommendations:   Consider folbee plus. Send Glucerna twice/day. Nutrition Assessment Pt. nutritionally compromised AEB Na 134. At risk for further nutrition compromise r/t advanced age, [place], Hyponatremia, UTI, CAD, GERD, HTN, Type II DM, Stage III CKD. Roopa Ayon Consider folbee plus. Send Glucerna twice/day. Labs: (8/2) Na 134, BUN 36, Cretinine 1.7, Glucose 122, chemsticks 118-142. Meds: Lantus, Actos, Glycolax, Senokot. Malnutrition Assessment:  · Malnutrition Status: At risk for malnutrition    Nutrition Risk Level: Moderate    Nutrient Needs:  · Estimated Daily Total Kcal: 1457-1748kcals (15-18kcals/kgm admit wt. 97.1kgm 7/31)  · Estimated Daily Protein (g): 94 grams (1.2 grams protein/kgm IBW 78kgm)    Nutrition Diagnosis:   · Problem: Altered nutrition-related lab values  · Etiology: related to Renal dysfunction     Signs and symptoms:  as evidenced by Lab values    Objective Information:  · Nutrition-Focused Physical Findings: Advanced age, [place], unable to recall what he ate for lunch, denies chewing/swallowing difficulty, good appetite.    · Wound Type: None  · Current Nutrition Therapies:  · Oral Diet Orders: (carb controlled 1500ml fluid restriction)   · Oral Diet intake: %  · Oral Nutrition Supplement (ONS) Orders: Diabetic Oral Supplement  · ONS intake: %  · Anthropometric Measures:  · Ht: 5' 10\" (177.8 cm)   · Current Body Wt: 214 lb 8.1 oz (97.3 kg)  · Admission Body Wt: 214 lb 1.1 oz (97.1 kg)((7/31) no edema)  · Usual Body Wt: (223# (7/31/19) per EMR)  · % Weight Change:  ,  4% wt. loss in 2 days per EMR (different scales)  ·    · BMI Classification: BMI 30.0 - 34.9 Obese Class I    Nutrition Interventions:   Continue current diet, Continue current ONS  Continued Inpatient Monitoring, Education not appropriate at this time, Coordination of Care    Nutrition Evaluation:

## 2019-08-02 NOTE — PLAN OF CARE
Problem: Nutrition  Goal: Optimal nutrition therapy  Outcome: Ongoing   Nutrition Problem: Altered nutrition-related lab values  Intervention: Food and/or Nutrient Delivery: Continue current diet, Continue current ONS  Nutritional Goals: Pt's Na will improve during LOS. (8/2) Na 124.

## 2019-08-02 NOTE — PROGRESS NOTES
compesnatory strategies with 80% accuracy and min cues in order to improve awareness to hospital environment. Goal 2: Patient will complete immediate, delayed, and working memory tasks with 80% accuracy and min cues in order to improve retention of novel information. Goal 3: Patient will complete basic visual/verbal reasoning tasks with 80% accuracy with mod cues in order to improve completion of ADLs and IADLs. Goal 4: Patient will complete basic safety and reasoning tasks with 80% accuracy and mod cues in order to improve patient safety in hospital setting. LONG TERM GOALS:  Long-term Goals  Timeframe for Long-term Goals: 6 weeks  Goal 1: Patient will improve cognitive functioning to a supervision level in order to improve patient completion of ADLs and IADLs upon discharge.       Sophia Tirado M.S., CF-SLP

## 2019-08-03 LAB — GLUCOSE BLD-MCNC: 73 MG/DL (ref 70–108)

## 2019-08-03 PROCEDURE — 6370000000 HC RX 637 (ALT 250 FOR IP): Performed by: PSYCHIATRY & NEUROLOGY

## 2019-08-03 PROCEDURE — 1290000000 HC SEMI PRIVATE OTHER R&B

## 2019-08-03 PROCEDURE — 6370000000 HC RX 637 (ALT 250 FOR IP): Performed by: FAMILY MEDICINE

## 2019-08-03 PROCEDURE — 6360000002 HC RX W HCPCS: Performed by: FAMILY MEDICINE

## 2019-08-03 PROCEDURE — 6370000000 HC RX 637 (ALT 250 FOR IP): Performed by: INTERNAL MEDICINE

## 2019-08-03 PROCEDURE — 82948 REAGENT STRIP/BLOOD GLUCOSE: CPT

## 2019-08-03 PROCEDURE — 6370000000 HC RX 637 (ALT 250 FOR IP): Performed by: NURSE PRACTITIONER

## 2019-08-03 RX ORDER — INSULIN GLARGINE 100 [IU]/ML
13 INJECTION, SOLUTION SUBCUTANEOUS EVERY MORNING
Status: DISCONTINUED | OUTPATIENT
Start: 2019-08-04 | End: 2019-08-08

## 2019-08-03 RX ORDER — QUETIAPINE FUMARATE 25 MG/1
25 TABLET, FILM COATED ORAL 2 TIMES DAILY
Status: DISCONTINUED | OUTPATIENT
Start: 2019-08-03 | End: 2019-08-09 | Stop reason: HOSPADM

## 2019-08-03 RX ADMIN — CARVEDILOL 6.25 MG: 6.25 TABLET, FILM COATED ORAL at 09:06

## 2019-08-03 RX ADMIN — SODIUM CHLORIDE TAB 1 GM 1 G: 1 TAB at 18:34

## 2019-08-03 RX ADMIN — HYDRALAZINE HYDROCHLORIDE 100 MG: 50 TABLET, FILM COATED ORAL at 21:03

## 2019-08-03 RX ADMIN — QUETIAPINE FUMARATE 25 MG: 25 TABLET ORAL at 09:06

## 2019-08-03 RX ADMIN — SODIUM CHLORIDE TAB 1 GM 1 G: 1 TAB at 15:05

## 2019-08-03 RX ADMIN — CARVEDILOL 6.25 MG: 6.25 TABLET, FILM COATED ORAL at 18:34

## 2019-08-03 RX ADMIN — INSULIN GLARGINE 26 UNITS: 100 INJECTION, SOLUTION SUBCUTANEOUS at 09:06

## 2019-08-03 RX ADMIN — ACETAMINOPHEN 650 MG: 325 TABLET ORAL at 09:06

## 2019-08-03 RX ADMIN — PIOGLITAZONE 30 MG: 30 TABLET ORAL at 09:06

## 2019-08-03 RX ADMIN — HYDRALAZINE HYDROCHLORIDE 100 MG: 50 TABLET, FILM COATED ORAL at 15:06

## 2019-08-03 RX ADMIN — QUETIAPINE FUMARATE 25 MG: 25 TABLET ORAL at 15:06

## 2019-08-03 RX ADMIN — POLYETHYLENE GLYCOL 3350 17 G: 17 POWDER, FOR SOLUTION ORAL at 09:06

## 2019-08-03 RX ADMIN — LEVOTHYROXINE SODIUM 75 MCG: 75 TABLET ORAL at 09:06

## 2019-08-03 RX ADMIN — HYDRALAZINE HYDROCHLORIDE 100 MG: 50 TABLET, FILM COATED ORAL at 06:37

## 2019-08-03 RX ADMIN — ENOXAPARIN SODIUM 40 MG: 40 INJECTION SUBCUTANEOUS at 21:03

## 2019-08-03 RX ADMIN — SODIUM CHLORIDE TAB 1 GM 1 G: 1 TAB at 09:06

## 2019-08-03 RX ADMIN — FAMOTIDINE 20 MG: 20 TABLET ORAL at 09:06

## 2019-08-03 RX ADMIN — QUETIAPINE FUMARATE 25 MG: 25 TABLET ORAL at 21:03

## 2019-08-03 ASSESSMENT — PAIN SCALES - GENERAL
PAINLEVEL_OUTOF10: 0
PAINLEVEL_OUTOF10: 0

## 2019-08-03 ASSESSMENT — PAIN - FUNCTIONAL ASSESSMENT: PAIN_FUNCTIONAL_ASSESSMENT: ACTIVITIES ARE NOT PREVENTED

## 2019-08-04 PROCEDURE — 97110 THERAPEUTIC EXERCISES: CPT

## 2019-08-04 PROCEDURE — 6370000000 HC RX 637 (ALT 250 FOR IP): Performed by: INTERNAL MEDICINE

## 2019-08-04 PROCEDURE — 1290000000 HC SEMI PRIVATE OTHER R&B

## 2019-08-04 PROCEDURE — 6370000000 HC RX 637 (ALT 250 FOR IP): Performed by: NURSE PRACTITIONER

## 2019-08-04 PROCEDURE — 6370000000 HC RX 637 (ALT 250 FOR IP): Performed by: FAMILY MEDICINE

## 2019-08-04 PROCEDURE — 97116 GAIT TRAINING THERAPY: CPT

## 2019-08-04 PROCEDURE — 6360000002 HC RX W HCPCS: Performed by: FAMILY MEDICINE

## 2019-08-04 PROCEDURE — 6370000000 HC RX 637 (ALT 250 FOR IP): Performed by: PSYCHIATRY & NEUROLOGY

## 2019-08-04 RX ADMIN — HYDRALAZINE HYDROCHLORIDE 100 MG: 50 TABLET, FILM COATED ORAL at 05:30

## 2019-08-04 RX ADMIN — QUETIAPINE FUMARATE 25 MG: 25 TABLET ORAL at 10:02

## 2019-08-04 RX ADMIN — CARVEDILOL 6.25 MG: 6.25 TABLET, FILM COATED ORAL at 10:02

## 2019-08-04 RX ADMIN — HYDRALAZINE HYDROCHLORIDE 100 MG: 50 TABLET, FILM COATED ORAL at 23:05

## 2019-08-04 RX ADMIN — SODIUM CHLORIDE TAB 1 GM 1 G: 1 TAB at 14:40

## 2019-08-04 RX ADMIN — PIOGLITAZONE 30 MG: 30 TABLET ORAL at 10:02

## 2019-08-04 RX ADMIN — INSULIN GLARGINE 13 UNITS: 100 INJECTION, SOLUTION SUBCUTANEOUS at 10:03

## 2019-08-04 RX ADMIN — SODIUM CHLORIDE TAB 1 GM 1 G: 1 TAB at 10:02

## 2019-08-04 RX ADMIN — ENOXAPARIN SODIUM 40 MG: 40 INJECTION SUBCUTANEOUS at 20:29

## 2019-08-04 RX ADMIN — CARVEDILOL 6.25 MG: 6.25 TABLET, FILM COATED ORAL at 18:28

## 2019-08-04 RX ADMIN — ACETAMINOPHEN 650 MG: 325 TABLET ORAL at 14:40

## 2019-08-04 RX ADMIN — FAMOTIDINE 20 MG: 20 TABLET ORAL at 10:02

## 2019-08-04 RX ADMIN — QUETIAPINE FUMARATE 25 MG: 25 TABLET ORAL at 20:29

## 2019-08-04 RX ADMIN — ACETAMINOPHEN 650 MG: 325 TABLET ORAL at 10:02

## 2019-08-04 RX ADMIN — HYDRALAZINE HYDROCHLORIDE 100 MG: 50 TABLET, FILM COATED ORAL at 14:40

## 2019-08-04 RX ADMIN — LEVOTHYROXINE SODIUM 75 MCG: 75 TABLET ORAL at 10:02

## 2019-08-04 RX ADMIN — SODIUM CHLORIDE TAB 1 GM 1 G: 1 TAB at 18:28

## 2019-08-04 ASSESSMENT — PAIN SCALES - GENERAL: PAINLEVEL_OUTOF10: 0

## 2019-08-04 NOTE — PROGRESS NOTES
SBA for home entry. Short term goal 5: Patient will complete car transfer with CGA for transportation needs. Short term goal 6: Patient will complete TUG in <30 seconds which indicates decreased falls risk. Short term goal 6: Patient will complete TUG in <30 seconds which indicates decreased falls risk. Long term goals  Time Frame for Long term goals : 2 weeks  Long term goal 1: Patient will transfer sit <--> stand with mod I from various surfaces, in order to get up to ambulate. Long term goal 2: Patient will ambulate >80' with a RW and mod I for community distances. Long term goal 3: Patient will negotiate 3 steps with B hand rails and supervision for home entry. Long term goal 4: Patient will complete car transfer with SBA for transportation needs. Long term goal 5: Patient will complete TUG in <20 seconds which indicates decreased falls risk. Following session, patient left in safe position with all fall risk precautions in place.

## 2019-08-04 NOTE — PROGRESS NOTES
Functional Mobility Training, Safety Education & Training, Balance Training, Strengthening, Patient/Caregiver Education & Training, Equipment Evaluation, Education, & procurement, Home Management Training    Patient Education  Patient Education: Home Exercise Program, Reviewed Prior Education and Importance of Increasing Activity    Goals  Short term goals  Time Frame for Short term goals: 1 week  Short term goal 1: Pt will demonstrate [facility] distance functional mobility with no greater than SBA for inc indep with accessing environment  Short term goal 2: Pt will demo dynamic standing tasks with 1-2 UE for greater than 2 mins with no greater than SBA with 0 LOB for increased indep with clothing mangement  Short term goal 3: Pt will complete various ADL transfers with no greater than SBA and min cues for safety for inc indep with toileting  Short term goal 4: Pt will complete BUE AROM and [address] activities to increase his endurance, strength and coordination for ease of doing self care. Long term goals  Time Frame for Long term goals : 3-4 weeks  Long term goal 1: Pt to demonstrate standing tolerance greater than 6 mins with SBA for increased indep with preferred occupations  Long term goal 2: Pt to demonstate BADL routine using any DME necessary and no greater than SBA for increased indep with self cares    Following session, patient left in safe position with all fall risk precautions in place.

## 2019-08-05 LAB — GLUCOSE BLD-MCNC: 319 MG/DL (ref 70–108)

## 2019-08-05 PROCEDURE — 97530 THERAPEUTIC ACTIVITIES: CPT

## 2019-08-05 PROCEDURE — 6360000002 HC RX W HCPCS: Performed by: FAMILY MEDICINE

## 2019-08-05 PROCEDURE — 97535 SELF CARE MNGMENT TRAINING: CPT

## 2019-08-05 PROCEDURE — 97110 THERAPEUTIC EXERCISES: CPT

## 2019-08-05 PROCEDURE — 6370000000 HC RX 637 (ALT 250 FOR IP): Performed by: PSYCHIATRY & NEUROLOGY

## 2019-08-05 PROCEDURE — 97127 HC SP THER IVNTJ W/FOCUS COG FUNCJ: CPT

## 2019-08-05 PROCEDURE — 6370000000 HC RX 637 (ALT 250 FOR IP): Performed by: FAMILY MEDICINE

## 2019-08-05 PROCEDURE — 82948 REAGENT STRIP/BLOOD GLUCOSE: CPT

## 2019-08-05 PROCEDURE — 6370000000 HC RX 637 (ALT 250 FOR IP): Performed by: INTERNAL MEDICINE

## 2019-08-05 PROCEDURE — 1290000000 HC SEMI PRIVATE OTHER R&B

## 2019-08-05 PROCEDURE — 97116 GAIT TRAINING THERAPY: CPT

## 2019-08-05 PROCEDURE — 99232 SBSQ HOSP IP/OBS MODERATE 35: CPT | Performed by: NURSE PRACTITIONER

## 2019-08-05 PROCEDURE — 6370000000 HC RX 637 (ALT 250 FOR IP): Performed by: NURSE PRACTITIONER

## 2019-08-05 RX ADMIN — LEVOTHYROXINE SODIUM 75 MCG: 75 TABLET ORAL at 10:04

## 2019-08-05 RX ADMIN — QUETIAPINE FUMARATE 25 MG: 25 TABLET ORAL at 20:28

## 2019-08-05 RX ADMIN — QUETIAPINE FUMARATE 25 MG: 25 TABLET ORAL at 10:04

## 2019-08-05 RX ADMIN — PIOGLITAZONE 30 MG: 30 TABLET ORAL at 10:04

## 2019-08-05 RX ADMIN — ENOXAPARIN SODIUM 40 MG: 40 INJECTION SUBCUTANEOUS at 20:28

## 2019-08-05 RX ADMIN — SODIUM CHLORIDE TAB 1 GM 1 G: 1 TAB at 15:04

## 2019-08-05 RX ADMIN — HYDRALAZINE HYDROCHLORIDE 100 MG: 50 TABLET, FILM COATED ORAL at 20:28

## 2019-08-05 RX ADMIN — SODIUM CHLORIDE TAB 1 GM 1 G: 1 TAB at 10:04

## 2019-08-05 RX ADMIN — HYDRALAZINE HYDROCHLORIDE 100 MG: 50 TABLET, FILM COATED ORAL at 05:40

## 2019-08-05 RX ADMIN — SODIUM CHLORIDE TAB 1 GM 1 G: 1 TAB at 18:51

## 2019-08-05 RX ADMIN — HYDRALAZINE HYDROCHLORIDE 100 MG: 50 TABLET, FILM COATED ORAL at 15:04

## 2019-08-05 RX ADMIN — FAMOTIDINE 20 MG: 20 TABLET ORAL at 10:04

## 2019-08-05 RX ADMIN — CARVEDILOL 6.25 MG: 6.25 TABLET, FILM COATED ORAL at 18:51

## 2019-08-05 RX ADMIN — ACETAMINOPHEN 650 MG: 325 TABLET ORAL at 10:04

## 2019-08-05 RX ADMIN — CARVEDILOL 6.25 MG: 6.25 TABLET, FILM COATED ORAL at 10:04

## 2019-08-05 RX ADMIN — INSULIN GLARGINE 13 UNITS: 100 INJECTION, SOLUTION SUBCUTANEOUS at 10:05

## 2019-08-05 ASSESSMENT — PAIN SCALES - GENERAL
PAINLEVEL_OUTOF10: 1
PAINLEVEL_OUTOF10: 0
PAINLEVEL_OUTOF10: 1

## 2019-08-05 NOTE — PLAN OF CARE
EXTREMITIES  Goal: LTG - patient will dress lower body with or without assistive device  Outcome: Ongoing     Problem: IP BALANCE  Goal: LTG - Patient will maintain balance to allow for safe/functional mobility  Outcome: Ongoing     Problem: IP COMMUNICATION/DYSARTHRIA  Goal: LTG - Patient will effectively communicate in all situations with the use of compensatory strategies  Outcome: Ongoing     Problem: Discharge Planning:  Goal: Patients continuum of care needs are met  Description  Patients continuum of care needs are met  Outcome: Ongoing     Problem: Nutrition  Goal: Optimal nutrition therapy  Outcome: Ongoing   Reviewed care plan and goals with patient. Patient is unable to  verbalize understanding of care plan and goals.

## 2019-08-05 NOTE — PROGRESS NOTES
TCU BALANCE TEST:    Balance during to/from sit to stand CGA   Balance during walking CGA   Balance during turn-around and while walking CGA   Balance moving on and off toilet CGA   Balance during surface to/from surface transfers     Independent = 0  Modified Independent, Supervision, SBA = 1  CGA, Min Assist, Mod Assist, Max Assist, Dependent = 2  Not Tested/No Response = 8

## 2019-08-05 NOTE — PROGRESS NOTES
lower extremity edema. PSYCHIATRIC: mood and affect appropriate at present. Medications:   Med reviewed  Scheduled Meds:   insulin glargine  13 Units Subcutaneous QAM    QUEtiapine  25 mg Oral BID    pioglitazone  30 mg Oral Daily    famotidine  20 mg Oral Daily    carvedilol  6.25 mg Oral BID WC    hydrALAZINE  100 mg Oral 3 times per day    levothyroxine  75 mcg Oral QAM    lidocaine  1 patch Topical Daily    ppd   Does not apply Weekly    sodium chloride  1 g Oral TID     tuberculin  5 Units Intradermal Weekly    enoxaparin  40 mg Subcutaneous Q24H     Labs:   Labs reviewed  No results for input(s): NA, K, CL, CO2, BUN, CREATININE, LABGLOM, GLUCOSE, MG, CALCIUM, CAION, MG in the last 72 hours. ASSESSMENT:  1. 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 Continue 1500 fluid restriction. Continue salt tabs to 1 gm 3x/d. BMP in AM  2. Acute Kidney Injury, etiology unclear, although consider Acute Interstitial Nephritis due to Protonix. Now on Pepcic  3. Essential Hypertension, At control  4. Chronic Kidney Disease Stage III, creatinine better than baseline  5. 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 Memory loss  7. Hx ulcerative colitis with colostomy  8. Coronary artery disease S/P CABG  9. Hx prostate Ca  10.  Deconditioning    Principal Problem:    SIADH (syndrome of inappropriate ADH production) (formerly [facility])  Active Problems:    Essential hypertension    Prostate CA (HCC)    Colon cancer (HCC)    Hypothyroidism    CAD (coronary artery disease)    Type 2 diabetes mellitus with stage 3 chronic kidney disease, with long-term current use of insulin (formerly [facility])    S/P CABG (coronary artery bypass graft)    Hyperlipidemia    Class 1 obesity due to excess calories with serious comorbidity and body mass index (BMI) of 30.0 to 30.9 in adult    Chronic hyponatremia    Colostomy status (HCC)    Panlobular emphysema (HCC)    Generalized weakness    Acute metabolic encephalopathy    Physical deconditioning    Cerebrovascular disease  Resolved Problems:    * No resolved hospital problems.  MY Burnette - CNP 11:44 AM 8/5/2019

## 2019-08-05 NOTE — PLAN OF CARE
Problem: Activity:  Goal: Ability to tolerate increased activity will improve  Description  Ability to tolerate increased activity will improve  8/5/2019 1517 by Massimo Meraz RN  Outcome: Ongoing           Problem: Falls - Risk of:  Goal: Will remain free from falls  Description  Will remain free from falls  8/5/2019 1517 by Massimo Meraz RN  Outcome: Ongoing  Note:   Patient uses call light at times to request assistance and at other times yells out into the leblanc. Compliant with using chair and bed alarms. Problem: Pain:  Goal: Pain level will decrease  Description  Pain level will decrease  8/5/2019 1517 by Massimo Meraz RN  Outcome: Ongoing  Note:   Patient c/o chronic upper arm pain. States pain is minimal and accepts tylenol when offered. Repositions self. Problem: Agitation  Goal: Appropriate behavior observed  8/5/2019 1517 by Massimo Meraz RN  Outcome: Ongoing  Note:   Patient continually asks for wallet which was reported missing on 44/74/0278 to campus police. Calls to linen and  to continue to attempt to locate wallet. After talking to patient several times, unable to redirect but patient does grumble and change the subject. Problem: Risk for Impaired Skin Integrity  Goal: Tissue integrity - skin and mucous membranes  Description  Structural intactness and normal physiological function of skin and  mucous membranes. 8/5/2019 1517 by Massimo Meraz RN  Outcome: Ongoing  Note:   Colostomy stoma is red, moist.  Skin surrounding area dry and intact. Problem: Bowel/Gastric:  Goal: Will be independent with ostomy care  8/5/2019 1517 by Massimo Meraz RN  Outcome: Ongoing  Note:   Patient takes care of colostomy at home per self. Relies on staff to do so here. Colostomy stoma is red, moist.  Skin surrounding area dry and intact.       Problem: Discharge Planning:  Goal: Patients continuum of care needs are met  Description  Patients continuum of care needs are met  8/5/2019 1517 by Jenna Goldstein RN  Outcome: Ongoing  Note:   Patient and family talked with  about wanting patient to be discharged as soon as tomorrow. Patients son states that he would like to be present on rounding after team conference to determine home going status. Problem: Activity:  Intervention: Encourage scheduling of activities to allow for periods of rest  Note:   Patient participates with therapy. Appears to be getting stronger. Does ambulate in halls with staff.

## 2019-08-05 NOTE — PROGRESS NOTES
will complete basic safety and reasoning tasks with 80% accuracy and mod cues in order to improve patient safety in hospital setting. - GOAL NOT MET, CONTINUE   INTERVENTIONS: Household problem solving-  Prioritizing problems: 2/5 indep, 1/5 min cues, 1/5 mod cues, 1/5 max cues  Finding solutions: 1/5 indep, 1/5 min cues, 1/5 max cues, 2/5 total assits  *decreased insight into need for assistance with tasks in home setting    Long-term Goals  Timeframe for Long-term Goals: 6 weeks  Goal 1: Patient will improve cognitive functioning to a supervision level in order to improve patient completion of ADLs and IADLs upon discharge. - GOAL NOT MET, CONTINUE       ASSESSMENT:  Assessment: []Progressing towards goals          []Not Progressing towards goals  SUMMARY:  Patient has met 0/4 STG's and 0/1 LTG's this therapy period. Patient will require ongoing education regarding need for ST services, as this was first treatment session and pt  Demonstrates decreased insight into deficits. Pt continues to present with moderate cognitive impairment as evidenced by deficits in the area of orientation, reasoning, divergent thinking, safety awareness, immediate, delayed, and working memory with poor insight into deficits. Pt with need for supervision at this time to decreased fall risk due to decreased insight into environment and safety awareness. Speech and voice appear to be grossly intact with no presence of dysphagia, dysarthria, dysphonia, or aphonia at this time. Expressive and receptive language appear to be intact for basic communicative interaction, however will continue to monitor understanding of complex yes/no questions.  Patient would highly benefit from continued ST services to address the above deficits and implement cognitive POC for potential reintegration into home/community settings at discharge.      Patient Tolerance of Treatment:   [x]Tolerated well []Tolerated fair []Required rest breaks []Fatigued  Plan for

## 2019-08-06 PROBLEM — R45.1 AGITATION: Status: ACTIVE | Noted: 2019-08-06

## 2019-08-06 LAB
ANION GAP SERPL CALCULATED.3IONS-SCNC: 9 MEQ/L (ref 8–16)
BUN BLDV-MCNC: 35 MG/DL (ref 7–22)
CALCIUM SERPL-MCNC: 8.7 MG/DL (ref 8.5–10.5)
CHLORIDE BLD-SCNC: 102 MEQ/L (ref 98–111)
CO2: 18 MEQ/L (ref 23–33)
CREAT SERPL-MCNC: 1.7 MG/DL (ref 0.4–1.2)
GFR SERPL CREATININE-BSD FRML MDRD: 38 ML/MIN/1.73M2
GLUCOSE BLD-MCNC: 130 MG/DL (ref 70–108)
GLUCOSE BLD-MCNC: 146 MG/DL (ref 70–108)
POTASSIUM SERPL-SCNC: 4.7 MEQ/L (ref 3.5–5.2)
SODIUM BLD-SCNC: 129 MEQ/L (ref 135–145)

## 2019-08-06 PROCEDURE — 6370000000 HC RX 637 (ALT 250 FOR IP): Performed by: INTERNAL MEDICINE

## 2019-08-06 PROCEDURE — 82948 REAGENT STRIP/BLOOD GLUCOSE: CPT

## 2019-08-06 PROCEDURE — 80048 BASIC METABOLIC PNL TOTAL CA: CPT

## 2019-08-06 PROCEDURE — 97530 THERAPEUTIC ACTIVITIES: CPT

## 2019-08-06 PROCEDURE — 97110 THERAPEUTIC EXERCISES: CPT

## 2019-08-06 PROCEDURE — 99232 SBSQ HOSP IP/OBS MODERATE 35: CPT | Performed by: NURSE PRACTITIONER

## 2019-08-06 PROCEDURE — 6370000000 HC RX 637 (ALT 250 FOR IP): Performed by: PSYCHIATRY & NEUROLOGY

## 2019-08-06 PROCEDURE — 1290000000 HC SEMI PRIVATE OTHER R&B

## 2019-08-06 PROCEDURE — 6370000000 HC RX 637 (ALT 250 FOR IP): Performed by: NURSE PRACTITIONER

## 2019-08-06 PROCEDURE — 99232 SBSQ HOSP IP/OBS MODERATE 35: CPT | Performed by: INTERNAL MEDICINE

## 2019-08-06 PROCEDURE — 6360000002 HC RX W HCPCS: Performed by: FAMILY MEDICINE

## 2019-08-06 PROCEDURE — 97127 HC SP THER IVNTJ W/FOCUS COG FUNCJ: CPT

## 2019-08-06 PROCEDURE — 6370000000 HC RX 637 (ALT 250 FOR IP): Performed by: FAMILY MEDICINE

## 2019-08-06 PROCEDURE — 36415 COLL VENOUS BLD VENIPUNCTURE: CPT

## 2019-08-06 RX ORDER — SODIUM CHLORIDE 1000 MG
1 TABLET, SOLUBLE MISCELLANEOUS 2 TIMES DAILY
Status: DISCONTINUED | OUTPATIENT
Start: 2019-08-06 | End: 2019-08-06 | Stop reason: CLARIF

## 2019-08-06 RX ORDER — SODIUM CHLORIDE 1000 MG
1 TABLET, SOLUBLE MISCELLANEOUS ONCE
Status: COMPLETED | OUTPATIENT
Start: 2019-08-06 | End: 2019-08-06

## 2019-08-06 RX ADMIN — CARVEDILOL 6.25 MG: 6.25 TABLET, FILM COATED ORAL at 09:15

## 2019-08-06 RX ADMIN — SODIUM CHLORIDE TAB 1 GM 1 G: 1 TAB at 17:55

## 2019-08-06 RX ADMIN — QUETIAPINE FUMARATE 25 MG: 25 TABLET ORAL at 20:34

## 2019-08-06 RX ADMIN — PIOGLITAZONE 30 MG: 30 TABLET ORAL at 09:15

## 2019-08-06 RX ADMIN — FAMOTIDINE 20 MG: 20 TABLET ORAL at 09:15

## 2019-08-06 RX ADMIN — LEVOTHYROXINE SODIUM 75 MCG: 75 TABLET ORAL at 09:15

## 2019-08-06 RX ADMIN — CARVEDILOL 6.25 MG: 6.25 TABLET, FILM COATED ORAL at 15:48

## 2019-08-06 RX ADMIN — INSULIN GLARGINE 13 UNITS: 100 INJECTION, SOLUTION SUBCUTANEOUS at 09:18

## 2019-08-06 RX ADMIN — HYDRALAZINE HYDROCHLORIDE 100 MG: 50 TABLET, FILM COATED ORAL at 20:34

## 2019-08-06 RX ADMIN — SODIUM CHLORIDE TAB 1 GM 1 G: 1 TAB at 09:14

## 2019-08-06 RX ADMIN — HYDRALAZINE HYDROCHLORIDE 100 MG: 50 TABLET, FILM COATED ORAL at 15:47

## 2019-08-06 RX ADMIN — HYDRALAZINE HYDROCHLORIDE 100 MG: 50 TABLET, FILM COATED ORAL at 05:49

## 2019-08-06 RX ADMIN — QUETIAPINE FUMARATE 25 MG: 25 TABLET ORAL at 09:14

## 2019-08-06 RX ADMIN — SODIUM CHLORIDE TAB 1 GM 1 G: 1 TAB at 15:46

## 2019-08-06 RX ADMIN — ENOXAPARIN SODIUM 40 MG: 40 INJECTION SUBCUTANEOUS at 20:34

## 2019-08-06 ASSESSMENT — PAIN SCALES - GENERAL: PAINLEVEL_OUTOF10: 0

## 2019-08-06 NOTE — PROGRESS NOTES
Med reviewed  Scheduled Meds:   insulin glargine  13 Units Subcutaneous QAM    QUEtiapine  25 mg Oral BID    pioglitazone  30 mg Oral Daily    famotidine  20 mg Oral Daily    carvedilol  6.25 mg Oral BID WC    hydrALAZINE  100 mg Oral 3 times per day    levothyroxine  75 mcg Oral QAM    lidocaine  1 patch Topical Daily    ppd   Does not apply Weekly    sodium chloride  1 g Oral TID     tuberculin  5 Units Intradermal Weekly    enoxaparin  40 mg Subcutaneous Q24H     Labs:   Labs reviewed  Recent Labs     08/06/19  0417   *   K 4.7      CO2 18*   BUN 35*   CREATININE 1.7*   LABGLOM 38*   GLUCOSE 130*   CALCIUM 8.7       ASSESSMENT:  1. Chronic Hyponatremia likely multifactorial including SIADH appearance, Na dropped today. Continue 1500 fluid restriction. Continue salt tabs to 1 gm 3x/d. Will give extra dose today. BMP in AM  2. Acute Kidney Injury, Creatinine stable  3. Essential Hypertension, BP running borderline low this AM. Will monitor  4. Chronic Kidney Disease Stage III, creatinine better than baseline  5. 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 Memory loss  7. Hx ulcerative colitis with colostomy  8. Coronary artery disease S/P CABG  9. Hx prostate Ca  10.  Deconditioning    Principal Problem:    SIADH (syndrome of inappropriate ADH production) ([facility])  Active Problems:    Essential hypertension    Prostate CA (HCC)    Colon cancer (HCC)    Hypothyroidism    CAD (coronary artery disease)    Type 2 diabetes mellitus with stage 3 chronic kidney disease, with long-term current use of insulin (HCC)    S/P CABG (coronary artery bypass graft)    Hyperlipidemia    Class 1 obesity due to excess calories with serious comorbidity and body mass index (BMI) of 30.0 to 30.9 in adult    Chronic hyponatremia    Colostomy status (HCC)    Panlobular emphysema (HCC)    Generalized weakness    Acute metabolic encephalopathy    Physical deconditioning

## 2019-08-06 NOTE — PLAN OF CARE
Team Conference held this afternoon. Recommendations of the team were explained to the patient by DAVID and Dr. Carlos Bruno. Team is recommending that patient continue on TCU until sodium test result have come back. Patient's sodium levels are elevated. Patient and Son would like a discharge by the end of the week. Following discharge team is recommending patient return home with daughter and wife. Team is recommending patient receive [facility] services, PT,OT,Nursing and Aide services. Patient would like to go with agency that is providing care to his wife. He is not sure the name of the agency. SW gave a list and asked that he show the list to dtr and wife and get back with this Sw. DAVID and MD asked patient and son about patient's ability to manage his colostomy on his own. Son states his dad is independent with his colostomy. SW asked about his insulin administration. Son states that his dad is able to perform this task independently as well. SW will work with family and team towards a possible discharge for the end of the week. Care plan reviewed with patient and son. Patient and son verbalized understanding of the plan of care and contributed to goal setting.

## 2019-08-07 LAB
ANION GAP SERPL CALCULATED.3IONS-SCNC: 9 MEQ/L (ref 8–16)
BUN BLDV-MCNC: 32 MG/DL (ref 7–22)
CALCIUM SERPL-MCNC: 8.8 MG/DL (ref 8.5–10.5)
CHLORIDE BLD-SCNC: 102 MEQ/L (ref 98–111)
CO2: 20 MEQ/L (ref 23–33)
CREAT SERPL-MCNC: 1.6 MG/DL (ref 0.4–1.2)
GFR SERPL CREATININE-BSD FRML MDRD: 41 ML/MIN/1.73M2
GLUCOSE BLD-MCNC: 188 MG/DL (ref 70–108)
GLUCOSE BLD-MCNC: 210 MG/DL (ref 70–108)
GLUCOSE BLD-MCNC: 285 MG/DL (ref 70–108)
POTASSIUM SERPL-SCNC: 4.5 MEQ/L (ref 3.5–5.2)
SODIUM BLD-SCNC: 131 MEQ/L (ref 135–145)

## 2019-08-07 PROCEDURE — 82948 REAGENT STRIP/BLOOD GLUCOSE: CPT

## 2019-08-07 PROCEDURE — 0220000000 HC SKILLED NURSING FACILITY

## 2019-08-07 PROCEDURE — 99232 SBSQ HOSP IP/OBS MODERATE 35: CPT | Performed by: NURSE PRACTITIONER

## 2019-08-07 PROCEDURE — 6370000000 HC RX 637 (ALT 250 FOR IP): Performed by: PSYCHIATRY & NEUROLOGY

## 2019-08-07 PROCEDURE — 36415 COLL VENOUS BLD VENIPUNCTURE: CPT

## 2019-08-07 PROCEDURE — 6370000000 HC RX 637 (ALT 250 FOR IP): Performed by: INTERNAL MEDICINE

## 2019-08-07 PROCEDURE — 97530 THERAPEUTIC ACTIVITIES: CPT

## 2019-08-07 PROCEDURE — 1290000000 HC SEMI PRIVATE OTHER R&B

## 2019-08-07 PROCEDURE — 6360000002 HC RX W HCPCS: Performed by: FAMILY MEDICINE

## 2019-08-07 PROCEDURE — 6370000000 HC RX 637 (ALT 250 FOR IP): Performed by: NURSE PRACTITIONER

## 2019-08-07 PROCEDURE — 80048 BASIC METABOLIC PNL TOTAL CA: CPT

## 2019-08-07 PROCEDURE — 97127 HC SP THER IVNTJ W/FOCUS COG FUNCJ: CPT

## 2019-08-07 PROCEDURE — 97116 GAIT TRAINING THERAPY: CPT

## 2019-08-07 PROCEDURE — 6370000000 HC RX 637 (ALT 250 FOR IP): Performed by: FAMILY MEDICINE

## 2019-08-07 PROCEDURE — 97535 SELF CARE MNGMENT TRAINING: CPT

## 2019-08-07 RX ADMIN — HYDRALAZINE HYDROCHLORIDE 75 MG: 50 TABLET, FILM COATED ORAL at 13:16

## 2019-08-07 RX ADMIN — PIOGLITAZONE 30 MG: 30 TABLET ORAL at 08:41

## 2019-08-07 RX ADMIN — QUETIAPINE FUMARATE 25 MG: 25 TABLET ORAL at 23:08

## 2019-08-07 RX ADMIN — CARVEDILOL 6.25 MG: 6.25 TABLET, FILM COATED ORAL at 17:40

## 2019-08-07 RX ADMIN — HYDRALAZINE HYDROCHLORIDE 100 MG: 50 TABLET, FILM COATED ORAL at 05:53

## 2019-08-07 RX ADMIN — SODIUM CHLORIDE TAB 1 GM 1 G: 1 TAB at 13:16

## 2019-08-07 RX ADMIN — INSULIN GLARGINE 13 UNITS: 100 INJECTION, SOLUTION SUBCUTANEOUS at 08:41

## 2019-08-07 RX ADMIN — SODIUM CHLORIDE TAB 1 GM 1 G: 1 TAB at 17:40

## 2019-08-07 RX ADMIN — SODIUM CHLORIDE TAB 1 GM 1 G: 1 TAB at 08:41

## 2019-08-07 RX ADMIN — FAMOTIDINE 20 MG: 20 TABLET ORAL at 08:40

## 2019-08-07 RX ADMIN — LEVOTHYROXINE SODIUM 75 MCG: 75 TABLET ORAL at 08:41

## 2019-08-07 RX ADMIN — QUETIAPINE FUMARATE 25 MG: 25 TABLET ORAL at 08:40

## 2019-08-07 RX ADMIN — ENOXAPARIN SODIUM 40 MG: 40 INJECTION SUBCUTANEOUS at 23:07

## 2019-08-07 ASSESSMENT — PAIN SCALES - GENERAL: PAINLEVEL_OUTOF10: 0

## 2019-08-07 NOTE — PLAN OF CARE
skin breakdown   Problem: Risk of Bleeding    Goal: Will show no signs and symptoms of excessive bleeding     Problem: Colostomy Care    Goal: Will be independent with ostomy care    Goal: Complications related to the disease process, condition or treatment will be avoided or minimized   Problem: Nutrition    Goal: Optimal nutrition therapy   Problem: Fluid Volume- Risk of, Imbalanced    Goal: Will remain free of signs and symptoms of dehydration   Problem: Impaired Mobility    Goal: Mobility will improve   Problem: Discharge Planning    Goal: Continuum of care needs are met   Problem: Agitation    Goal: Able to control aggressive behavior     Goal: Appropriate behavior observed

## 2019-08-07 NOTE — PROGRESS NOTES
Level:  Stand By Assistance. Distance: To and from bathroom  Completed functional mobility to/from BR and within pt room at slow pace, no LOB noted. Pt requires max cues for walker safety- pt became agitated and threw walker at - seated rest break after trial of mobility, min fatigue noted. ADDITIONAL ACTIVITIES:  Completed BUE AROM exercises x10 reps x1 sets in all joints/planes to increase strength and endurance required for ADLs. Pt required rest break between each exercise and mod v/c for encouragement for proper technique. ASSESSMENT:  Activity Tolerance:  Patient tolerance of  treatment: fair. Discharge Recommendations: Home with Home health OT, 24 hour supervision or assist  Equipment Recommendations: Equipment Needed: No  Other: Pt states he has a reacher for home use- denies need for BSC, shower, chair, and additional LHAE. Plan: Times per week: 6x  Current Treatment Recommendations: Self-Care / ADL, Endurance Training, Functional Mobility Training, Safety Education & Training, Balance Training, Strengthening, Patient/Caregiver Education & Training, Equipment Evaluation, Education, & procurement, Home Management Training    Patient Education  Patient Education: Safety with transfers and mobility, ADL strategies, role of OT. Goals  Short term goals  Time Frame for Short term goals: 1 week  Short term goal 1: Pt will demonstrate HH distance functional mobility with no greater than SBA for inc indep with accessing environment  Short term goal 2: Pt will demo dynamic standing tasks with 1-2 UE for greater than 2 mins with no greater than SBA with 0 LOB for increased indep with clothing mangement  Short term goal 3: Pt will complete various ADL transfers with no greater than SBA and min cues for safety for inc indep with toileting  Short term goal 4: Pt will complete BUE AROM and [facility] activities to increase his endurance, strength and coordination for ease of doing self care.    Long term goals  Time Frame for Long term goals : 3-4 weeks  Long term goal 1: Pt to demonstrate standing tolerance greater than 6 mins with SBA for increased indep with preferred occupations  Long term goal 2: Pt to demonstate BADL routine using any DME necessary and no greater than SBA for increased indep with self cares    Following session, patient left in safe position with all fall risk precautions in place.

## 2019-08-07 NOTE — PROGRESS NOTES
(8/2) Na 124    · Monitoring: Meal Intake, Diet Tolerance, Mental Status/Confusion, Weight, Pertinent Labs, Monitor Bowel Function      Electronically signed by Ray Urena RD, LD on 8/7/19 at 4:30 PM    Contact Number: [phone]

## 2019-08-08 LAB
ANION GAP SERPL CALCULATED.3IONS-SCNC: 8 MEQ/L (ref 8–16)
BUN BLDV-MCNC: 29 MG/DL (ref 7–22)
CALCIUM SERPL-MCNC: 8.9 MG/DL (ref 8.5–10.5)
CHLORIDE BLD-SCNC: 104 MEQ/L (ref 98–111)
CO2: 20 MEQ/L (ref 23–33)
CREAT SERPL-MCNC: 1.3 MG/DL (ref 0.4–1.2)
GFR SERPL CREATININE-BSD FRML MDRD: 52 ML/MIN/1.73M2
GLUCOSE BLD-MCNC: 154 MG/DL (ref 70–108)
GLUCOSE BLD-MCNC: 155 MG/DL (ref 70–108)
GLUCOSE BLD-MCNC: 211 MG/DL (ref 70–108)
POTASSIUM SERPL-SCNC: 4.6 MEQ/L (ref 3.5–5.2)
SODIUM BLD-SCNC: 132 MEQ/L (ref 135–145)

## 2019-08-08 PROCEDURE — 6370000000 HC RX 637 (ALT 250 FOR IP): Performed by: PSYCHIATRY & NEUROLOGY

## 2019-08-08 PROCEDURE — 6370000000 HC RX 637 (ALT 250 FOR IP): Performed by: NURSE PRACTITIONER

## 2019-08-08 PROCEDURE — 97110 THERAPEUTIC EXERCISES: CPT

## 2019-08-08 PROCEDURE — 6370000000 HC RX 637 (ALT 250 FOR IP): Performed by: FAMILY MEDICINE

## 2019-08-08 PROCEDURE — 6370000000 HC RX 637 (ALT 250 FOR IP): Performed by: INTERNAL MEDICINE

## 2019-08-08 PROCEDURE — 80048 BASIC METABOLIC PNL TOTAL CA: CPT

## 2019-08-08 PROCEDURE — 36415 COLL VENOUS BLD VENIPUNCTURE: CPT

## 2019-08-08 PROCEDURE — 97127 HC SP THER IVNTJ W/FOCUS COG FUNCJ: CPT

## 2019-08-08 PROCEDURE — 97530 THERAPEUTIC ACTIVITIES: CPT

## 2019-08-08 PROCEDURE — 6360000002 HC RX W HCPCS: Performed by: FAMILY MEDICINE

## 2019-08-08 PROCEDURE — 1290000000 HC SEMI PRIVATE OTHER R&B

## 2019-08-08 PROCEDURE — 97535 SELF CARE MNGMENT TRAINING: CPT

## 2019-08-08 PROCEDURE — 82948 REAGENT STRIP/BLOOD GLUCOSE: CPT

## 2019-08-08 RX ORDER — INSULIN GLARGINE 100 [IU]/ML
15 INJECTION, SOLUTION SUBCUTANEOUS EVERY MORNING
Status: DISCONTINUED | OUTPATIENT
Start: 2019-08-09 | End: 2019-08-09 | Stop reason: HOSPADM

## 2019-08-08 RX ADMIN — PIOGLITAZONE 30 MG: 30 TABLET ORAL at 08:00

## 2019-08-08 RX ADMIN — SODIUM CHLORIDE TAB 1 GM 1 G: 1 TAB at 08:00

## 2019-08-08 RX ADMIN — INSULIN GLARGINE 13 UNITS: 100 INJECTION, SOLUTION SUBCUTANEOUS at 09:14

## 2019-08-08 RX ADMIN — QUETIAPINE FUMARATE 25 MG: 25 TABLET ORAL at 20:08

## 2019-08-08 RX ADMIN — HYDRALAZINE HYDROCHLORIDE 75 MG: 50 TABLET, FILM COATED ORAL at 20:08

## 2019-08-08 RX ADMIN — SODIUM CHLORIDE TAB 1 GM 1 G: 1 TAB at 17:33

## 2019-08-08 RX ADMIN — CARVEDILOL 6.25 MG: 6.25 TABLET, FILM COATED ORAL at 08:00

## 2019-08-08 RX ADMIN — FAMOTIDINE 20 MG: 20 TABLET ORAL at 09:09

## 2019-08-08 RX ADMIN — ENOXAPARIN SODIUM 40 MG: 40 INJECTION SUBCUTANEOUS at 20:08

## 2019-08-08 RX ADMIN — HYDRALAZINE HYDROCHLORIDE 75 MG: 50 TABLET, FILM COATED ORAL at 14:35

## 2019-08-08 RX ADMIN — LEVOTHYROXINE SODIUM 75 MCG: 75 TABLET ORAL at 08:00

## 2019-08-08 RX ADMIN — CARVEDILOL 6.25 MG: 6.25 TABLET, FILM COATED ORAL at 17:34

## 2019-08-08 RX ADMIN — HYDRALAZINE HYDROCHLORIDE 75 MG: 50 TABLET, FILM COATED ORAL at 06:05

## 2019-08-08 RX ADMIN — SODIUM CHLORIDE TAB 1 GM 1 G: 1 TAB at 14:35

## 2019-08-08 RX ADMIN — QUETIAPINE FUMARATE 25 MG: 25 TABLET ORAL at 08:00

## 2019-08-08 ASSESSMENT — PAIN SCALES - GENERAL
PAINLEVEL_OUTOF10: 0
PAINLEVEL_OUTOF10: 0

## 2019-08-08 NOTE — PLAN OF CARE
DAVID spoke with patient and son, both are requesting a discharge tomorrow. Patient's sodium levels have continued to go up so he is medically stable. DAVID to inform staff and Dr. Rosalino Pickard regarding discharge home.  Son will transport patient and will be here at by 11am.

## 2019-08-09 VITALS
RESPIRATION RATE: 22 BRPM | OXYGEN SATURATION: 94 % | BODY MASS INDEX: 31.09 KG/M2 | DIASTOLIC BLOOD PRESSURE: 67 MMHG | SYSTOLIC BLOOD PRESSURE: 158 MMHG | HEART RATE: 58 BPM | HEIGHT: 70 IN | WEIGHT: 217.2 LBS | TEMPERATURE: 98.1 F

## 2019-08-09 LAB — GLUCOSE BLD-MCNC: 152 MG/DL (ref 70–108)

## 2019-08-09 PROCEDURE — 6370000000 HC RX 637 (ALT 250 FOR IP): Performed by: NURSE PRACTITIONER

## 2019-08-09 PROCEDURE — 6370000000 HC RX 637 (ALT 250 FOR IP): Performed by: PSYCHIATRY & NEUROLOGY

## 2019-08-09 PROCEDURE — 82948 REAGENT STRIP/BLOOD GLUCOSE: CPT

## 2019-08-09 PROCEDURE — 6370000000 HC RX 637 (ALT 250 FOR IP): Performed by: FAMILY MEDICINE

## 2019-08-09 PROCEDURE — 6370000000 HC RX 637 (ALT 250 FOR IP): Performed by: INTERNAL MEDICINE

## 2019-08-09 RX ORDER — INSULIN GLARGINE 100 [IU]/ML
17 INJECTION, SOLUTION SUBCUTANEOUS EVERY MORNING
COMMUNITY
Start: 2019-08-09

## 2019-08-09 RX ORDER — LIDOCAINE 50 MG/G
1 PATCH TOPICAL DAILY
COMMUNITY
Start: 2019-08-09 | End: 2019-08-20 | Stop reason: ALTCHOICE

## 2019-08-09 RX ORDER — CARVEDILOL 6.25 MG/1
6.25 TABLET ORAL 2 TIMES DAILY WITH MEALS
Qty: 30 TABLET | Refills: 0 | Status: ON HOLD | OUTPATIENT
Start: 2019-08-09 | End: 2019-12-02 | Stop reason: HOSPADM

## 2019-08-09 RX ORDER — QUETIAPINE FUMARATE 25 MG/1
25 TABLET, FILM COATED ORAL 2 TIMES DAILY
Qty: 30 TABLET | Refills: 0 | Status: SHIPPED | OUTPATIENT
Start: 2019-08-09

## 2019-08-09 RX ORDER — HYDRALAZINE HYDROCHLORIDE 25 MG/1
75 TABLET, FILM COATED ORAL EVERY 8 HOURS SCHEDULED
Qty: 100 TABLET | Refills: 0 | Status: ON HOLD | OUTPATIENT
Start: 2019-08-09 | End: 2019-12-02 | Stop reason: HOSPADM

## 2019-08-09 RX ORDER — PIOGLITAZONEHYDROCHLORIDE 30 MG/1
30 TABLET ORAL DAILY
Qty: 15 TABLET | Refills: 0 | Status: ON HOLD | OUTPATIENT
Start: 2019-08-09 | End: 2019-11-12 | Stop reason: HOSPADM

## 2019-08-09 RX ORDER — SODIUM CHLORIDE 1000 MG
1 TABLET, SOLUBLE MISCELLANEOUS
Status: ON HOLD | COMMUNITY
Start: 2019-08-09 | End: 2019-11-12 | Stop reason: HOSPADM

## 2019-08-09 RX ADMIN — FAMOTIDINE 20 MG: 20 TABLET ORAL at 08:22

## 2019-08-09 RX ADMIN — SODIUM CHLORIDE TAB 1 GM 1 G: 1 TAB at 08:22

## 2019-08-09 RX ADMIN — HYDRALAZINE HYDROCHLORIDE 75 MG: 50 TABLET, FILM COATED ORAL at 06:17

## 2019-08-09 RX ADMIN — QUETIAPINE FUMARATE 25 MG: 25 TABLET ORAL at 08:22

## 2019-08-09 RX ADMIN — LEVOTHYROXINE SODIUM 75 MCG: 75 TABLET ORAL at 08:57

## 2019-08-09 RX ADMIN — INSULIN GLARGINE 15 UNITS: 100 INJECTION, SOLUTION SUBCUTANEOUS at 08:58

## 2019-08-09 RX ADMIN — PIOGLITAZONE 30 MG: 30 TABLET ORAL at 08:22

## 2019-08-09 RX ADMIN — CARVEDILOL 6.25 MG: 6.25 TABLET, FILM COATED ORAL at 08:22

## 2019-08-09 ASSESSMENT — PAIN SCALES - GENERAL: PAINLEVEL_OUTOF10: 0

## 2019-08-09 NOTE — PROGRESS NOTES
Pt became agitated when asking about his wallet. Pt stated that he lost it in the therapy room earlier today. HORACE Boland wrote on 8/5 that wallet was misplaced and that family was aware of this. Charge HORACE Severino notified.

## 2019-08-09 NOTE — CARE COORDINATION
[address] [facility]  [address]      Date: 2019  Patient Name: Sai Saha,  Gender:  male        MRN: [id number]  : 10/6/1928  (80 y.o.)  Referral Date : 19  Referring Practitioner: D. Karey Kocher, MD  Diagnosis: Physical deconditioning  Additional Pertinent Hx: 80 y.o. male admitted to the transitional care unit on 2019. He came to the ED with concern of weakness and mental status changes. He was found to have a worsening of the chronic hyponatremia and a uti. He has become medically stable enough to now come to the TCU before a final choice for disposition is made     Restrictions/Precautions:  Restrictions/Precautions: Fall Risk, General Precautions                      Social/Functional:  Type of Home: House  Home Layout: One level  Home Access: Stairs to enter with rails  Entrance Stairs - Number of Steps: 3  Entrance Stairs - Rails: Both  Home Equipment: Rolling walker, Cane     Assessment:  Pt showing good progress,meeting 5/6 short term goals and 1 long term goals. Pt continues to show some deficits with higher level balance with standing and gait, decreased endurance and would benefit from skilled PT in the discharge setting for continued gains in those areas for improved functional mobility and safety. Equipment Recommendations:  Equipment Needed: No(has a RW)    Plan:  Discharge to home with family to assist.  HH P.T. To f/u    Goals:  Short term goals  Time Frame for Short term goals: 1 week  Short term goal 1: Patient will transfer supine <--> sit with mod I in order to get into/out of bed. MET  Short term goal 2: Patient will transfer sit <--> stand with SBA from various surfaces, in order to get up to ambulate. MET  Short term goal 3: Patient will ambulate >50' with a RW and SBA for household distances. MET  Short term goal 4: Patient will negotiate 3 steps with B hand rails and SBA for home

## 2019-08-09 NOTE — PLAN OF CARE
8/9/19, 9:27 AM    Discharge plan discussed by / . Discharge plan reviewed with patient/ family. Patient/ family verbalize understanding of discharge plan and are in agreement with plan. Understanding was demonstrated using the teach back method. IMM Letter  Notice of Medicare Non-Coverage Letter Not Given?  (Post Acute): Patient Initiated Discharge

## 2019-08-19 LAB
BUN BLDV-MCNC: 26 MG/DL
CALCIUM SERPL-MCNC: 8.7 MG/DL
CHLORIDE BLD-SCNC: 100 MMOL/L
CO2: 22 MMOL/L
CREAT SERPL-MCNC: 1.48 MG/DL
GFR CALCULATED: 41
GLUCOSE BLD-MCNC: 271 MG/DL
POTASSIUM SERPL-SCNC: 5.4 MMOL/L
SODIUM BLD-SCNC: 134 MMOL/L

## 2019-08-20 ENCOUNTER — OFFICE VISIT (OUTPATIENT)
Dept: NEPHROLOGY | Age: 84
End: 2019-08-20
Payer: MEDICARE

## 2019-08-20 VITALS
WEIGHT: 218.8 LBS | HEIGHT: 70 IN | DIASTOLIC BLOOD PRESSURE: 80 MMHG | BODY MASS INDEX: 31.32 KG/M2 | SYSTOLIC BLOOD PRESSURE: 140 MMHG | HEART RATE: 60 BPM

## 2019-08-20 DIAGNOSIS — E22.2 SIADH (SYNDROME OF INAPPROPRIATE ADH PRODUCTION) (HCC): Primary | ICD-10-CM

## 2019-08-20 DIAGNOSIS — Z79.4 TYPE 2 DIABETES MELLITUS WITH STAGE 3 CHRONIC KIDNEY DISEASE, WITH LONG-TERM CURRENT USE OF INSULIN (HCC): ICD-10-CM

## 2019-08-20 DIAGNOSIS — I10 BENIGN ESSENTIAL HYPERTENSION: ICD-10-CM

## 2019-08-20 DIAGNOSIS — N18.30 TYPE 2 DIABETES MELLITUS WITH STAGE 3 CHRONIC KIDNEY DISEASE, WITH LONG-TERM CURRENT USE OF INSULIN (HCC): ICD-10-CM

## 2019-08-20 DIAGNOSIS — E11.22 TYPE 2 DIABETES MELLITUS WITH STAGE 3 CHRONIC KIDNEY DISEASE, WITH LONG-TERM CURRENT USE OF INSULIN (HCC): ICD-10-CM

## 2019-08-20 DIAGNOSIS — E87.5 HYPERKALEMIA: ICD-10-CM

## 2019-08-20 DIAGNOSIS — N18.30 CKD (CHRONIC KIDNEY DISEASE), STAGE III (HCC): ICD-10-CM

## 2019-08-20 PROCEDURE — 99213 OFFICE O/P EST LOW 20 MIN: CPT | Performed by: NURSE PRACTITIONER

## 2019-08-20 NOTE — PROGRESS NOTES
Todd Alexandre is a 80 y. 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 He was admitted on 7/23/19 with Na  125. Na was 132 at discharge 8/8/19 with 1500 ml fluid restriction and Salt tabs. He is living at home with his spouse and family member. She is helping with his meds, except pt gives his own insulin. He has home health in the home. He is poor historian due to memory issues. State partial compliance with meds and denies adverse effects. Denies dysuria. denies edema. Family report BS running above 200 consistently. Has a fair appetite. Likes meat and potatoes and cookies. Amb with walker    Recent Wts and BP Noted and Reviewed     Wt Readings from Last 3 Encounters:   08/20/19 218 lb 12.8 oz (99.2 kg)   08/09/19 217 lb 3.2 oz (98.5 kg)   07/30/19 209 lb 1.6 oz (94.8 kg)     BP Readings from Last 3 Encounters:   08/20/19 (!) 140/80   08/09/19 (!) 158/67   07/31/19 130/67      Body mass index is 31.39 kg/m².       PAST MEDICAL HISTORY:       Diagnosis Date    Arthritis     CAD (coronary artery disease)     Colostomy status ([address] 94/94/5387    Diastolic CHF (HCC)     GERD (gastroesophageal reflux disease)     HTN (hypertension) 1/6/2013    Hyperlipidemia     Hypothyroid     Panlobular emphysema ([address] 11/30/2017    Prostate cancer ([facility]     S/P CABG (coronary artery bypass graft)     Type 2 diabetes mellitus with stage 3 chronic kidney disease, with long-term current use of insulin ([address]      SURGICAL HISTORY:    Past Surgical History:   Procedure Laterality Date    ABDOMEN SURGERY      APPENDECTOMY      CARDIAC SURGERY      COLECTOMY      COLONOSCOPY      COLOSTOMY      CORONARY ARTERY BYPASS GRAFT      triple to Distal RCA, first OM, and LIMA to diagonal by Dr Michelle Lucero, ESOPHAGUS      EYE SURGERY      bilateral cataracts    JOINT REPLACEMENT      bilat knees    KNEE SURGERY      left total knee and right total knee    TONSILLECTOMY      VASCULAR SURGERY      cabg harvests from legs     FAMILY HISTORY:       Problem Relation Age of Onset   [facility] Cancer Mother     Asthma Father      ALLERGIES:  No Known Allergies  Social and Occupational History:    TOBACCO:   reports that he has quit smoking. He quit after 25.00 years of use. He has never used smokeless tobacco.  ETOH:   reports that he does not drink alcohol. REVIEW OF SYSTEMS:   GENERAL: Usual state of health. Stable weight. Pleasant  HEENT: Denies recent vision change, Denies Upper respiratory complaints  CARDIOVASCULAR: Denies chest pain/angina. RESPIRATORY:Denies sob, cough, wheezing  ABDOMEN: Denies nausea, vomiting, diarrhea. Colostomy   CNS:Denies headache, dizziness  MUSCULOSKELETAL: Reports joint arthralgia  SKIN: Denies rash, pruritis  HEMATOLOGIC: Denies bruising  ENDOCRINE:  Negative for heat or cold intolerance     EXAM:  VITALS:  BP (!) 140/80 (Site: Left Upper Arm, Position: Sitting, Cuff Size: Medium Adult)   Pulse 60   Ht 5' 10\" (1.778 m)   Wt 218 lb 12.8 oz (99.2 kg)   BMI 31.39 kg/m²    Body mass index is 31.39 kg/m². CONSTITUTIONAL:  No acute distress. Sitting comfortable in chair  HEENT:  Head is normocephalic, Extraocular movement intact,  Neck is supple  CARDIOVASCULAR:  No lower extremity edema  RESPIRATORY: No shortness of breath. ABDOMEN: soft  NEUROLOGICAL: Patient is alert and oriented to person, place. Recent and remote memory is partially intact. SKIN: No rash on exposed surfaces  MUSCULOSKELETAL: Movement is coordinated.    EXTREMITIES: Distal lower extremity temp is warm,   PSYCHIATRIC: mood and affect appropriate    Meds reviewed and discussed with pt  Current Outpatient Medications   Medication Sig Dispense Refill    insulin glargine (LANTUS) 100 UNIT/ML injection vial Inject 17 Units into the skin every morning      pioglitazone (ACTOS) 30 MG tablet Take 1 tablet by mouth daily Additional RF per his PCP 15 tablet 0    hydrALAZINE (APRESOLINE) 25 MG tablet Take 3 tablets by mouth every 8 hours Additional RF per his  tablet 0    QUEtiapine (SEROQUEL) 25 MG tablet Take 1 tablet by mouth 2 times daily Additional RF per his PCP 30 tablet 0    carvedilol (COREG) 6.25 MG tablet Take 1 tablet by mouth 2 times daily (with meals) Additional RF per his PCP 30 tablet 0    sodium chloride 1 g tablet Take 1 tablet by mouth 3 times daily (with meals)      levothyroxine (SYNTHROID) 75 MCG tablet Take 1 tablet by mouth every morning 30 tablet 0    acetaminophen (TYLENOL) 325 MG tablet Take 2 tablets by mouth every 4 hours as needed for Pain (Patient taking differently: Take 650 mg by mouth every 4 hours as needed for Pain ) 120 tablet 3     No current facility-administered medications for this visit. Diagnostic Data:  Lab Results   Component Value Date    CREATININE 1.48 08/19/2019    CREATININE 1.3 08/08/2019    CREATININE 1.6 08/07/2019    CREATININE 1.7 08/06/2019    CREATININE 1.7 08/02/2019    CREATININE 1.6 08/01/2019     Lab Results   Component Value Date     08/19/2019    K 5.4 08/19/2019    K 4.6 06/06/2018     08/19/2019    CO2 22 08/19/2019    BUN 26 08/19/2019    CREATININE 1.48 08/19/2019    LABGLOM 41 08/19/2019    LABGLOM 52 08/08/2019    GLUCOSE 271 08/19/2019    GLUCOSE 170 10/23/2011     Lab Results   Component Value Date    PHOS 3.6 06/29/2016    CALCIUM 8.7 08/19/2019       Lab Results   Component Value Date    WBC 9.7 07/23/2019    HGB 11.6 (L) 07/23/2019    HCT 34.9 (L) 07/23/2019    MCV 87.0 07/23/2019    MCH 28.9 07/23/2019     07/23/2019     Assessment:    Doyle Newton was seen today for chronic kidney disease. Diagnoses and all orders for this visit:    SIADH (syndrome of inappropriate ADH production) ([facility] Na stable at 134. Continue Salt tabs and [id number] fluid restriction.  -     Basic Metabolic Panel; Future    CKD (chronic kidney disease), stage III ([facility] [address]  Overall stable    Type 2 diabetes mellitus with stage 3 chronic kidney disease, with long-term current use of insulin ([facility] [address] Pt has follow up appt with Dr Micah Munoz this week. Hyperkalemia likely multifactorial including diet, compounded by Hypoinsulinemia with hyperglycemia. Advise Low potassium diet. Follow up with Jahaira Arboleda MD.  Recheck potassium in one week. Benign essential hypertension, At control     Avoid nonsteroidal anti inflammatory drugs such as Ibuprofen, Aleve, Advil, Motrin. Schedule for follow up appt in 3 months. Pt asked to bring pill bottles to next office visit. Please make early appointment if needed and to call office for questions, concerns, persistent, changing or worsening symptoms.   Discussed with the patient and answered their questions     [place], CNP

## 2019-10-09 ENCOUNTER — HOSPITAL ENCOUNTER (EMERGENCY)
Age: 84
Discharge: HOME | End: 2019-10-09
Attending: EMERGENCY MEDICINE
Payer: MEDICARE

## 2019-10-09 VITALS
DIASTOLIC BLOOD PRESSURE: 95 MMHG | TEMPERATURE: 97.5 F | RESPIRATION RATE: 14 BRPM | SYSTOLIC BLOOD PRESSURE: 130 MMHG | HEART RATE: 55 BPM | OXYGEN SATURATION: 99 %

## 2019-10-09 DIAGNOSIS — Z43.3 COLOSTOMY CARE (HCC): Primary | ICD-10-CM

## 2019-10-09 PROCEDURE — 99282 EMERGENCY DEPT VISIT SF MDM: CPT

## 2019-10-09 PROCEDURE — 2709999900 HC NON-CHARGEABLE SUPPLY

## 2019-10-09 ASSESSMENT — ENCOUNTER SYMPTOMS
RHINORRHEA: 0
VOICE CHANGE: 0
COUGH: 0
PHOTOPHOBIA: 0
CHEST TIGHTNESS: 0
BLOOD IN STOOL: 0
CHOKING: 0
EYE PAIN: 0
SHORTNESS OF BREATH: 0
BACK PAIN: 0
DIARRHEA: 0
NAUSEA: 0
SINUS PRESSURE: 0
EYE ITCHING: 0
WHEEZING: 0
TROUBLE SWALLOWING: 0
ABDOMINAL PAIN: 0
EYE DISCHARGE: 0
ABDOMINAL DISTENTION: 0
SORE THROAT: 0
VOMITING: 0
CONSTIPATION: 0
EYE REDNESS: 0

## 2019-10-11 ENCOUNTER — HOSPITAL ENCOUNTER (OUTPATIENT)
Dept: WOUND CARE | Age: 84
Discharge: HOME | End: 2019-10-11
Payer: MEDICARE

## 2019-10-11 VITALS
OXYGEN SATURATION: 96 % | HEIGHT: 70 IN | HEART RATE: 56 BPM | TEMPERATURE: 97.9 F | BODY MASS INDEX: 32.17 KG/M2 | RESPIRATION RATE: 18 BRPM | WEIGHT: 224.7 LBS | SYSTOLIC BLOOD PRESSURE: 130 MMHG | DIASTOLIC BLOOD PRESSURE: 56 MMHG

## 2019-10-11 DIAGNOSIS — Z43.3 COLOSTOMY CARE (HCC): ICD-10-CM

## 2019-10-11 PROCEDURE — 99204 OFFICE O/P NEW MOD 45 MIN: CPT | Performed by: NURSE PRACTITIONER

## 2019-10-11 PROCEDURE — 99213 OFFICE O/P EST LOW 20 MIN: CPT

## 2019-10-23 PROBLEM — Z43.3 COLOSTOMY CARE (HCC): Status: ACTIVE | Noted: 2019-10-23

## 2019-11-06 ENCOUNTER — APPOINTMENT (OUTPATIENT)
Dept: GENERAL RADIOLOGY | Age: 84
DRG: 291 | End: 2019-11-06
Payer: MEDICARE

## 2019-11-06 ENCOUNTER — HOSPITAL ENCOUNTER (INPATIENT)
Age: 84
LOS: 7 days | Discharge: NURSING, REHAB OR LONG-TERM CARE | DRG: 291 | End: 2019-11-13
Attending: EMERGENCY MEDICINE | Admitting: INTERNAL MEDICINE
Payer: MEDICARE

## 2019-11-06 DIAGNOSIS — I50.43 CHF (CONGESTIVE HEART FAILURE), NYHA CLASS I, ACUTE ON CHRONIC, COMBINED (HCC): ICD-10-CM

## 2019-11-06 DIAGNOSIS — I50.9 ACUTE CONGESTIVE HEART FAILURE, UNSPECIFIED HEART FAILURE TYPE (HCC): Primary | ICD-10-CM

## 2019-11-06 LAB
ALBUMIN SERPL-MCNC: 3.8 G/DL (ref 3.5–5.1)
ALP BLD-CCNC: 73 U/L (ref 38–126)
ALT SERPL-CCNC: 14 U/L (ref 11–66)
ANION GAP SERPL CALCULATED.3IONS-SCNC: 14 MEQ/L (ref 8–16)
AST SERPL-CCNC: 17 U/L (ref 5–40)
BACTERIA: ABNORMAL
BASOPHILS # BLD: 0.5 %
BASOPHILS ABSOLUTE: 0 THOU/MM3 (ref 0–0.1)
BILIRUB SERPL-MCNC: 0.6 MG/DL (ref 0.3–1.2)
BILIRUBIN URINE: NEGATIVE
BLOOD, URINE: NEGATIVE
BUN BLDV-MCNC: 19 MG/DL (ref 7–22)
CALCIUM SERPL-MCNC: 9.1 MG/DL (ref 8.5–10.5)
CASTS: ABNORMAL /LPF
CASTS: ABNORMAL /LPF
CHARACTER, URINE: CLEAR
CHLORIDE BLD-SCNC: 97 MEQ/L (ref 98–111)
CO2: 20 MEQ/L (ref 23–33)
COLOR: YELLOW
CREAT SERPL-MCNC: 1.4 MG/DL (ref 0.4–1.2)
CRYSTALS: ABNORMAL
EKG ATRIAL RATE: 65 BPM
EKG P AXIS: 57 DEGREES
EKG P-R INTERVAL: 180 MS
EKG Q-T INTERVAL: 422 MS
EKG QRS DURATION: 88 MS
EKG QTC CALCULATION (BAZETT): 438 MS
EKG R AXIS: -29 DEGREES
EKG T AXIS: 115 DEGREES
EKG VENTRICULAR RATE: 65 BPM
EOSINOPHIL # BLD: 7.8 %
EOSINOPHILS ABSOLUTE: 0.6 THOU/MM3 (ref 0–0.4)
EPITHELIAL CELLS, UA: ABNORMAL /HPF
ERYTHROCYTE [DISTWIDTH] IN BLOOD BY AUTOMATED COUNT: 19.1 % (ref 11.5–14.5)
ERYTHROCYTE [DISTWIDTH] IN BLOOD BY AUTOMATED COUNT: 63 FL (ref 35–45)
GFR SERPL CREATININE-BSD FRML MDRD: 47 ML/MIN/1.73M2
GLUCOSE BLD-MCNC: 160 MG/DL (ref 70–108)
GLUCOSE BLD-MCNC: 185 MG/DL (ref 70–108)
GLUCOSE BLD-MCNC: 239 MG/DL (ref 70–108)
GLUCOSE BLD-MCNC: 249 MG/DL (ref 70–108)
GLUCOSE, URINE: 100 MG/DL
HCT VFR BLD CALC: 34.4 % (ref 42–52)
HEMOGLOBIN: 10.5 GM/DL (ref 14–18)
IMMATURE GRANS (ABS): 0.08 THOU/MM3 (ref 0–0.07)
IMMATURE GRANULOCYTES: 1 %
KETONES, URINE: NEGATIVE
LEUKOCYTE EST, POC: NEGATIVE
LYMPHOCYTES # BLD: 20.2 %
LYMPHOCYTES ABSOLUTE: 1.6 THOU/MM3 (ref 1–4.8)
MCH RBC QN AUTO: 27.6 PG (ref 26–33)
MCHC RBC AUTO-ENTMCNC: 30.5 GM/DL (ref 32.2–35.5)
MCV RBC AUTO: 90.5 FL (ref 80–94)
MISCELLANEOUS LAB TEST RESULT: ABNORMAL
MONOCYTES # BLD: 15.9 %
MONOCYTES ABSOLUTE: 1.3 THOU/MM3 (ref 0.4–1.3)
MRSA SCREEN RT-PCR: NEGATIVE
NITRITE, URINE: NEGATIVE
NUCLEATED RED BLOOD CELLS: 0 /100 WBC
OSMOLALITY CALCULATION: 272.7 MOSMOL/KG (ref 275–300)
PH UA: 5 (ref 5–9)
PLATELET # BLD: 258 THOU/MM3 (ref 130–400)
PMV BLD AUTO: 10.6 FL (ref 9.4–12.4)
POTASSIUM REFLEX MAGNESIUM: 4.9 MEQ/L (ref 3.5–5.2)
PRO-BNP: 3900 PG/ML (ref 0–1800)
PROTEIN UA: ABNORMAL MG/DL
RBC # BLD: 3.8 MILL/MM3 (ref 4.7–6.1)
RBC URINE: ABNORMAL /HPF
RENAL EPITHELIAL, UA: ABNORMAL
SEG NEUTROPHILS: 54.6 %
SEGMENTED NEUTROPHILS ABSOLUTE COUNT: 4.3 THOU/MM3 (ref 1.8–7.7)
SODIUM BLD-SCNC: 131 MEQ/L (ref 135–145)
SPECIFIC GRAVITY UA: 1.01 (ref 1–1.03)
T4 FREE: 1.19 NG/DL (ref 0.93–1.76)
TOTAL PROTEIN: 6.5 G/DL (ref 6.1–8)
TROPONIN T: 0.02 NG/ML
TSH SERPL DL<=0.05 MIU/L-ACNC: 5.32 UIU/ML (ref 0.4–4.2)
UROBILINOGEN, URINE: 0.2 EU/DL (ref 0–1)
VANCOMYCIN RESISTANT ENTEROCOCCUS: NEGATIVE
WBC # BLD: 7.9 THOU/MM3 (ref 4.8–10.8)
WBC UA: ABNORMAL /HPF
YEAST: ABNORMAL

## 2019-11-06 PROCEDURE — 84439 ASSAY OF FREE THYROXINE: CPT

## 2019-11-06 PROCEDURE — 84484 ASSAY OF TROPONIN QUANT: CPT

## 2019-11-06 PROCEDURE — 87500 VANOMYCIN DNA AMP PROBE: CPT

## 2019-11-06 PROCEDURE — 71046 X-RAY EXAM CHEST 2 VIEWS: CPT

## 2019-11-06 PROCEDURE — 87641 MR-STAPH DNA AMP PROBE: CPT

## 2019-11-06 PROCEDURE — 36415 COLL VENOUS BLD VENIPUNCTURE: CPT

## 2019-11-06 PROCEDURE — 2500000003 HC RX 250 WO HCPCS: Performed by: INTERNAL MEDICINE

## 2019-11-06 PROCEDURE — 81001 URINALYSIS AUTO W/SCOPE: CPT

## 2019-11-06 PROCEDURE — 80053 COMPREHEN METABOLIC PANEL: CPT

## 2019-11-06 PROCEDURE — 2709999900 HC NON-CHARGEABLE SUPPLY

## 2019-11-06 PROCEDURE — 93010 ELECTROCARDIOGRAM REPORT: CPT | Performed by: NUCLEAR MEDICINE

## 2019-11-06 PROCEDURE — 84443 ASSAY THYROID STIM HORMONE: CPT

## 2019-11-06 PROCEDURE — 82948 REAGENT STRIP/BLOOD GLUCOSE: CPT

## 2019-11-06 PROCEDURE — 93005 ELECTROCARDIOGRAM TRACING: CPT | Performed by: EMERGENCY MEDICINE

## 2019-11-06 PROCEDURE — 6360000002 HC RX W HCPCS: Performed by: INTERNAL MEDICINE

## 2019-11-06 PROCEDURE — 83880 ASSAY OF NATRIURETIC PEPTIDE: CPT

## 2019-11-06 PROCEDURE — 85025 COMPLETE CBC W/AUTO DIFF WBC: CPT

## 2019-11-06 PROCEDURE — 87086 URINE CULTURE/COLONY COUNT: CPT

## 2019-11-06 PROCEDURE — 2060000000 HC ICU INTERMEDIATE R&B

## 2019-11-06 PROCEDURE — 6370000000 HC RX 637 (ALT 250 FOR IP): Performed by: INTERNAL MEDICINE

## 2019-11-06 PROCEDURE — 99285 EMERGENCY DEPT VISIT HI MDM: CPT

## 2019-11-06 PROCEDURE — 87081 CULTURE SCREEN ONLY: CPT

## 2019-11-06 RX ORDER — LEVOTHYROXINE SODIUM 0.07 MG/1
75 TABLET ORAL EVERY MORNING
Status: DISCONTINUED | OUTPATIENT
Start: 2019-11-07 | End: 2019-11-13 | Stop reason: HOSPADM

## 2019-11-06 RX ORDER — INSULIN GLARGINE 100 [IU]/ML
17 INJECTION, SOLUTION SUBCUTANEOUS EVERY MORNING
Status: DISCONTINUED | OUTPATIENT
Start: 2019-11-07 | End: 2019-11-13 | Stop reason: HOSPADM

## 2019-11-06 RX ORDER — BUMETANIDE 0.25 MG/ML
0.5 INJECTION, SOLUTION INTRAMUSCULAR; INTRAVENOUS EVERY 8 HOURS
Status: DISCONTINUED | OUTPATIENT
Start: 2019-11-06 | End: 2019-11-08

## 2019-11-06 RX ORDER — ALBUTEROL SULFATE 90 UG/1
2 AEROSOL, METERED RESPIRATORY (INHALATION) EVERY 6 HOURS PRN
Status: ON HOLD | COMMUNITY
End: 2019-11-12 | Stop reason: HOSPADM

## 2019-11-06 RX ORDER — HEPARIN SODIUM 5000 [USP'U]/ML
5000 INJECTION, SOLUTION INTRAVENOUS; SUBCUTANEOUS EVERY 8 HOURS SCHEDULED
Status: DISCONTINUED | OUTPATIENT
Start: 2019-11-06 | End: 2019-11-07

## 2019-11-06 RX ORDER — QUETIAPINE FUMARATE 25 MG/1
25 TABLET, FILM COATED ORAL 2 TIMES DAILY
Status: DISCONTINUED | OUTPATIENT
Start: 2019-11-06 | End: 2019-11-13 | Stop reason: HOSPADM

## 2019-11-06 RX ORDER — ACETAMINOPHEN 325 MG/1
650 TABLET ORAL EVERY 6 HOURS PRN
Status: DISCONTINUED | OUTPATIENT
Start: 2019-11-06 | End: 2019-11-13 | Stop reason: HOSPADM

## 2019-11-06 RX ORDER — CARVEDILOL 6.25 MG/1
6.25 TABLET ORAL 2 TIMES DAILY WITH MEALS
Status: DISCONTINUED | OUTPATIENT
Start: 2019-11-07 | End: 2019-11-13 | Stop reason: HOSPADM

## 2019-11-06 RX ADMIN — INSULIN LISPRO 1 UNITS: 100 INJECTION, SOLUTION INTRAVENOUS; SUBCUTANEOUS at 23:34

## 2019-11-06 RX ADMIN — QUETIAPINE FUMARATE 25 MG: 25 TABLET ORAL at 22:41

## 2019-11-06 RX ADMIN — BUMETANIDE 0.5 MG: 0.25 INJECTION INTRAMUSCULAR; INTRAVENOUS at 22:40

## 2019-11-06 RX ADMIN — HEPARIN SODIUM 5000 UNITS: 5000 INJECTION INTRAVENOUS; SUBCUTANEOUS at 22:41

## 2019-11-06 RX ADMIN — HYDRALAZINE HYDROCHLORIDE 75 MG: 50 TABLET, FILM COATED ORAL at 22:40

## 2019-11-06 ASSESSMENT — ENCOUNTER SYMPTOMS
ABDOMINAL PAIN: 0
WHEEZING: 0
COUGH: 1
SORE THROAT: 0
EYE DISCHARGE: 0
EYE REDNESS: 0
NAUSEA: 0
RHINORRHEA: 0
DIARRHEA: 0
VOMITING: 0
SHORTNESS OF BREATH: 1
BACK PAIN: 0

## 2019-11-06 ASSESSMENT — PAIN SCALES - GENERAL
PAINLEVEL_OUTOF10: 0
PAINLEVEL_OUTOF10: 0

## 2019-11-07 LAB
ANION GAP SERPL CALCULATED.3IONS-SCNC: 11 MEQ/L (ref 8–16)
APTT: 159.9 SECONDS (ref 22–38)
APTT: 38.3 SECONDS (ref 22–38)
BUN BLDV-MCNC: 18 MG/DL (ref 7–22)
CALCIUM SERPL-MCNC: 9.1 MG/DL (ref 8.5–10.5)
CHLORIDE BLD-SCNC: 99 MEQ/L (ref 98–111)
CO2: 22 MEQ/L (ref 23–33)
CREAT SERPL-MCNC: 1.3 MG/DL (ref 0.4–1.2)
ERYTHROCYTE [DISTWIDTH] IN BLOOD BY AUTOMATED COUNT: 19 % (ref 11.5–14.5)
ERYTHROCYTE [DISTWIDTH] IN BLOOD BY AUTOMATED COUNT: 19.3 % (ref 11.5–14.5)
ERYTHROCYTE [DISTWIDTH] IN BLOOD BY AUTOMATED COUNT: 62.9 FL (ref 35–45)
ERYTHROCYTE [DISTWIDTH] IN BLOOD BY AUTOMATED COUNT: 63.3 FL (ref 35–45)
GFR SERPL CREATININE-BSD FRML MDRD: 52 ML/MIN/1.73M2
GLUCOSE BLD-MCNC: 107 MG/DL (ref 70–108)
GLUCOSE BLD-MCNC: 158 MG/DL (ref 70–108)
GLUCOSE BLD-MCNC: 221 MG/DL (ref 70–108)
GLUCOSE BLD-MCNC: 250 MG/DL (ref 70–108)
HCT VFR BLD CALC: 31.5 % (ref 42–52)
HCT VFR BLD CALC: 32.1 % (ref 42–52)
HEMOGLOBIN: 10.1 GM/DL (ref 14–18)
HEMOGLOBIN: 10.2 GM/DL (ref 14–18)
LV EF: 40 %
LVEF MODALITY: NORMAL
MAGNESIUM: 1.7 MG/DL (ref 1.6–2.4)
MCH RBC QN AUTO: 28.6 PG (ref 26–33)
MCH RBC QN AUTO: 28.7 PG (ref 26–33)
MCHC RBC AUTO-ENTMCNC: 31.8 GM/DL (ref 32.2–35.5)
MCHC RBC AUTO-ENTMCNC: 32.1 GM/DL (ref 32.2–35.5)
MCV RBC AUTO: 89.2 FL (ref 80–94)
MCV RBC AUTO: 90.2 FL (ref 80–94)
OSMOLALITY CALCULATION: 266.9 MOSMOL/KG (ref 275–300)
PLATELET # BLD: 237 THOU/MM3 (ref 130–400)
PLATELET # BLD: 241 THOU/MM3 (ref 130–400)
PMV BLD AUTO: 10.5 FL (ref 9.4–12.4)
PMV BLD AUTO: 10.7 FL (ref 9.4–12.4)
POTASSIUM SERPL-SCNC: 4.1 MEQ/L (ref 3.5–5.2)
RBC # BLD: 3.53 MILL/MM3 (ref 4.7–6.1)
RBC # BLD: 3.56 MILL/MM3 (ref 4.7–6.1)
SODIUM BLD-SCNC: 132 MEQ/L (ref 135–145)
TROPONIN T: 0.02 NG/ML
TROPONIN T: 0.04 NG/ML
TSH SERPL DL<=0.05 MIU/L-ACNC: 5.03 UIU/ML (ref 0.4–4.2)
WBC # BLD: 9 THOU/MM3 (ref 4.8–10.8)
WBC # BLD: 9.2 THOU/MM3 (ref 4.8–10.8)

## 2019-11-07 PROCEDURE — 6370000000 HC RX 637 (ALT 250 FOR IP): Performed by: INTERNAL MEDICINE

## 2019-11-07 PROCEDURE — 99223 1ST HOSP IP/OBS HIGH 75: CPT | Performed by: INTERNAL MEDICINE

## 2019-11-07 PROCEDURE — 85730 THROMBOPLASTIN TIME PARTIAL: CPT

## 2019-11-07 PROCEDURE — 82948 REAGENT STRIP/BLOOD GLUCOSE: CPT

## 2019-11-07 PROCEDURE — 83735 ASSAY OF MAGNESIUM: CPT

## 2019-11-07 PROCEDURE — 2060000000 HC ICU INTERMEDIATE R&B

## 2019-11-07 PROCEDURE — 6360000002 HC RX W HCPCS: Performed by: INTERNAL MEDICINE

## 2019-11-07 PROCEDURE — 2709999900 HC NON-CHARGEABLE SUPPLY

## 2019-11-07 PROCEDURE — 93306 TTE W/DOPPLER COMPLETE: CPT

## 2019-11-07 PROCEDURE — 80048 BASIC METABOLIC PNL TOTAL CA: CPT

## 2019-11-07 PROCEDURE — 97166 OT EVAL MOD COMPLEX 45 MIN: CPT

## 2019-11-07 PROCEDURE — 36415 COLL VENOUS BLD VENIPUNCTURE: CPT

## 2019-11-07 PROCEDURE — 85027 COMPLETE CBC AUTOMATED: CPT

## 2019-11-07 PROCEDURE — 2500000003 HC RX 250 WO HCPCS: Performed by: INTERNAL MEDICINE

## 2019-11-07 PROCEDURE — 97110 THERAPEUTIC EXERCISES: CPT

## 2019-11-07 PROCEDURE — 84484 ASSAY OF TROPONIN QUANT: CPT

## 2019-11-07 PROCEDURE — 84443 ASSAY THYROID STIM HORMONE: CPT

## 2019-11-07 PROCEDURE — 97535 SELF CARE MNGMENT TRAINING: CPT

## 2019-11-07 RX ORDER — HEPARIN SODIUM 1000 [USP'U]/ML
4000 INJECTION, SOLUTION INTRAVENOUS; SUBCUTANEOUS ONCE
Status: COMPLETED | OUTPATIENT
Start: 2019-11-07 | End: 2019-11-07

## 2019-11-07 RX ORDER — HEPARIN SODIUM 1000 [USP'U]/ML
2000 INJECTION, SOLUTION INTRAVENOUS; SUBCUTANEOUS PRN
Status: DISCONTINUED | OUTPATIENT
Start: 2019-11-07 | End: 2019-11-11

## 2019-11-07 RX ORDER — HEPARIN SODIUM 1000 [USP'U]/ML
4000 INJECTION, SOLUTION INTRAVENOUS; SUBCUTANEOUS PRN
Status: DISCONTINUED | OUTPATIENT
Start: 2019-11-07 | End: 2019-11-11

## 2019-11-07 RX ORDER — ATORVASTATIN CALCIUM 40 MG/1
40 TABLET, FILM COATED ORAL NIGHTLY
Status: DISCONTINUED | OUTPATIENT
Start: 2019-11-07 | End: 2019-11-13 | Stop reason: HOSPADM

## 2019-11-07 RX ORDER — HEPARIN SODIUM 10000 [USP'U]/100ML
10 INJECTION, SOLUTION INTRAVENOUS CONTINUOUS
Status: DISCONTINUED | OUTPATIENT
Start: 2019-11-07 | End: 2019-11-08

## 2019-11-07 RX ORDER — ASPIRIN 81 MG/1
81 TABLET, CHEWABLE ORAL DAILY
Status: DISCONTINUED | OUTPATIENT
Start: 2019-11-07 | End: 2019-11-13 | Stop reason: HOSPADM

## 2019-11-07 RX ADMIN — CARVEDILOL 6.25 MG: 6.25 TABLET, FILM COATED ORAL at 17:26

## 2019-11-07 RX ADMIN — BUMETANIDE 0.5 MG: 0.25 INJECTION INTRAMUSCULAR; INTRAVENOUS at 06:23

## 2019-11-07 RX ADMIN — HEPARIN SODIUM 5000 UNITS: 5000 INJECTION INTRAVENOUS; SUBCUTANEOUS at 06:26

## 2019-11-07 RX ADMIN — INSULIN LISPRO 1 UNITS: 100 INJECTION, SOLUTION INTRAVENOUS; SUBCUTANEOUS at 21:27

## 2019-11-07 RX ADMIN — HYDRALAZINE HYDROCHLORIDE 75 MG: 50 TABLET, FILM COATED ORAL at 21:27

## 2019-11-07 RX ADMIN — HEPARIN SODIUM 4000 UNITS: 1000 INJECTION INTRAVENOUS; SUBCUTANEOUS at 12:38

## 2019-11-07 RX ADMIN — ASPIRIN 81 MG 81 MG: 81 TABLET ORAL at 12:37

## 2019-11-07 RX ADMIN — HEPARIN SODIUM 10 UNITS/KG/HR: 10000 INJECTION, SOLUTION INTRAVENOUS at 12:38

## 2019-11-07 RX ADMIN — HYDRALAZINE HYDROCHLORIDE 75 MG: 50 TABLET, FILM COATED ORAL at 06:23

## 2019-11-07 RX ADMIN — BUMETANIDE 0.5 MG: 0.25 INJECTION INTRAMUSCULAR; INTRAVENOUS at 15:24

## 2019-11-07 RX ADMIN — CARVEDILOL 6.25 MG: 6.25 TABLET, FILM COATED ORAL at 09:23

## 2019-11-07 RX ADMIN — QUETIAPINE FUMARATE 25 MG: 25 TABLET ORAL at 21:27

## 2019-11-07 RX ADMIN — BUMETANIDE 0.5 MG: 0.25 INJECTION INTRAMUSCULAR; INTRAVENOUS at 21:27

## 2019-11-07 RX ADMIN — QUETIAPINE FUMARATE 25 MG: 25 TABLET ORAL at 09:23

## 2019-11-07 RX ADMIN — INSULIN LISPRO 3 UNITS: 100 INJECTION, SOLUTION INTRAVENOUS; SUBCUTANEOUS at 12:37

## 2019-11-07 RX ADMIN — HYDRALAZINE HYDROCHLORIDE 75 MG: 50 TABLET, FILM COATED ORAL at 15:24

## 2019-11-07 RX ADMIN — ATORVASTATIN CALCIUM 40 MG: 40 TABLET, FILM COATED ORAL at 21:27

## 2019-11-07 RX ADMIN — LEVOTHYROXINE SODIUM 75 MCG: 75 TABLET ORAL at 06:23

## 2019-11-07 RX ADMIN — INSULIN LISPRO 2 UNITS: 100 INJECTION, SOLUTION INTRAVENOUS; SUBCUTANEOUS at 17:26

## 2019-11-07 RX ADMIN — INSULIN GLARGINE 17 UNITS: 100 INJECTION, SOLUTION SUBCUTANEOUS at 09:27

## 2019-11-07 ASSESSMENT — PAIN SCALES - GENERAL
PAINLEVEL_OUTOF10: 0

## 2019-11-08 LAB
ANION GAP SERPL CALCULATED.3IONS-SCNC: 13 MEQ/L (ref 8–16)
APTT: 36.6 SECONDS (ref 22–38)
APTT: 87.8 SECONDS (ref 22–38)
BASOPHILS # BLD: 0.4 %
BASOPHILS ABSOLUTE: 0 THOU/MM3 (ref 0–0.1)
BUN BLDV-MCNC: 19 MG/DL (ref 7–22)
CALCIUM SERPL-MCNC: 9 MG/DL (ref 8.5–10.5)
CHLORIDE BLD-SCNC: 97 MEQ/L (ref 98–111)
CHOLESTEROL, TOTAL: 98 MG/DL (ref 100–199)
CO2: 22 MEQ/L (ref 23–33)
CREAT SERPL-MCNC: 1.5 MG/DL (ref 0.4–1.2)
EOSINOPHIL # BLD: 6.3 %
EOSINOPHILS ABSOLUTE: 0.6 THOU/MM3 (ref 0–0.4)
ERYTHROCYTE [DISTWIDTH] IN BLOOD BY AUTOMATED COUNT: 19.4 % (ref 11.5–14.5)
ERYTHROCYTE [DISTWIDTH] IN BLOOD BY AUTOMATED COUNT: 62.9 FL (ref 35–45)
GFR SERPL CREATININE-BSD FRML MDRD: 44 ML/MIN/1.73M2
GLUCOSE BLD-MCNC: 101 MG/DL (ref 70–108)
GLUCOSE BLD-MCNC: 135 MG/DL (ref 70–108)
GLUCOSE BLD-MCNC: 217 MG/DL (ref 70–108)
GLUCOSE BLD-MCNC: 335 MG/DL (ref 70–108)
GLUCOSE BLD-MCNC: 91 MG/DL (ref 70–108)
HCT VFR BLD CALC: 31.9 % (ref 42–52)
HDLC SERPL-MCNC: 56 MG/DL
HEMOGLOBIN: 10.3 GM/DL (ref 14–18)
IMMATURE GRANS (ABS): 0.07 THOU/MM3 (ref 0–0.07)
IMMATURE GRANULOCYTES: 0.8 %
LDL CHOLESTEROL CALCULATED: 26 MG/DL
LYMPHOCYTES # BLD: 18 %
LYMPHOCYTES ABSOLUTE: 1.7 THOU/MM3 (ref 1–4.8)
MCH RBC QN AUTO: 28.5 PG (ref 26–33)
MCHC RBC AUTO-ENTMCNC: 32.3 GM/DL (ref 32.2–35.5)
MCV RBC AUTO: 88.4 FL (ref 80–94)
MONOCYTES # BLD: 16.5 %
MONOCYTES ABSOLUTE: 1.5 THOU/MM3 (ref 0.4–1.3)
NUCLEATED RED BLOOD CELLS: 0 /100 WBC
ORGANISM: ABNORMAL
PLATELET # BLD: 256 THOU/MM3 (ref 130–400)
PMV BLD AUTO: 11.5 FL (ref 9.4–12.4)
POTASSIUM SERPL-SCNC: 4.2 MEQ/L (ref 3.5–5.2)
RBC # BLD: 3.61 MILL/MM3 (ref 4.7–6.1)
SEG NEUTROPHILS: 58 %
SEGMENTED NEUTROPHILS ABSOLUTE COUNT: 5.3 THOU/MM3 (ref 1.8–7.7)
SODIUM BLD-SCNC: 132 MEQ/L (ref 135–145)
TRIGL SERPL-MCNC: 79 MG/DL (ref 0–199)
URINE CULTURE, ROUTINE: ABNORMAL
WBC # BLD: 9.2 THOU/MM3 (ref 4.8–10.8)

## 2019-11-08 PROCEDURE — 36415 COLL VENOUS BLD VENIPUNCTURE: CPT

## 2019-11-08 PROCEDURE — 85730 THROMBOPLASTIN TIME PARTIAL: CPT

## 2019-11-08 PROCEDURE — 85025 COMPLETE CBC W/AUTO DIFF WBC: CPT

## 2019-11-08 PROCEDURE — 6370000000 HC RX 637 (ALT 250 FOR IP): Performed by: INTERNAL MEDICINE

## 2019-11-08 PROCEDURE — 97530 THERAPEUTIC ACTIVITIES: CPT

## 2019-11-08 PROCEDURE — 2709999900 HC NON-CHARGEABLE SUPPLY

## 2019-11-08 PROCEDURE — 82948 REAGENT STRIP/BLOOD GLUCOSE: CPT

## 2019-11-08 PROCEDURE — 99232 SBSQ HOSP IP/OBS MODERATE 35: CPT | Performed by: PHYSICIAN ASSISTANT

## 2019-11-08 PROCEDURE — 2500000003 HC RX 250 WO HCPCS: Performed by: INTERNAL MEDICINE

## 2019-11-08 PROCEDURE — 6360000002 HC RX W HCPCS: Performed by: INTERNAL MEDICINE

## 2019-11-08 PROCEDURE — 6370000000 HC RX 637 (ALT 250 FOR IP): Performed by: PHYSICIAN ASSISTANT

## 2019-11-08 PROCEDURE — 97162 PT EVAL MOD COMPLEX 30 MIN: CPT

## 2019-11-08 PROCEDURE — 80048 BASIC METABOLIC PNL TOTAL CA: CPT

## 2019-11-08 PROCEDURE — 80061 LIPID PANEL: CPT

## 2019-11-08 PROCEDURE — 2060000000 HC ICU INTERMEDIATE R&B

## 2019-11-08 RX ORDER — CLOPIDOGREL BISULFATE 75 MG/1
75 TABLET ORAL DAILY
Status: DISCONTINUED | OUTPATIENT
Start: 2019-11-08 | End: 2019-11-13 | Stop reason: HOSPADM

## 2019-11-08 RX ORDER — BUMETANIDE 0.25 MG/ML
0.5 INJECTION, SOLUTION INTRAMUSCULAR; INTRAVENOUS EVERY 12 HOURS
Status: DISCONTINUED | OUTPATIENT
Start: 2019-11-08 | End: 2019-11-12

## 2019-11-08 RX ADMIN — QUETIAPINE FUMARATE 25 MG: 25 TABLET ORAL at 20:52

## 2019-11-08 RX ADMIN — ASPIRIN 81 MG 81 MG: 81 TABLET ORAL at 09:50

## 2019-11-08 RX ADMIN — CARVEDILOL 6.25 MG: 6.25 TABLET, FILM COATED ORAL at 09:50

## 2019-11-08 RX ADMIN — HEPARIN SODIUM 2000 UNITS: 1000 INJECTION INTRAVENOUS; SUBCUTANEOUS at 04:55

## 2019-11-08 RX ADMIN — INSULIN LISPRO 4 UNITS: 100 INJECTION, SOLUTION INTRAVENOUS; SUBCUTANEOUS at 11:50

## 2019-11-08 RX ADMIN — QUETIAPINE FUMARATE 25 MG: 25 TABLET ORAL at 09:50

## 2019-11-08 RX ADMIN — BUMETANIDE 0.5 MG: 0.25 INJECTION INTRAMUSCULAR; INTRAVENOUS at 17:05

## 2019-11-08 RX ADMIN — HYDRALAZINE HYDROCHLORIDE 75 MG: 50 TABLET, FILM COATED ORAL at 20:52

## 2019-11-08 RX ADMIN — BUMETANIDE 0.5 MG: 0.25 INJECTION INTRAMUSCULAR; INTRAVENOUS at 05:02

## 2019-11-08 RX ADMIN — CLOPIDOGREL BISULFATE 75 MG: 75 TABLET ORAL at 13:44

## 2019-11-08 RX ADMIN — ATORVASTATIN CALCIUM 40 MG: 40 TABLET, FILM COATED ORAL at 20:52

## 2019-11-08 RX ADMIN — HYDRALAZINE HYDROCHLORIDE 75 MG: 50 TABLET, FILM COATED ORAL at 13:44

## 2019-11-08 RX ADMIN — INSULIN LISPRO 1 UNITS: 100 INJECTION, SOLUTION INTRAVENOUS; SUBCUTANEOUS at 21:06

## 2019-11-08 RX ADMIN — HYDRALAZINE HYDROCHLORIDE 75 MG: 50 TABLET, FILM COATED ORAL at 05:00

## 2019-11-08 RX ADMIN — INSULIN GLARGINE 17 UNITS: 100 INJECTION, SOLUTION SUBCUTANEOUS at 09:50

## 2019-11-08 RX ADMIN — LEVOTHYROXINE SODIUM 75 MCG: 75 TABLET ORAL at 04:59

## 2019-11-08 RX ADMIN — CARVEDILOL 6.25 MG: 6.25 TABLET, FILM COATED ORAL at 17:05

## 2019-11-08 ASSESSMENT — PAIN SCALES - GENERAL
PAINLEVEL_OUTOF10: 0

## 2019-11-09 LAB
ANION GAP SERPL CALCULATED.3IONS-SCNC: 15 MEQ/L (ref 8–16)
BUN BLDV-MCNC: 23 MG/DL (ref 7–22)
CALCIUM SERPL-MCNC: 9 MG/DL (ref 8.5–10.5)
CHLORIDE BLD-SCNC: 95 MEQ/L (ref 98–111)
CO2: 20 MEQ/L (ref 23–33)
CREAT SERPL-MCNC: 1.5 MG/DL (ref 0.4–1.2)
GFR SERPL CREATININE-BSD FRML MDRD: 44 ML/MIN/1.73M2
GLUCOSE BLD-MCNC: 168 MG/DL (ref 70–108)
GLUCOSE BLD-MCNC: 169 MG/DL (ref 70–108)
GLUCOSE BLD-MCNC: 194 MG/DL (ref 70–108)
GLUCOSE BLD-MCNC: 208 MG/DL (ref 70–108)
GLUCOSE BLD-MCNC: 210 MG/DL (ref 70–108)
GLUCOSE BLD-MCNC: 251 MG/DL (ref 70–108)
PLATELET # BLD: 236 THOU/MM3 (ref 130–400)
POTASSIUM SERPL-SCNC: 4.1 MEQ/L (ref 3.5–5.2)
SODIUM BLD-SCNC: 130 MEQ/L (ref 135–145)

## 2019-11-09 PROCEDURE — 85049 AUTOMATED PLATELET COUNT: CPT

## 2019-11-09 PROCEDURE — 6370000000 HC RX 637 (ALT 250 FOR IP): Performed by: INTERNAL MEDICINE

## 2019-11-09 PROCEDURE — 82948 REAGENT STRIP/BLOOD GLUCOSE: CPT

## 2019-11-09 PROCEDURE — 2500000003 HC RX 250 WO HCPCS: Performed by: INTERNAL MEDICINE

## 2019-11-09 PROCEDURE — 6370000000 HC RX 637 (ALT 250 FOR IP): Performed by: PHYSICIAN ASSISTANT

## 2019-11-09 PROCEDURE — 2060000000 HC ICU INTERMEDIATE R&B

## 2019-11-09 PROCEDURE — 80048 BASIC METABOLIC PNL TOTAL CA: CPT

## 2019-11-09 PROCEDURE — 36415 COLL VENOUS BLD VENIPUNCTURE: CPT

## 2019-11-09 RX ADMIN — INSULIN LISPRO 1 UNITS: 100 INJECTION, SOLUTION INTRAVENOUS; SUBCUTANEOUS at 09:29

## 2019-11-09 RX ADMIN — HYDRALAZINE HYDROCHLORIDE 75 MG: 50 TABLET, FILM COATED ORAL at 20:11

## 2019-11-09 RX ADMIN — QUETIAPINE FUMARATE 25 MG: 25 TABLET ORAL at 20:12

## 2019-11-09 RX ADMIN — ASPIRIN 81 MG 81 MG: 81 TABLET ORAL at 09:26

## 2019-11-09 RX ADMIN — HYDRALAZINE HYDROCHLORIDE 75 MG: 50 TABLET, FILM COATED ORAL at 13:05

## 2019-11-09 RX ADMIN — QUETIAPINE FUMARATE 25 MG: 25 TABLET ORAL at 09:26

## 2019-11-09 RX ADMIN — CARVEDILOL 6.25 MG: 6.25 TABLET, FILM COATED ORAL at 09:26

## 2019-11-09 RX ADMIN — CLOPIDOGREL BISULFATE 75 MG: 75 TABLET ORAL at 09:26

## 2019-11-09 RX ADMIN — BUMETANIDE 0.5 MG: 0.25 INJECTION INTRAMUSCULAR; INTRAVENOUS at 17:05

## 2019-11-09 RX ADMIN — HYDRALAZINE HYDROCHLORIDE 75 MG: 50 TABLET, FILM COATED ORAL at 05:29

## 2019-11-09 RX ADMIN — CARVEDILOL 6.25 MG: 6.25 TABLET, FILM COATED ORAL at 17:02

## 2019-11-09 RX ADMIN — INSULIN GLARGINE 17 UNITS: 100 INJECTION, SOLUTION SUBCUTANEOUS at 09:30

## 2019-11-09 RX ADMIN — INSULIN LISPRO 1 UNITS: 100 INJECTION, SOLUTION INTRAVENOUS; SUBCUTANEOUS at 20:13

## 2019-11-09 RX ADMIN — INSULIN LISPRO 2 UNITS: 100 INJECTION, SOLUTION INTRAVENOUS; SUBCUTANEOUS at 17:02

## 2019-11-09 RX ADMIN — LEVOTHYROXINE SODIUM 75 MCG: 75 TABLET ORAL at 05:29

## 2019-11-09 RX ADMIN — INSULIN LISPRO 2 UNITS: 100 INJECTION, SOLUTION INTRAVENOUS; SUBCUTANEOUS at 12:10

## 2019-11-09 RX ADMIN — BUMETANIDE 0.5 MG: 0.25 INJECTION INTRAMUSCULAR; INTRAVENOUS at 05:29

## 2019-11-09 RX ADMIN — ATORVASTATIN CALCIUM 40 MG: 40 TABLET, FILM COATED ORAL at 20:11

## 2019-11-09 ASSESSMENT — PAIN SCALES - GENERAL
PAINLEVEL_OUTOF10: 0

## 2019-11-10 LAB
GLUCOSE BLD-MCNC: 145 MG/DL (ref 70–108)
GLUCOSE BLD-MCNC: 180 MG/DL (ref 70–108)
GLUCOSE BLD-MCNC: 284 MG/DL (ref 70–108)
MRSA SCREEN: ABNORMAL
ORGANISM: ABNORMAL

## 2019-11-10 PROCEDURE — 6370000000 HC RX 637 (ALT 250 FOR IP): Performed by: PHYSICIAN ASSISTANT

## 2019-11-10 PROCEDURE — 6370000000 HC RX 637 (ALT 250 FOR IP): Performed by: INTERNAL MEDICINE

## 2019-11-10 PROCEDURE — 2709999900 HC NON-CHARGEABLE SUPPLY

## 2019-11-10 PROCEDURE — 2060000000 HC ICU INTERMEDIATE R&B

## 2019-11-10 PROCEDURE — 82948 REAGENT STRIP/BLOOD GLUCOSE: CPT

## 2019-11-10 PROCEDURE — 2500000003 HC RX 250 WO HCPCS: Performed by: INTERNAL MEDICINE

## 2019-11-10 RX ADMIN — INSULIN GLARGINE 17 UNITS: 100 INJECTION, SOLUTION SUBCUTANEOUS at 08:54

## 2019-11-10 RX ADMIN — ASPIRIN 81 MG 81 MG: 81 TABLET ORAL at 08:54

## 2019-11-10 RX ADMIN — HYDRALAZINE HYDROCHLORIDE 75 MG: 50 TABLET, FILM COATED ORAL at 13:52

## 2019-11-10 RX ADMIN — CARVEDILOL 6.25 MG: 6.25 TABLET, FILM COATED ORAL at 08:54

## 2019-11-10 RX ADMIN — CARVEDILOL 6.25 MG: 6.25 TABLET, FILM COATED ORAL at 17:18

## 2019-11-10 RX ADMIN — LEVOTHYROXINE SODIUM 75 MCG: 75 TABLET ORAL at 05:23

## 2019-11-10 RX ADMIN — ATORVASTATIN CALCIUM 40 MG: 40 TABLET, FILM COATED ORAL at 20:19

## 2019-11-10 RX ADMIN — INSULIN LISPRO 1 UNITS: 100 INJECTION, SOLUTION INTRAVENOUS; SUBCUTANEOUS at 20:20

## 2019-11-10 RX ADMIN — HYDRALAZINE HYDROCHLORIDE 75 MG: 50 TABLET, FILM COATED ORAL at 23:15

## 2019-11-10 RX ADMIN — BUMETANIDE 0.5 MG: 0.25 INJECTION INTRAMUSCULAR; INTRAVENOUS at 17:17

## 2019-11-10 RX ADMIN — QUETIAPINE FUMARATE 25 MG: 25 TABLET ORAL at 08:54

## 2019-11-10 RX ADMIN — INSULIN LISPRO 1 UNITS: 100 INJECTION, SOLUTION INTRAVENOUS; SUBCUTANEOUS at 17:07

## 2019-11-10 RX ADMIN — QUETIAPINE FUMARATE 25 MG: 25 TABLET ORAL at 20:19

## 2019-11-10 RX ADMIN — CLOPIDOGREL BISULFATE 75 MG: 75 TABLET ORAL at 08:54

## 2019-11-10 RX ADMIN — INSULIN LISPRO 3 UNITS: 100 INJECTION, SOLUTION INTRAVENOUS; SUBCUTANEOUS at 12:16

## 2019-11-10 RX ADMIN — BUMETANIDE 0.5 MG: 0.25 INJECTION INTRAMUSCULAR; INTRAVENOUS at 05:23

## 2019-11-10 ASSESSMENT — PAIN SCALES - GENERAL
PAINLEVEL_OUTOF10: 0

## 2019-11-11 LAB
GLUCOSE BLD-MCNC: 158 MG/DL (ref 70–108)
GLUCOSE BLD-MCNC: 211 MG/DL (ref 70–108)
GLUCOSE BLD-MCNC: 237 MG/DL (ref 70–108)
GLUCOSE BLD-MCNC: 278 MG/DL (ref 70–108)
PLATELET # BLD: 243 THOU/MM3 (ref 130–400)

## 2019-11-11 PROCEDURE — 2500000003 HC RX 250 WO HCPCS: Performed by: INTERNAL MEDICINE

## 2019-11-11 PROCEDURE — 2060000000 HC ICU INTERMEDIATE R&B

## 2019-11-11 PROCEDURE — 97535 SELF CARE MNGMENT TRAINING: CPT

## 2019-11-11 PROCEDURE — 6370000000 HC RX 637 (ALT 250 FOR IP): Performed by: PHYSICIAN ASSISTANT

## 2019-11-11 PROCEDURE — 85049 AUTOMATED PLATELET COUNT: CPT

## 2019-11-11 PROCEDURE — 6370000000 HC RX 637 (ALT 250 FOR IP): Performed by: INTERNAL MEDICINE

## 2019-11-11 PROCEDURE — 97530 THERAPEUTIC ACTIVITIES: CPT

## 2019-11-11 PROCEDURE — 36415 COLL VENOUS BLD VENIPUNCTURE: CPT

## 2019-11-11 PROCEDURE — 2709999900 HC NON-CHARGEABLE SUPPLY

## 2019-11-11 PROCEDURE — 97116 GAIT TRAINING THERAPY: CPT

## 2019-11-11 PROCEDURE — 97110 THERAPEUTIC EXERCISES: CPT

## 2019-11-11 PROCEDURE — 82948 REAGENT STRIP/BLOOD GLUCOSE: CPT

## 2019-11-11 RX ADMIN — HYDRALAZINE HYDROCHLORIDE 75 MG: 50 TABLET, FILM COATED ORAL at 06:14

## 2019-11-11 RX ADMIN — QUETIAPINE FUMARATE 25 MG: 25 TABLET ORAL at 22:10

## 2019-11-11 RX ADMIN — LEVOTHYROXINE SODIUM 75 MCG: 75 TABLET ORAL at 06:14

## 2019-11-11 RX ADMIN — CARVEDILOL 6.25 MG: 6.25 TABLET, FILM COATED ORAL at 08:48

## 2019-11-11 RX ADMIN — HYDRALAZINE HYDROCHLORIDE 75 MG: 50 TABLET, FILM COATED ORAL at 15:30

## 2019-11-11 RX ADMIN — INSULIN LISPRO 1 UNITS: 100 INJECTION, SOLUTION INTRAVENOUS; SUBCUTANEOUS at 08:51

## 2019-11-11 RX ADMIN — HYDRALAZINE HYDROCHLORIDE 75 MG: 50 TABLET, FILM COATED ORAL at 22:10

## 2019-11-11 RX ADMIN — CLOPIDOGREL BISULFATE 75 MG: 75 TABLET ORAL at 08:48

## 2019-11-11 RX ADMIN — INSULIN LISPRO 3 UNITS: 100 INJECTION, SOLUTION INTRAVENOUS; SUBCUTANEOUS at 12:58

## 2019-11-11 RX ADMIN — INSULIN LISPRO 1 UNITS: 100 INJECTION, SOLUTION INTRAVENOUS; SUBCUTANEOUS at 22:08

## 2019-11-11 RX ADMIN — CARVEDILOL 6.25 MG: 6.25 TABLET, FILM COATED ORAL at 17:27

## 2019-11-11 RX ADMIN — BUMETANIDE 0.5 MG: 0.25 INJECTION INTRAMUSCULAR; INTRAVENOUS at 06:15

## 2019-11-11 RX ADMIN — BUMETANIDE 0.5 MG: 0.25 INJECTION INTRAMUSCULAR; INTRAVENOUS at 17:29

## 2019-11-11 RX ADMIN — ASPIRIN 81 MG 81 MG: 81 TABLET ORAL at 08:48

## 2019-11-11 RX ADMIN — ATORVASTATIN CALCIUM 40 MG: 40 TABLET, FILM COATED ORAL at 22:10

## 2019-11-11 RX ADMIN — QUETIAPINE FUMARATE 25 MG: 25 TABLET ORAL at 08:48

## 2019-11-11 RX ADMIN — INSULIN GLARGINE 17 UNITS: 100 INJECTION, SOLUTION SUBCUTANEOUS at 08:53

## 2019-11-11 RX ADMIN — INSULIN LISPRO 2 UNITS: 100 INJECTION, SOLUTION INTRAVENOUS; SUBCUTANEOUS at 17:27

## 2019-11-11 ASSESSMENT — PAIN SCALES - GENERAL
PAINLEVEL_OUTOF10: 0

## 2019-11-12 LAB
ANION GAP SERPL CALCULATED.3IONS-SCNC: 17 MEQ/L (ref 8–16)
BUN BLDV-MCNC: 33 MG/DL (ref 7–22)
CALCIUM SERPL-MCNC: 8.7 MG/DL (ref 8.5–10.5)
CHLORIDE BLD-SCNC: 95 MEQ/L (ref 98–111)
CO2: 22 MEQ/L (ref 23–33)
CREAT SERPL-MCNC: 1.9 MG/DL (ref 0.4–1.2)
GFR SERPL CREATININE-BSD FRML MDRD: 33 ML/MIN/1.73M2
GLUCOSE BLD-MCNC: 146 MG/DL (ref 70–108)
GLUCOSE BLD-MCNC: 186 MG/DL (ref 70–108)
GLUCOSE BLD-MCNC: 243 MG/DL (ref 70–108)
GLUCOSE BLD-MCNC: 288 MG/DL (ref 70–108)
POTASSIUM SERPL-SCNC: 4.2 MEQ/L (ref 3.5–5.2)
SODIUM BLD-SCNC: 134 MEQ/L (ref 135–145)

## 2019-11-12 PROCEDURE — 82948 REAGENT STRIP/BLOOD GLUCOSE: CPT

## 2019-11-12 PROCEDURE — 6370000000 HC RX 637 (ALT 250 FOR IP): Performed by: PHYSICIAN ASSISTANT

## 2019-11-12 PROCEDURE — 6370000000 HC RX 637 (ALT 250 FOR IP): Performed by: INTERNAL MEDICINE

## 2019-11-12 PROCEDURE — 80048 BASIC METABOLIC PNL TOTAL CA: CPT

## 2019-11-12 PROCEDURE — 36415 COLL VENOUS BLD VENIPUNCTURE: CPT

## 2019-11-12 PROCEDURE — 2060000000 HC ICU INTERMEDIATE R&B

## 2019-11-12 PROCEDURE — 97110 THERAPEUTIC EXERCISES: CPT

## 2019-11-12 PROCEDURE — 97530 THERAPEUTIC ACTIVITIES: CPT

## 2019-11-12 PROCEDURE — 2500000003 HC RX 250 WO HCPCS: Performed by: INTERNAL MEDICINE

## 2019-11-12 RX ORDER — DEXTROSE MONOHYDRATE 50 MG/ML
100 INJECTION, SOLUTION INTRAVENOUS PRN
Status: DISCONTINUED | OUTPATIENT
Start: 2019-11-12 | End: 2019-11-13 | Stop reason: HOSPADM

## 2019-11-12 RX ORDER — CLOPIDOGREL BISULFATE 75 MG/1
75 TABLET ORAL DAILY
Qty: 30 TABLET | Refills: 3 | Status: ON HOLD | OUTPATIENT
Start: 2019-11-12 | End: 2019-12-18 | Stop reason: SDUPTHER

## 2019-11-12 RX ORDER — DEXTROSE MONOHYDRATE 25 G/50ML
12.5 INJECTION, SOLUTION INTRAVENOUS PRN
Status: DISCONTINUED | OUTPATIENT
Start: 2019-11-12 | End: 2019-11-13 | Stop reason: HOSPADM

## 2019-11-12 RX ORDER — ASPIRIN 81 MG/1
81 TABLET, CHEWABLE ORAL DAILY
Qty: 30 TABLET | Refills: 3 | Status: ON HOLD | OUTPATIENT
Start: 2019-11-12 | End: 2020-01-03 | Stop reason: SDUPTHER

## 2019-11-12 RX ORDER — BUMETANIDE 0.5 MG/1
0.5 TABLET ORAL DAILY
Qty: 30 TABLET | Refills: 0 | Status: ON HOLD | DISCHARGE
Start: 2019-11-13 | End: 2019-12-02 | Stop reason: HOSPADM

## 2019-11-12 RX ORDER — ACETAMINOPHEN 325 MG/1
650 TABLET ORAL EVERY 6 HOURS PRN
Qty: 120 TABLET | Refills: 3 | COMMUNITY
Start: 2019-11-12

## 2019-11-12 RX ORDER — ATORVASTATIN CALCIUM 40 MG/1
40 TABLET, FILM COATED ORAL NIGHTLY
Qty: 30 TABLET | Refills: 3 | Status: ON HOLD | OUTPATIENT
Start: 2019-11-12 | End: 2020-01-24 | Stop reason: HOSPADM

## 2019-11-12 RX ORDER — NICOTINE POLACRILEX 4 MG
15 LOZENGE BUCCAL PRN
Status: DISCONTINUED | OUTPATIENT
Start: 2019-11-12 | End: 2019-11-13 | Stop reason: HOSPADM

## 2019-11-12 RX ADMIN — ASPIRIN 81 MG 81 MG: 81 TABLET ORAL at 09:11

## 2019-11-12 RX ADMIN — HYDRALAZINE HYDROCHLORIDE 75 MG: 50 TABLET, FILM COATED ORAL at 21:07

## 2019-11-12 RX ADMIN — CLOPIDOGREL BISULFATE 75 MG: 75 TABLET ORAL at 09:11

## 2019-11-12 RX ADMIN — INSULIN GLARGINE 17 UNITS: 100 INJECTION, SOLUTION SUBCUTANEOUS at 09:11

## 2019-11-12 RX ADMIN — LEVOTHYROXINE SODIUM 75 MCG: 75 TABLET ORAL at 05:01

## 2019-11-12 RX ADMIN — QUETIAPINE FUMARATE 25 MG: 25 TABLET ORAL at 21:07

## 2019-11-12 RX ADMIN — QUETIAPINE FUMARATE 25 MG: 25 TABLET ORAL at 09:11

## 2019-11-12 RX ADMIN — INSULIN LISPRO 3 UNITS: 100 INJECTION, SOLUTION INTRAVENOUS; SUBCUTANEOUS at 12:07

## 2019-11-12 RX ADMIN — CARVEDILOL 6.25 MG: 6.25 TABLET, FILM COATED ORAL at 09:11

## 2019-11-12 RX ADMIN — HYDRALAZINE HYDROCHLORIDE 75 MG: 50 TABLET, FILM COATED ORAL at 05:02

## 2019-11-12 RX ADMIN — INSULIN LISPRO 2 UNITS: 100 INJECTION, SOLUTION INTRAVENOUS; SUBCUTANEOUS at 18:08

## 2019-11-12 RX ADMIN — BUMETANIDE 0.5 MG: 0.25 INJECTION INTRAMUSCULAR; INTRAVENOUS at 04:50

## 2019-11-12 RX ADMIN — INSULIN LISPRO 1 UNITS: 100 INJECTION, SOLUTION INTRAVENOUS; SUBCUTANEOUS at 21:08

## 2019-11-12 RX ADMIN — ATORVASTATIN CALCIUM 40 MG: 40 TABLET, FILM COATED ORAL at 21:07

## 2019-11-12 ASSESSMENT — PAIN SCALES - GENERAL
PAINLEVEL_OUTOF10: 0

## 2019-11-13 VITALS
OXYGEN SATURATION: 93 % | BODY MASS INDEX: 29.48 KG/M2 | RESPIRATION RATE: 18 BRPM | HEART RATE: 52 BPM | WEIGHT: 205.9 LBS | TEMPERATURE: 98.1 F | HEIGHT: 70 IN | DIASTOLIC BLOOD PRESSURE: 63 MMHG | SYSTOLIC BLOOD PRESSURE: 141 MMHG

## 2019-11-13 LAB
ANION GAP SERPL CALCULATED.3IONS-SCNC: 18 MEQ/L (ref 8–16)
BUN BLDV-MCNC: 38 MG/DL (ref 7–22)
CALCIUM SERPL-MCNC: 8.9 MG/DL (ref 8.5–10.5)
CHLORIDE BLD-SCNC: 97 MEQ/L (ref 98–111)
CO2: 20 MEQ/L (ref 23–33)
CREAT SERPL-MCNC: 1.8 MG/DL (ref 0.4–1.2)
GFR SERPL CREATININE-BSD FRML MDRD: 36 ML/MIN/1.73M2
GLUCOSE BLD-MCNC: 139 MG/DL (ref 70–108)
GLUCOSE BLD-MCNC: 144 MG/DL (ref 70–108)
GLUCOSE BLD-MCNC: 259 MG/DL (ref 70–108)
GLUCOSE BLD-MCNC: 278 MG/DL (ref 70–108)
POTASSIUM SERPL-SCNC: 4.6 MEQ/L (ref 3.5–5.2)
SODIUM BLD-SCNC: 135 MEQ/L (ref 135–145)

## 2019-11-13 PROCEDURE — 36415 COLL VENOUS BLD VENIPUNCTURE: CPT

## 2019-11-13 PROCEDURE — 82948 REAGENT STRIP/BLOOD GLUCOSE: CPT

## 2019-11-13 PROCEDURE — 6370000000 HC RX 637 (ALT 250 FOR IP): Performed by: INTERNAL MEDICINE

## 2019-11-13 PROCEDURE — 80048 BASIC METABOLIC PNL TOTAL CA: CPT

## 2019-11-13 PROCEDURE — 6370000000 HC RX 637 (ALT 250 FOR IP): Performed by: PHYSICIAN ASSISTANT

## 2019-11-13 PROCEDURE — 97530 THERAPEUTIC ACTIVITIES: CPT

## 2019-11-13 RX ORDER — BUMETANIDE 1 MG/1
0.5 TABLET ORAL DAILY
Status: DISCONTINUED | OUTPATIENT
Start: 2019-11-14 | End: 2019-11-13 | Stop reason: HOSPADM

## 2019-11-13 RX ADMIN — HYDRALAZINE HYDROCHLORIDE 75 MG: 50 TABLET, FILM COATED ORAL at 06:35

## 2019-11-13 RX ADMIN — INSULIN LISPRO 1 UNITS: 100 INJECTION, SOLUTION INTRAVENOUS; SUBCUTANEOUS at 08:14

## 2019-11-13 RX ADMIN — HYDRALAZINE HYDROCHLORIDE 75 MG: 50 TABLET, FILM COATED ORAL at 15:00

## 2019-11-13 RX ADMIN — CARVEDILOL 6.25 MG: 6.25 TABLET, FILM COATED ORAL at 08:11

## 2019-11-13 RX ADMIN — ASPIRIN 81 MG 81 MG: 81 TABLET ORAL at 08:13

## 2019-11-13 RX ADMIN — INSULIN LISPRO 3 UNITS: 100 INJECTION, SOLUTION INTRAVENOUS; SUBCUTANEOUS at 17:33

## 2019-11-13 RX ADMIN — CLOPIDOGREL BISULFATE 75 MG: 75 TABLET ORAL at 08:13

## 2019-11-13 RX ADMIN — QUETIAPINE FUMARATE 25 MG: 25 TABLET ORAL at 10:09

## 2019-11-13 RX ADMIN — INSULIN GLARGINE 17 UNITS: 100 INJECTION, SOLUTION SUBCUTANEOUS at 08:13

## 2019-11-13 RX ADMIN — CARVEDILOL 6.25 MG: 6.25 TABLET, FILM COATED ORAL at 17:34

## 2019-11-13 RX ADMIN — LEVOTHYROXINE SODIUM 75 MCG: 75 TABLET ORAL at 06:35

## 2019-11-13 RX ADMIN — INSULIN LISPRO 3 UNITS: 100 INJECTION, SOLUTION INTRAVENOUS; SUBCUTANEOUS at 12:12

## 2019-11-13 ASSESSMENT — PAIN SCALES - GENERAL: PAINLEVEL_OUTOF10: 0

## 2019-11-25 ENCOUNTER — APPOINTMENT (OUTPATIENT)
Dept: CT IMAGING | Age: 84
DRG: 309 | End: 2019-11-25
Payer: MEDICARE

## 2019-11-25 ENCOUNTER — HOSPITAL ENCOUNTER (INPATIENT)
Age: 84
LOS: 14 days | Discharge: NURSING, REHAB OR LONG-TERM CARE | DRG: 309 | End: 2019-12-09
Attending: INTERNAL MEDICINE | Admitting: INTERNAL MEDICINE
Payer: MEDICARE

## 2019-11-25 ENCOUNTER — CARE COORDINATION (OUTPATIENT)
Dept: CASE MANAGEMENT | Age: 84
End: 2019-11-25

## 2019-11-25 ENCOUNTER — APPOINTMENT (OUTPATIENT)
Dept: GENERAL RADIOLOGY | Age: 84
DRG: 309 | End: 2019-11-25
Payer: MEDICARE

## 2019-11-25 DIAGNOSIS — R29.6 FREQUENT FALLS: ICD-10-CM

## 2019-11-25 DIAGNOSIS — N18.9 ACUTE KIDNEY INJURY SUPERIMPOSED ON CHRONIC KIDNEY DISEASE (HCC): ICD-10-CM

## 2019-11-25 DIAGNOSIS — R42 DIZZINESS: Primary | ICD-10-CM

## 2019-11-25 DIAGNOSIS — I47.20 VENTRICULAR TACHYCARDIA SEEN ON CARDIAC MONITOR: ICD-10-CM

## 2019-11-25 DIAGNOSIS — S09.90XA INJURY OF HEAD, INITIAL ENCOUNTER: ICD-10-CM

## 2019-11-25 DIAGNOSIS — N17.9 ACUTE KIDNEY INJURY SUPERIMPOSED ON CHRONIC KIDNEY DISEASE (HCC): ICD-10-CM

## 2019-11-25 DIAGNOSIS — E11.65 UNCONTROLLED TYPE 2 DIABETES MELLITUS WITH HYPERGLYCEMIA (HCC): ICD-10-CM

## 2019-11-25 PROBLEM — I49.9 VENTRICULAR ARRHYTHMIA: Status: ACTIVE | Noted: 2019-11-25

## 2019-11-25 LAB
ALBUMIN SERPL-MCNC: 4.3 G/DL (ref 3.5–5.1)
ALP BLD-CCNC: 92 U/L (ref 38–126)
ALT SERPL-CCNC: 21 U/L (ref 11–66)
ANION GAP SERPL CALCULATED.3IONS-SCNC: 17 MEQ/L (ref 8–16)
AST SERPL-CCNC: 19 U/L (ref 5–40)
BACTERIA: ABNORMAL /HPF
BASOPHILS # BLD: 0.4 %
BASOPHILS ABSOLUTE: 0.1 THOU/MM3 (ref 0–0.1)
BILIRUB SERPL-MCNC: 1.5 MG/DL (ref 0.3–1.2)
BILIRUBIN URINE: NEGATIVE
BLOOD, URINE: ABNORMAL
BUN BLDV-MCNC: 60 MG/DL (ref 7–22)
CALCIUM SERPL-MCNC: 9.9 MG/DL (ref 8.5–10.5)
CASTS 2: ABNORMAL /LPF
CASTS UA: ABNORMAL /LPF
CHARACTER, URINE: CLEAR
CHLORIDE BLD-SCNC: 95 MEQ/L (ref 98–111)
CO2: 23 MEQ/L (ref 23–33)
COLOR: YELLOW
CREAT SERPL-MCNC: 2.1 MG/DL (ref 0.4–1.2)
CRYSTALS, UA: ABNORMAL
EKG ATRIAL RATE: 65 BPM
EKG ATRIAL RATE: 72 BPM
EKG P AXIS: 55 DEGREES
EKG P AXIS: 63 DEGREES
EKG P-R INTERVAL: 176 MS
EKG P-R INTERVAL: 176 MS
EKG Q-T INTERVAL: 428 MS
EKG Q-T INTERVAL: 468 MS
EKG QRS DURATION: 100 MS
EKG QRS DURATION: 104 MS
EKG QTC CALCULATION (BAZETT): 445 MS
EKG QTC CALCULATION (BAZETT): 512 MS
EKG R AXIS: -29 DEGREES
EKG R AXIS: -34 DEGREES
EKG T AXIS: 110 DEGREES
EKG T AXIS: 112 DEGREES
EKG VENTRICULAR RATE: 65 BPM
EKG VENTRICULAR RATE: 72 BPM
EOSINOPHIL # BLD: 3.5 %
EOSINOPHILS ABSOLUTE: 0.5 THOU/MM3 (ref 0–0.4)
EPITHELIAL CELLS, UA: ABNORMAL /HPF
ERYTHROCYTE [DISTWIDTH] IN BLOOD BY AUTOMATED COUNT: 18.5 % (ref 11.5–14.5)
ERYTHROCYTE [DISTWIDTH] IN BLOOD BY AUTOMATED COUNT: 59.8 FL (ref 35–45)
GFR SERPL CREATININE-BSD FRML MDRD: 30 ML/MIN/1.73M2
GLUCOSE BLD-MCNC: 156 MG/DL (ref 70–108)
GLUCOSE BLD-MCNC: 269 MG/DL (ref 70–108)
GLUCOSE BLD-MCNC: 391 MG/DL (ref 70–108)
GLUCOSE BLD-MCNC: 408 MG/DL (ref 70–108)
GLUCOSE URINE: >= 1000 MG/DL
HCT VFR BLD CALC: 38.9 % (ref 42–52)
HEMOGLOBIN: 12.4 GM/DL (ref 14–18)
IMMATURE GRANS (ABS): 0.08 THOU/MM3 (ref 0–0.07)
IMMATURE GRANULOCYTES: 0.6 %
INR BLD: 1.09 (ref 0.85–1.13)
KETONES, URINE: NEGATIVE
LEUKOCYTE ESTERASE, URINE: NEGATIVE
LYMPHOCYTES # BLD: 10.8 %
LYMPHOCYTES ABSOLUTE: 1.5 THOU/MM3 (ref 1–4.8)
MAGNESIUM: 2 MG/DL (ref 1.6–2.4)
MAGNESIUM: 2.1 MG/DL (ref 1.6–2.4)
MCH RBC QN AUTO: 28.1 PG (ref 26–33)
MCHC RBC AUTO-ENTMCNC: 31.9 GM/DL (ref 32.2–35.5)
MCV RBC AUTO: 88.2 FL (ref 80–94)
MISCELLANEOUS 2: ABNORMAL
MONOCYTES # BLD: 10.5 %
MONOCYTES ABSOLUTE: 1.4 THOU/MM3 (ref 0.4–1.3)
NITRITE, URINE: NEGATIVE
NUCLEATED RED BLOOD CELLS: 0 /100 WBC
OSMOLALITY CALCULATION: 303.3 MOSMOL/KG (ref 275–300)
PH UA: 5 (ref 5–9)
PLATELET # BLD: 317 THOU/MM3 (ref 130–400)
PMV BLD AUTO: 11.4 FL (ref 9.4–12.4)
POTASSIUM SERPL-SCNC: 4.5 MEQ/L (ref 3.5–5.2)
PROCALCITONIN: 0.11 NG/ML (ref 0.01–0.09)
PROTEIN UA: NEGATIVE
RBC # BLD: 4.41 MILL/MM3 (ref 4.7–6.1)
RBC URINE: ABNORMAL /HPF
RENAL EPITHELIAL, UA: ABNORMAL
SEG NEUTROPHILS: 74.2 %
SEGMENTED NEUTROPHILS ABSOLUTE COUNT: 10 THOU/MM3 (ref 1.8–7.7)
SODIUM BLD-SCNC: 135 MEQ/L (ref 135–145)
SPECIFIC GRAVITY, URINE: 1.01 (ref 1–1.03)
TOTAL PROTEIN: 7.2 G/DL (ref 6.1–8)
TROPONIN T: 0.03 NG/ML
TROPONIN T: 0.04 NG/ML
UROBILINOGEN, URINE: 0.2 EU/DL (ref 0–1)
WBC # BLD: 13.5 THOU/MM3 (ref 4.8–10.8)
WBC UA: ABNORMAL /HPF
YEAST: ABNORMAL

## 2019-11-25 PROCEDURE — 85025 COMPLETE CBC W/AUTO DIFF WBC: CPT

## 2019-11-25 PROCEDURE — 93005 ELECTROCARDIOGRAM TRACING: CPT | Performed by: INTERNAL MEDICINE

## 2019-11-25 PROCEDURE — 93005 ELECTROCARDIOGRAM TRACING: CPT | Performed by: PHYSICIAN ASSISTANT

## 2019-11-25 PROCEDURE — 2709999900 HC NON-CHARGEABLE SUPPLY

## 2019-11-25 PROCEDURE — 6370000000 HC RX 637 (ALT 250 FOR IP): Performed by: PHYSICIAN ASSISTANT

## 2019-11-25 PROCEDURE — 83735 ASSAY OF MAGNESIUM: CPT

## 2019-11-25 PROCEDURE — 2580000003 HC RX 258: Performed by: INTERNAL MEDICINE

## 2019-11-25 PROCEDURE — 85610 PROTHROMBIN TIME: CPT

## 2019-11-25 PROCEDURE — 36415 COLL VENOUS BLD VENIPUNCTURE: CPT

## 2019-11-25 PROCEDURE — 96374 THER/PROPH/DIAG INJ IV PUSH: CPT

## 2019-11-25 PROCEDURE — 2140000000 HC CCU INTERMEDIATE R&B

## 2019-11-25 PROCEDURE — 84484 ASSAY OF TROPONIN QUANT: CPT

## 2019-11-25 PROCEDURE — 70450 CT HEAD/BRAIN W/O DYE: CPT

## 2019-11-25 PROCEDURE — 82948 REAGENT STRIP/BLOOD GLUCOSE: CPT

## 2019-11-25 PROCEDURE — 71046 X-RAY EXAM CHEST 2 VIEWS: CPT

## 2019-11-25 PROCEDURE — 6370000000 HC RX 637 (ALT 250 FOR IP): Performed by: INTERNAL MEDICINE

## 2019-11-25 PROCEDURE — 81001 URINALYSIS AUTO W/SCOPE: CPT

## 2019-11-25 PROCEDURE — 84145 PROCALCITONIN (PCT): CPT

## 2019-11-25 PROCEDURE — 80053 COMPREHEN METABOLIC PANEL: CPT

## 2019-11-25 PROCEDURE — 2580000003 HC RX 258: Performed by: PHYSICIAN ASSISTANT

## 2019-11-25 PROCEDURE — 99285 EMERGENCY DEPT VISIT HI MDM: CPT

## 2019-11-25 PROCEDURE — 72125 CT NECK SPINE W/O DYE: CPT

## 2019-11-25 RX ORDER — SODIUM CHLORIDE 9 MG/ML
INJECTION, SOLUTION INTRAVENOUS CONTINUOUS
Status: DISCONTINUED | OUTPATIENT
Start: 2019-11-25 | End: 2019-11-25

## 2019-11-25 RX ORDER — SODIUM CHLORIDE 9 MG/ML
INJECTION, SOLUTION INTRAVENOUS CONTINUOUS
Status: ACTIVE | OUTPATIENT
Start: 2019-11-25 | End: 2019-11-26

## 2019-11-25 RX ORDER — ACETAMINOPHEN 325 MG/1
650 TABLET ORAL EVERY 6 HOURS PRN
Status: DISCONTINUED | OUTPATIENT
Start: 2019-11-25 | End: 2019-12-09 | Stop reason: HOSPADM

## 2019-11-25 RX ORDER — ATORVASTATIN CALCIUM 40 MG/1
40 TABLET, FILM COATED ORAL NIGHTLY
Status: DISCONTINUED | OUTPATIENT
Start: 2019-11-25 | End: 2019-12-09 | Stop reason: HOSPADM

## 2019-11-25 RX ORDER — INSULIN GLARGINE 100 [IU]/ML
17 INJECTION, SOLUTION SUBCUTANEOUS EVERY MORNING
Status: DISCONTINUED | OUTPATIENT
Start: 2019-11-26 | End: 2019-12-09 | Stop reason: HOSPADM

## 2019-11-25 RX ORDER — QUETIAPINE FUMARATE 25 MG/1
25 TABLET, FILM COATED ORAL 2 TIMES DAILY
Status: DISCONTINUED | OUTPATIENT
Start: 2019-11-25 | End: 2019-12-09 | Stop reason: HOSPADM

## 2019-11-25 RX ORDER — CARVEDILOL 6.25 MG/1
6.25 TABLET ORAL 2 TIMES DAILY WITH MEALS
Status: DISCONTINUED | OUTPATIENT
Start: 2019-11-25 | End: 2019-11-26

## 2019-11-25 RX ORDER — LEVOTHYROXINE SODIUM 0.07 MG/1
75 TABLET ORAL EVERY MORNING
Status: DISCONTINUED | OUTPATIENT
Start: 2019-11-26 | End: 2019-12-09 | Stop reason: HOSPADM

## 2019-11-25 RX ORDER — ASPIRIN 81 MG/1
81 TABLET, CHEWABLE ORAL DAILY
Status: DISCONTINUED | OUTPATIENT
Start: 2019-11-25 | End: 2019-12-09 | Stop reason: HOSPADM

## 2019-11-25 RX ORDER — CLOPIDOGREL BISULFATE 75 MG/1
75 TABLET ORAL DAILY
Status: DISCONTINUED | OUTPATIENT
Start: 2019-11-25 | End: 2019-12-09 | Stop reason: HOSPADM

## 2019-11-25 RX ORDER — INSULIN GLARGINE 100 [IU]/ML
10 INJECTION, SOLUTION SUBCUTANEOUS ONCE
Status: DISCONTINUED | OUTPATIENT
Start: 2019-11-25 | End: 2019-11-25

## 2019-11-25 RX ADMIN — QUETIAPINE FUMARATE 25 MG: 25 TABLET ORAL at 20:09

## 2019-11-25 RX ADMIN — Medication 10 UNITS: at 15:32

## 2019-11-25 RX ADMIN — CARVEDILOL 6.25 MG: 6.25 TABLET, FILM COATED ORAL at 20:09

## 2019-11-25 RX ADMIN — INSULIN LISPRO 5 UNITS: 100 INJECTION, SOLUTION INTRAVENOUS; SUBCUTANEOUS at 20:10

## 2019-11-25 RX ADMIN — SODIUM CHLORIDE: 9 INJECTION, SOLUTION INTRAVENOUS at 15:35

## 2019-11-25 RX ADMIN — SODIUM CHLORIDE: 9 INJECTION, SOLUTION INTRAVENOUS at 20:09

## 2019-11-25 RX ADMIN — ATORVASTATIN CALCIUM 40 MG: 40 TABLET, FILM COATED ORAL at 20:09

## 2019-11-25 RX ADMIN — ACETAMINOPHEN 650 MG: 325 TABLET ORAL at 20:09

## 2019-11-25 ASSESSMENT — ENCOUNTER SYMPTOMS
NAUSEA: 0
WHEEZING: 0
EYE REDNESS: 0
EYE DISCHARGE: 0
BACK PAIN: 0
ABDOMINAL PAIN: 0
SHORTNESS OF BREATH: 1
VOMITING: 0
DIARRHEA: 0
RHINORRHEA: 0
COUGH: 0
SORE THROAT: 0

## 2019-11-25 ASSESSMENT — PAIN SCALES - GENERAL
PAINLEVEL_OUTOF10: 0

## 2019-11-26 ENCOUNTER — CARE COORDINATION (OUTPATIENT)
Dept: CASE MANAGEMENT | Age: 84
End: 2019-11-26

## 2019-11-26 LAB
ALBUMIN SERPL-MCNC: 3.6 G/DL (ref 3.5–5.1)
ALP BLD-CCNC: 78 U/L (ref 38–126)
ALT SERPL-CCNC: 19 U/L (ref 11–66)
ANION GAP SERPL CALCULATED.3IONS-SCNC: 15 MEQ/L (ref 8–16)
AST SERPL-CCNC: 19 U/L (ref 5–40)
BACTERIA: ABNORMAL /HPF
BASOPHILS # BLD: 0.5 %
BASOPHILS ABSOLUTE: 0.1 THOU/MM3 (ref 0–0.1)
BILIRUB SERPL-MCNC: 1.4 MG/DL (ref 0.3–1.2)
BILIRUBIN URINE: ABNORMAL
BLOOD, URINE: ABNORMAL
BUN BLDV-MCNC: 60 MG/DL (ref 7–22)
CALCIUM SERPL-MCNC: 9.5 MG/DL (ref 8.5–10.5)
CASTS 2: ABNORMAL /LPF
CASTS UA: ABNORMAL /LPF
CHARACTER, URINE: ABNORMAL
CHLORIDE BLD-SCNC: 101 MEQ/L (ref 98–111)
CO2: 22 MEQ/L (ref 23–33)
COLOR: ABNORMAL
CREAT SERPL-MCNC: 2 MG/DL (ref 0.4–1.2)
CRYSTALS, UA: ABNORMAL
EOSINOPHIL # BLD: 5.9 %
EOSINOPHILS ABSOLUTE: 0.6 THOU/MM3 (ref 0–0.4)
EPITHELIAL CELLS, UA: ABNORMAL /HPF
ERYTHROCYTE [DISTWIDTH] IN BLOOD BY AUTOMATED COUNT: 18.5 % (ref 11.5–14.5)
ERYTHROCYTE [DISTWIDTH] IN BLOOD BY AUTOMATED COUNT: 59.6 FL (ref 35–45)
GFR SERPL CREATININE-BSD FRML MDRD: 31 ML/MIN/1.73M2
GLUCOSE BLD-MCNC: 138 MG/DL (ref 70–108)
GLUCOSE BLD-MCNC: 147 MG/DL (ref 70–108)
GLUCOSE BLD-MCNC: 157 MG/DL (ref 70–108)
GLUCOSE BLD-MCNC: 262 MG/DL (ref 70–108)
GLUCOSE BLD-MCNC: 267 MG/DL (ref 70–108)
GLUCOSE BLD-MCNC: 281 MG/DL (ref 70–108)
GLUCOSE BLD-MCNC: 410 MG/DL (ref 70–108)
GLUCOSE URINE: 100 MG/DL
HCT VFR BLD CALC: 34.7 % (ref 42–52)
HEMOGLOBIN: 11.2 GM/DL (ref 14–18)
ICTOTEST: NEGATIVE
IMMATURE GRANS (ABS): 0.07 THOU/MM3 (ref 0–0.07)
IMMATURE GRANULOCYTES: 0.7 %
KETONES, URINE: NEGATIVE
LEUKOCYTE ESTERASE, URINE: ABNORMAL
LYMPHOCYTES # BLD: 22 %
LYMPHOCYTES ABSOLUTE: 2.2 THOU/MM3 (ref 1–4.8)
MAGNESIUM: 2 MG/DL (ref 1.6–2.4)
MCH RBC QN AUTO: 28.4 PG (ref 26–33)
MCHC RBC AUTO-ENTMCNC: 32.3 GM/DL (ref 32.2–35.5)
MCV RBC AUTO: 88.1 FL (ref 80–94)
MISCELLANEOUS 2: ABNORMAL
MONOCYTES # BLD: 15 %
MONOCYTES ABSOLUTE: 1.5 THOU/MM3 (ref 0.4–1.3)
NITRITE, URINE: ABNORMAL
NUCLEATED RED BLOOD CELLS: 1 /100 WBC
PH UA: 5 (ref 5–9)
PLATELET # BLD: 280 THOU/MM3 (ref 130–400)
PMV BLD AUTO: 10.9 FL (ref 9.4–12.4)
POTASSIUM SERPL-SCNC: 4.3 MEQ/L (ref 3.5–5.2)
PROTEIN UA: 30
RBC # BLD: 3.94 MILL/MM3 (ref 4.7–6.1)
RBC URINE: > 200 /HPF
RENAL EPITHELIAL, UA: ABNORMAL
SEG NEUTROPHILS: 55.9 %
SEGMENTED NEUTROPHILS ABSOLUTE COUNT: 5.6 THOU/MM3 (ref 1.8–7.7)
SODIUM BLD-SCNC: 138 MEQ/L (ref 135–145)
SPECIFIC GRAVITY, URINE: 1.02 (ref 1–1.03)
TOTAL PROTEIN: 6.3 G/DL (ref 6.1–8)
TROPONIN T: 0.03 NG/ML
UROBILINOGEN, URINE: 0.2 EU/DL (ref 0–1)
WBC # BLD: 10.1 THOU/MM3 (ref 4.8–10.8)
WBC UA: ABNORMAL /HPF
YEAST: ABNORMAL

## 2019-11-26 PROCEDURE — 2140000000 HC CCU INTERMEDIATE R&B

## 2019-11-26 PROCEDURE — 6360000002 HC RX W HCPCS: Performed by: INTERNAL MEDICINE

## 2019-11-26 PROCEDURE — 81001 URINALYSIS AUTO W/SCOPE: CPT

## 2019-11-26 PROCEDURE — 36415 COLL VENOUS BLD VENIPUNCTURE: CPT

## 2019-11-26 PROCEDURE — 88112 CYTOPATH CELL ENHANCE TECH: CPT

## 2019-11-26 PROCEDURE — 87086 URINE CULTURE/COLONY COUNT: CPT

## 2019-11-26 PROCEDURE — 51702 INSERT TEMP BLADDER CATH: CPT

## 2019-11-26 PROCEDURE — 51798 US URINE CAPACITY MEASURE: CPT

## 2019-11-26 PROCEDURE — 2580000003 HC RX 258: Performed by: INTERNAL MEDICINE

## 2019-11-26 PROCEDURE — 97163 PT EVAL HIGH COMPLEX 45 MIN: CPT

## 2019-11-26 PROCEDURE — 82948 REAGENT STRIP/BLOOD GLUCOSE: CPT

## 2019-11-26 PROCEDURE — 85025 COMPLETE CBC W/AUTO DIFF WBC: CPT

## 2019-11-26 PROCEDURE — 97110 THERAPEUTIC EXERCISES: CPT

## 2019-11-26 PROCEDURE — 99223 1ST HOSP IP/OBS HIGH 75: CPT | Performed by: INTERNAL MEDICINE

## 2019-11-26 PROCEDURE — 97167 OT EVAL HIGH COMPLEX 60 MIN: CPT

## 2019-11-26 PROCEDURE — 6370000000 HC RX 637 (ALT 250 FOR IP): Performed by: INTERNAL MEDICINE

## 2019-11-26 PROCEDURE — 87077 CULTURE AEROBIC IDENTIFY: CPT

## 2019-11-26 PROCEDURE — 83735 ASSAY OF MAGNESIUM: CPT

## 2019-11-26 PROCEDURE — 2709999900 HC NON-CHARGEABLE SUPPLY

## 2019-11-26 PROCEDURE — 87186 SC STD MICRODIL/AGAR DIL: CPT

## 2019-11-26 PROCEDURE — 80053 COMPREHEN METABOLIC PANEL: CPT

## 2019-11-26 RX ORDER — SODIUM CHLORIDE 9 MG/ML
INJECTION, SOLUTION INTRAVENOUS CONTINUOUS
Status: DISCONTINUED | OUTPATIENT
Start: 2019-11-26 | End: 2019-12-09 | Stop reason: HOSPADM

## 2019-11-26 RX ORDER — METOPROLOL SUCCINATE 25 MG/1
25 TABLET, EXTENDED RELEASE ORAL DAILY
Status: DISCONTINUED | OUTPATIENT
Start: 2019-11-26 | End: 2019-12-09 | Stop reason: HOSPADM

## 2019-11-26 RX ADMIN — SODIUM CHLORIDE: 9 INJECTION, SOLUTION INTRAVENOUS at 23:03

## 2019-11-26 RX ADMIN — INSULIN LISPRO 2 UNITS: 100 INJECTION, SOLUTION INTRAVENOUS; SUBCUTANEOUS at 21:03

## 2019-11-26 RX ADMIN — METOPROLOL SUCCINATE 25 MG: 25 TABLET, FILM COATED, EXTENDED RELEASE ORAL at 12:28

## 2019-11-26 RX ADMIN — LEVOTHYROXINE SODIUM 75 MCG: 75 TABLET ORAL at 09:21

## 2019-11-26 RX ADMIN — HYDRALAZINE HYDROCHLORIDE 75 MG: 50 TABLET, FILM COATED ORAL at 05:45

## 2019-11-26 RX ADMIN — INSULIN LISPRO 9 UNITS: 100 INJECTION, SOLUTION INTRAVENOUS; SUBCUTANEOUS at 09:21

## 2019-11-26 RX ADMIN — INSULIN GLARGINE 17 UNITS: 100 INJECTION, SOLUTION SUBCUTANEOUS at 09:21

## 2019-11-26 RX ADMIN — QUETIAPINE FUMARATE 25 MG: 25 TABLET ORAL at 20:58

## 2019-11-26 RX ADMIN — QUETIAPINE FUMARATE 25 MG: 25 TABLET ORAL at 09:21

## 2019-11-26 RX ADMIN — CEFTRIAXONE SODIUM 1 G: 1 INJECTION, POWDER, FOR SOLUTION INTRAMUSCULAR; INTRAVENOUS at 12:28

## 2019-11-26 RX ADMIN — ATORVASTATIN CALCIUM 40 MG: 40 TABLET, FILM COATED ORAL at 20:58

## 2019-11-26 RX ADMIN — SODIUM CHLORIDE: 9 INJECTION, SOLUTION INTRAVENOUS at 05:41

## 2019-11-26 RX ADMIN — INSULIN LISPRO 18 UNITS: 100 INJECTION, SOLUTION INTRAVENOUS; SUBCUTANEOUS at 12:38

## 2019-11-26 ASSESSMENT — PAIN SCALES - GENERAL
PAINLEVEL_OUTOF10: 0

## 2019-11-27 ENCOUNTER — APPOINTMENT (OUTPATIENT)
Dept: ULTRASOUND IMAGING | Age: 84
DRG: 309 | End: 2019-11-27
Payer: MEDICARE

## 2019-11-27 LAB
ANION GAP SERPL CALCULATED.3IONS-SCNC: 15 MEQ/L (ref 8–16)
BASOPHILS # BLD: 0.7 %
BASOPHILS ABSOLUTE: 0.1 THOU/MM3 (ref 0–0.1)
BUN BLDV-MCNC: 60 MG/DL (ref 7–22)
CALCIUM SERPL-MCNC: 9 MG/DL (ref 8.5–10.5)
CHLORIDE BLD-SCNC: 102 MEQ/L (ref 98–111)
CO2: 21 MEQ/L (ref 23–33)
CREAT SERPL-MCNC: 1.8 MG/DL (ref 0.4–1.2)
EOSINOPHIL # BLD: 5.2 %
EOSINOPHILS ABSOLUTE: 0.6 THOU/MM3 (ref 0–0.4)
ERYTHROCYTE [DISTWIDTH] IN BLOOD BY AUTOMATED COUNT: 18.6 % (ref 11.5–14.5)
ERYTHROCYTE [DISTWIDTH] IN BLOOD BY AUTOMATED COUNT: 63.2 FL (ref 35–45)
GFR SERPL CREATININE-BSD FRML MDRD: 36 ML/MIN/1.73M2
GLUCOSE BLD-MCNC: 168 MG/DL (ref 70–108)
GLUCOSE BLD-MCNC: 179 MG/DL (ref 70–108)
GLUCOSE BLD-MCNC: 210 MG/DL (ref 70–108)
GLUCOSE BLD-MCNC: 211 MG/DL (ref 70–108)
GLUCOSE BLD-MCNC: 317 MG/DL (ref 70–108)
HCT VFR BLD CALC: 34.3 % (ref 42–52)
HEMOGLOBIN: 10.3 GM/DL (ref 14–18)
IMMATURE GRANS (ABS): 0.09 THOU/MM3 (ref 0–0.07)
IMMATURE GRANULOCYTES: 0.8 %
LYMPHOCYTES # BLD: 21.8 %
LYMPHOCYTES ABSOLUTE: 2.3 THOU/MM3 (ref 1–4.8)
MCH RBC QN AUTO: 27.8 PG (ref 26–33)
MCHC RBC AUTO-ENTMCNC: 30 GM/DL (ref 32.2–35.5)
MCV RBC AUTO: 92.5 FL (ref 80–94)
MONOCYTES # BLD: 15.2 %
MONOCYTES ABSOLUTE: 1.6 THOU/MM3 (ref 0.4–1.3)
NUCLEATED RED BLOOD CELLS: 0 /100 WBC
PLATELET # BLD: 255 THOU/MM3 (ref 130–400)
PMV BLD AUTO: 11.8 FL (ref 9.4–12.4)
POTASSIUM SERPL-SCNC: 4.3 MEQ/L (ref 3.5–5.2)
RBC # BLD: 3.71 MILL/MM3 (ref 4.7–6.1)
SEG NEUTROPHILS: 56.3 %
SEGMENTED NEUTROPHILS ABSOLUTE COUNT: 6 THOU/MM3 (ref 1.8–7.7)
SODIUM BLD-SCNC: 138 MEQ/L (ref 135–145)
WBC # BLD: 10.7 THOU/MM3 (ref 4.8–10.8)

## 2019-11-27 PROCEDURE — 6370000000 HC RX 637 (ALT 250 FOR IP): Performed by: INTERNAL MEDICINE

## 2019-11-27 PROCEDURE — 2709999900 HC NON-CHARGEABLE SUPPLY

## 2019-11-27 PROCEDURE — 99221 1ST HOSP IP/OBS SF/LOW 40: CPT | Performed by: NURSE PRACTITIONER

## 2019-11-27 PROCEDURE — 6360000002 HC RX W HCPCS: Performed by: INTERNAL MEDICINE

## 2019-11-27 PROCEDURE — 2140000000 HC CCU INTERMEDIATE R&B

## 2019-11-27 PROCEDURE — 85025 COMPLETE CBC W/AUTO DIFF WBC: CPT

## 2019-11-27 PROCEDURE — 97530 THERAPEUTIC ACTIVITIES: CPT

## 2019-11-27 PROCEDURE — 99232 SBSQ HOSP IP/OBS MODERATE 35: CPT | Performed by: NURSE PRACTITIONER

## 2019-11-27 PROCEDURE — 80048 BASIC METABOLIC PNL TOTAL CA: CPT

## 2019-11-27 PROCEDURE — 76770 US EXAM ABDO BACK WALL COMP: CPT

## 2019-11-27 PROCEDURE — 82948 REAGENT STRIP/BLOOD GLUCOSE: CPT

## 2019-11-27 PROCEDURE — 51798 US URINE CAPACITY MEASURE: CPT

## 2019-11-27 PROCEDURE — 88112 CYTOPATH CELL ENHANCE TECH: CPT

## 2019-11-27 PROCEDURE — 2580000003 HC RX 258: Performed by: INTERNAL MEDICINE

## 2019-11-27 PROCEDURE — 36415 COLL VENOUS BLD VENIPUNCTURE: CPT

## 2019-11-27 RX ADMIN — CLOPIDOGREL BISULFATE 75 MG: 75 TABLET ORAL at 11:40

## 2019-11-27 RX ADMIN — METOPROLOL SUCCINATE 25 MG: 25 TABLET, FILM COATED, EXTENDED RELEASE ORAL at 07:58

## 2019-11-27 RX ADMIN — INSULIN LISPRO 3 UNITS: 100 INJECTION, SOLUTION INTRAVENOUS; SUBCUTANEOUS at 07:58

## 2019-11-27 RX ADMIN — INSULIN GLARGINE 17 UNITS: 100 INJECTION, SOLUTION SUBCUTANEOUS at 07:58

## 2019-11-27 RX ADMIN — QUETIAPINE FUMARATE 25 MG: 25 TABLET ORAL at 21:21

## 2019-11-27 RX ADMIN — INSULIN LISPRO 12 UNITS: 100 INJECTION, SOLUTION INTRAVENOUS; SUBCUTANEOUS at 11:38

## 2019-11-27 RX ADMIN — INSULIN LISPRO 6 UNITS: 100 INJECTION, SOLUTION INTRAVENOUS; SUBCUTANEOUS at 17:37

## 2019-11-27 RX ADMIN — LEVOTHYROXINE SODIUM 75 MCG: 75 TABLET ORAL at 07:58

## 2019-11-27 RX ADMIN — ACETAMINOPHEN 650 MG: 325 TABLET ORAL at 01:17

## 2019-11-27 RX ADMIN — ATORVASTATIN CALCIUM 40 MG: 40 TABLET, FILM COATED ORAL at 21:21

## 2019-11-27 RX ADMIN — INSULIN LISPRO 3 UNITS: 100 INJECTION, SOLUTION INTRAVENOUS; SUBCUTANEOUS at 21:23

## 2019-11-27 RX ADMIN — SODIUM CHLORIDE: 9 INJECTION, SOLUTION INTRAVENOUS at 21:21

## 2019-11-27 RX ADMIN — QUETIAPINE FUMARATE 25 MG: 25 TABLET ORAL at 09:33

## 2019-11-27 RX ADMIN — CEFTRIAXONE SODIUM 1 G: 1 INJECTION, POWDER, FOR SOLUTION INTRAMUSCULAR; INTRAVENOUS at 11:09

## 2019-11-27 ASSESSMENT — PAIN SCALES - GENERAL
PAINLEVEL_OUTOF10: 1
PAINLEVEL_OUTOF10: 0

## 2019-11-28 LAB
ANION GAP SERPL CALCULATED.3IONS-SCNC: 13 MEQ/L (ref 8–16)
BUN BLDV-MCNC: 49 MG/DL (ref 7–22)
CALCIUM SERPL-MCNC: 8.9 MG/DL (ref 8.5–10.5)
CHLORIDE BLD-SCNC: 107 MEQ/L (ref 98–111)
CO2: 20 MEQ/L (ref 23–33)
CREAT SERPL-MCNC: 1.4 MG/DL (ref 0.4–1.2)
GFR SERPL CREATININE-BSD FRML MDRD: 47 ML/MIN/1.73M2
GLUCOSE BLD-MCNC: 133 MG/DL (ref 70–108)
GLUCOSE BLD-MCNC: 158 MG/DL (ref 70–108)
GLUCOSE BLD-MCNC: 231 MG/DL (ref 70–108)
GLUCOSE BLD-MCNC: 337 MG/DL (ref 70–108)
ORGANISM: ABNORMAL
POTASSIUM SERPL-SCNC: 4 MEQ/L (ref 3.5–5.2)
SODIUM BLD-SCNC: 140 MEQ/L (ref 135–145)
URINE CULTURE REFLEX: ABNORMAL

## 2019-11-28 PROCEDURE — 2140000000 HC CCU INTERMEDIATE R&B

## 2019-11-28 PROCEDURE — 6370000000 HC RX 637 (ALT 250 FOR IP): Performed by: INTERNAL MEDICINE

## 2019-11-28 PROCEDURE — 2709999900 HC NON-CHARGEABLE SUPPLY

## 2019-11-28 PROCEDURE — 6360000002 HC RX W HCPCS: Performed by: INTERNAL MEDICINE

## 2019-11-28 PROCEDURE — 99231 SBSQ HOSP IP/OBS SF/LOW 25: CPT | Performed by: UROLOGY

## 2019-11-28 PROCEDURE — 36415 COLL VENOUS BLD VENIPUNCTURE: CPT

## 2019-11-28 PROCEDURE — 80048 BASIC METABOLIC PNL TOTAL CA: CPT

## 2019-11-28 PROCEDURE — 2580000003 HC RX 258: Performed by: INTERNAL MEDICINE

## 2019-11-28 PROCEDURE — 82948 REAGENT STRIP/BLOOD GLUCOSE: CPT

## 2019-11-28 RX ORDER — DEXTROSE MONOHYDRATE 25 G/50ML
12.5 INJECTION, SOLUTION INTRAVENOUS PRN
Status: DISCONTINUED | OUTPATIENT
Start: 2019-11-28 | End: 2019-12-09 | Stop reason: HOSPADM

## 2019-11-28 RX ORDER — NICOTINE POLACRILEX 4 MG
15 LOZENGE BUCCAL PRN
Status: DISCONTINUED | OUTPATIENT
Start: 2019-11-28 | End: 2019-12-09 | Stop reason: HOSPADM

## 2019-11-28 RX ORDER — DEXTROSE MONOHYDRATE 50 MG/ML
100 INJECTION, SOLUTION INTRAVENOUS PRN
Status: DISCONTINUED | OUTPATIENT
Start: 2019-11-28 | End: 2019-12-09 | Stop reason: HOSPADM

## 2019-11-28 RX ADMIN — INSULIN GLARGINE 17 UNITS: 100 INJECTION, SOLUTION SUBCUTANEOUS at 09:59

## 2019-11-28 RX ADMIN — QUETIAPINE FUMARATE 25 MG: 25 TABLET ORAL at 20:06

## 2019-11-28 RX ADMIN — INSULIN LISPRO 2 UNITS: 100 INJECTION, SOLUTION INTRAVENOUS; SUBCUTANEOUS at 20:06

## 2019-11-28 RX ADMIN — METOPROLOL SUCCINATE 25 MG: 25 TABLET, FILM COATED, EXTENDED RELEASE ORAL at 09:59

## 2019-11-28 RX ADMIN — CEFTRIAXONE SODIUM 1 G: 1 INJECTION, POWDER, FOR SOLUTION INTRAMUSCULAR; INTRAVENOUS at 12:23

## 2019-11-28 RX ADMIN — INSULIN LISPRO 6 UNITS: 100 INJECTION, SOLUTION INTRAVENOUS; SUBCUTANEOUS at 16:46

## 2019-11-28 RX ADMIN — CLOPIDOGREL BISULFATE 75 MG: 75 TABLET ORAL at 09:59

## 2019-11-28 RX ADMIN — ATORVASTATIN CALCIUM 40 MG: 40 TABLET, FILM COATED ORAL at 20:06

## 2019-11-28 RX ADMIN — INSULIN LISPRO 12 UNITS: 100 INJECTION, SOLUTION INTRAVENOUS; SUBCUTANEOUS at 12:26

## 2019-11-28 RX ADMIN — QUETIAPINE FUMARATE 25 MG: 25 TABLET ORAL at 09:59

## 2019-11-28 RX ADMIN — LEVOTHYROXINE SODIUM 75 MCG: 75 TABLET ORAL at 09:59

## 2019-11-28 RX ADMIN — ASPIRIN 81 MG 81 MG: 81 TABLET ORAL at 09:59

## 2019-11-28 ASSESSMENT — PAIN SCALES - GENERAL
PAINLEVEL_OUTOF10: 0

## 2019-11-29 LAB
GLUCOSE BLD-MCNC: 130 MG/DL (ref 70–108)
GLUCOSE BLD-MCNC: 238 MG/DL (ref 70–108)
GLUCOSE BLD-MCNC: 240 MG/DL (ref 70–108)
GLUCOSE BLD-MCNC: 286 MG/DL (ref 70–108)

## 2019-11-29 PROCEDURE — 1200000000 HC SEMI PRIVATE

## 2019-11-29 PROCEDURE — 2709999900 HC NON-CHARGEABLE SUPPLY

## 2019-11-29 PROCEDURE — 6360000002 HC RX W HCPCS: Performed by: INTERNAL MEDICINE

## 2019-11-29 PROCEDURE — 2580000003 HC RX 258: Performed by: INTERNAL MEDICINE

## 2019-11-29 PROCEDURE — 6370000000 HC RX 637 (ALT 250 FOR IP): Performed by: INTERNAL MEDICINE

## 2019-11-29 PROCEDURE — 97110 THERAPEUTIC EXERCISES: CPT

## 2019-11-29 PROCEDURE — 97530 THERAPEUTIC ACTIVITIES: CPT

## 2019-11-29 PROCEDURE — 82948 REAGENT STRIP/BLOOD GLUCOSE: CPT

## 2019-11-29 RX ADMIN — CEFTRIAXONE SODIUM 1 G: 1 INJECTION, POWDER, FOR SOLUTION INTRAMUSCULAR; INTRAVENOUS at 11:09

## 2019-11-29 RX ADMIN — ASPIRIN 81 MG 81 MG: 81 TABLET ORAL at 08:39

## 2019-11-29 RX ADMIN — INSULIN LISPRO 6 UNITS: 100 INJECTION, SOLUTION INTRAVENOUS; SUBCUTANEOUS at 18:19

## 2019-11-29 RX ADMIN — QUETIAPINE FUMARATE 25 MG: 25 TABLET ORAL at 23:48

## 2019-11-29 RX ADMIN — METOPROLOL SUCCINATE 25 MG: 25 TABLET, FILM COATED, EXTENDED RELEASE ORAL at 08:39

## 2019-11-29 RX ADMIN — INSULIN LISPRO 9 UNITS: 100 INJECTION, SOLUTION INTRAVENOUS; SUBCUTANEOUS at 11:09

## 2019-11-29 RX ADMIN — QUETIAPINE FUMARATE 25 MG: 25 TABLET ORAL at 08:39

## 2019-11-29 RX ADMIN — LEVOTHYROXINE SODIUM 75 MCG: 75 TABLET ORAL at 08:39

## 2019-11-29 RX ADMIN — CLOPIDOGREL BISULFATE 75 MG: 75 TABLET ORAL at 08:39

## 2019-11-29 RX ADMIN — SODIUM CHLORIDE: 9 INJECTION, SOLUTION INTRAVENOUS at 11:09

## 2019-11-29 RX ADMIN — ATORVASTATIN CALCIUM 40 MG: 40 TABLET, FILM COATED ORAL at 23:48

## 2019-11-29 RX ADMIN — INSULIN GLARGINE 17 UNITS: 100 INJECTION, SOLUTION SUBCUTANEOUS at 08:43

## 2019-11-29 ASSESSMENT — PAIN SCALES - GENERAL
PAINLEVEL_OUTOF10: 0

## 2019-11-30 LAB
GLUCOSE BLD-MCNC: 106 MG/DL (ref 70–108)
GLUCOSE BLD-MCNC: 159 MG/DL (ref 70–108)
GLUCOSE BLD-MCNC: 305 MG/DL (ref 70–108)
GLUCOSE BLD-MCNC: 320 MG/DL (ref 70–108)

## 2019-11-30 PROCEDURE — 82948 REAGENT STRIP/BLOOD GLUCOSE: CPT

## 2019-11-30 PROCEDURE — 1200000000 HC SEMI PRIVATE

## 2019-11-30 PROCEDURE — 6370000000 HC RX 637 (ALT 250 FOR IP): Performed by: INTERNAL MEDICINE

## 2019-11-30 PROCEDURE — 6360000002 HC RX W HCPCS: Performed by: INTERNAL MEDICINE

## 2019-11-30 PROCEDURE — 2580000003 HC RX 258: Performed by: INTERNAL MEDICINE

## 2019-11-30 PROCEDURE — 2709999900 HC NON-CHARGEABLE SUPPLY

## 2019-11-30 RX ADMIN — QUETIAPINE FUMARATE 25 MG: 25 TABLET ORAL at 09:12

## 2019-11-30 RX ADMIN — INSULIN LISPRO 12 UNITS: 100 INJECTION, SOLUTION INTRAVENOUS; SUBCUTANEOUS at 12:29

## 2019-11-30 RX ADMIN — CEFTRIAXONE SODIUM 1 G: 1 INJECTION, POWDER, FOR SOLUTION INTRAMUSCULAR; INTRAVENOUS at 10:49

## 2019-11-30 RX ADMIN — INSULIN LISPRO 3 UNITS: 100 INJECTION, SOLUTION INTRAVENOUS; SUBCUTANEOUS at 07:26

## 2019-11-30 RX ADMIN — ASPIRIN 81 MG 81 MG: 81 TABLET ORAL at 09:13

## 2019-11-30 RX ADMIN — INSULIN LISPRO 12 UNITS: 100 INJECTION, SOLUTION INTRAVENOUS; SUBCUTANEOUS at 17:26

## 2019-11-30 RX ADMIN — METOPROLOL SUCCINATE 25 MG: 25 TABLET, FILM COATED, EXTENDED RELEASE ORAL at 09:22

## 2019-11-30 RX ADMIN — CLOPIDOGREL BISULFATE 75 MG: 75 TABLET ORAL at 09:12

## 2019-11-30 RX ADMIN — ATORVASTATIN CALCIUM 40 MG: 40 TABLET, FILM COATED ORAL at 20:42

## 2019-11-30 RX ADMIN — LEVOTHYROXINE SODIUM 75 MCG: 75 TABLET ORAL at 09:12

## 2019-11-30 RX ADMIN — INSULIN GLARGINE 17 UNITS: 100 INJECTION, SOLUTION SUBCUTANEOUS at 09:13

## 2019-11-30 RX ADMIN — QUETIAPINE FUMARATE 25 MG: 25 TABLET ORAL at 20:42

## 2019-11-30 RX ADMIN — SODIUM CHLORIDE: 9 INJECTION, SOLUTION INTRAVENOUS at 20:42

## 2019-11-30 ASSESSMENT — PAIN SCALES - GENERAL: PAINLEVEL_OUTOF10: 0

## 2019-12-01 LAB
BASOPHILS # BLD: 0.6 %
BASOPHILS ABSOLUTE: 0.1 THOU/MM3 (ref 0–0.1)
EOSINOPHIL # BLD: 8.4 %
EOSINOPHILS ABSOLUTE: 0.8 THOU/MM3 (ref 0–0.4)
ERYTHROCYTE [DISTWIDTH] IN BLOOD BY AUTOMATED COUNT: 18.5 % (ref 11.5–14.5)
ERYTHROCYTE [DISTWIDTH] IN BLOOD BY AUTOMATED COUNT: 62 FL (ref 35–45)
GLUCOSE BLD-MCNC: 108 MG/DL (ref 70–108)
GLUCOSE BLD-MCNC: 134 MG/DL (ref 70–108)
GLUCOSE BLD-MCNC: 229 MG/DL (ref 70–108)
GLUCOSE BLD-MCNC: 229 MG/DL (ref 70–108)
GLUCOSE BLD-MCNC: 239 MG/DL (ref 70–108)
HCT VFR BLD CALC: 33.1 % (ref 42–52)
HEMOGLOBIN: 10 GM/DL (ref 14–18)
IMMATURE GRANS (ABS): 0.11 THOU/MM3 (ref 0–0.07)
IMMATURE GRANULOCYTES: 1.1 %
LYMPHOCYTES # BLD: 20 %
LYMPHOCYTES ABSOLUTE: 2 THOU/MM3 (ref 1–4.8)
MCH RBC QN AUTO: 27.7 PG (ref 26–33)
MCHC RBC AUTO-ENTMCNC: 30.2 GM/DL (ref 32.2–35.5)
MCV RBC AUTO: 91.7 FL (ref 80–94)
MONOCYTES # BLD: 14.4 %
MONOCYTES ABSOLUTE: 1.4 THOU/MM3 (ref 0.4–1.3)
NUCLEATED RED BLOOD CELLS: 0 /100 WBC
PLATELET # BLD: 253 THOU/MM3 (ref 130–400)
PMV BLD AUTO: 11.4 FL (ref 9.4–12.4)
RBC # BLD: 3.61 MILL/MM3 (ref 4.7–6.1)
SEG NEUTROPHILS: 55.5 %
SEGMENTED NEUTROPHILS ABSOLUTE COUNT: 5.5 THOU/MM3 (ref 1.8–7.7)
WBC # BLD: 9.9 THOU/MM3 (ref 4.8–10.8)

## 2019-12-01 PROCEDURE — 36415 COLL VENOUS BLD VENIPUNCTURE: CPT

## 2019-12-01 PROCEDURE — 82948 REAGENT STRIP/BLOOD GLUCOSE: CPT

## 2019-12-01 PROCEDURE — 6360000002 HC RX W HCPCS: Performed by: INTERNAL MEDICINE

## 2019-12-01 PROCEDURE — 6370000000 HC RX 637 (ALT 250 FOR IP): Performed by: INTERNAL MEDICINE

## 2019-12-01 PROCEDURE — 1200000000 HC SEMI PRIVATE

## 2019-12-01 PROCEDURE — 2580000003 HC RX 258: Performed by: INTERNAL MEDICINE

## 2019-12-01 PROCEDURE — 85025 COMPLETE CBC W/AUTO DIFF WBC: CPT

## 2019-12-01 RX ADMIN — CLOPIDOGREL BISULFATE 75 MG: 75 TABLET ORAL at 08:51

## 2019-12-01 RX ADMIN — INSULIN LISPRO 6 UNITS: 100 INJECTION, SOLUTION INTRAVENOUS; SUBCUTANEOUS at 18:06

## 2019-12-01 RX ADMIN — INSULIN LISPRO 3 UNITS: 100 INJECTION, SOLUTION INTRAVENOUS; SUBCUTANEOUS at 22:40

## 2019-12-01 RX ADMIN — METOPROLOL SUCCINATE 25 MG: 25 TABLET, FILM COATED, EXTENDED RELEASE ORAL at 08:52

## 2019-12-01 RX ADMIN — QUETIAPINE FUMARATE 25 MG: 25 TABLET ORAL at 19:45

## 2019-12-01 RX ADMIN — ASPIRIN 81 MG 81 MG: 81 TABLET ORAL at 08:51

## 2019-12-01 RX ADMIN — INSULIN LISPRO 6 UNITS: 100 INJECTION, SOLUTION INTRAVENOUS; SUBCUTANEOUS at 12:52

## 2019-12-01 RX ADMIN — CEFTRIAXONE SODIUM 1 G: 1 INJECTION, POWDER, FOR SOLUTION INTRAMUSCULAR; INTRAVENOUS at 11:40

## 2019-12-01 RX ADMIN — LEVOTHYROXINE SODIUM 75 MCG: 75 TABLET ORAL at 08:51

## 2019-12-01 RX ADMIN — QUETIAPINE FUMARATE 25 MG: 25 TABLET ORAL at 08:51

## 2019-12-01 RX ADMIN — INSULIN GLARGINE 17 UNITS: 100 INJECTION, SOLUTION SUBCUTANEOUS at 08:37

## 2019-12-01 RX ADMIN — ATORVASTATIN CALCIUM 40 MG: 40 TABLET, FILM COATED ORAL at 19:45

## 2019-12-01 RX ADMIN — SODIUM CHLORIDE: 9 INJECTION, SOLUTION INTRAVENOUS at 17:02

## 2019-12-01 ASSESSMENT — PAIN SCALES - GENERAL
PAINLEVEL_OUTOF10: 0
PAINLEVEL_OUTOF10: 0

## 2019-12-02 LAB
GLUCOSE BLD-MCNC: 106 MG/DL (ref 70–108)
GLUCOSE BLD-MCNC: 191 MG/DL (ref 70–108)
GLUCOSE BLD-MCNC: 243 MG/DL (ref 70–108)
GLUCOSE BLD-MCNC: 255 MG/DL (ref 70–108)

## 2019-12-02 PROCEDURE — 1200000000 HC SEMI PRIVATE

## 2019-12-02 PROCEDURE — 2580000003 HC RX 258: Performed by: INTERNAL MEDICINE

## 2019-12-02 PROCEDURE — 6370000000 HC RX 637 (ALT 250 FOR IP): Performed by: INTERNAL MEDICINE

## 2019-12-02 PROCEDURE — 82948 REAGENT STRIP/BLOOD GLUCOSE: CPT

## 2019-12-02 PROCEDURE — 97530 THERAPEUTIC ACTIVITIES: CPT

## 2019-12-02 PROCEDURE — 97110 THERAPEUTIC EXERCISES: CPT

## 2019-12-02 PROCEDURE — 97116 GAIT TRAINING THERAPY: CPT

## 2019-12-02 PROCEDURE — 6360000002 HC RX W HCPCS: Performed by: INTERNAL MEDICINE

## 2019-12-02 PROCEDURE — 2709999900 HC NON-CHARGEABLE SUPPLY

## 2019-12-02 RX ORDER — CIPROFLOXACIN 500 MG/1
500 TABLET, FILM COATED ORAL DAILY
Refills: 0 | DISCHARGE
Start: 2019-12-02 | End: 2019-12-09 | Stop reason: HOSPADM

## 2019-12-02 RX ORDER — METOPROLOL SUCCINATE 25 MG/1
25 TABLET, EXTENDED RELEASE ORAL DAILY
Qty: 30 TABLET | Refills: 3 | DISCHARGE
Start: 2019-12-03

## 2019-12-02 RX ADMIN — INSULIN LISPRO 5 UNITS: 100 INJECTION, SOLUTION INTRAVENOUS; SUBCUTANEOUS at 21:53

## 2019-12-02 RX ADMIN — LEVOTHYROXINE SODIUM 75 MCG: 75 TABLET ORAL at 08:19

## 2019-12-02 RX ADMIN — SODIUM CHLORIDE: 9 INJECTION, SOLUTION INTRAVENOUS at 13:44

## 2019-12-02 RX ADMIN — CEFTRIAXONE SODIUM 1 G: 1 INJECTION, POWDER, FOR SOLUTION INTRAMUSCULAR; INTRAVENOUS at 11:12

## 2019-12-02 RX ADMIN — INSULIN LISPRO 3 UNITS: 100 INJECTION, SOLUTION INTRAVENOUS; SUBCUTANEOUS at 18:21

## 2019-12-02 RX ADMIN — ATORVASTATIN CALCIUM 40 MG: 40 TABLET, FILM COATED ORAL at 21:52

## 2019-12-02 RX ADMIN — METOPROLOL SUCCINATE 25 MG: 25 TABLET, FILM COATED, EXTENDED RELEASE ORAL at 08:19

## 2019-12-02 RX ADMIN — QUETIAPINE FUMARATE 25 MG: 25 TABLET ORAL at 21:52

## 2019-12-02 RX ADMIN — INSULIN LISPRO 6 UNITS: 100 INJECTION, SOLUTION INTRAVENOUS; SUBCUTANEOUS at 12:30

## 2019-12-02 RX ADMIN — INSULIN GLARGINE 17 UNITS: 100 INJECTION, SOLUTION SUBCUTANEOUS at 08:19

## 2019-12-02 RX ADMIN — ASPIRIN 81 MG 81 MG: 81 TABLET ORAL at 08:19

## 2019-12-02 RX ADMIN — CLOPIDOGREL BISULFATE 75 MG: 75 TABLET ORAL at 08:19

## 2019-12-02 RX ADMIN — QUETIAPINE FUMARATE 25 MG: 25 TABLET ORAL at 08:19

## 2019-12-02 ASSESSMENT — PAIN SCALES - GENERAL
PAINLEVEL_OUTOF10: 0

## 2019-12-03 LAB
GLUCOSE BLD-MCNC: 128 MG/DL (ref 70–108)
GLUCOSE BLD-MCNC: 166 MG/DL (ref 70–108)
GLUCOSE BLD-MCNC: 202 MG/DL (ref 70–108)
GLUCOSE BLD-MCNC: 257 MG/DL (ref 70–108)

## 2019-12-03 PROCEDURE — 1200000000 HC SEMI PRIVATE

## 2019-12-03 PROCEDURE — 6370000000 HC RX 637 (ALT 250 FOR IP): Performed by: INTERNAL MEDICINE

## 2019-12-03 PROCEDURE — 2709999900 HC NON-CHARGEABLE SUPPLY

## 2019-12-03 PROCEDURE — 97530 THERAPEUTIC ACTIVITIES: CPT

## 2019-12-03 PROCEDURE — 6360000002 HC RX W HCPCS: Performed by: INTERNAL MEDICINE

## 2019-12-03 PROCEDURE — 97110 THERAPEUTIC EXERCISES: CPT

## 2019-12-03 PROCEDURE — 82948 REAGENT STRIP/BLOOD GLUCOSE: CPT

## 2019-12-03 PROCEDURE — 2580000003 HC RX 258: Performed by: INTERNAL MEDICINE

## 2019-12-03 RX ADMIN — QUETIAPINE FUMARATE 25 MG: 25 TABLET ORAL at 09:24

## 2019-12-03 RX ADMIN — INSULIN LISPRO 6 UNITS: 100 INJECTION, SOLUTION INTRAVENOUS; SUBCUTANEOUS at 17:50

## 2019-12-03 RX ADMIN — ATORVASTATIN CALCIUM 40 MG: 40 TABLET, FILM COATED ORAL at 20:01

## 2019-12-03 RX ADMIN — CEFTRIAXONE SODIUM 1 G: 1 INJECTION, POWDER, FOR SOLUTION INTRAMUSCULAR; INTRAVENOUS at 11:36

## 2019-12-03 RX ADMIN — ASPIRIN 81 MG 81 MG: 81 TABLET ORAL at 09:24

## 2019-12-03 RX ADMIN — QUETIAPINE FUMARATE 25 MG: 25 TABLET ORAL at 20:01

## 2019-12-03 RX ADMIN — METOPROLOL SUCCINATE 25 MG: 25 TABLET, FILM COATED, EXTENDED RELEASE ORAL at 09:24

## 2019-12-03 RX ADMIN — INSULIN GLARGINE 17 UNITS: 100 INJECTION, SOLUTION SUBCUTANEOUS at 09:24

## 2019-12-03 RX ADMIN — INSULIN LISPRO 9 UNITS: 100 INJECTION, SOLUTION INTRAVENOUS; SUBCUTANEOUS at 13:37

## 2019-12-03 RX ADMIN — CLOPIDOGREL BISULFATE 75 MG: 75 TABLET ORAL at 09:24

## 2019-12-03 RX ADMIN — LEVOTHYROXINE SODIUM 75 MCG: 75 TABLET ORAL at 09:24

## 2019-12-03 ASSESSMENT — PAIN SCALES - GENERAL
PAINLEVEL_OUTOF10: 0
PAINLEVEL_OUTOF10: 0

## 2019-12-04 LAB
GLUCOSE BLD-MCNC: 122 MG/DL (ref 70–108)
GLUCOSE BLD-MCNC: 165 MG/DL (ref 70–108)
GLUCOSE BLD-MCNC: 225 MG/DL (ref 70–108)
GLUCOSE BLD-MCNC: 256 MG/DL (ref 70–108)

## 2019-12-04 PROCEDURE — 1200000000 HC SEMI PRIVATE

## 2019-12-04 PROCEDURE — 6370000000 HC RX 637 (ALT 250 FOR IP): Performed by: INTERNAL MEDICINE

## 2019-12-04 PROCEDURE — 2709999900 HC NON-CHARGEABLE SUPPLY

## 2019-12-04 PROCEDURE — 2580000003 HC RX 258: Performed by: INTERNAL MEDICINE

## 2019-12-04 PROCEDURE — 82948 REAGENT STRIP/BLOOD GLUCOSE: CPT

## 2019-12-04 PROCEDURE — 97110 THERAPEUTIC EXERCISES: CPT

## 2019-12-04 PROCEDURE — 6360000002 HC RX W HCPCS: Performed by: INTERNAL MEDICINE

## 2019-12-04 RX ADMIN — QUETIAPINE FUMARATE 25 MG: 25 TABLET ORAL at 21:13

## 2019-12-04 RX ADMIN — QUETIAPINE FUMARATE 25 MG: 25 TABLET ORAL at 10:07

## 2019-12-04 RX ADMIN — METOPROLOL SUCCINATE 25 MG: 25 TABLET, FILM COATED, EXTENDED RELEASE ORAL at 10:07

## 2019-12-04 RX ADMIN — INSULIN LISPRO 2 UNITS: 100 INJECTION, SOLUTION INTRAVENOUS; SUBCUTANEOUS at 21:19

## 2019-12-04 RX ADMIN — LEVOTHYROXINE SODIUM 75 MCG: 75 TABLET ORAL at 10:07

## 2019-12-04 RX ADMIN — INSULIN GLARGINE 17 UNITS: 100 INJECTION, SOLUTION SUBCUTANEOUS at 10:07

## 2019-12-04 RX ADMIN — CEFTRIAXONE SODIUM 1 G: 1 INJECTION, POWDER, FOR SOLUTION INTRAMUSCULAR; INTRAVENOUS at 11:24

## 2019-12-04 RX ADMIN — ATORVASTATIN CALCIUM 40 MG: 40 TABLET, FILM COATED ORAL at 21:13

## 2019-12-04 RX ADMIN — INSULIN LISPRO 9 UNITS: 100 INJECTION, SOLUTION INTRAVENOUS; SUBCUTANEOUS at 12:30

## 2019-12-04 RX ADMIN — ASPIRIN 81 MG 81 MG: 81 TABLET ORAL at 10:07

## 2019-12-04 RX ADMIN — CLOPIDOGREL BISULFATE 75 MG: 75 TABLET ORAL at 10:07

## 2019-12-04 RX ADMIN — INSULIN LISPRO 6 UNITS: 100 INJECTION, SOLUTION INTRAVENOUS; SUBCUTANEOUS at 17:07

## 2019-12-04 ASSESSMENT — PAIN SCALES - GENERAL
PAINLEVEL_OUTOF10: 0
PAINLEVEL_OUTOF10: 0

## 2019-12-05 LAB
GLUCOSE BLD-MCNC: 140 MG/DL (ref 70–108)
GLUCOSE BLD-MCNC: 206 MG/DL (ref 70–108)
GLUCOSE BLD-MCNC: 245 MG/DL (ref 70–108)
GLUCOSE BLD-MCNC: 279 MG/DL (ref 70–108)

## 2019-12-05 PROCEDURE — 82948 REAGENT STRIP/BLOOD GLUCOSE: CPT

## 2019-12-05 PROCEDURE — 6370000000 HC RX 637 (ALT 250 FOR IP): Performed by: INTERNAL MEDICINE

## 2019-12-05 PROCEDURE — 1200000000 HC SEMI PRIVATE

## 2019-12-05 RX ADMIN — QUETIAPINE FUMARATE 25 MG: 25 TABLET ORAL at 09:28

## 2019-12-05 RX ADMIN — INSULIN GLARGINE 17 UNITS: 100 INJECTION, SOLUTION SUBCUTANEOUS at 09:32

## 2019-12-05 RX ADMIN — ATORVASTATIN CALCIUM 40 MG: 40 TABLET, FILM COATED ORAL at 21:01

## 2019-12-05 RX ADMIN — INSULIN LISPRO 5 UNITS: 100 INJECTION, SOLUTION INTRAVENOUS; SUBCUTANEOUS at 21:07

## 2019-12-05 RX ADMIN — INSULIN LISPRO 6 UNITS: 100 INJECTION, SOLUTION INTRAVENOUS; SUBCUTANEOUS at 19:42

## 2019-12-05 RX ADMIN — CLOPIDOGREL BISULFATE 75 MG: 75 TABLET ORAL at 09:31

## 2019-12-05 RX ADMIN — INSULIN LISPRO 3 UNITS: 100 INJECTION, SOLUTION INTRAVENOUS; SUBCUTANEOUS at 09:32

## 2019-12-05 RX ADMIN — METOPROLOL SUCCINATE 25 MG: 25 TABLET, FILM COATED, EXTENDED RELEASE ORAL at 09:28

## 2019-12-05 RX ADMIN — ASPIRIN 81 MG 81 MG: 81 TABLET ORAL at 09:31

## 2019-12-05 RX ADMIN — INSULIN LISPRO 6 UNITS: 100 INJECTION, SOLUTION INTRAVENOUS; SUBCUTANEOUS at 13:04

## 2019-12-05 RX ADMIN — LEVOTHYROXINE SODIUM 75 MCG: 75 TABLET ORAL at 09:28

## 2019-12-05 RX ADMIN — QUETIAPINE FUMARATE 25 MG: 25 TABLET ORAL at 21:01

## 2019-12-05 ASSESSMENT — PAIN SCALES - GENERAL
PAINLEVEL_OUTOF10: 0
PAINLEVEL_OUTOF10: 0

## 2019-12-06 LAB
GLUCOSE BLD-MCNC: 169 MG/DL (ref 70–108)
GLUCOSE BLD-MCNC: 187 MG/DL (ref 70–108)
GLUCOSE BLD-MCNC: 228 MG/DL (ref 70–108)
GLUCOSE BLD-MCNC: 228 MG/DL (ref 70–108)

## 2019-12-06 PROCEDURE — 6370000000 HC RX 637 (ALT 250 FOR IP): Performed by: INTERNAL MEDICINE

## 2019-12-06 PROCEDURE — 2709999900 HC NON-CHARGEABLE SUPPLY

## 2019-12-06 PROCEDURE — 1200000000 HC SEMI PRIVATE

## 2019-12-06 PROCEDURE — 82948 REAGENT STRIP/BLOOD GLUCOSE: CPT

## 2019-12-06 RX ADMIN — INSULIN LISPRO 3 UNITS: 100 INJECTION, SOLUTION INTRAVENOUS; SUBCUTANEOUS at 17:53

## 2019-12-06 RX ADMIN — ASPIRIN 81 MG 81 MG: 81 TABLET ORAL at 09:15

## 2019-12-06 RX ADMIN — QUETIAPINE FUMARATE 25 MG: 25 TABLET ORAL at 20:29

## 2019-12-06 RX ADMIN — INSULIN LISPRO 6 UNITS: 100 INJECTION, SOLUTION INTRAVENOUS; SUBCUTANEOUS at 12:37

## 2019-12-06 RX ADMIN — INSULIN GLARGINE 17 UNITS: 100 INJECTION, SOLUTION SUBCUTANEOUS at 09:15

## 2019-12-06 RX ADMIN — QUETIAPINE FUMARATE 25 MG: 25 TABLET ORAL at 09:15

## 2019-12-06 RX ADMIN — LEVOTHYROXINE SODIUM 75 MCG: 75 TABLET ORAL at 09:15

## 2019-12-06 RX ADMIN — INSULIN LISPRO 2 UNITS: 100 INJECTION, SOLUTION INTRAVENOUS; SUBCUTANEOUS at 20:29

## 2019-12-06 RX ADMIN — ATORVASTATIN CALCIUM 40 MG: 40 TABLET, FILM COATED ORAL at 20:29

## 2019-12-06 RX ADMIN — INSULIN LISPRO 6 UNITS: 100 INJECTION, SOLUTION INTRAVENOUS; SUBCUTANEOUS at 09:16

## 2019-12-06 RX ADMIN — METOPROLOL SUCCINATE 25 MG: 25 TABLET, FILM COATED, EXTENDED RELEASE ORAL at 09:15

## 2019-12-06 RX ADMIN — CLOPIDOGREL BISULFATE 75 MG: 75 TABLET ORAL at 09:15

## 2019-12-06 ASSESSMENT — PAIN SCALES - GENERAL
PAINLEVEL_OUTOF10: 0

## 2019-12-07 LAB
GLUCOSE BLD-MCNC: 127 MG/DL (ref 70–108)
GLUCOSE BLD-MCNC: 134 MG/DL (ref 70–108)
GLUCOSE BLD-MCNC: 210 MG/DL (ref 70–108)
GLUCOSE BLD-MCNC: 227 MG/DL (ref 70–108)
GLUCOSE BLD-MCNC: 287 MG/DL (ref 70–108)

## 2019-12-07 PROCEDURE — 6370000000 HC RX 637 (ALT 250 FOR IP): Performed by: INTERNAL MEDICINE

## 2019-12-07 PROCEDURE — 1200000000 HC SEMI PRIVATE

## 2019-12-07 PROCEDURE — 82948 REAGENT STRIP/BLOOD GLUCOSE: CPT

## 2019-12-07 RX ADMIN — CLOPIDOGREL BISULFATE 75 MG: 75 TABLET ORAL at 09:06

## 2019-12-07 RX ADMIN — INSULIN LISPRO 3 UNITS: 100 INJECTION, SOLUTION INTRAVENOUS; SUBCUTANEOUS at 21:06

## 2019-12-07 RX ADMIN — INSULIN LISPRO 6 UNITS: 100 INJECTION, SOLUTION INTRAVENOUS; SUBCUTANEOUS at 12:49

## 2019-12-07 RX ADMIN — INSULIN LISPRO 9 UNITS: 100 INJECTION, SOLUTION INTRAVENOUS; SUBCUTANEOUS at 17:48

## 2019-12-07 RX ADMIN — ASPIRIN 81 MG 81 MG: 81 TABLET ORAL at 09:06

## 2019-12-07 RX ADMIN — QUETIAPINE FUMARATE 25 MG: 25 TABLET ORAL at 09:06

## 2019-12-07 RX ADMIN — QUETIAPINE FUMARATE 25 MG: 25 TABLET ORAL at 20:51

## 2019-12-07 RX ADMIN — ATORVASTATIN CALCIUM 40 MG: 40 TABLET, FILM COATED ORAL at 20:51

## 2019-12-07 RX ADMIN — INSULIN GLARGINE 17 UNITS: 100 INJECTION, SOLUTION SUBCUTANEOUS at 09:34

## 2019-12-07 RX ADMIN — LEVOTHYROXINE SODIUM 75 MCG: 75 TABLET ORAL at 09:06

## 2019-12-07 RX ADMIN — METOPROLOL SUCCINATE 25 MG: 25 TABLET, FILM COATED, EXTENDED RELEASE ORAL at 09:06

## 2019-12-07 ASSESSMENT — PAIN SCALES - GENERAL: PAINLEVEL_OUTOF10: 0

## 2019-12-08 LAB
GLUCOSE BLD-MCNC: 168 MG/DL (ref 70–108)
GLUCOSE BLD-MCNC: 177 MG/DL (ref 70–108)
GLUCOSE BLD-MCNC: 253 MG/DL (ref 70–108)
GLUCOSE BLD-MCNC: 268 MG/DL (ref 70–108)

## 2019-12-08 PROCEDURE — 82948 REAGENT STRIP/BLOOD GLUCOSE: CPT

## 2019-12-08 PROCEDURE — 6370000000 HC RX 637 (ALT 250 FOR IP): Performed by: INTERNAL MEDICINE

## 2019-12-08 PROCEDURE — 1200000000 HC SEMI PRIVATE

## 2019-12-08 RX ADMIN — INSULIN LISPRO 2 UNITS: 100 INJECTION, SOLUTION INTRAVENOUS; SUBCUTANEOUS at 22:01

## 2019-12-08 RX ADMIN — METOPROLOL SUCCINATE 25 MG: 25 TABLET, FILM COATED, EXTENDED RELEASE ORAL at 10:33

## 2019-12-08 RX ADMIN — ATORVASTATIN CALCIUM 40 MG: 40 TABLET, FILM COATED ORAL at 22:01

## 2019-12-08 RX ADMIN — ASPIRIN 81 MG 81 MG: 81 TABLET ORAL at 10:32

## 2019-12-08 RX ADMIN — QUETIAPINE FUMARATE 25 MG: 25 TABLET ORAL at 22:01

## 2019-12-08 RX ADMIN — INSULIN LISPRO 9 UNITS: 100 INJECTION, SOLUTION INTRAVENOUS; SUBCUTANEOUS at 17:27

## 2019-12-08 RX ADMIN — CLOPIDOGREL BISULFATE 75 MG: 75 TABLET ORAL at 10:32

## 2019-12-08 RX ADMIN — LEVOTHYROXINE SODIUM 75 MCG: 75 TABLET ORAL at 10:33

## 2019-12-08 RX ADMIN — QUETIAPINE FUMARATE 25 MG: 25 TABLET ORAL at 10:33

## 2019-12-08 RX ADMIN — INSULIN LISPRO 9 UNITS: 100 INJECTION, SOLUTION INTRAVENOUS; SUBCUTANEOUS at 12:46

## 2019-12-08 RX ADMIN — INSULIN LISPRO 3 UNITS: 100 INJECTION, SOLUTION INTRAVENOUS; SUBCUTANEOUS at 10:03

## 2019-12-08 RX ADMIN — INSULIN GLARGINE 17 UNITS: 100 INJECTION, SOLUTION SUBCUTANEOUS at 10:03

## 2019-12-08 ASSESSMENT — PAIN SCALES - GENERAL
PAINLEVEL_OUTOF10: 0

## 2019-12-09 VITALS
SYSTOLIC BLOOD PRESSURE: 130 MMHG | OXYGEN SATURATION: 98 % | DIASTOLIC BLOOD PRESSURE: 62 MMHG | HEIGHT: 70 IN | BODY MASS INDEX: 29.2 KG/M2 | TEMPERATURE: 97.8 F | RESPIRATION RATE: 16 BRPM | WEIGHT: 204 LBS | HEART RATE: 69 BPM

## 2019-12-09 LAB
GLUCOSE BLD-MCNC: 133 MG/DL (ref 70–108)
GLUCOSE BLD-MCNC: 287 MG/DL (ref 70–108)

## 2019-12-09 PROCEDURE — 6370000000 HC RX 637 (ALT 250 FOR IP): Performed by: INTERNAL MEDICINE

## 2019-12-09 PROCEDURE — 82948 REAGENT STRIP/BLOOD GLUCOSE: CPT

## 2019-12-09 RX ADMIN — QUETIAPINE FUMARATE 25 MG: 25 TABLET ORAL at 08:19

## 2019-12-09 RX ADMIN — CLOPIDOGREL BISULFATE 75 MG: 75 TABLET ORAL at 08:19

## 2019-12-09 RX ADMIN — METOPROLOL SUCCINATE 25 MG: 25 TABLET, FILM COATED, EXTENDED RELEASE ORAL at 08:19

## 2019-12-09 RX ADMIN — LEVOTHYROXINE SODIUM 75 MCG: 75 TABLET ORAL at 08:19

## 2019-12-09 RX ADMIN — ASPIRIN 81 MG 81 MG: 81 TABLET ORAL at 08:19

## 2019-12-09 RX ADMIN — INSULIN LISPRO 9 UNITS: 100 INJECTION, SOLUTION INTRAVENOUS; SUBCUTANEOUS at 12:49

## 2019-12-09 RX ADMIN — INSULIN GLARGINE 17 UNITS: 100 INJECTION, SOLUTION SUBCUTANEOUS at 08:28

## 2019-12-09 ASSESSMENT — PAIN SCALES - GENERAL: PAINLEVEL_OUTOF10: 0

## 2019-12-12 ENCOUNTER — TELEPHONE (OUTPATIENT)
Dept: UROLOGY | Age: 84
End: 2019-12-12

## 2019-12-14 ENCOUNTER — HOSPITAL ENCOUNTER (INPATIENT)
Age: 84
LOS: 5 days | Discharge: NURSING, REHAB OR LONG-TERM CARE | DRG: 372 | End: 2019-12-19
Attending: INTERNAL MEDICINE | Admitting: INTERNAL MEDICINE
Payer: MEDICARE

## 2019-12-14 ENCOUNTER — APPOINTMENT (OUTPATIENT)
Dept: CT IMAGING | Age: 84
DRG: 372 | End: 2019-12-14
Payer: MEDICARE

## 2019-12-14 DIAGNOSIS — R31.0 GROSS HEMATURIA: Primary | ICD-10-CM

## 2019-12-14 PROBLEM — R31.9 HEMATURIA: Status: ACTIVE | Noted: 2019-12-14

## 2019-12-14 LAB
ALBUMIN SERPL-MCNC: 3.9 G/DL (ref 3.5–5.1)
ALP BLD-CCNC: 134 U/L (ref 38–126)
ALT SERPL-CCNC: 22 U/L (ref 11–66)
ANION GAP SERPL CALCULATED.3IONS-SCNC: 15 MEQ/L (ref 8–16)
APTT: 35.7 SECONDS (ref 22–38)
AST SERPL-CCNC: 23 U/L (ref 5–40)
BASOPHILS # BLD: 0.4 %
BASOPHILS ABSOLUTE: 0.1 THOU/MM3 (ref 0–0.1)
BILIRUB SERPL-MCNC: 1 MG/DL (ref 0.3–1.2)
BILIRUBIN DIRECT: 0.3 MG/DL (ref 0–0.3)
BUN BLDV-MCNC: 36 MG/DL (ref 7–22)
CALCIUM SERPL-MCNC: 9.6 MG/DL (ref 8.5–10.5)
CHLORIDE BLD-SCNC: 105 MEQ/L (ref 98–111)
CO2: 17 MEQ/L (ref 23–33)
CREAT SERPL-MCNC: 1.5 MG/DL (ref 0.4–1.2)
EOSINOPHIL # BLD: 2.3 %
EOSINOPHILS ABSOLUTE: 0.4 THOU/MM3 (ref 0–0.4)
ERYTHROCYTE [DISTWIDTH] IN BLOOD BY AUTOMATED COUNT: 18.6 % (ref 11.5–14.5)
ERYTHROCYTE [DISTWIDTH] IN BLOOD BY AUTOMATED COUNT: 60.9 FL (ref 35–45)
GFR SERPL CREATININE-BSD FRML MDRD: 44 ML/MIN/1.73M2
GLUCOSE BLD-MCNC: 256 MG/DL (ref 70–108)
GLUCOSE BLD-MCNC: 276 MG/DL (ref 70–108)
HCT VFR BLD CALC: 39.5 % (ref 42–52)
HEMOGLOBIN: 12.3 GM/DL (ref 14–18)
IMMATURE GRANS (ABS): 0.08 THOU/MM3 (ref 0–0.07)
IMMATURE GRANULOCYTES: 0.5 %
INR BLD: 1.09 (ref 0.85–1.13)
LIPASE: 10.7 U/L (ref 5.6–51.3)
LYMPHOCYTES # BLD: 8.9 %
LYMPHOCYTES ABSOLUTE: 1.4 THOU/MM3 (ref 1–4.8)
MCH RBC QN AUTO: 27.8 PG (ref 26–33)
MCHC RBC AUTO-ENTMCNC: 31.1 GM/DL (ref 32.2–35.5)
MCV RBC AUTO: 89.4 FL (ref 80–94)
MONOCYTES # BLD: 8.2 %
MONOCYTES ABSOLUTE: 1.3 THOU/MM3 (ref 0.4–1.3)
NUCLEATED RED BLOOD CELLS: 0 /100 WBC
OSMOLALITY CALCULATION: 290.9 MOSMOL/KG (ref 275–300)
PLATELET # BLD: 416 THOU/MM3 (ref 130–400)
PMV BLD AUTO: 10.5 FL (ref 9.4–12.4)
POTASSIUM SERPL-SCNC: 5.7 MEQ/L (ref 3.5–5.2)
PRO-BNP: 1584 PG/ML (ref 0–1800)
RBC # BLD: 4.42 MILL/MM3 (ref 4.7–6.1)
SEG NEUTROPHILS: 79.7 %
SEGMENTED NEUTROPHILS ABSOLUTE COUNT: 12.8 THOU/MM3 (ref 1.8–7.7)
SODIUM BLD-SCNC: 137 MEQ/L (ref 135–145)
TOTAL PROTEIN: 7.3 G/DL (ref 6.1–8)
TROPONIN T: 0.01 NG/ML
WBC # BLD: 16 THOU/MM3 (ref 4.8–10.8)

## 2019-12-14 PROCEDURE — 2580000003 HC RX 258: Performed by: NURSE PRACTITIONER

## 2019-12-14 PROCEDURE — 85025 COMPLETE CBC W/AUTO DIFF WBC: CPT

## 2019-12-14 PROCEDURE — 2709999900 HC NON-CHARGEABLE SUPPLY

## 2019-12-14 PROCEDURE — 74178 CT ABD&PLV WO CNTR FLWD CNTR: CPT

## 2019-12-14 PROCEDURE — 6360000002 HC RX W HCPCS: Performed by: INTERNAL MEDICINE

## 2019-12-14 PROCEDURE — 51700 IRRIGATION OF BLADDER: CPT

## 2019-12-14 PROCEDURE — 82948 REAGENT STRIP/BLOOD GLUCOSE: CPT

## 2019-12-14 PROCEDURE — 85730 THROMBOPLASTIN TIME PARTIAL: CPT

## 2019-12-14 PROCEDURE — 83690 ASSAY OF LIPASE: CPT

## 2019-12-14 PROCEDURE — 6370000000 HC RX 637 (ALT 250 FOR IP): Performed by: INTERNAL MEDICINE

## 2019-12-14 PROCEDURE — 36415 COLL VENOUS BLD VENIPUNCTURE: CPT

## 2019-12-14 PROCEDURE — 84484 ASSAY OF TROPONIN QUANT: CPT

## 2019-12-14 PROCEDURE — 1200000000 HC SEMI PRIVATE

## 2019-12-14 PROCEDURE — 6360000004 HC RX CONTRAST MEDICATION: Performed by: NURSE PRACTITIONER

## 2019-12-14 PROCEDURE — 80053 COMPREHEN METABOLIC PANEL: CPT

## 2019-12-14 PROCEDURE — 85610 PROTHROMBIN TIME: CPT

## 2019-12-14 PROCEDURE — 83880 ASSAY OF NATRIURETIC PEPTIDE: CPT

## 2019-12-14 PROCEDURE — 82248 BILIRUBIN DIRECT: CPT

## 2019-12-14 PROCEDURE — 99285 EMERGENCY DEPT VISIT HI MDM: CPT

## 2019-12-14 PROCEDURE — 0107U C DIFF TOX AG DETCJ IA STOOL: CPT

## 2019-12-14 PROCEDURE — 87493 C DIFF AMPLIFIED PROBE: CPT

## 2019-12-14 RX ORDER — CIPROFLOXACIN 2 MG/ML
400 INJECTION, SOLUTION INTRAVENOUS EVERY 12 HOURS
Status: DISCONTINUED | OUTPATIENT
Start: 2019-12-14 | End: 2019-12-16

## 2019-12-14 RX ORDER — QUETIAPINE FUMARATE 25 MG/1
25 TABLET, FILM COATED ORAL 2 TIMES DAILY
Status: DISCONTINUED | OUTPATIENT
Start: 2019-12-14 | End: 2019-12-19 | Stop reason: HOSPADM

## 2019-12-14 RX ORDER — INSULIN GLARGINE 100 [IU]/ML
17 INJECTION, SOLUTION SUBCUTANEOUS EVERY MORNING
Status: DISCONTINUED | OUTPATIENT
Start: 2019-12-15 | End: 2019-12-19 | Stop reason: HOSPADM

## 2019-12-14 RX ORDER — ACETAMINOPHEN 325 MG/1
650 TABLET ORAL EVERY 6 HOURS PRN
Status: DISCONTINUED | OUTPATIENT
Start: 2019-12-14 | End: 2019-12-19 | Stop reason: HOSPADM

## 2019-12-14 RX ORDER — METOPROLOL SUCCINATE 25 MG/1
25 TABLET, EXTENDED RELEASE ORAL DAILY
Status: DISCONTINUED | OUTPATIENT
Start: 2019-12-15 | End: 2019-12-19 | Stop reason: HOSPADM

## 2019-12-14 RX ORDER — SODIUM CHLORIDE 9 MG/ML
1000 INJECTION, SOLUTION INTRAVENOUS CONTINUOUS
Status: DISCONTINUED | OUTPATIENT
Start: 2019-12-14 | End: 2019-12-15

## 2019-12-14 RX ORDER — LEVOTHYROXINE SODIUM 0.07 MG/1
75 TABLET ORAL EVERY MORNING
Status: DISCONTINUED | OUTPATIENT
Start: 2019-12-15 | End: 2019-12-19 | Stop reason: HOSPADM

## 2019-12-14 RX ADMIN — SODIUM CHLORIDE 1000 ML: 9 INJECTION, SOLUTION INTRAVENOUS at 16:29

## 2019-12-14 RX ADMIN — CIPROFLOXACIN 400 MG: 2 INJECTION, SOLUTION INTRAVENOUS at 22:21

## 2019-12-14 RX ADMIN — IOPAMIDOL 80 ML: 755 INJECTION, SOLUTION INTRAVENOUS at 17:28

## 2019-12-14 RX ADMIN — INSULIN LISPRO 3 UNITS: 100 INJECTION, SOLUTION INTRAVENOUS; SUBCUTANEOUS at 23:39

## 2019-12-14 ASSESSMENT — PAIN SCALES - GENERAL: PAINLEVEL_OUTOF10: 0

## 2019-12-15 LAB
C DIFF TOXIN/ANTIGEN: ABNORMAL
CLOSTRIDIUM DIFFICILE, PCR: POSITIVE
GLUCOSE BLD-MCNC: 173 MG/DL (ref 70–108)
GLUCOSE BLD-MCNC: 177 MG/DL (ref 70–108)
GLUCOSE BLD-MCNC: 254 MG/DL (ref 70–108)
GLUCOSE BLD-MCNC: 284 MG/DL (ref 70–108)
GLUCOSE BLD-MCNC: 295 MG/DL (ref 70–108)

## 2019-12-15 PROCEDURE — 6370000000 HC RX 637 (ALT 250 FOR IP): Performed by: INTERNAL MEDICINE

## 2019-12-15 PROCEDURE — 87081 CULTURE SCREEN ONLY: CPT

## 2019-12-15 PROCEDURE — 99221 1ST HOSP IP/OBS SF/LOW 40: CPT | Performed by: NURSE PRACTITIONER

## 2019-12-15 PROCEDURE — 6360000002 HC RX W HCPCS: Performed by: INTERNAL MEDICINE

## 2019-12-15 PROCEDURE — 82948 REAGENT STRIP/BLOOD GLUCOSE: CPT

## 2019-12-15 PROCEDURE — 51700 IRRIGATION OF BLADDER: CPT

## 2019-12-15 PROCEDURE — 2580000003 HC RX 258: Performed by: NURSE PRACTITIONER

## 2019-12-15 PROCEDURE — 2580000003 HC RX 258: Performed by: INTERNAL MEDICINE

## 2019-12-15 PROCEDURE — 1200000000 HC SEMI PRIVATE

## 2019-12-15 RX ORDER — NICOTINE POLACRILEX 4 MG
15 LOZENGE BUCCAL PRN
Status: DISCONTINUED | OUTPATIENT
Start: 2019-12-15 | End: 2019-12-19 | Stop reason: HOSPADM

## 2019-12-15 RX ORDER — METRONIDAZOLE 500 MG/1
500 TABLET ORAL EVERY 8 HOURS SCHEDULED
Status: DISCONTINUED | OUTPATIENT
Start: 2019-12-15 | End: 2019-12-16

## 2019-12-15 RX ORDER — DEXTROSE MONOHYDRATE 50 MG/ML
100 INJECTION, SOLUTION INTRAVENOUS PRN
Status: DISCONTINUED | OUTPATIENT
Start: 2019-12-15 | End: 2019-12-19 | Stop reason: HOSPADM

## 2019-12-15 RX ORDER — DEXTROSE MONOHYDRATE 25 G/50ML
12.5 INJECTION, SOLUTION INTRAVENOUS PRN
Status: DISCONTINUED | OUTPATIENT
Start: 2019-12-15 | End: 2019-12-19 | Stop reason: HOSPADM

## 2019-12-15 RX ORDER — SODIUM CHLORIDE 9 MG/ML
1000 INJECTION, SOLUTION INTRAVENOUS CONTINUOUS
Status: DISCONTINUED | OUTPATIENT
Start: 2019-12-15 | End: 2019-12-19 | Stop reason: HOSPADM

## 2019-12-15 RX ADMIN — SODIUM CHLORIDE 1000 ML: 9 INJECTION, SOLUTION INTRAVENOUS at 14:28

## 2019-12-15 RX ADMIN — SODIUM CHLORIDE 1000 ML: 9 INJECTION, SOLUTION INTRAVENOUS at 00:30

## 2019-12-15 RX ADMIN — QUETIAPINE FUMARATE 25 MG: 25 TABLET ORAL at 01:45

## 2019-12-15 RX ADMIN — INSULIN LISPRO 2 UNITS: 100 INJECTION, SOLUTION INTRAVENOUS; SUBCUTANEOUS at 09:25

## 2019-12-15 RX ADMIN — METRONIDAZOLE 500 MG: 500 TABLET, FILM COATED ORAL at 21:32

## 2019-12-15 RX ADMIN — INSULIN LISPRO 3 UNITS: 100 INJECTION, SOLUTION INTRAVENOUS; SUBCUTANEOUS at 21:25

## 2019-12-15 RX ADMIN — METOPROLOL SUCCINATE 25 MG: 25 TABLET, FILM COATED, EXTENDED RELEASE ORAL at 10:31

## 2019-12-15 RX ADMIN — INSULIN LISPRO 4 UNITS: 100 INJECTION, SOLUTION INTRAVENOUS; SUBCUTANEOUS at 12:44

## 2019-12-15 RX ADMIN — METRONIDAZOLE 500 MG: 500 TABLET, FILM COATED ORAL at 17:41

## 2019-12-15 RX ADMIN — CIPROFLOXACIN 400 MG: 2 INJECTION, SOLUTION INTRAVENOUS at 21:25

## 2019-12-15 RX ADMIN — QUETIAPINE FUMARATE 25 MG: 25 TABLET ORAL at 21:50

## 2019-12-15 RX ADMIN — CIPROFLOXACIN 400 MG: 2 INJECTION, SOLUTION INTRAVENOUS at 10:31

## 2019-12-15 RX ADMIN — QUETIAPINE FUMARATE 25 MG: 25 TABLET ORAL at 10:31

## 2019-12-15 RX ADMIN — INSULIN GLARGINE 17 UNITS: 100 INJECTION, SOLUTION SUBCUTANEOUS at 09:30

## 2019-12-15 RX ADMIN — LEVOTHYROXINE SODIUM 75 MCG: 75 TABLET ORAL at 10:31

## 2019-12-15 RX ADMIN — INSULIN LISPRO 6 UNITS: 100 INJECTION, SOLUTION INTRAVENOUS; SUBCUTANEOUS at 17:42

## 2019-12-15 ASSESSMENT — PAIN SCALES - GENERAL
PAINLEVEL_OUTOF10: 0

## 2019-12-16 LAB
ANION GAP SERPL CALCULATED.3IONS-SCNC: 12 MEQ/L (ref 8–16)
BASOPHILS # BLD: 0.6 %
BASOPHILS ABSOLUTE: 0.1 THOU/MM3 (ref 0–0.1)
BUN BLDV-MCNC: 25 MG/DL (ref 7–22)
CALCIUM SERPL-MCNC: 8.6 MG/DL (ref 8.5–10.5)
CHLORIDE BLD-SCNC: 110 MEQ/L (ref 98–111)
CO2: 16 MEQ/L (ref 23–33)
CREAT SERPL-MCNC: 1.2 MG/DL (ref 0.4–1.2)
EOSINOPHIL # BLD: 5.8 %
EOSINOPHILS ABSOLUTE: 0.9 THOU/MM3 (ref 0–0.4)
ERYTHROCYTE [DISTWIDTH] IN BLOOD BY AUTOMATED COUNT: 18.8 % (ref 11.5–14.5)
ERYTHROCYTE [DISTWIDTH] IN BLOOD BY AUTOMATED COUNT: 64.1 FL (ref 35–45)
GFR SERPL CREATININE-BSD FRML MDRD: 57 ML/MIN/1.73M2
GLUCOSE BLD-MCNC: 140 MG/DL (ref 70–108)
GLUCOSE BLD-MCNC: 145 MG/DL (ref 70–108)
GLUCOSE BLD-MCNC: 182 MG/DL (ref 70–108)
GLUCOSE BLD-MCNC: 216 MG/DL (ref 70–108)
GLUCOSE BLD-MCNC: 284 MG/DL (ref 70–108)
HCT VFR BLD CALC: 35.4 % (ref 42–52)
HEMOGLOBIN: 10.5 GM/DL (ref 14–18)
IMMATURE GRANS (ABS): 0.14 THOU/MM3 (ref 0–0.07)
IMMATURE GRANULOCYTES: 0.9 %
LYMPHOCYTES # BLD: 14.4 %
LYMPHOCYTES ABSOLUTE: 2.1 THOU/MM3 (ref 1–4.8)
MCH RBC QN AUTO: 27.7 PG (ref 26–33)
MCHC RBC AUTO-ENTMCNC: 29.7 GM/DL (ref 32.2–35.5)
MCV RBC AUTO: 93.4 FL (ref 80–94)
MONOCYTES # BLD: 11.9 %
MONOCYTES ABSOLUTE: 1.8 THOU/MM3 (ref 0.4–1.3)
NUCLEATED RED BLOOD CELLS: 0 /100 WBC
PLATELET # BLD: 348 THOU/MM3 (ref 130–400)
PMV BLD AUTO: 11.4 FL (ref 9.4–12.4)
POTASSIUM SERPL-SCNC: 4.6 MEQ/L (ref 3.5–5.2)
RBC # BLD: 3.79 MILL/MM3 (ref 4.7–6.1)
SEG NEUTROPHILS: 66.4 %
SEGMENTED NEUTROPHILS ABSOLUTE COUNT: 9.9 THOU/MM3 (ref 1.8–7.7)
SODIUM BLD-SCNC: 138 MEQ/L (ref 135–145)
WBC # BLD: 14.9 THOU/MM3 (ref 4.8–10.8)

## 2019-12-16 PROCEDURE — 97166 OT EVAL MOD COMPLEX 45 MIN: CPT

## 2019-12-16 PROCEDURE — 36415 COLL VENOUS BLD VENIPUNCTURE: CPT

## 2019-12-16 PROCEDURE — 1200000000 HC SEMI PRIVATE

## 2019-12-16 PROCEDURE — 85025 COMPLETE CBC W/AUTO DIFF WBC: CPT

## 2019-12-16 PROCEDURE — 6370000000 HC RX 637 (ALT 250 FOR IP): Performed by: INTERNAL MEDICINE

## 2019-12-16 PROCEDURE — 97530 THERAPEUTIC ACTIVITIES: CPT

## 2019-12-16 PROCEDURE — 2580000003 HC RX 258: Performed by: INTERNAL MEDICINE

## 2019-12-16 PROCEDURE — 99232 SBSQ HOSP IP/OBS MODERATE 35: CPT | Performed by: NURSE PRACTITIONER

## 2019-12-16 PROCEDURE — 80048 BASIC METABOLIC PNL TOTAL CA: CPT

## 2019-12-16 PROCEDURE — 82948 REAGENT STRIP/BLOOD GLUCOSE: CPT

## 2019-12-16 RX ADMIN — METRONIDAZOLE 500 MG: 500 TABLET, FILM COATED ORAL at 05:22

## 2019-12-16 RX ADMIN — LEVOTHYROXINE SODIUM 75 MCG: 75 TABLET ORAL at 10:10

## 2019-12-16 RX ADMIN — SODIUM CHLORIDE 1000 ML: 9 INJECTION, SOLUTION INTRAVENOUS at 05:19

## 2019-12-16 RX ADMIN — INSULIN LISPRO 4 UNITS: 100 INJECTION, SOLUTION INTRAVENOUS; SUBCUTANEOUS at 13:12

## 2019-12-16 RX ADMIN — INSULIN LISPRO 6 UNITS: 100 INJECTION, SOLUTION INTRAVENOUS; SUBCUTANEOUS at 17:36

## 2019-12-16 RX ADMIN — INSULIN LISPRO 1 UNITS: 100 INJECTION, SOLUTION INTRAVENOUS; SUBCUTANEOUS at 21:15

## 2019-12-16 RX ADMIN — Medication 125 MG: at 21:15

## 2019-12-16 RX ADMIN — INSULIN GLARGINE 17 UNITS: 100 INJECTION, SOLUTION SUBCUTANEOUS at 10:14

## 2019-12-16 RX ADMIN — QUETIAPINE FUMARATE 25 MG: 25 TABLET ORAL at 10:10

## 2019-12-16 RX ADMIN — Medication 125 MG: at 13:12

## 2019-12-16 RX ADMIN — INSULIN LISPRO 2 UNITS: 100 INJECTION, SOLUTION INTRAVENOUS; SUBCUTANEOUS at 10:12

## 2019-12-16 RX ADMIN — QUETIAPINE FUMARATE 25 MG: 25 TABLET ORAL at 21:15

## 2019-12-16 RX ADMIN — METOPROLOL SUCCINATE 25 MG: 25 TABLET, FILM COATED, EXTENDED RELEASE ORAL at 10:10

## 2019-12-16 RX ADMIN — Medication 125 MG: at 17:52

## 2019-12-16 ASSESSMENT — PAIN SCALES - GENERAL
PAINLEVEL_OUTOF10: 0

## 2019-12-17 LAB
ANION GAP SERPL CALCULATED.3IONS-SCNC: 12 MEQ/L (ref 8–16)
BASOPHILS # BLD: 0.5 %
BASOPHILS ABSOLUTE: 0.1 THOU/MM3 (ref 0–0.1)
BUN BLDV-MCNC: 21 MG/DL (ref 7–22)
CALCIUM SERPL-MCNC: 8.8 MG/DL (ref 8.5–10.5)
CHLORIDE BLD-SCNC: 108 MEQ/L (ref 98–111)
CO2: 17 MEQ/L (ref 23–33)
CREAT SERPL-MCNC: 1.1 MG/DL (ref 0.4–1.2)
EOSINOPHIL # BLD: 2.9 %
EOSINOPHILS ABSOLUTE: 0.6 THOU/MM3 (ref 0–0.4)
ERYTHROCYTE [DISTWIDTH] IN BLOOD BY AUTOMATED COUNT: 18.7 % (ref 11.5–14.5)
ERYTHROCYTE [DISTWIDTH] IN BLOOD BY AUTOMATED COUNT: 63.1 FL (ref 35–45)
GFR SERPL CREATININE-BSD FRML MDRD: 63 ML/MIN/1.73M2
GLUCOSE BLD-MCNC: 171 MG/DL (ref 70–108)
GLUCOSE BLD-MCNC: 178 MG/DL (ref 70–108)
GLUCOSE BLD-MCNC: 237 MG/DL (ref 70–108)
GLUCOSE BLD-MCNC: 261 MG/DL (ref 70–108)
GLUCOSE BLD-MCNC: 274 MG/DL (ref 70–108)
HCT VFR BLD CALC: 36.4 % (ref 42–52)
HEMOGLOBIN: 11 GM/DL (ref 14–18)
IMMATURE GRANS (ABS): 0.23 THOU/MM3 (ref 0–0.07)
IMMATURE GRANULOCYTES: 1.2 %
LYMPHOCYTES # BLD: 11.3 %
LYMPHOCYTES ABSOLUTE: 2.2 THOU/MM3 (ref 1–4.8)
MCH RBC QN AUTO: 27.7 PG (ref 26–33)
MCHC RBC AUTO-ENTMCNC: 30.2 GM/DL (ref 32.2–35.5)
MCV RBC AUTO: 91.7 FL (ref 80–94)
MONOCYTES # BLD: 9.8 %
MONOCYTES ABSOLUTE: 1.9 THOU/MM3 (ref 0.4–1.3)
NUCLEATED RED BLOOD CELLS: 0 /100 WBC
PLATELET # BLD: 375 THOU/MM3 (ref 130–400)
PMV BLD AUTO: 10.7 FL (ref 9.4–12.4)
POTASSIUM SERPL-SCNC: 4.9 MEQ/L (ref 3.5–5.2)
RBC # BLD: 3.97 MILL/MM3 (ref 4.7–6.1)
SEG NEUTROPHILS: 74.3 %
SEGMENTED NEUTROPHILS ABSOLUTE COUNT: 14.3 THOU/MM3 (ref 1.8–7.7)
SODIUM BLD-SCNC: 137 MEQ/L (ref 135–145)
WBC # BLD: 19.2 THOU/MM3 (ref 4.8–10.8)

## 2019-12-17 PROCEDURE — 97530 THERAPEUTIC ACTIVITIES: CPT

## 2019-12-17 PROCEDURE — 36415 COLL VENOUS BLD VENIPUNCTURE: CPT

## 2019-12-17 PROCEDURE — 1200000000 HC SEMI PRIVATE

## 2019-12-17 PROCEDURE — 2709999900 HC NON-CHARGEABLE SUPPLY

## 2019-12-17 PROCEDURE — 85025 COMPLETE CBC W/AUTO DIFF WBC: CPT

## 2019-12-17 PROCEDURE — 97162 PT EVAL MOD COMPLEX 30 MIN: CPT

## 2019-12-17 PROCEDURE — 6370000000 HC RX 637 (ALT 250 FOR IP): Performed by: INTERNAL MEDICINE

## 2019-12-17 PROCEDURE — 82948 REAGENT STRIP/BLOOD GLUCOSE: CPT

## 2019-12-17 PROCEDURE — 99232 SBSQ HOSP IP/OBS MODERATE 35: CPT | Performed by: NURSE PRACTITIONER

## 2019-12-17 PROCEDURE — 80048 BASIC METABOLIC PNL TOTAL CA: CPT

## 2019-12-17 RX ADMIN — LEVOTHYROXINE SODIUM 75 MCG: 75 TABLET ORAL at 08:30

## 2019-12-17 RX ADMIN — INSULIN LISPRO 3 UNITS: 100 INJECTION, SOLUTION INTRAVENOUS; SUBCUTANEOUS at 20:04

## 2019-12-17 RX ADMIN — QUETIAPINE FUMARATE 25 MG: 25 TABLET ORAL at 08:30

## 2019-12-17 RX ADMIN — Medication 125 MG: at 08:30

## 2019-12-17 RX ADMIN — INSULIN LISPRO 4 UNITS: 100 INJECTION, SOLUTION INTRAVENOUS; SUBCUTANEOUS at 18:41

## 2019-12-17 RX ADMIN — INSULIN LISPRO 2 UNITS: 100 INJECTION, SOLUTION INTRAVENOUS; SUBCUTANEOUS at 08:30

## 2019-12-17 RX ADMIN — Medication 125 MG: at 18:43

## 2019-12-17 RX ADMIN — Medication 125 MG: at 15:21

## 2019-12-17 RX ADMIN — QUETIAPINE FUMARATE 25 MG: 25 TABLET ORAL at 20:05

## 2019-12-17 RX ADMIN — Medication 125 MG: at 20:05

## 2019-12-17 RX ADMIN — INSULIN GLARGINE 17 UNITS: 100 INJECTION, SOLUTION SUBCUTANEOUS at 08:30

## 2019-12-18 LAB
ANION GAP SERPL CALCULATED.3IONS-SCNC: 12 MEQ/L (ref 8–16)
BASOPHILS # BLD: 0.8 %
BASOPHILS ABSOLUTE: 0.1 THOU/MM3 (ref 0–0.1)
BUN BLDV-MCNC: 23 MG/DL (ref 7–22)
CALCIUM SERPL-MCNC: 8.5 MG/DL (ref 8.5–10.5)
CHLORIDE BLD-SCNC: 108 MEQ/L (ref 98–111)
CO2: 16 MEQ/L (ref 23–33)
CREAT SERPL-MCNC: 1.3 MG/DL (ref 0.4–1.2)
EOSINOPHIL # BLD: 5.4 %
EOSINOPHILS ABSOLUTE: 0.8 THOU/MM3 (ref 0–0.4)
ERYTHROCYTE [DISTWIDTH] IN BLOOD BY AUTOMATED COUNT: 18.9 % (ref 11.5–14.5)
ERYTHROCYTE [DISTWIDTH] IN BLOOD BY AUTOMATED COUNT: 61.7 FL (ref 35–45)
GFR SERPL CREATININE-BSD FRML MDRD: 52 ML/MIN/1.73M2
GLUCOSE BLD-MCNC: 147 MG/DL (ref 70–108)
GLUCOSE BLD-MCNC: 165 MG/DL (ref 70–108)
GLUCOSE BLD-MCNC: 264 MG/DL (ref 70–108)
GLUCOSE BLD-MCNC: 280 MG/DL (ref 70–108)
GLUCOSE BLD-MCNC: 291 MG/DL (ref 70–108)
GLUCOSE BLD-MCNC: 304 MG/DL (ref 70–108)
HCT VFR BLD CALC: 33.7 % (ref 42–52)
HEMOGLOBIN: 10.3 GM/DL (ref 14–18)
IMMATURE GRANS (ABS): 0.34 THOU/MM3 (ref 0–0.07)
IMMATURE GRANULOCYTES: 2.4 %
LYMPHOCYTES # BLD: 16.3 %
LYMPHOCYTES ABSOLUTE: 2.3 THOU/MM3 (ref 1–4.8)
MCH RBC QN AUTO: 27.5 PG (ref 26–33)
MCHC RBC AUTO-ENTMCNC: 30.6 GM/DL (ref 32.2–35.5)
MCV RBC AUTO: 89.9 FL (ref 80–94)
MONOCYTES # BLD: 12.9 %
MONOCYTES ABSOLUTE: 1.8 THOU/MM3 (ref 0.4–1.3)
NUCLEATED RED BLOOD CELLS: 1 /100 WBC
PLATELET # BLD: 373 THOU/MM3 (ref 130–400)
PMV BLD AUTO: 10.9 FL (ref 9.4–12.4)
POTASSIUM SERPL-SCNC: 4.5 MEQ/L (ref 3.5–5.2)
RBC # BLD: 3.75 MILL/MM3 (ref 4.7–6.1)
SEG NEUTROPHILS: 62.2 %
SEGMENTED NEUTROPHILS ABSOLUTE COUNT: 8.8 THOU/MM3 (ref 1.8–7.7)
SODIUM BLD-SCNC: 136 MEQ/L (ref 135–145)
WBC # BLD: 14.1 THOU/MM3 (ref 4.8–10.8)

## 2019-12-18 PROCEDURE — 6370000000 HC RX 637 (ALT 250 FOR IP): Performed by: INTERNAL MEDICINE

## 2019-12-18 PROCEDURE — 36415 COLL VENOUS BLD VENIPUNCTURE: CPT

## 2019-12-18 PROCEDURE — 82948 REAGENT STRIP/BLOOD GLUCOSE: CPT

## 2019-12-18 PROCEDURE — 2709999900 HC NON-CHARGEABLE SUPPLY

## 2019-12-18 PROCEDURE — 85025 COMPLETE CBC W/AUTO DIFF WBC: CPT

## 2019-12-18 PROCEDURE — 80048 BASIC METABOLIC PNL TOTAL CA: CPT

## 2019-12-18 PROCEDURE — 1200000000 HC SEMI PRIVATE

## 2019-12-18 RX ORDER — CLOPIDOGREL BISULFATE 75 MG/1
75 TABLET ORAL DAILY
Qty: 30 TABLET | Refills: 3 | Status: ON HOLD | DISCHARGE
Start: 2019-12-18 | End: 2020-01-03 | Stop reason: HOSPADM

## 2019-12-18 RX ADMIN — Medication 125 MG: at 20:09

## 2019-12-18 RX ADMIN — Medication 125 MG: at 18:31

## 2019-12-18 RX ADMIN — INSULIN GLARGINE 17 UNITS: 100 INJECTION, SOLUTION SUBCUTANEOUS at 09:40

## 2019-12-18 RX ADMIN — QUETIAPINE FUMARATE 25 MG: 25 TABLET ORAL at 20:09

## 2019-12-18 RX ADMIN — METOPROLOL SUCCINATE 25 MG: 25 TABLET, FILM COATED, EXTENDED RELEASE ORAL at 09:50

## 2019-12-18 RX ADMIN — LEVOTHYROXINE SODIUM 75 MCG: 75 TABLET ORAL at 09:49

## 2019-12-18 RX ADMIN — Medication 125 MG: at 09:39

## 2019-12-18 RX ADMIN — INSULIN LISPRO 2 UNITS: 100 INJECTION, SOLUTION INTRAVENOUS; SUBCUTANEOUS at 09:40

## 2019-12-18 RX ADMIN — QUETIAPINE FUMARATE 25 MG: 25 TABLET ORAL at 09:49

## 2019-12-18 RX ADMIN — INSULIN LISPRO 3 UNITS: 100 INJECTION, SOLUTION INTRAVENOUS; SUBCUTANEOUS at 20:09

## 2019-12-18 RX ADMIN — INSULIN LISPRO 6 UNITS: 100 INJECTION, SOLUTION INTRAVENOUS; SUBCUTANEOUS at 18:27

## 2019-12-19 ENCOUNTER — TELEPHONE (OUTPATIENT)
Dept: UROLOGY | Age: 84
End: 2019-12-19

## 2019-12-19 VITALS
WEIGHT: 204 LBS | HEIGHT: 70 IN | HEART RATE: 65 BPM | SYSTOLIC BLOOD PRESSURE: 136 MMHG | TEMPERATURE: 98.5 F | BODY MASS INDEX: 29.2 KG/M2 | DIASTOLIC BLOOD PRESSURE: 59 MMHG | RESPIRATION RATE: 18 BRPM | OXYGEN SATURATION: 98 %

## 2019-12-19 LAB
GLUCOSE BLD-MCNC: 179 MG/DL (ref 70–108)
GLUCOSE BLD-MCNC: 285 MG/DL (ref 70–108)
ORGANISM: ABNORMAL
VRE CULTURE: ABNORMAL

## 2019-12-19 PROCEDURE — 6370000000 HC RX 637 (ALT 250 FOR IP): Performed by: INTERNAL MEDICINE

## 2019-12-19 PROCEDURE — 82948 REAGENT STRIP/BLOOD GLUCOSE: CPT

## 2019-12-19 RX ORDER — GREEN TEA/HOODIA GORDONII 315-12.5MG
1 CAPSULE ORAL 3 TIMES DAILY
Qty: 60 TABLET | Refills: 0 | DISCHARGE
Start: 2019-12-19 | End: 2020-01-18

## 2019-12-19 RX ADMIN — INSULIN LISPRO 6 UNITS: 100 INJECTION, SOLUTION INTRAVENOUS; SUBCUTANEOUS at 12:14

## 2019-12-19 RX ADMIN — INSULIN LISPRO 2 UNITS: 100 INJECTION, SOLUTION INTRAVENOUS; SUBCUTANEOUS at 09:09

## 2019-12-19 RX ADMIN — LEVOTHYROXINE SODIUM 75 MCG: 75 TABLET ORAL at 09:13

## 2019-12-19 RX ADMIN — QUETIAPINE FUMARATE 25 MG: 25 TABLET ORAL at 09:13

## 2019-12-19 RX ADMIN — INSULIN GLARGINE 17 UNITS: 100 INJECTION, SOLUTION SUBCUTANEOUS at 09:16

## 2019-12-19 RX ADMIN — METOPROLOL SUCCINATE 25 MG: 25 TABLET, FILM COATED, EXTENDED RELEASE ORAL at 09:13

## 2019-12-19 RX ADMIN — Medication 125 MG: at 09:16

## 2019-12-19 ASSESSMENT — PAIN SCALES - GENERAL: PAINLEVEL_OUTOF10: 0

## 2019-12-27 ENCOUNTER — HOSPITAL ENCOUNTER (INPATIENT)
Age: 84
LOS: 11 days | Discharge: NURSING, REHAB OR LONG-TERM CARE | DRG: 872 | End: 2020-01-07
Attending: INTERNAL MEDICINE | Admitting: INTERNAL MEDICINE
Payer: MEDICARE

## 2019-12-27 ENCOUNTER — APPOINTMENT (OUTPATIENT)
Dept: CT IMAGING | Age: 84
DRG: 872 | End: 2019-12-27
Payer: MEDICARE

## 2019-12-27 ENCOUNTER — APPOINTMENT (OUTPATIENT)
Dept: ULTRASOUND IMAGING | Age: 84
DRG: 872 | End: 2019-12-27
Payer: MEDICARE

## 2019-12-27 PROBLEM — E87.5 HYPERKALEMIA: Status: ACTIVE | Noted: 2019-12-27

## 2019-12-27 PROBLEM — R31.0 GROSS HEMATURIA: Status: ACTIVE | Noted: 2019-12-27

## 2019-12-27 PROBLEM — N39.0 UTI (URINARY TRACT INFECTION): Status: ACTIVE | Noted: 2019-12-27

## 2019-12-27 LAB
AMORPHOUS: ABNORMAL
ANION GAP SERPL CALCULATED.3IONS-SCNC: 17 MEQ/L (ref 8–16)
ANISOCYTOSIS: PRESENT
BACTERIA: ABNORMAL
BACTERIA: ABNORMAL /HPF
BASOPHILS # BLD: 0 %
BASOPHILS ABSOLUTE: 0 THOU/MM3 (ref 0–0.1)
BILIRUBIN URINE: ABNORMAL
BILIRUBIN URINE: ABNORMAL
BLOOD, URINE: ABNORMAL
BLOOD, URINE: ABNORMAL
BUN BLDV-MCNC: 58 MG/DL (ref 7–22)
CALCIUM SERPL-MCNC: 9.7 MG/DL (ref 8.5–10.5)
CASTS UA: ABNORMAL /LPF
CASTS: ABNORMAL /LPF
CHARACTER, URINE: ABNORMAL
CHARACTER, URINE: CLEAR
CHLORIDE BLD-SCNC: 100 MEQ/L (ref 98–111)
CHLORIDE, URINE: 31 MEQ/L
CHP ED QC CHECK: YES
CO2: 16 MEQ/L (ref 23–33)
COLOR: ABNORMAL
COLOR: YELLOW
CREAT SERPL-MCNC: 2.4 MG/DL (ref 0.4–1.2)
CREATININE URINE: 168.8 MG/DL
CRYSTALS, UA: ABNORMAL
CRYSTALS: ABNORMAL
DIFFERENTIAL TYPE: ABNORMAL
DIFFERENTIAL, MANUAL: NORMAL
EKG ATRIAL RATE: 72 BPM
EKG P AXIS: 67 DEGREES
EKG P-R INTERVAL: 178 MS
EKG Q-T INTERVAL: 420 MS
EKG QRS DURATION: 96 MS
EKG QTC CALCULATION (BAZETT): 459 MS
EKG R AXIS: -26 DEGREES
EKG T AXIS: 95 DEGREES
EKG VENTRICULAR RATE: 72 BPM
EOSINOPHIL # BLD: 5 %
EOSINOPHIL SMEAR: NORMAL
EOSINOPHILS ABSOLUTE: 1 THOU/MM3 (ref 0–0.4)
EPITHELIAL CELLS, UA: ABNORMAL /HPF
EPITHELIAL CELLS, UA: ABNORMAL /HPF
ERYTHROCYTE [DISTWIDTH] IN BLOOD BY AUTOMATED COUNT: 19.3 % (ref 11.5–14.5)
ERYTHROCYTE [DISTWIDTH] IN BLOOD BY AUTOMATED COUNT: 61.1 FL (ref 35–45)
GFR SERPL CREATININE-BSD FRML MDRD: 25 ML/MIN/1.73M2
GLUCOSE BLD-MCNC: 218 MG/DL (ref 70–108)
GLUCOSE BLD-MCNC: 253 MG/DL
GLUCOSE BLD-MCNC: 253 MG/DL (ref 70–108)
GLUCOSE BLD-MCNC: 260 MG/DL (ref 70–108)
GLUCOSE BLD-MCNC: 275 MG/DL (ref 70–108)
GLUCOSE BLD-MCNC: 291 MG/DL (ref 70–108)
GLUCOSE URINE: ABNORMAL MG/DL
GLUCOSE, URINE: NEGATIVE MG/DL
HCT VFR BLD CALC: 40.5 % (ref 42–52)
HEMOGLOBIN: 12.6 GM/DL (ref 14–18)
ICTOTEST: NEGATIVE
ICTOTEST: NEGATIVE
KETONES, URINE: ABNORMAL
KETONES, URINE: ABNORMAL
LACTIC ACID: 1.8 MMOL/L (ref 0.5–2.2)
LEUKOCYTE ESTERASE, URINE: ABNORMAL
LEUKOCYTE ESTERASE, URINE: ABNORMAL
LYMPHOCYTES # BLD: 22 %
LYMPHOCYTES ABSOLUTE: 4.5 THOU/MM3 (ref 1–4.8)
MAGNESIUM: 2.1 MG/DL (ref 1.6–2.4)
MCH RBC QN AUTO: 27.2 PG (ref 26–33)
MCHC RBC AUTO-ENTMCNC: 31.1 GM/DL (ref 32.2–35.5)
MCV RBC AUTO: 87.3 FL (ref 80–94)
METAMYELOCYTES: 2 %
MISCELLANEOUS LAB TEST RESULT: ABNORMAL
MONOCYTES # BLD: 5 %
MONOCYTES ABSOLUTE: 1 THOU/MM3 (ref 0.4–1.3)
MYELOCYTES: 5 %
NITRITE, URINE: ABNORMAL
NITRITE, URINE: POSITIVE
NUCLEATED RED BLOOD CELLS: 0 /100 WBC
OSMOLALITY CALCULATION: 292.4 MOSMOL/KG (ref 275–300)
PATHOLOGIST REVIEW: ABNORMAL
PH UA: 5.5 (ref 5–9)
PH UA: ABNORMAL (ref 5–9)
PLATELET # BLD: 401 THOU/MM3 (ref 130–400)
PMV BLD AUTO: 10.1 FL (ref 9.4–12.4)
POTASSIUM REFLEX MAGNESIUM: 5.6 MEQ/L (ref 3.5–5.2)
POTASSIUM SERPL-SCNC: 4.8 MEQ/L (ref 3.5–5.2)
POTASSIUM SERPL-SCNC: 5.8 MEQ/L (ref 3.5–5.2)
POTASSIUM, URINE: 48.5 MEQ/L
PROCALCITONIN: 0.14 NG/ML (ref 0.01–0.09)
PROTEIN UA: >= 300 MG/DL
PROTEIN UA: ABNORMAL
RBC # BLD: 4.64 MILL/MM3 (ref 4.7–6.1)
RBC URINE: > 200 /HPF
RBC URINE: > 200 /HPF
RENAL EPITHELIAL, UA: ABNORMAL
SEG NEUTROPHILS: 61 %
SEGMENTED NEUTROPHILS ABSOLUTE COUNT: 12.5 THOU/MM3 (ref 1.8–7.7)
SMUDGE CELLS: PRESENT
SODIUM BLD-SCNC: 133 MEQ/L (ref 135–145)
SODIUM URINE: 23 MEQ/L
SPECIFIC GRAVITY UA: >= 1.03 (ref 1–1.03)
SPECIFIC GRAVITY, URINE: ABNORMAL (ref 1–1.03)
SPECIMEN: NORMAL
TOXIC GRANULATION: PRESENT
UROBILINOGEN, URINE: 0.2 EU/DL (ref 0–1)
UROBILINOGEN, URINE: ABNORMAL EU/DL (ref 0–1)
WBC # BLD: 20.5 THOU/MM3 (ref 4.8–10.8)
WBC UA: > 100 /HPF
WBC UA: ABNORMAL /HPF
YEAST: ABNORMAL

## 2019-12-27 PROCEDURE — 87186 SC STD MICRODIL/AGAR DIL: CPT

## 2019-12-27 PROCEDURE — 94760 N-INVAS EAR/PLS OXIMETRY 1: CPT

## 2019-12-27 PROCEDURE — 36415 COLL VENOUS BLD VENIPUNCTURE: CPT

## 2019-12-27 PROCEDURE — 82948 REAGENT STRIP/BLOOD GLUCOSE: CPT

## 2019-12-27 PROCEDURE — 83605 ASSAY OF LACTIC ACID: CPT

## 2019-12-27 PROCEDURE — 2580000003 HC RX 258: Performed by: INTERNAL MEDICINE

## 2019-12-27 PROCEDURE — 80048 BASIC METABOLIC PNL TOTAL CA: CPT

## 2019-12-27 PROCEDURE — 85025 COMPLETE CBC W/AUTO DIFF WBC: CPT

## 2019-12-27 PROCEDURE — 2580000003 HC RX 258: Performed by: PHYSICIAN ASSISTANT

## 2019-12-27 PROCEDURE — 96375 TX/PRO/DX INJ NEW DRUG ADDON: CPT

## 2019-12-27 PROCEDURE — 82570 ASSAY OF URINE CREATININE: CPT

## 2019-12-27 PROCEDURE — 6370000000 HC RX 637 (ALT 250 FOR IP): Performed by: PHYSICIAN ASSISTANT

## 2019-12-27 PROCEDURE — 6370000000 HC RX 637 (ALT 250 FOR IP): Performed by: INTERNAL MEDICINE

## 2019-12-27 PROCEDURE — 93005 ELECTROCARDIOGRAM TRACING: CPT | Performed by: PHYSICIAN ASSISTANT

## 2019-12-27 PROCEDURE — 84132 ASSAY OF SERUM POTASSIUM: CPT

## 2019-12-27 PROCEDURE — 99285 EMERGENCY DEPT VISIT HI MDM: CPT

## 2019-12-27 PROCEDURE — 51700 IRRIGATION OF BLADDER: CPT

## 2019-12-27 PROCEDURE — 2709999900 HC NON-CHARGEABLE SUPPLY

## 2019-12-27 PROCEDURE — 74176 CT ABD & PELVIS W/O CONTRAST: CPT

## 2019-12-27 PROCEDURE — 84300 ASSAY OF URINE SODIUM: CPT

## 2019-12-27 PROCEDURE — 87040 BLOOD CULTURE FOR BACTERIA: CPT

## 2019-12-27 PROCEDURE — 96361 HYDRATE IV INFUSION ADD-ON: CPT

## 2019-12-27 PROCEDURE — 6360000002 HC RX W HCPCS: Performed by: PHYSICIAN ASSISTANT

## 2019-12-27 PROCEDURE — 83735 ASSAY OF MAGNESIUM: CPT

## 2019-12-27 PROCEDURE — 84145 PROCALCITONIN (PCT): CPT

## 2019-12-27 PROCEDURE — G0378 HOSPITAL OBSERVATION PER HR: HCPCS

## 2019-12-27 PROCEDURE — 81001 URINALYSIS AUTO W/SCOPE: CPT

## 2019-12-27 PROCEDURE — 2140000000 HC CCU INTERMEDIATE R&B

## 2019-12-27 PROCEDURE — 82436 ASSAY OF URINE CHLORIDE: CPT

## 2019-12-27 PROCEDURE — 89190 NASAL SMEAR FOR EOSINOPHILS: CPT

## 2019-12-27 PROCEDURE — 87077 CULTURE AEROBIC IDENTIFY: CPT

## 2019-12-27 PROCEDURE — 87086 URINE CULTURE/COLONY COUNT: CPT

## 2019-12-27 PROCEDURE — 76770 US EXAM ABDO BACK WALL COMP: CPT

## 2019-12-27 PROCEDURE — 96365 THER/PROPH/DIAG IV INF INIT: CPT

## 2019-12-27 PROCEDURE — 84133 ASSAY OF URINE POTASSIUM: CPT

## 2019-12-27 RX ORDER — SODIUM POLYSTYRENE SULFONATE 15 G/60ML
30 SUSPENSION ORAL; RECTAL ONCE
Status: COMPLETED | OUTPATIENT
Start: 2019-12-27 | End: 2019-12-27

## 2019-12-27 RX ORDER — SODIUM CHLORIDE 0.9 % (FLUSH) 0.9 %
10 SYRINGE (ML) INJECTION PRN
Status: DISCONTINUED | OUTPATIENT
Start: 2019-12-27 | End: 2020-01-07 | Stop reason: HOSPADM

## 2019-12-27 RX ORDER — INSULIN GLARGINE 100 [IU]/ML
17 INJECTION, SOLUTION SUBCUTANEOUS EVERY MORNING
Status: DISCONTINUED | OUTPATIENT
Start: 2019-12-27 | End: 2020-01-07 | Stop reason: HOSPADM

## 2019-12-27 RX ORDER — ATORVASTATIN CALCIUM 40 MG/1
40 TABLET, FILM COATED ORAL NIGHTLY
Status: DISCONTINUED | OUTPATIENT
Start: 2019-12-27 | End: 2020-01-07 | Stop reason: HOSPADM

## 2019-12-27 RX ORDER — METOPROLOL SUCCINATE 25 MG/1
25 TABLET, EXTENDED RELEASE ORAL DAILY
Status: DISCONTINUED | OUTPATIENT
Start: 2019-12-27 | End: 2020-01-07 | Stop reason: HOSPADM

## 2019-12-27 RX ORDER — NICOTINE POLACRILEX 4 MG
15 LOZENGE BUCCAL PRN
Status: DISCONTINUED | OUTPATIENT
Start: 2019-12-27 | End: 2020-01-07 | Stop reason: HOSPADM

## 2019-12-27 RX ORDER — SODIUM CHLORIDE 9 MG/ML
INJECTION, SOLUTION INTRAVENOUS CONTINUOUS
Status: DISCONTINUED | OUTPATIENT
Start: 2019-12-27 | End: 2019-12-31

## 2019-12-27 RX ORDER — ACETAMINOPHEN 325 MG/1
650 TABLET ORAL EVERY 6 HOURS PRN
Status: DISCONTINUED | OUTPATIENT
Start: 2019-12-27 | End: 2020-01-07 | Stop reason: HOSPADM

## 2019-12-27 RX ORDER — LEVOTHYROXINE SODIUM 0.07 MG/1
75 TABLET ORAL EVERY MORNING
Status: DISCONTINUED | OUTPATIENT
Start: 2019-12-27 | End: 2020-01-07 | Stop reason: HOSPADM

## 2019-12-27 RX ORDER — DEXTROSE MONOHYDRATE 25 G/50ML
12.5 INJECTION, SOLUTION INTRAVENOUS PRN
Status: DISCONTINUED | OUTPATIENT
Start: 2019-12-27 | End: 2020-01-07 | Stop reason: HOSPADM

## 2019-12-27 RX ORDER — SODIUM CHLORIDE 0.9 % (FLUSH) 0.9 %
10 SYRINGE (ML) INJECTION EVERY 12 HOURS SCHEDULED
Status: DISCONTINUED | OUTPATIENT
Start: 2019-12-27 | End: 2019-12-27 | Stop reason: SDUPTHER

## 2019-12-27 RX ORDER — QUETIAPINE FUMARATE 25 MG/1
25 TABLET, FILM COATED ORAL 2 TIMES DAILY
Status: DISCONTINUED | OUTPATIENT
Start: 2019-12-27 | End: 2020-01-07 | Stop reason: HOSPADM

## 2019-12-27 RX ORDER — SODIUM CHLORIDE 0.9 % (FLUSH) 0.9 %
10 SYRINGE (ML) INJECTION PRN
Status: DISCONTINUED | OUTPATIENT
Start: 2019-12-27 | End: 2019-12-27 | Stop reason: SDUPTHER

## 2019-12-27 RX ORDER — SODIUM CHLORIDE 0.9 % (FLUSH) 0.9 %
10 SYRINGE (ML) INJECTION EVERY 12 HOURS SCHEDULED
Status: DISCONTINUED | OUTPATIENT
Start: 2019-12-27 | End: 2020-01-07 | Stop reason: HOSPADM

## 2019-12-27 RX ORDER — LACTOBACILLUS RHAMNOSUS GG 10B CELL
1 CAPSULE ORAL 3 TIMES DAILY
Status: DISCONTINUED | OUTPATIENT
Start: 2019-12-27 | End: 2020-01-07 | Stop reason: HOSPADM

## 2019-12-27 RX ORDER — DEXTROSE MONOHYDRATE 50 MG/ML
100 INJECTION, SOLUTION INTRAVENOUS PRN
Status: DISCONTINUED | OUTPATIENT
Start: 2019-12-27 | End: 2020-01-07 | Stop reason: HOSPADM

## 2019-12-27 RX ORDER — ONDANSETRON 2 MG/ML
4 INJECTION INTRAMUSCULAR; INTRAVENOUS EVERY 6 HOURS PRN
Status: DISCONTINUED | OUTPATIENT
Start: 2019-12-27 | End: 2020-01-07 | Stop reason: HOSPADM

## 2019-12-27 RX ORDER — 0.9 % SODIUM CHLORIDE 0.9 %
30 INTRAVENOUS SOLUTION INTRAVENOUS ONCE
Status: COMPLETED | OUTPATIENT
Start: 2019-12-27 | End: 2019-12-27

## 2019-12-27 RX ADMIN — INSULIN GLARGINE 17 UNITS: 100 INJECTION, SOLUTION SUBCUTANEOUS at 13:46

## 2019-12-27 RX ADMIN — INSULIN LISPRO 6 UNITS: 100 INJECTION, SOLUTION INTRAVENOUS; SUBCUTANEOUS at 17:19

## 2019-12-27 RX ADMIN — CEFTRIAXONE SODIUM 1 G: 1 INJECTION, POWDER, FOR SOLUTION INTRAMUSCULAR; INTRAVENOUS at 07:34

## 2019-12-27 RX ADMIN — METOPROLOL SUCCINATE 25 MG: 25 TABLET, FILM COATED, EXTENDED RELEASE ORAL at 13:47

## 2019-12-27 RX ADMIN — INSULIN HUMAN 6 UNITS: 100 INJECTION, SOLUTION PARENTERAL at 07:48

## 2019-12-27 RX ADMIN — Medication 1 CAPSULE: at 22:27

## 2019-12-27 RX ADMIN — ATORVASTATIN CALCIUM 40 MG: 40 TABLET, FILM COATED ORAL at 22:27

## 2019-12-27 RX ADMIN — SODIUM CHLORIDE 1000 ML: 9 INJECTION, SOLUTION INTRAVENOUS at 07:48

## 2019-12-27 RX ADMIN — Medication 125 MG: at 13:46

## 2019-12-27 RX ADMIN — QUETIAPINE FUMARATE 25 MG: 25 TABLET ORAL at 22:27

## 2019-12-27 RX ADMIN — Medication 125 MG: at 22:32

## 2019-12-27 RX ADMIN — Medication 1 CAPSULE: at 13:47

## 2019-12-27 RX ADMIN — SODIUM POLYSTYRENE SULFONATE 30 G: 15 SUSPENSION ORAL; RECTAL at 10:18

## 2019-12-27 RX ADMIN — SODIUM CHLORIDE: 9 INJECTION, SOLUTION INTRAVENOUS at 13:02

## 2019-12-27 RX ADMIN — INSULIN LISPRO 3 UNITS: 100 INJECTION, SOLUTION INTRAVENOUS; SUBCUTANEOUS at 22:28

## 2019-12-27 RX ADMIN — QUETIAPINE FUMARATE 25 MG: 25 TABLET ORAL at 13:47

## 2019-12-27 RX ADMIN — LEVOTHYROXINE SODIUM 75 MCG: 75 TABLET ORAL at 13:47

## 2019-12-27 ASSESSMENT — ENCOUNTER SYMPTOMS
VOMITING: 0
NAUSEA: 0
ABDOMINAL PAIN: 0
BACK PAIN: 0

## 2019-12-27 ASSESSMENT — PAIN SCALES - GENERAL
PAINLEVEL_OUTOF10: 0
PAINLEVEL_OUTOF10: 0

## 2019-12-27 NOTE — ED NOTES
Pt. Presents to the ED with complaints of hematuria. Pt. Reportedly developed the blood in his urine early this am and had become worse. Pt. Reportedly had clear urine at the start of the nurses shift last night but then suddenly developed the blood this am. Pt. Denies any pain at this time.       Sofie Rosenberg RN  12/27/19 [id number]

## 2019-12-27 NOTE — H&P
Laura Torres    History & Physical    Dr Henley Glenrock covering for  Dr Cecille Lozano       Patient:  Kathie Lopez  YOB: 1928  Date of Service: 12/27/2019  MRN: [id number]   Acct:  [de-identified]   Primary Care Physician: Taye Camacho MD    Chief Complaint: Hematuria     History of Present Illness:   History obtained from the patient / records    The patient is a 80 y.o. male  with a background history of recent C difficile colitis, was recently discharged following gross hematuria the had required continuous bladder irrigation for about 2-3 days.  At the time he had his Plavix and aspirin held however in the last 4 days his Plavix was resumed and the hematuria re started again this morning.  He denies dysuria, abdominal pain, flank pains or urgency.  No fever or chills however was noted to have evidence of acute kidney injury and hyperkalemia along with markers a urinary tract infection and leukocytosis.  Still with diarrhea  Past Medical History:        Diagnosis Date    Arthritis     CAD (coronary artery disease)     Colostomy status ([facility] [address] 27/41/1495    Diastolic CHF (HCC)     GERD (gastroesophageal reflux disease)     HTN (hypertension) 1/6/2013    Hyperlipidemia     Hypothyroid     Panlobular emphysema ([facility] [address] 11/30/2017    Prostate cancer ([facility] [address]     S/P CABG (coronary artery bypass graft)     Type 2 diabetes mellitus with stage 3 chronic kidney disease, with long-term current use of insulin (HCC)        Past Surgical History:        Procedure Laterality Date    ABDOMEN SURGERY      APPENDECTOMY      CARDIAC SURGERY      COLECTOMY      COLONOSCOPY      COLOSTOMY      CORONARY ARTERY BYPASS GRAFT      triple to Distal RCA, first OM, and LIMA to diagonal by Dr Layla Hunt, ESOPHAGUS     [address]      bilateral cataracts    JOINT REPLACEMENT      bilat knees    KNEE SURGERY      left total knee and right total knee    TONSILLECTOMY      VASCULAR SURGERY      cabg harvests from legs       Home Medications:   No current facility-administered medications on file prior to encounter. Current Outpatient Medications on File Prior to Encounter   Medication Sig Dispense Refill    Lactobacillus (PROBIOTIC ACIDOPHILUS) TABS Take 1 tablet by mouth 3 times daily 60 tablet 0    clopidogrel (PLAVIX) 75 MG tablet Take 1 tablet by mouth daily Restart if ok with urology 30 tablet 3    vancomycin (VANCOCIN) 25 mg/ml oral solution Take 5 mLs by mouth 4 times daily for 12 days  0    insulin aspart (NOVOLOG PENFILL) 100 UNIT/ML injection cartridge Inject into the skin 4 times daily (before meals and nightly) Inject as per sliding scale: If 151- 200 = 2 units;  201-250 = 4 units  251-300 = 6 units  301-350 = 8 units  351-400 = 10 units  Call physician if blood sugar <60 or >400.  metoprolol succinate (TOPROL XL) 25 MG extended release tablet Take 1 tablet by mouth daily 30 tablet 3    acetaminophen (TYLENOL) 325 MG tablet Take 2 tablets by mouth every 6 hours as needed for Pain 120 tablet 3    aspirin 81 MG chewable tablet Take 1 tablet by mouth daily 30 tablet 3    atorvastatin (LIPITOR) 40 MG tablet Take 1 tablet by mouth nightly 30 tablet 3    insulin glargine (LANTUS) 100 UNIT/ML injection vial Inject 17 Units into the skin every morning      QUEtiapine (SEROQUEL) 25 MG tablet Take 1 tablet by mouth 2 times daily Additional RF per his PCP 30 tablet 0    levothyroxine (SYNTHROID) 75 MCG tablet Take 1 tablet by mouth every morning 30 tablet 0       Allergies:  Patient has no known allergies. Social History:    reports that he has quit smoking. He quit after 25.00 years of use. He has never used smokeless tobacco. He reports that he does not drink alcohol or use drugs.     Family History:       Problem Relation Age of Onset    Cancer Mother     Asthma Father        Review of systems:    CNS: No seizures, no dizziness, no facial droop,  no paresthesia, Testing:    Recent Results (from the past 24 hour(s))   Urine with Reflexed Micro    Collection Time: 12/27/19  6:15 AM   Result Value Ref Range    Glucose, Ur see below NEGATIVE mg/dl    Bilirubin Urine see below NEGATIVE    Ketones, Urine see below NEGATIVE    Specific Gravity, Urine see below 1.002 - 1.03    Blood, Urine see below NEGATIVE    pH, UA see below 5.0 - 9.0    Protein, UA see below NEGATIVE    Urobilinogen, Urine see below 0.0 - 1.0 eu/dl    Nitrite, Urine see below NEGATIVE    Leukocyte Esterase, Urine see below NEGATIVE    Color, UA RED (A) STRAW-YELL    Character, Urine TURBID (A) CLEAR-SL C    RBC, UA > 200 0-2/hpf /hpf    WBC, UA > 100 0-4/hpf /hpf    Epi Cells 0-2 3-5/hpf /hpf    Bacteria, UA MODERATE FEW/NONE S /hpf    Casts UA NONE SEEN NONE SEEN /lpf    Crystals NONE SEEN NONE SEEN   Bile Acids, Total    Collection Time: 12/27/19  6:15 AM   Result Value Ref Range    Ictotest NEGATIVE NEGATIVE   CBC Auto Differential    Collection Time: 12/27/19  6:54 AM   Result Value Ref Range    WBC 20.5 (H) 4.8 - 10.8 thou/mm3    RBC 4.64 (L) 4.70 - 6.10 mill/mm3    Hemoglobin 12.6 (L) 14.0 - 18.0 gm/dl    Hematocrit 40.5 (L) 42.0 - 52.0 %    MCV 87.3 80.0 - 94.0 fL    MCH 27.2 26.0 - 33.0 pg    MCHC 31.1 (L) 32.2 - 35.5 gm/dl    RDW-CV 19.3 (H) 11.5 - 14.5 %    RDW-SD 61.1 (H) 35.0 - 45.0 fL    Platelets 312 (H) 248 - 400 thou/mm3    MPV 10.1 9.4 - 12.4 fL    Differential Type see below     Metamyelocytes 2 %    Myelocytes 5 %   Basic Metabolic Panel    Collection Time: 12/27/19  6:54 AM   Result Value Ref Range    Sodium 133 (L) 135 - 145 meq/L    Potassium 5.8 (H) 3.5 - 5.2 meq/L    Chloride 100 98 - 111 meq/L    CO2 16 (L) 23 - 33 meq/L    Glucose 275 (H) 70 - 108 mg/dL    BUN 58 (H) 7 - 22 mg/dL    CREATININE 2.4 (H) 0.4 - 1.2 mg/dL    Calcium 9.7 8.5 - 10.5 mg/dL   Magnesium    Collection Time: 12/27/19  6:54 AM   Result Value Ref Range    Magnesium 2.1 1.6 - 2.4 mg/dL   Anion Gap    Collection Time: as reasonably achievable. FINDINGS: Lung bases Mild bronchiectasis and basilar fibrosis. Coronary artery calcifications. Abdomen pelvis Solid or evaluation limited without IV contrast. Streak artifact from patient's extremities. 10 mm nonobstructing calculus right kidney. No hydronephrosis of either kidney. Mildly distended gallbladder. Spleen is normal. Liver is normal. Dense atherosclerosis of the aorta and mesenteric vessels. Normal caliber ureters with no ureteral calculi identified. Pelvis Matos balloon within the bladder which occupies most of the volume of the decompressed bladder small pocket of air within the bladder. No bowel obstruction. Bilateral fat-containing inguinal hernias. Bones Severe degenerative change throughout the lumbar spine spine bilateral sacroiliitis. No suspicious bone lesion. Nonobstructing right intrarenal calculus. No acute process identified in the abdomen or pelvis **This report has been created using voice recognition software. It may contain minor errors which are inherent in voice recognition technology. ** Final report electronically signed by Dr. Lindy Blackburn on 12/27/2019 8:57 AM        Active Problems:    SHAYAN (acute kidney injury) ([facility] [address]    UTI (urinary tract infection)    Gross hematuria    Hyperkalemia  Resolved Problems:    * No resolved hospital problems. *      Plan:  Blood and urine cultures, empiric antibiotic coverage ,  continue oral vancomycin for recent c.diff, ID service to see. Arrange for urine electrolytes, eosinophils, casts, renal ultrasound. Trend BMP  Nephrology consult if no improvement, gentle hydration, avoid nephrotoxic agents. Hold plavix and ASA, initiate continuous bladder irrigation. Urology consult  Hyperkalemia treated, trend potassium.    Resume insulin and other ECF meds      DVT prophylaxis: [] Lovenox                                 [x] SCDs                                 [] SQ Heparin                                 [] Encourage

## 2019-12-27 NOTE — ED NOTES
Bed: 008A  Expected date: 12/27/19  Expected time: 6:02 AM  Means of arrival: [facility]  Comments:     Chhaya Levine RN  12/27/19 [id number]

## 2019-12-27 NOTE — ED PROVIDER NOTES
MEDICATIONS       Current Discharge Medication List      CONTINUE these medications which have NOT CHANGED    Details   Lactobacillus (PROBIOTIC ACIDOPHILUS) TABS Take 1 tablet by mouth 3 times daily  Qty: 60 tablet, Refills: 0      clopidogrel (PLAVIX) 75 MG tablet Take 1 tablet by mouth daily Restart if ok with urology  Qty: 30 tablet, Refills: 3      vancomycin (VANCOCIN) 25 mg/ml oral solution Take 5 mLs by mouth 4 times daily for 12 days  Refills: 0      insulin aspart (NOVOLOG PENFILL) 100 UNIT/ML injection cartridge Inject into the skin 4 times daily (before meals and nightly) Inject as per sliding scale: If 151- 200 = 2 units;  201-250 = 4 units  251-300 = 6 units  301-350 = 8 units  351-400 = 10 units  Call physician if blood sugar <60 or >400. metoprolol succinate (TOPROL XL) 25 MG extended release tablet Take 1 tablet by mouth daily  Qty: 30 tablet, Refills: 3      acetaminophen (TYLENOL) 325 MG tablet Take 2 tablets by mouth every 6 hours as needed for Pain  Qty: 120 tablet, Refills: 3      aspirin 81 MG chewable tablet Take 1 tablet by mouth daily  Qty: 30 tablet, Refills: 3      atorvastatin (LIPITOR) 40 MG tablet Take 1 tablet by mouth nightly  Qty: 30 tablet, Refills: 3      insulin glargine (LANTUS) 100 UNIT/ML injection vial Inject 17 Units into the skin every morning      QUEtiapine (SEROQUEL) 25 MG tablet Take 1 tablet by mouth 2 times daily Additional RF per his PCP  Qty: 30 tablet, Refills: 0      levothyroxine (SYNTHROID) 75 MCG tablet Take 1 tablet by mouth every morning  Qty: 30 tablet, Refills: 0             ALLERGIES     has No Known Allergies. FAMILY HISTORY     He indicated that his mother is . He indicated that his father is . He indicated that his sister is . family history includes Asthma in his father; Cancer in his mother. SOCIAL HISTORY      reports that he has quit smoking. He quit after 25.00 years of use.  He has never used smokeless tobacco. He reports that he does not drink alcohol or use drugs. PHYSICAL EXAM     INITIAL VITALS:  height is 5' 10\" (1.778 m) and weight is 177 lb (80.3 kg). His oral temperature is 97.5 °F (36.4 °C). His blood pressure is 121/60 and his pulse is 68. His respiration is 16 and oxygen saturation is 95%. Physical Exam  Vitals signs and nursing note reviewed. Constitutional:       Appearance: He is well-developed. He is not diaphoretic. HENT:      Head: Normocephalic and atraumatic. Right Ear: External ear normal.      Left Ear: External ear normal.   Eyes:      General: No scleral icterus. Right eye: No discharge. Left eye: No discharge. Conjunctiva/sclera: Conjunctivae normal.   Neck:      Musculoskeletal: Normal range of motion and neck supple. Vascular: No JVD. Pulmonary:      Effort: Pulmonary effort is normal. No respiratory distress. Breath sounds: No stridor. Abdominal:      General: The ostomy site is clean. Bowel sounds are normal. There is no distension. Palpations: Abdomen is soft. Tenderness: There is no tenderness. There is no right CVA tenderness, left CVA tenderness, guarding or rebound. Comments: Left colostomy present   Matos catheter bag has gross hematuria with clots    Genitourinary:     Comments: Thick yellow drainage around catheter   Musculoskeletal: Normal range of motion. Skin:     General: Skin is warm and dry. Findings: No erythema. Neurological:      Mental Status: He is alert and oriented to person, place, and time. Motor: No abnormal muscle tone.    Psychiatric:         Behavior: Behavior normal.         DIFFERENTIAL DIAGNOSIS:   Differential diagnoses as discussed     DIAGNOSTIC RESULTS     EKG: All EKG's are interpreted by the Emergency Department Physician who eithersigns or Co-signs this chart in the absence of a cardiologist.    EKG    The patient had an EKG which is visualized and interpreted by me and attending provider in the absence of a Cardiologist.   [] Without comparison to previous. [x] With comparison to a previous EKG Dated 11/25/19  Rhythm: normal sinus  Rate: 72  Axis: LAD  ST Segments: normal  T Waves: inverted in anterior leads  Clinical Impression: Normal sinus rhythm. No STEMI. RADIOLOGY:non-plain filmimages(s) such as CT, Ultrasound and MRI are read by the radiologist.    US RENAL COMPLETE   Final Result   1. Elevated resistive indices. 2. Solitary gallstone. No acute findings. 3. Nonobstructing calculus upper pole right kidney. 4. No hydronephrosis. No appreciable change from prior study. **This report has been created using voice recognition software. It may contain minor errors which are inherent in voice recognition technology. **      Final report electronically signed by Dr. Jacinto Cross on 12/27/2019 4:36 PM      CT ABDOMEN PELVIS WO CONTRAST Additional Contrast? None   Final Result   Nonobstructing right intrarenal calculus. No acute process identified in the abdomen or pelvis            **This report has been created using voice recognition software. It may contain minor errors which are inherent in voice recognition technology. **      Final report electronically signed by Dr. Lizett Nolan on 12/27/2019 8:57 AM           LABS:     Labs Reviewed   CBC WITH AUTO DIFFERENTIAL - Abnormal; Notable for the following components:       Result Value    WBC 20.5 (*)     RBC 4.64 (*)     Hemoglobin 12.6 (*)     Hematocrit 40.5 (*)     MCHC 31.1 (*)     RDW-CV 19.3 (*)     RDW-SD 61.1 (*)     Platelets 579 (*)     Segs Absolute 12.5 (*)     Eosinophils Absolute 1.0 (*)     All other components within normal limits   BASIC METABOLIC PANEL - Abnormal; Notable for the following components:    Sodium 133 (*)     Potassium 5.8 (*)     CO2 16 (*)     Glucose 275 (*)     BUN 58 (*)     CREATININE 2.4 (*)     All other components within normal limits   URINE WITH REFLEXED MICRO - Abnormal;

## 2019-12-28 LAB
ANION GAP SERPL CALCULATED.3IONS-SCNC: 14 MEQ/L (ref 8–16)
BASOPHILS # BLD: 1.1 %
BASOPHILS ABSOLUTE: 0.2 THOU/MM3 (ref 0–0.1)
BUN BLDV-MCNC: 46 MG/DL (ref 7–22)
CALCIUM SERPL-MCNC: 8.7 MG/DL (ref 8.5–10.5)
CHLORIDE BLD-SCNC: 110 MEQ/L (ref 98–111)
CO2: 16 MEQ/L (ref 23–33)
CREAT SERPL-MCNC: 1.5 MG/DL (ref 0.4–1.2)
EOSINOPHIL # BLD: 5 %
EOSINOPHILS ABSOLUTE: 0.8 THOU/MM3 (ref 0–0.4)
ERYTHROCYTE [DISTWIDTH] IN BLOOD BY AUTOMATED COUNT: 19.2 % (ref 11.5–14.5)
ERYTHROCYTE [DISTWIDTH] IN BLOOD BY AUTOMATED COUNT: 62.3 FL (ref 35–45)
GFR SERPL CREATININE-BSD FRML MDRD: 44 ML/MIN/1.73M2
GLUCOSE BLD-MCNC: 149 MG/DL (ref 70–108)
GLUCOSE BLD-MCNC: 152 MG/DL (ref 70–108)
GLUCOSE BLD-MCNC: 190 MG/DL (ref 70–108)
GLUCOSE BLD-MCNC: 200 MG/DL (ref 70–108)
GLUCOSE BLD-MCNC: 210 MG/DL (ref 70–108)
HCT VFR BLD CALC: 34.8 % (ref 42–52)
HEMOGLOBIN: 10.5 GM/DL (ref 14–18)
IMMATURE GRANS (ABS): 1.09 THOU/MM3 (ref 0–0.07)
IMMATURE GRANULOCYTES: 6.5 %
LYMPHOCYTES # BLD: 11.5 %
LYMPHOCYTES ABSOLUTE: 1.9 THOU/MM3 (ref 1–4.8)
MAGNESIUM: 2 MG/DL (ref 1.6–2.4)
MCH RBC QN AUTO: 26.8 PG (ref 26–33)
MCHC RBC AUTO-ENTMCNC: 30.2 GM/DL (ref 32.2–35.5)
MCV RBC AUTO: 88.8 FL (ref 80–94)
MONOCYTES # BLD: 9.3 %
MONOCYTES ABSOLUTE: 1.6 THOU/MM3 (ref 0.4–1.3)
NUCLEATED RED BLOOD CELLS: 1 /100 WBC
PLATELET # BLD: 333 THOU/MM3 (ref 130–400)
PMV BLD AUTO: 10.4 FL (ref 9.4–12.4)
POTASSIUM REFLEX MAGNESIUM: 4.8 MEQ/L (ref 3.5–5.2)
RBC # BLD: 3.92 MILL/MM3 (ref 4.7–6.1)
SEG NEUTROPHILS: 66.6 %
SEGMENTED NEUTROPHILS ABSOLUTE COUNT: 11.1 THOU/MM3 (ref 1.8–7.7)
SODIUM BLD-SCNC: 140 MEQ/L (ref 135–145)
WBC # BLD: 16.7 THOU/MM3 (ref 4.8–10.8)

## 2019-12-28 PROCEDURE — 99221 1ST HOSP IP/OBS SF/LOW 40: CPT | Performed by: NURSE PRACTITIONER

## 2019-12-28 PROCEDURE — 85025 COMPLETE CBC W/AUTO DIFF WBC: CPT

## 2019-12-28 PROCEDURE — 96361 HYDRATE IV INFUSION ADD-ON: CPT

## 2019-12-28 PROCEDURE — 2580000003 HC RX 258: Performed by: INTERNAL MEDICINE

## 2019-12-28 PROCEDURE — 36415 COLL VENOUS BLD VENIPUNCTURE: CPT

## 2019-12-28 PROCEDURE — 96366 THER/PROPH/DIAG IV INF ADDON: CPT

## 2019-12-28 PROCEDURE — 2709999900 HC NON-CHARGEABLE SUPPLY

## 2019-12-28 PROCEDURE — 83735 ASSAY OF MAGNESIUM: CPT

## 2019-12-28 PROCEDURE — 6360000002 HC RX W HCPCS: Performed by: INTERNAL MEDICINE

## 2019-12-28 PROCEDURE — 6370000000 HC RX 637 (ALT 250 FOR IP): Performed by: INTERNAL MEDICINE

## 2019-12-28 PROCEDURE — G0378 HOSPITAL OBSERVATION PER HR: HCPCS

## 2019-12-28 PROCEDURE — 2140000000 HC CCU INTERMEDIATE R&B

## 2019-12-28 PROCEDURE — 80048 BASIC METABOLIC PNL TOTAL CA: CPT

## 2019-12-28 PROCEDURE — 82948 REAGENT STRIP/BLOOD GLUCOSE: CPT

## 2019-12-28 RX ADMIN — ATORVASTATIN CALCIUM 40 MG: 40 TABLET, FILM COATED ORAL at 21:11

## 2019-12-28 RX ADMIN — LEVOTHYROXINE SODIUM 75 MCG: 75 TABLET ORAL at 10:05

## 2019-12-28 RX ADMIN — Medication 125 MG: at 21:24

## 2019-12-28 RX ADMIN — METOPROLOL SUCCINATE 25 MG: 25 TABLET, FILM COATED, EXTENDED RELEASE ORAL at 10:05

## 2019-12-28 RX ADMIN — QUETIAPINE FUMARATE 25 MG: 25 TABLET ORAL at 21:11

## 2019-12-28 RX ADMIN — INSULIN LISPRO 2 UNITS: 100 INJECTION, SOLUTION INTRAVENOUS; SUBCUTANEOUS at 21:11

## 2019-12-28 RX ADMIN — CEFTRIAXONE SODIUM 1 G: 1 INJECTION, POWDER, FOR SOLUTION INTRAMUSCULAR; INTRAVENOUS at 10:15

## 2019-12-28 RX ADMIN — Medication 125 MG: at 10:15

## 2019-12-28 RX ADMIN — SODIUM CHLORIDE: 9 INJECTION, SOLUTION INTRAVENOUS at 15:30

## 2019-12-28 RX ADMIN — Medication 1 CAPSULE: at 10:06

## 2019-12-28 RX ADMIN — Medication 125 MG: at 02:14

## 2019-12-28 RX ADMIN — Medication 1 CAPSULE: at 14:46

## 2019-12-28 RX ADMIN — INSULIN GLARGINE 17 UNITS: 100 INJECTION, SOLUTION SUBCUTANEOUS at 10:15

## 2019-12-28 RX ADMIN — Medication 1 CAPSULE: at 21:11

## 2019-12-28 RX ADMIN — Medication 125 MG: at 14:46

## 2019-12-28 RX ADMIN — SODIUM CHLORIDE: 9 INJECTION, SOLUTION INTRAVENOUS at 02:15

## 2019-12-28 RX ADMIN — QUETIAPINE FUMARATE 25 MG: 25 TABLET ORAL at 10:05

## 2019-12-28 RX ADMIN — INSULIN LISPRO 4 UNITS: 100 INJECTION, SOLUTION INTRAVENOUS; SUBCUTANEOUS at 17:30

## 2019-12-28 RX ADMIN — INSULIN LISPRO 2 UNITS: 100 INJECTION, SOLUTION INTRAVENOUS; SUBCUTANEOUS at 10:08

## 2019-12-28 ASSESSMENT — PAIN SCALES - GENERAL
PAINLEVEL_OUTOF10: 0

## 2019-12-28 NOTE — PROGRESS NOTES
metoprolol succinate  25 mg Oral Daily    QUEtiapine  25 mg Oral BID    vancomycin  125 mg Oral Q6H    sodium chloride flush  10 mL Intravenous 2 times per day    cefTRIAXone (ROCEPHIN) IV  1 g Intravenous Q24H    insulin lispro  0-12 Units Subcutaneous TID WC    insulin lispro  0-6 Units Subcutaneous Nightly     Continuous Infusions:   dextrose      sodium chloride 75 mL/hr at 12/27/19 1302     PRN Meds:acetaminophen, sodium chloride flush, ondansetron, glucose, dextrose, glucagon (rDNA), dextrose        Allergies:  Patient has no known allergies. OBJECTIVE:    Vitals:   Vitals:    12/27/19 2300   BP: (!) 124/58   Pulse: 62   Resp: 18   Temp: 98.1 °F (36.7 °C)   SpO2: 96%      BMI: Body mass index is 25.4 kg/m². PHYSICAL EXAMINATION:          General appearance:  No apparent distress, appears stated age and cooperative. HEENT:  Normal cephalic, atraumatic without obvious deformity. Pupils equal, round, and reactive to light. Extra ocular muscles intact. Conjunctivae/corneas clear. Neck: Supple, with full range of motion. No jugular venous distention. Respiratory:  Normal respiratory effort. Clear to auscultation, bilaterally without Rales/Wheezes/Rhonchi. Cardiovascular:  Regular rhythm with normal S1/S2 without murmurs, rubs or gallops. Abdomen: Midline scar , soft, non-tender, non-distended with normal bowel sounds. MARIYA: Matos catheter in place with bloody urine. Musculoskeletal:  No clubbing, cyanosis or edema bilaterally. Full range of motion without deformity. Skin: Skin color, texture, turgor normal.  No rashes or lesions. Neurologic: Alert and oriented, Neurovascularly intact without any focal motor deficits.    Psychiatric:   Thought content appropriate, normal insight       Review of Labs and Diagnostic Testing:    Recent Results (from the past 24 hour(s))   Urine with Reflexed Micro    Collection Time: 12/27/19  6:15 AM   Result Value Ref Range    Glucose, Ur see below NEGATIVE meq/L    CO2 16 (L) 23 - 33 meq/L    Glucose 275 (H) 70 - 108 mg/dL    BUN 58 (H) 7 - 22 mg/dL    CREATININE 2.4 (H) 0.4 - 1.2 mg/dL    Calcium 9.7 8.5 - 10.5 mg/dL   Magnesium    Collection Time: 12/27/19  6:54 AM   Result Value Ref Range    Magnesium 2.1 1.6 - 2.4 mg/dL   Anion Gap    Collection Time: 12/27/19  6:54 AM   Result Value Ref Range    Anion Gap 17.0 (H) 8.0 - 16.0 meq/L   Glomerular Filtration Rate, Estimated    Collection Time: 12/27/19  6:54 AM   Result Value Ref Range    Est, Glom Filt Rate 25 (A) ml/min/1.73m2   Osmolality    Collection Time: 12/27/19  6:54 AM   Result Value Ref Range    Osmolality Calc 292.4 275.0 - 300 mOsmol/kg   Manual Differential    Collection Time: 12/27/19  6:54 AM   Result Value Ref Range    Differential, manual see below    Lactic acid, plasma    Collection Time: 12/27/19  7:44 AM   Result Value Ref Range    Lactic Acid 1.8 0.5 - 2.2 mmol/L   Procalcitonin    Collection Time: 12/27/19  7:44 AM   Result Value Ref Range    Procalcitonin 0.14 (H) 0.01 - 0.09 ng/mL   Culture blood #1    Collection Time: 12/27/19  7:44 AM   Result Value Ref Range    Blood Culture, Routine No growth-preliminary     Culture blood #2    Collection Time: 12/27/19  7:44 AM   Result Value Ref Range    Blood Culture, Routine No growth-preliminary     POCT Glucose    Collection Time: 12/27/19  7:44 AM   Result Value Ref Range    POC Glucose 253 (H) 70 - 108 mg/dl   Potassium w/ Reflex to Magnesium    Collection Time: 12/27/19  7:44 AM   Result Value Ref Range    Potassium reflex Magnesium 5.6 (H) 3.5 - 5.2 meq/L   POCT glucose    Collection Time: 12/27/19  7:45 AM   Result Value Ref Range    Glucose 253 mg/dL    QC OK?  yes    POCT glucose    Collection Time: 12/27/19  8:54 AM   Result Value Ref Range    POC Glucose 218 (H) 70 - 108 mg/dl   EKG Emergency    Collection Time: 12/27/19 10:15 AM   Result Value Ref Range    Ventricular Rate 72 BPM    Atrial Rate 72 BPM    P-R Interval 178 ms    QRS Duration 96 ms    Q-T Interval 420 ms    QTc Calculation (Bazett) 459 ms    P Axis 67 degrees    R Axis -26 degrees    T Axis 95 degrees   Potassium    Collection Time: 12/27/19  1:36 PM   Result Value Ref Range    Potassium 4.8 3.5 - 5.2 meq/L   POCT glucose    Collection Time: 12/27/19  4:10 PM   Result Value Ref Range    POC Glucose 291 (H) 70 - 108 mg/dl   Urinalysis with microscopic    Collection Time: 12/27/19  5:37 PM   Result Value Ref Range    Glucose, Urine NEGATIVE NEGATIVE mg/dl    Bilirubin Urine SMALL (A) NEGATIVE    Ketones, Urine TRACE (A) NEGATIVE    Specific Gravity, UA >=1.030 1.002 - 1.03    Blood, Urine LARGE (A) NEGATIVE    pH, UA 5.5 5.0 - 9.0    Protein, UA >= 300 (A) NEGATIVE mg/dl    Urobilinogen, Urine 0.2 0.0 - 1.0 eu/dl    Nitrite, Urine POSITIVE (A) NEGATIVE    Leukocyte Esterase, Urine LARGE (A) NEGATIVE    Color, UA YELLOW YELLOW-STR    Character, Urine CLEAR [id number]    RBC, UA > 200 0-2/hpf /hpf    WBC, UA  0-4/hpf /hpf    Epi Cells 0-2 3-5/hpf /hpf    Amorphous, UA NONE SEEN NONE SEEN    Bacteria, UA FEW FEW/NONE S    Casts NONE SEEN NONE SEEN /lpf    Crystals NONE SEEN NONE SEEN    Renal Epithelial, Urine NONE NONE SEEN    Yeast, UA NONE SEEN NONE SEEN    Miscellaneous Lab Test Result NONE SEEN    Creatinine, urine, random    Collection Time: 12/27/19  5:37 PM   Result Value Ref Range    Creatinine, Urine 168.8 mg/dl   Sodium, urine, random    Collection Time: 12/27/19  5:37 PM   Result Value Ref Range    Sodium, Ur 23 meq/l   Potassium, urine, random    Collection Time: 12/27/19  5:37 PM   Result Value Ref Range    Potassium, Urine 48.5 meq/l   Chloride, Random Urine    Collection Time: 12/27/19  5:37 PM   Result Value Ref Range    Chloride, Urine 31.0 meq/l   Eosinophil Smear Urine    Collection Time: 12/27/19  5:37 PM   Result Value Ref Range    Eosinophil Smear Eos Seen NONE SEEN    Specimen Urine    ICTOTEST, URINE    Collection Time: 12/27/19  5:37 PM   Result Value Ref Range Ictotest NEGATIVE NEGATIVE   POCT glucose    Collection Time: 12/27/19  8:30 PM   Result Value Ref Range    POC Glucose 260 (H) 70 - 108 mg/dl       Radiology:     Ct Abdomen Pelvis Wo Contrast Additional Contrast? None    Result Date: 12/27/2019  PROCEDURE: CT ABDOMEN PELVIS WO CONTRAST CLINICAL INFORMATION: hematuria, sepsis . COMPARISON: 12/14/2019 TECHNIQUE: 2-D multiplanar noncontrast images of the abdomen and pelvis All CT scans at this facility use dose modulation, iterative reconstruction, and/or weight-based dosing when appropriate to reduce radiation dose to as low as reasonably achievable. FINDINGS: Lung bases Mild bronchiectasis and basilar fibrosis. Coronary artery calcifications. Abdomen pelvis Solid or evaluation limited without IV contrast. Streak artifact from patient's extremities. 10 mm nonobstructing calculus right kidney. No hydronephrosis of either kidney. Mildly distended gallbladder. Spleen is normal. Liver is normal. Dense atherosclerosis of the aorta and mesenteric vessels. Normal caliber ureters with no ureteral calculi identified. Pelvis Matos balloon within the bladder which occupies most of the volume of the decompressed bladder small pocket of air within the bladder. No bowel obstruction. Bilateral fat-containing inguinal hernias. Bones Severe degenerative change throughout the lumbar spine spine bilateral sacroiliitis. No suspicious bone lesion. Nonobstructing right intrarenal calculus. No acute process identified in the abdomen or pelvis **This report has been created using voice recognition software. It may contain minor errors which are inherent in voice recognition technology. ** Final report electronically signed by Dr. Sonu Clay on 12/27/2019 8:57 AM    Us Renal Complete    Result Date: 12/27/2019  BILATERAL RENAL ULTRASOUND: CLINICAL INFORMATION: SHAYAN COMPARISON: 11/27/2019 TECHNIQUE: Multiple sonographic images of both kidneys were obtained.  Images of the urinary

## 2019-12-28 NOTE — CONSULTS
CONSULTATION NOTE :ID       Patient - Kat Perez,  Age - 80 y.o.    - 10/6/1928      Room Number - 3B-31/031-A   N -  [id number]   [facility]  Date of Admission -  2019  6:15 AM  Patient's PCP: Faith Favre, MD     Requesting Physician: Faith Favre, MD    REASON FOR CONSULTATION   UTI, hx of C.difficile infection    HISTORY OF PRESENT ILLNESS       This is a very pleasant 80 y.o. male who was admitted to the hospital with a chief complaints of hematuria. He is from NH. Recently treated for C.difficle induced diarrhea and hematuria related to plavix and ASA. I was asked to see due to abnormal urine and hx of C.difficle. He is confused. He is on continuous bladder irrigation. His medical record was reviewed and discussed with the nursing staff. He has no fever or chills. He has pyuria and still has loose stool related to C. difficile. He has history of coronary artery disease congestive heart failure hypertension and COPD.     PAST MEDICAL  HISTORY       Past Medical History:   Diagnosis Date    Arthritis     CAD (coronary artery disease)     Colostomy status ([address]     Diastolic CHF (HCC)     GERD (gastroesophageal reflux disease)     HTN (hypertension) 2013    Hyperlipidemia     Hypothyroid     Panlobular emphysema ([address] 2017    [facility]     S/P CABG (coronary artery bypass graft)     Type 2 diabetes mellitus with stage 3 chronic kidney disease, with long-term current use of insulin ([address]        PAST SURGICAL HISTORY     Past Surgical History:   Procedure Laterality Date    ABDOMEN SURGERY      APPENDECTOMY      CARDIAC SURGERY      COLECTOMY      COLONOSCOPY      COLOSTOMY      CORONARY ARTERY BYPASS GRAFT      triple to Distal RCA, first OM, and LIMA to diagonal by Dr Quita Rankin, ESOPHAGUS      EYE SURGERY      bilateral cataracts    JOINT REPLACEMENT      bilat knees  KNEE SURGERY      left total knee and right total knee    TONSILLECTOMY      VASCULAR SURGERY      cabg harvests from legs         MEDICATIONS:       Scheduled Meds:   atorvastatin  40 mg Oral Nightly    insulin glargine  17 Units Subcutaneous QAM    lactobacillus  1 capsule Oral TID    levothyroxine  75 mcg Oral QAM    metoprolol succinate  25 mg Oral Daily    QUEtiapine  25 mg Oral BID    vancomycin  125 mg Oral Q6H    sodium chloride flush  10 mL Intravenous 2 times per day    [START ON 12/28/2019] cefTRIAXone (ROCEPHIN) IV  1 g Intravenous Q24H    insulin lispro  0-12 Units Subcutaneous TID WC    insulin lispro  0-6 Units Subcutaneous Nightly     Continuous Infusions:   dextrose      sodium chloride 75 mL/hr at 12/27/19 1302     PRN Meds:acetaminophen, sodium chloride flush, ondansetron, glucose, dextrose, glucagon (rDNA), dextrose  Allergies:   ALLERGIES:    Patient has no known allergies. SOCIAL HISTORY:     TOBACCO:   reports that he has quit smoking. He quit after 25.00 years of use. He has never used smokeless tobacco.     ETOH:   reports no history of alcohol use. Patient currently lives in a nursing home      FAMILY HISTORY:         Problem Relation Age of Onset    Cancer Mother     Asthma Father        REVIEW OF SYSTEMS:     Constitutional: no fever,  Head: no head ache , no head injury, no migranes. Eye: no blurring of vision, no double vision.   Ears: no hearing difficulty, no tinnitus  Mouth/throat: no ulceration, dental caries , dysphagia  Lungs: no cough no chest pain  CVS: no palpitation, no chest pain, no shortness of breath  GI: no abdominal pain, no nausea , no vomiting, no constipation  MARIYA: He has hematuria as noted in HPI   musculoskeletal: no joint pain, swelling , stiffness,  Endocrine: no polyuria, polydipsia, no cold or heat intolerance  Hematology: no anemia,   Dermatology: no skin rash, no eczema, no pruritis,    PHYSICAL EXAM:     /66   Pulse 70 Cerebrovascular disease I67.9    Agitation R45.1    Benign essential hypertension I10    Colostomy care ([facility] [address] Z43.3    Peristomal skin complication V78.4    CHF (congestive heart failure), NYHA class I, acute on chronic, combined (HCC) I50.43    Ventricular arrhythmia I49.9    Hematuria R31.9    UTI (urinary tract infection) N39.0    Gross hematuria R31.0    Hyperkalemia E87.5           Impression and Recommendation:   Hematuria: Likely related to aspirin and Plavix use. UTI: He is a symptomatic, may be related to the recent instrumentation. History of C. difficile in recent diarrhea: Continue current antibiotic. We will continue antibiotics   Infection Control:   Contact isolation due to history of C. difficile induced diarrhea  Thank you Maribell Williamson MD for allowing me to participate in this patient's care.     Pj Bagley MD,FACP 12/27/2019 8:24 PM

## 2019-12-28 NOTE — CONSULTS
[facility] [address]  [address] [facility] [address]  Dept: [phone]  Loc: [phone]  Visit Date: 12/27/2019    Urology Consult Note    Reason for Consult:  Gross hematuria  Requesting Physician:  Loli Dickinson MD    History Obtained From:  patient, electronic medical record    Chief Complaint: Hematuria    HISTORY OF PRESENT ILLNESS:                The patient is a 80 y.o. male with significant past medical history of CHF, diabetes, prostate cancer who was admitted with gross hematuria. Patient is resident at [facility]. He was recently discharged from [facility] on 12/19/19. He was noted to have gross hematuria at last admission after rousseau catheter placement, and treated for UTI. He was restarted on his ASA and Plavix and developed hematuria at [facility] on 12/27 and was sent to ER for evaluation. CT A/P completed this admission which showed unchanged 12 mm right renal stone without acute process. Urine culture + on admission and treating with IV Rocephin since today for EGNB. Previous urine cytology negative.     Today he denies dysuria, urgency, frequency, fever, chills, or suprapubic pain. He believes the catheter has been draining well.       Past Medical History:        Diagnosis Date    Arthritis     CAD (coronary artery disease)     Colostomy status ([address] 75/89/5580    Diastolic CHF (HCC)     GERD (gastroesophageal reflux disease)     HTN (hypertension) 1/6/2013    Hyperlipidemia     Hypothyroid     Panlobular emphysema ([address] 11/30/2017    Prostate cancer ([address]     S/P CABG (coronary artery bypass graft)     Type 2 diabetes mellitus with stage 3 chronic kidney disease, with long-term current use of insulin ([address]      Past Surgical History:        Procedure Laterality Date    ABDOMEN SURGERY      APPENDECTOMY      CARDIAC SURGERY      COLECTOMY      COLONOSCOPY      COLOSTOMY      CORONARY ARTERY BYPASS GRAFT      triple to Distal Denies headache, difficulty swallowing, earache, and nosebleeds. Cardiovascular: Negative for chest pain, palpitations, tachycardia or edema. Respiratory: Denies cough or SOB. GI:The patient denies abdominal or flank pain, anorexia, nausea or vomiting. : see HPI. Musculoskeletal: Patient denies low back pain or painful or reduced range of ROM. Neurological: The patient denies any symptoms of neurological impairment or TIA. Psychiatric: Denies anxiety or depression. Skin: Denies rash or lesions. All remaining ROS negative. PHYSICAL EXAM:  VITALS:  BP (!) 121/58   Pulse 61   Temp 97.9 °F (36.6 °C) (Axillary)   Resp 18   Ht 5' 10\" (1.778 m)   Wt 177 lb (80.3 kg)   SpO2 97%   BMI 25.40 kg/m² . Nursing note and vitals reviewed. Constitutional: Alert and oriented times x3, no acute distress, and cooperative to examination with appropriate mood and affect. HEENT:   Head:         Normocephalic and atraumatic. Mouth/Throat:          Mucous membranes are normal.   Eyes:         EOM are normal. No scleral icterus. Nose:    The external appearance of the nose is normal  Ears: The ears appear normal to external inspection. Cardiovascular:       Normal rate, regular rhythm. Pulmonary/Chest:  Normal respiratory rate and rhthym. No use of accessory muscles. Lungs clear bilaterally. Abdominal:          Soft. No tenderness. Active bowel sounds             Genitalia:    Normal penis with 3-way rousseau catheter intact and draining clear, nick urine without clots. CBI clamped. Urethral meatus is normal in size and location  Musculoskeletal:    Normal range of motion. He exhibits no edema or tenderness of lower extremities. Extremities:    No cyanosis, clubbing, or edema present. Neurological:    Alert and oriented.      DATA:  CBC:   Lab Results   Component Value Date    WBC 16.7 12/28/2019    RBC 3.92 12/28/2019    RBC 4.16 10/22/2011    HGB 10.5 12/28/2019    HCT 34.8 12/28/2019    MCV 88.8 12/28/2019    MCH 26.8 12/28/2019    MCHC 30.2 12/28/2019    RDW 19.6 06/06/2018     12/28/2019    MPV 10.4 12/28/2019     BMP:    Lab Results   Component Value Date     12/28/2019    K 4.8 12/28/2019     12/28/2019    CO2 16 12/28/2019    BUN 46 12/28/2019    CREATININE 1.5 12/28/2019    CALCIUM 8.7 12/28/2019    LABGLOM 44 12/28/2019    GLUCOSE 152 12/28/2019    GLUCOSE 170 10/23/2011     BUN/Creatinine:    Lab Results   Component Value Date    BUN 46 12/28/2019    CREATININE 1.5 12/28/2019     Magnesium:    Lab Results   Component Value Date    MG 2.0 12/28/2019     Phosphorus:    Lab Results   Component Value Date    PHOS 3.6 06/29/2016     PT/INR:    Lab Results   Component Value Date    INR 1.09 12/14/2019     U/A:    Lab Results   Component Value Date    COLORU YELLOW 12/27/2019    PHUR 5.5 12/27/2019    LABCAST NONE SEEN 12/27/2019    WBCUA  12/27/2019    RBCUA > 200 12/27/2019    YEAST NONE SEEN 12/27/2019    BACTERIA FEW 12/27/2019    SPECGRAV >=1.030 12/27/2019    LEUKOCYTESUR LARGE 12/27/2019    UROBILINOGEN 0.2 12/27/2019    BILIRUBINUR SMALL 12/27/2019    BLOODU LARGE 12/27/2019    GLUCOSEU see below 12/27/2019    AMORPHOUS NONE SEEN 12/27/2019       Imaging: The patient has had a CT A/P Without Contrast which I have independently reviewed along with its accompanying report. The study demonstrates:    Narrative   PROCEDURE: CT ABDOMEN PELVIS WO CONTRAST       CLINICAL INFORMATION: hematuria, sepsis .       COMPARISON: 12/14/2019       TECHNIQUE: 2-D multiplanar noncontrast images of the abdomen and pelvis   All CT scans at this facility use dose modulation, iterative reconstruction, and/or weight-based dosing when appropriate to reduce radiation dose to as low as reasonably achievable.       FINDINGS: Lung bases   Mild bronchiectasis and basilar fibrosis.  Coronary artery calcifications.       Abdomen pelvis   Solid or evaluation limited without IV contrast. Streak artifact from patient's extremities. 10 mm nonobstructing calculus right kidney. No hydronephrosis of either kidney.       Mildly distended gallbladder. Spleen is normal.   Liver is normal.   Dense atherosclerosis of the aorta and mesenteric vessels. Normal caliber ureters with no ureteral calculi identified. Pelvis   Rousseau balloon within the bladder which occupies most of the volume of the decompressed bladder small pocket of air within the bladder. No bowel obstruction. Bilateral fat-containing inguinal hernias.       Bones   Severe degenerative change throughout the lumbar spine spine bilateral sacroiliitis. No suspicious bone lesion.               Impression   Nonobstructing right intrarenal calculus. No acute process identified in the abdomen or pelvis           IMPRESSION:   Acute cystitis with hematuria  Gross hematuria  Right renal calculus    Plan:      Hold ASA and Plavix  IV Rocephin - follow UC and tailor antibiotics to sensitivity  Discontinue CBI  Remove rousseau catheter - allow to void on his own  Bladder scan tomorrow morning. If residual > 225 ml, reinsert 18 fr.  Rousseau catheter    F/U as scheduled on 1/3/20 in office for cystoscopy  Call with questions or concerns    Thank you for including us in the care of [facility], APRJARRETT  12/28/19 8:43 AM  Urology

## 2019-12-29 LAB
GLUCOSE BLD-MCNC: 171 MG/DL (ref 70–108)
GLUCOSE BLD-MCNC: 171 MG/DL (ref 70–108)
GLUCOSE BLD-MCNC: 180 MG/DL (ref 70–108)
GLUCOSE BLD-MCNC: 223 MG/DL (ref 70–108)
GLUCOSE BLD-MCNC: 244 MG/DL (ref 70–108)

## 2019-12-29 PROCEDURE — 2709999900 HC NON-CHARGEABLE SUPPLY

## 2019-12-29 PROCEDURE — G0378 HOSPITAL OBSERVATION PER HR: HCPCS

## 2019-12-29 PROCEDURE — 82948 REAGENT STRIP/BLOOD GLUCOSE: CPT

## 2019-12-29 PROCEDURE — 2580000003 HC RX 258: Performed by: INTERNAL MEDICINE

## 2019-12-29 PROCEDURE — 6360000002 HC RX W HCPCS: Performed by: INTERNAL MEDICINE

## 2019-12-29 PROCEDURE — 97162 PT EVAL MOD COMPLEX 30 MIN: CPT

## 2019-12-29 PROCEDURE — 2140000000 HC CCU INTERMEDIATE R&B

## 2019-12-29 PROCEDURE — 6370000000 HC RX 637 (ALT 250 FOR IP): Performed by: INTERNAL MEDICINE

## 2019-12-29 PROCEDURE — 97530 THERAPEUTIC ACTIVITIES: CPT

## 2019-12-29 PROCEDURE — 96361 HYDRATE IV INFUSION ADD-ON: CPT

## 2019-12-29 PROCEDURE — 96366 THER/PROPH/DIAG IV INF ADDON: CPT

## 2019-12-29 RX ADMIN — METOPROLOL SUCCINATE 25 MG: 25 TABLET, FILM COATED, EXTENDED RELEASE ORAL at 08:53

## 2019-12-29 RX ADMIN — INSULIN LISPRO 2 UNITS: 100 INJECTION, SOLUTION INTRAVENOUS; SUBCUTANEOUS at 17:04

## 2019-12-29 RX ADMIN — INSULIN LISPRO 2 UNITS: 100 INJECTION, SOLUTION INTRAVENOUS; SUBCUTANEOUS at 20:32

## 2019-12-29 RX ADMIN — SODIUM CHLORIDE: 9 INJECTION, SOLUTION INTRAVENOUS at 05:35

## 2019-12-29 RX ADMIN — INSULIN GLARGINE 17 UNITS: 100 INJECTION, SOLUTION SUBCUTANEOUS at 09:01

## 2019-12-29 RX ADMIN — Medication 1 CAPSULE: at 16:40

## 2019-12-29 RX ADMIN — Medication 125 MG: at 01:57

## 2019-12-29 RX ADMIN — Medication 125 MG: at 09:01

## 2019-12-29 RX ADMIN — QUETIAPINE FUMARATE 25 MG: 25 TABLET ORAL at 20:17

## 2019-12-29 RX ADMIN — QUETIAPINE FUMARATE 25 MG: 25 TABLET ORAL at 08:53

## 2019-12-29 RX ADMIN — Medication 1 CAPSULE: at 08:53

## 2019-12-29 RX ADMIN — Medication 1 CAPSULE: at 20:18

## 2019-12-29 RX ADMIN — INSULIN LISPRO 2 UNITS: 100 INJECTION, SOLUTION INTRAVENOUS; SUBCUTANEOUS at 09:01

## 2019-12-29 RX ADMIN — CEFTRIAXONE SODIUM 1 G: 1 INJECTION, POWDER, FOR SOLUTION INTRAMUSCULAR; INTRAVENOUS at 05:34

## 2019-12-29 RX ADMIN — Medication 125 MG: at 20:18

## 2019-12-29 RX ADMIN — INSULIN LISPRO 2 UNITS: 100 INJECTION, SOLUTION INTRAVENOUS; SUBCUTANEOUS at 11:38

## 2019-12-29 RX ADMIN — LEVOTHYROXINE SODIUM 75 MCG: 75 TABLET ORAL at 08:52

## 2019-12-29 RX ADMIN — Medication 125 MG: at 16:40

## 2019-12-29 RX ADMIN — ATORVASTATIN CALCIUM 40 MG: 40 TABLET, FILM COATED ORAL at 20:18

## 2019-12-29 ASSESSMENT — PAIN SCALES - GENERAL
PAINLEVEL_OUTOF10: 0

## 2019-12-29 NOTE — PROGRESS NOTES
Recommendations:  Discharge Recommendations: Subacute/Skilled Nursing Facility    Patient Education:  PT Education: Goals, PT Role, Plan of Care    Equipment Recommendations:  Equipment Needed: No    Plan:  Times per week: 3-5 X GM  Current Treatment Recommendations: Strengthening, Functional Mobility Training, Transfer Training, Balance Training, Gait Training, Safety Education & Training, Patient/Caregiver Education & Training    Goals:  Patient goals : does not state  Short term goals  Time Frame for Short term goals: by discharge  Short term goal 1: Pt to transfer supine <--> sit SBA to enable pt to get in/out of bed. Short term goal 2: Pt to transfer sit <--> stand SBA for increased functional mobility. Short term goal 3: Pt to ambulate >25 feet with RW CGA to enable pt to amb to the bathroom. Long term goals  Time Frame for Long term goals : NA due to short length of stay. Following session, patient left in safe position with all fall risk precautions in place.

## 2019-12-29 NOTE — PLAN OF CARE
humalog as ordered per sliding scale coverage. Care plan reviewed with patient. Patient verbalizes understanding of the care plan and contributed to goal setting.

## 2019-12-29 NOTE — FLOWSHEET NOTE
12/28/19 1454   Provider Notification   Reason for Communication Review case   Provider Name [facility]   Provider Notification Nurse Practitioner   Method of Communication Secure Message   Response Waiting for response   Notification Time [address] [facility] contacted regarding order placed to remove rousseau catheter. Notified that the CBI was not clamped at time and RN clamped irrigation and clots noted in tubing. Asked if he still wanted catheter removed. New order to Manually irrigate catheter every 2 hours and PRN with 120 ml saline.

## 2019-12-29 NOTE — PROGRESS NOTES
Patient has been unhooked from the continuous bladder irrigation throughout the night and have been manually irrigating three way catheter with 120 ml of normal saline every 2 hours. Patient has had blood clots occasionally. Matos bag is red/brown/hazy with clots. Educated patient about why we are irrigating it and he stated he really appreciated someone taking the time to explain it. Patient more alert throughout the night and a bit more amiable to nursing staff. By the end of the shift, patients urine has been free of blood clots with manual irrigations.

## 2019-12-29 NOTE — PROGRESS NOTES
Oral QAM    metoprolol succinate  25 mg Oral Daily    QUEtiapine  25 mg Oral BID    vancomycin  125 mg Oral Q6H    sodium chloride flush  10 mL Intravenous 2 times per day    cefTRIAXone (ROCEPHIN) IV  1 g Intravenous Q24H    insulin lispro  0-12 Units Subcutaneous TID WC    insulin lispro  0-6 Units Subcutaneous Nightly     Continuous Infusions:   dextrose      sodium chloride Stopped (12/28/19 1539)     PRN Meds:acetaminophen, sodium chloride flush, ondansetron, glucose, dextrose, glucagon (rDNA), dextrose        Allergies:  Patient has no known allergies. OBJECTIVE:    Vitals:   Vitals:    12/28/19 2332   BP: (!) 129/59   Pulse: 52   Resp: 18   Temp: 97.7 °F (36.5 °C)   SpO2: 97%      BMI: Body mass index is 25.4 kg/m². PHYSICAL EXAMINATION:          General appearance:  No apparent distress, appears stated age and cooperative. HEENT:  Normal cephalic, atraumatic without obvious deformity. Pupils equal, round, and reactive to light. Extra ocular muscles intact. Conjunctivae/corneas clear. Neck: Supple, with full range of motion. No jugular venous distention. Respiratory:  Normal respiratory effort. Clear to auscultation, bilaterally without Rales/Wheezes/Rhonchi. Cardiovascular:  Regular rhythm with normal S1/S2 without murmurs, rubs or gallops. Abdomen: Midline scar ,colostomy LLQ,  soft, non-tender, non-distended with normal bowel sounds. MARIYA: Matos catheter in place with tea colored urine   Musculoskeletal:  No clubbing, cyanosis or edema bilaterally. Full range of motion without deformity. Skin: Skin color, texture, turgor normal.  No rashes or lesions. Neurologic: Alert and oriented, Neurovascularly intact without any focal motor deficits.    Psychiatric:   Thought content appropriate, normal insight       Review of Labs and Diagnostic Testing:    Recent Results (from the past 24 hour(s))   Magnesium    Collection Time: 12/28/19  6:17 AM   Result Value Ref Range    Magnesium 2.0 1.6 PM   Result Value Ref Range    POC Glucose 200 (H) 70 - 108 mg/dl       Radiology:     Ct Abdomen Pelvis Wo Contrast Additional Contrast? None    Result Date: 12/27/2019  PROCEDURE: CT ABDOMEN PELVIS WO CONTRAST CLINICAL INFORMATION: hematuria, sepsis . COMPARISON: 12/14/2019 TECHNIQUE: 2-D multiplanar noncontrast images of the abdomen and pelvis All CT scans at this facility use dose modulation, iterative reconstruction, and/or weight-based dosing when appropriate to reduce radiation dose to as low as reasonably achievable. FINDINGS: Lung bases Mild bronchiectasis and basilar fibrosis. Coronary artery calcifications. Abdomen pelvis Solid or evaluation limited without IV contrast. Streak artifact from patient's extremities. 10 mm nonobstructing calculus right kidney. No hydronephrosis of either kidney. Mildly distended gallbladder. Spleen is normal. Liver is normal. Dense atherosclerosis of the aorta and mesenteric vessels. Normal caliber ureters with no ureteral calculi identified. Pelvis Matos balloon within the bladder which occupies most of the volume of the decompressed bladder small pocket of air within the bladder. No bowel obstruction. Bilateral fat-containing inguinal hernias. Bones Severe degenerative change throughout the lumbar spine spine bilateral sacroiliitis. No suspicious bone lesion. Nonobstructing right intrarenal calculus. No acute process identified in the abdomen or pelvis **This report has been created using voice recognition software. It may contain minor errors which are inherent in voice recognition technology. ** Final report electronically signed by Dr. Ankur Richmond on 12/27/2019 8:57 AM    Us Renal Complete    Result Date: 12/27/2019  BILATERAL RENAL ULTRASOUND: CLINICAL INFORMATION: SHAYAN COMPARISON: 11/27/2019 TECHNIQUE: Multiple sonographic images of both kidneys were obtained. Images of the urinary bladder were also obtained.  FINDINGS: RIGHT KIDNEY - 9.90 x 5.54 x 6.56 cm Resistive

## 2019-12-29 NOTE — PLAN OF CARE
Problem: Falls - Risk of:  Goal: Will remain free from falls  Description  Will remain free from falls  12/29/2019 1359 by Kevon Schultz RN  Outcome: Met This Shift  Fall assessment completed. Patient using call light appropriately to call for assistance with ambulation to bathroom. Personal items within reach. Patient is also compliant with use of non-skid slippers. Problem: Risk for Impaired Skin Integrity  Goal: Tissue integrity - skin and mucous membranes  Description  Structural intactness and normal physiological function of skin and  mucous membranes. 12/29/2019 1359 by Kevon Schultz RN  Outcome: Ongoing  No skin breakdown this shift. Patient being assisted with turning. Patients states understanding of repositioning every two hours. Problem: Discharge Planning:  Goal: Participates in care planning  Description  Participates in care planning  12/29/2019 1359 by Kevon Schultz RN  Outcome: Ongoing  Discharge plan is in process. Plan [facility] to [facility]. Problem: Bowel Function - Altered:  Goal: Bowel elimination is within specified parameters  Description  Bowel elimination is within specified parameters  Outcome: Ongoing  Patient has colostomy in place. Output watery. Problem: Mental Status - Impaired:  Goal: Mental status will be restored to baseline  Description  Mental status will be restored to baseline  Outcome: OngoingPatient alert & oriented x 1. Patient able to follow commands. Hand grasps equal bilaterally. Problem: Serum Glucose Level - Abnormal:  Goal: Ability to maintain appropriate glucose levels will improve to within specified parameters  Description  Ability to maintain appropriate glucose levels will improve to within specified parameters  12/29/2019 1359 by Kevon Schultz RN  Outcome: Ongoing  Insulin coverage per order.      Problem: Skin Integrity - Impaired:  Goal: Will show no infection signs and symptoms  Description  Will show no

## 2019-12-30 LAB
ANION GAP SERPL CALCULATED.3IONS-SCNC: 11 MEQ/L (ref 8–16)
BASOPHILS # BLD: 0.7 %
BASOPHILS ABSOLUTE: 0.1 THOU/MM3 (ref 0–0.1)
BUN BLDV-MCNC: 26 MG/DL (ref 7–22)
CALCIUM SERPL-MCNC: 8.4 MG/DL (ref 8.5–10.5)
CHLORIDE BLD-SCNC: 112 MEQ/L (ref 98–111)
CO2: 15 MEQ/L (ref 23–33)
CREAT SERPL-MCNC: 1 MG/DL (ref 0.4–1.2)
EOSINOPHIL # BLD: 5.5 %
EOSINOPHILS ABSOLUTE: 0.8 THOU/MM3 (ref 0–0.4)
ERYTHROCYTE [DISTWIDTH] IN BLOOD BY AUTOMATED COUNT: 19.5 % (ref 11.5–14.5)
ERYTHROCYTE [DISTWIDTH] IN BLOOD BY AUTOMATED COUNT: 62 FL (ref 35–45)
GFR SERPL CREATININE-BSD FRML MDRD: 70 ML/MIN/1.73M2
GLUCOSE BLD-MCNC: 146 MG/DL (ref 70–108)
GLUCOSE BLD-MCNC: 150 MG/DL (ref 70–108)
GLUCOSE BLD-MCNC: 168 MG/DL (ref 70–108)
GLUCOSE BLD-MCNC: 174 MG/DL (ref 70–108)
GLUCOSE BLD-MCNC: 223 MG/DL (ref 70–108)
HCT VFR BLD CALC: 30.5 % (ref 42–52)
HEMOGLOBIN: 9.4 GM/DL (ref 14–18)
IMMATURE GRANS (ABS): 0.59 THOU/MM3 (ref 0–0.07)
IMMATURE GRANULOCYTES: 4.1 %
LYMPHOCYTES # BLD: 12 %
LYMPHOCYTES ABSOLUTE: 1.7 THOU/MM3 (ref 1–4.8)
MCH RBC QN AUTO: 27.1 PG (ref 26–33)
MCHC RBC AUTO-ENTMCNC: 30.8 GM/DL (ref 32.2–35.5)
MCV RBC AUTO: 87.9 FL (ref 80–94)
MONOCYTES # BLD: 10.1 %
MONOCYTES ABSOLUTE: 1.5 THOU/MM3 (ref 0.4–1.3)
NUCLEATED RED BLOOD CELLS: 0 /100 WBC
ORGANISM: ABNORMAL
PLATELET # BLD: 282 THOU/MM3 (ref 130–400)
PMV BLD AUTO: 10.4 FL (ref 9.4–12.4)
POTASSIUM SERPL-SCNC: 4.3 MEQ/L (ref 3.5–5.2)
RBC # BLD: 3.47 MILL/MM3 (ref 4.7–6.1)
SEG NEUTROPHILS: 67.6 %
SEGMENTED NEUTROPHILS ABSOLUTE COUNT: 9.8 THOU/MM3 (ref 1.8–7.7)
SODIUM BLD-SCNC: 138 MEQ/L (ref 135–145)
URINE CULTURE REFLEX: ABNORMAL
WBC # BLD: 14.5 THOU/MM3 (ref 4.8–10.8)

## 2019-12-30 PROCEDURE — 2709999900 HC NON-CHARGEABLE SUPPLY

## 2019-12-30 PROCEDURE — 2140000000 HC CCU INTERMEDIATE R&B

## 2019-12-30 PROCEDURE — G0378 HOSPITAL OBSERVATION PER HR: HCPCS

## 2019-12-30 PROCEDURE — 96366 THER/PROPH/DIAG IV INF ADDON: CPT

## 2019-12-30 PROCEDURE — 80048 BASIC METABOLIC PNL TOTAL CA: CPT

## 2019-12-30 PROCEDURE — 6360000002 HC RX W HCPCS: Performed by: INTERNAL MEDICINE

## 2019-12-30 PROCEDURE — 97110 THERAPEUTIC EXERCISES: CPT

## 2019-12-30 PROCEDURE — 82948 REAGENT STRIP/BLOOD GLUCOSE: CPT

## 2019-12-30 PROCEDURE — 6370000000 HC RX 637 (ALT 250 FOR IP): Performed by: INTERNAL MEDICINE

## 2019-12-30 PROCEDURE — 85025 COMPLETE CBC W/AUTO DIFF WBC: CPT

## 2019-12-30 PROCEDURE — 96361 HYDRATE IV INFUSION ADD-ON: CPT

## 2019-12-30 PROCEDURE — 36415 COLL VENOUS BLD VENIPUNCTURE: CPT

## 2019-12-30 PROCEDURE — 2580000003 HC RX 258: Performed by: INTERNAL MEDICINE

## 2019-12-30 RX ORDER — GRANULES FOR ORAL 3 G/1
3 POWDER ORAL ONCE
Status: COMPLETED | OUTPATIENT
Start: 2019-12-30 | End: 2019-12-30

## 2019-12-30 RX ADMIN — Medication 1 CAPSULE: at 22:32

## 2019-12-30 RX ADMIN — INSULIN LISPRO 2 UNITS: 100 INJECTION, SOLUTION INTRAVENOUS; SUBCUTANEOUS at 11:25

## 2019-12-30 RX ADMIN — LEVOTHYROXINE SODIUM 75 MCG: 75 TABLET ORAL at 06:30

## 2019-12-30 RX ADMIN — INSULIN LISPRO 1 UNITS: 100 INJECTION, SOLUTION INTRAVENOUS; SUBCUTANEOUS at 07:53

## 2019-12-30 RX ADMIN — Medication 125 MG: at 02:47

## 2019-12-30 RX ADMIN — INSULIN LISPRO 2 UNITS: 100 INJECTION, SOLUTION INTRAVENOUS; SUBCUTANEOUS at 22:43

## 2019-12-30 RX ADMIN — QUETIAPINE FUMARATE 25 MG: 25 TABLET ORAL at 22:32

## 2019-12-30 RX ADMIN — Medication 125 MG: at 22:42

## 2019-12-30 RX ADMIN — Medication 1 CAPSULE: at 15:43

## 2019-12-30 RX ADMIN — Medication 1 CAPSULE: at 09:02

## 2019-12-30 RX ADMIN — ATORVASTATIN CALCIUM 40 MG: 40 TABLET, FILM COATED ORAL at 22:32

## 2019-12-30 RX ADMIN — CEFTRIAXONE SODIUM 1 G: 1 INJECTION, POWDER, FOR SOLUTION INTRAMUSCULAR; INTRAVENOUS at 06:32

## 2019-12-30 RX ADMIN — METOPROLOL SUCCINATE 25 MG: 25 TABLET, FILM COATED, EXTENDED RELEASE ORAL at 09:02

## 2019-12-30 RX ADMIN — FOSFOMYCIN TROMETHAMINE 1 PACKET: 3 POWDER ORAL at 11:45

## 2019-12-30 RX ADMIN — QUETIAPINE FUMARATE 25 MG: 25 TABLET ORAL at 09:02

## 2019-12-30 RX ADMIN — Medication 125 MG: at 15:42

## 2019-12-30 RX ADMIN — Medication 125 MG: at 09:07

## 2019-12-30 RX ADMIN — INSULIN GLARGINE 17 UNITS: 100 INJECTION, SOLUTION SUBCUTANEOUS at 08:57

## 2019-12-30 ASSESSMENT — PAIN SCALES - GENERAL
PAINLEVEL_OUTOF10: 0

## 2019-12-30 NOTE — PROGRESS NOTES
IM Progress Note  Dr. Scott Holm  12/30/2019 10:05 AM      Patient name Elda Sanford  [id number]  PCP: Mariana Ulloa MD  Admit Date: 12/27/2019  Acct No. [de-identified]    Subjective: Interval History:   Pt lying in bed  No sob  Matos in place on 2hr irrigation    Diet: DIET CARB CONTROL;    I/O last 3 completed shifts: In: 2407.3 [P.O.:745; I.V.:1662.3]  Out: 1415 [Urine:615; Stool:800]  No intake/output data recorded. Admission weight: 204 lb (92.5 kg) as of 12/27/2019  6:15 AM  Wt Readings from Last 3 Encounters:   12/30/19 183 lb 9.6 oz (83.3 kg)   12/14/19 204 lb (92.5 kg)   12/08/19 204 lb (92.5 kg)     Body mass index is 26.34 kg/m². ROS   CVS;  no cp or palpitation  Resp: no SOB or cough  Neuro:  No numbness or weakness or dizziness  Abd: no nausea or vomiting or abd pain      Medications:   Scheduled Meds:   atorvastatin  40 mg Oral Nightly    insulin glargine  17 Units Subcutaneous QAM    lactobacillus  1 capsule Oral TID    levothyroxine  75 mcg Oral QAM    metoprolol succinate  25 mg Oral Daily    QUEtiapine  25 mg Oral BID    vancomycin  125 mg Oral Q6H    sodium chloride flush  10 mL Intravenous 2 times per day    cefTRIAXone (ROCEPHIN) IV  1 g Intravenous Q24H    insulin lispro  0-12 Units Subcutaneous TID WC    insulin lispro  0-6 Units Subcutaneous Nightly     Continuous Infusions:   dextrose      sodium chloride 75 mL/hr at 12/29/19 0535       Labs :     CBC:   Recent Labs     12/28/19  0617 12/30/19  0447   WBC 16.7* 14.5*   HGB 10.5* 9.4*    282     BMP:    Recent Labs     12/27/19  1336 12/28/19  0617 12/30/19  0447   NA  --  140 138   K 4.8 4.8 4.3   CL  --  110 112*   CO2  --  16* 15*   BUN  --  46* 26*   CREATININE  --  1.5* 1.0   GLUCOSE  --  152* 150*     Hepatic: No results for input(s): AST, ALT, ALB, BILITOT, ALKPHOS in the last 72 hours. Troponin: No results for input(s): TROPONINI in the last 72 hours.   BNP: No results for input(s): BNP in

## 2019-12-30 NOTE — FLOWSHEET NOTE
12/30/19 1015   Encounter Summary   Services provided to: Patient   Referral/Consult From: Rocio   Continue Visiting Yes  (12/30/19 )   Complexity of Encounter Moderate   Length of Encounter 15 minutes   Routine   Type Initial   Assessment Calm; Approachable   Intervention Nurtured hope   Outcome Comfort   Spiritual/Cheondoism   Type Spiritual support   S- During my contact with the 80 yr old patient I wanted to assess the spiritual and emotional        needs. The pt was coping with a UTI/ Urinary Tract Infection. O- The patient was in bed and was receptive to my presences. The pt didnt share any             major concerns at the time. The pt does have support through their family. A- I offered emotional support as well as words of encouragement. P-  Continued support would be helpful in order to meet the future Spiritual needs of the         patient. The pt is supported by [employer].

## 2019-12-30 NOTE — PROGRESS NOTES
Progress note: Infectious diseases    Patient - Elda Sanford,  Age - 80 y.o.    - 10/6/1928      Room Number - 3B-31/031-A   MRN -  [id number]   Acct # - [de-identified]  Date of Admission -  2019  6:15 AM    SUBJECTIVE:   No new issues  Has rousseau, the bleeding is improving  OBJECTIVE   VITALS    height is 5' 10\" (1.778 m) and weight is 183 lb 9.6 oz (83.3 kg). His axillary temperature is 97.7 °F (36.5 °C). His blood pressure is 114/54 (abnormal) and his pulse is 63. His respiration is 18 and oxygen saturation is 96%. Wt Readings from Last 3 Encounters:   19 183 lb 9.6 oz (83.3 kg)   19 204 lb (92.5 kg)   19 204 lb (92.5 kg)       I/O (24 Hours)    Intake/Output Summary (Last 24 hours) at 2019 1009  Last data filed at 2019 0650  Gross per 24 hour   Intake 2407.27 ml   Output 1415 ml   Net 992.27 ml       General Appearance  Awake, alert, oriented,  not  In acute distress  HEENT - normocephalic, atraumatic, pale conjunctiva,  anicteric sclera  Neck - Supple, no mass  Lungs -  Bilateral good air entry, no rhonchi, no wheeze  Cardiovascular - Heart sounds are normal.  Regular rate and rhythm without murmur, gallop or rub.   Abdomen - soft, not distended, nontender,   Neurologic -awake and oriented  Skin - No bruising or bleeding  Extremities - No edema, no cyanosis, clubbing     MEDICATIONS:      atorvastatin  40 mg Oral Nightly    insulin glargine  17 Units Subcutaneous QAM    lactobacillus  1 capsule Oral TID    levothyroxine  75 mcg Oral QAM    metoprolol succinate  25 mg Oral Daily    QUEtiapine  25 mg Oral BID    vancomycin  125 mg Oral Q6H    sodium chloride flush  10 mL Intravenous 2 times per day    cefTRIAXone (ROCEPHIN) IV  1 g Intravenous Q24H    insulin lispro  0-12 Units Subcutaneous TID WC    insulin lispro  0-6 Units Subcutaneous Nightly      dextrose      sodium chloride 75 mL/hr at 12/29/19 0535     acetaminophen, sodium chloride flush, ondansetron, glucose, dextrose, glucagon (rDNA), dextrose      LABS:     CBC:   Recent Labs     12/28/19  0617 12/30/19  0447   WBC 16.7* 14.5*   HGB 10.5* 9.4*    282     BMP:    Recent Labs     12/27/19  1336 12/28/19  0617 12/30/19  0447   NA  --  140 138   K 4.8 4.8 4.3   CL  --  110 112*   CO2  --  16* 15*   BUN  --  46* 26*   CREATININE  --  1.5* 1.0   GLUCOSE  --  152* 150*     Calcium:  Recent Labs     12/30/19  0447   CALCIUM 8.4*     Ionized Calcium:No results for input(s): IONCA in the last 72 hours. Magnesium:  Recent Labs     12/28/19  0617   MG 2.0     Phosphorus:No results for input(s): PHOS in the last 72 hours. BNP:No results for input(s): BNP in the last 72 hours.   Glucose:  Recent Labs     12/29/19  2031 12/29/19  2045 12/30/19  0628   POCGLU 223* 244* 146*        CULTURES:   UA:   Recent Labs     12/27/19  1737   SPECGRAV >=1.030   PHUR 5.5   COLORU YELLOW   PROTEINU >= 300*   BLOODU LARGE*   RBCUA > 200   WBCUA    BACTERIA FEW   NITRU POSITIVE*   BILIRUBINUR SMALL*   UROBILINOGEN 0.2   KETUA TRACE*   LABCAST NONE SEEN     Micro:   Lab Results   Component Value Date    BC No growth-preliminary  12/27/2019    BC No growth-preliminary  12/27/2019          Problem list of patient:     Patient Active Problem List   Diagnosis Code    Essential hypertension I10    Prostate CA ([facility] [address] C61    Colon cancer ([facility] [address] C18.9    Diabetes mellitus ([facility] [address] E11.9    SHAYAN (acute kidney injury) ([facility] [address] N17.9    Hypothyroidism E03.9    CAD (coronary artery disease) I25.10    Chest pain R07.9    Hyponatremia E87.1    Anemia D64.9    Dyspnea R06.00    Type 2 diabetes mellitus with stage 3 chronic kidney disease, with long-term current use of insulin (HCC) E11.22, N18.3, Z79.4    S/P CABG (coronary artery bypass graft) Z95.1    CKD (chronic kidney disease) N18.9    Hyperlipidemia E78.5    Class 1 obesity due to

## 2019-12-30 NOTE — PLAN OF CARE
Problem: Falls - Risk of:  Goal: Will remain free from falls  Description  Will remain free from falls  Outcome: Ongoing  Goal: Absence of physical injury  Description  Absence of physical injury  Outcome: Ongoing     Problem: Risk for Impaired Skin Integrity  Goal: Tissue integrity - skin and mucous membranes  Description  Structural intactness and normal physiological function of skin and  mucous membranes. Outcome: Ongoing     Problem: Discharge Planning:  Goal: Participates in care planning  Description  Participates in care planning  Outcome: Ongoing  Goal: Discharged to appropriate level of care  Description  Discharged to appropriate level of care  Outcome: Ongoing  Note:   Plan return to [place], [place] consulted         Problem:  Bowel Function - Altered:  Goal: Bowel elimination is within specified parameters  Description  Bowel elimination is within specified parameters  Outcome: Ongoing     Problem: Mental Status - Impaired:  Goal: Mental status will be restored to baseline  Description  Mental status will be restored to baseline  Outcome: Ongoing     Problem: Serum Glucose Level - Abnormal:  Goal: Ability to maintain appropriate glucose levels will improve to within specified parameters  Description  Ability to maintain appropriate glucose levels will improve to within specified parameters  Outcome: Ongoing     Problem: Skin Integrity - Impaired:  Goal: Will show no infection signs and symptoms  Description  Will show no infection signs and symptoms  Outcome: Ongoing  Goal: Absence of new skin breakdown  Description  Absence of new skin breakdown  Outcome: Ongoing     Problem: Urinary Elimination:  Goal: Signs and symptoms of infection will decrease  Description  Signs and symptoms of infection will decrease  Outcome: Ongoing  Note:   Current treatment for UTI, ESBL  Goal: Complications related to the disease process, condition or treatment will be avoided or minimized  Description  Complications related to the disease process, condition or treatment will be avoided or minimized  Outcome: Ongoing   Care plan reviewed with patient and son. Patient and son verbalize understanding of the plan of care and contribute to goal setting.

## 2019-12-30 NOTE — PROGRESS NOTES
[address] [place] [address]  INPATIENT PHYSICAL THERAPY  DAILY NOTE  [facility] CCU-STEPDOWN 3B - 3B-31/031-A  Time In: 6921  Time Out: 1405  Timed Code Treatment Minutes: 10 Minutes  Minutes: 10          Date: 2019  Patient Name: Kat Perez,  Gender:  male        MRN: [id number]  : 10/6/1928  (80 y.o.)     Referring Practitioner: Edelmiro Leyden, MD  Diagnosis: SHAYAN  Additional Pertinent Hx: This is a very pleasant 80 y.o. male who was admitted to the hospital with a chief complaints of hematuria. He is from NH. Recently treated for C.difficle induced diarrhea and hematuria related to plavix and ASA. I was asked to see due to abnormal urine and hx of C.difficle. He is confused. He is on continuous bladder irrigation. His medical record was reviewed and discussed with the nursing staff. He has no fever or chills. He has pyuria and still has loose stool related to C. difficile. He has history of coronary artery disease congestive heart failure hypertension and COPD. Prior Level of Function:  Type of Home: Facility([facility])        Ambulation Assistance: Needs assistance  Transfer Assistance: Needs assistance  Additional Comments: Pt unable to give PLOF due to confusion, per chart, was at [facility]    Restrictions/Precautions:  Restrictions/Precautions: Fall Risk    SUBJECTIVE: Pt adamantly declined mobility in any form. Agreed to therex with mod encouragement . PAIN: no complaints     OBJECTIVE:    Exercise:  Patient was guided in 10-20 reps of exercise to both lower extremities. Ankle pumps, Glut sets, Quad sets, Heelslides, Hip abduction/adduction and Straight leg raises. Exercises were completed for increased independence with functional mobility.     Functional Outcome Measures: Completed  AM-PAC Inpatient Mobility without Stair Climbing Raw Score : 14  AM-PAC Inpatient without Stair Climbing T-Scale Score : 40.85    ASSESSMENT:  Assessment: Patient progressing toward established goals. Activity Tolerance:  Patient tolerance of  treatment: fair. Equipment Recommendations:Equipment Needed: No  Discharge Recommendations:  Subacute/Skilled Nursing Facility    Plan: Times per week: 3-5 X GM  Current Treatment Recommendations: Strengthening, Functional Mobility Training, Transfer Training, Balance Training, Gait Training, Safety Education & Training, Patient/Caregiver Education & Training    Patient Education  Patient Education: Home Exercise Program    Goals:  Patient goals : does not state  Short term goals  Time Frame for Short term goals: by discharge  Short term goal 1: Pt to transfer supine <--> sit SBA to enable pt to get in/out of bed. Short term goal 2: Pt to transfer sit <--> stand SBA for increased functional mobility. Short term goal 3: Pt to ambulate >25 feet with RW CGA to enable pt to amb to the bathroom. Long term goals  Time Frame for Long term goals : NA due to short length of stay. Following session, patient left in safe position with all fall risk precautions in place.

## 2019-12-30 NOTE — PROGRESS NOTES
[address]  OCCUPATIONAL THERAPY MISSED TREATMENT NOTE  [facility] CCU-STEPDOWN 3B  3B-31/031-A      Date: 2019  Patient Name: Kandace Gutiérrez        CSN: [id number]   : 10/6/1928  (80 y.o.)  Gender: male   Referring Practitioner: Jarocho Vargas MD            REASON FOR MISSED TREATMENT: Patient Refused X2 occasions despite max encouragement and education re: importance of participation. Pt ignoring therapist. Pj Urrutia later in AM and pt again refused stating \"no therapy today. \" When asked if pt would participate in therapy while admitted here he stated \"I'm not sure. \" Will check back as able.

## 2019-12-31 LAB
ANISOCYTOSIS: PRESENT
BASOPHILS # BLD: 0.5 %
BASOPHILS ABSOLUTE: 0.1 THOU/MM3 (ref 0–0.1)
EOSINOPHIL # BLD: 6.5 %
EOSINOPHILS ABSOLUTE: 0.9 THOU/MM3 (ref 0–0.4)
ERYTHROCYTE [DISTWIDTH] IN BLOOD BY AUTOMATED COUNT: 19.8 % (ref 11.5–14.5)
ERYTHROCYTE [DISTWIDTH] IN BLOOD BY AUTOMATED COUNT: 65.1 FL (ref 35–45)
GLUCOSE BLD-MCNC: 134 MG/DL (ref 70–108)
GLUCOSE BLD-MCNC: 174 MG/DL (ref 70–108)
GLUCOSE BLD-MCNC: 177 MG/DL (ref 70–108)
GLUCOSE BLD-MCNC: 224 MG/DL (ref 70–108)
HCT VFR BLD CALC: 31.4 % (ref 42–52)
HEMOGLOBIN: 9.5 GM/DL (ref 14–18)
IMMATURE GRANS (ABS): 0.43 THOU/MM3 (ref 0–0.07)
IMMATURE GRANULOCYTES: 3.1 %
LYMPHOCYTES # BLD: 13.4 %
LYMPHOCYTES ABSOLUTE: 1.8 THOU/MM3 (ref 1–4.8)
MCH RBC QN AUTO: 27.4 PG (ref 26–33)
MCHC RBC AUTO-ENTMCNC: 30.3 GM/DL (ref 32.2–35.5)
MCV RBC AUTO: 90.5 FL (ref 80–94)
MONOCYTES # BLD: 10.1 %
MONOCYTES ABSOLUTE: 1.4 THOU/MM3 (ref 0.4–1.3)
NUCLEATED RED BLOOD CELLS: 0 /100 WBC
PLATELET # BLD: 286 THOU/MM3 (ref 130–400)
PMV BLD AUTO: 11.1 FL (ref 9.4–12.4)
RBC # BLD: 3.47 MILL/MM3 (ref 4.7–6.1)
SEG NEUTROPHILS: 66.4 %
SEGMENTED NEUTROPHILS ABSOLUTE COUNT: 9.2 THOU/MM3 (ref 1.8–7.7)
WBC # BLD: 13.8 THOU/MM3 (ref 4.8–10.8)

## 2019-12-31 PROCEDURE — 97167 OT EVAL HIGH COMPLEX 60 MIN: CPT

## 2019-12-31 PROCEDURE — 96361 HYDRATE IV INFUSION ADD-ON: CPT

## 2019-12-31 PROCEDURE — 1200000000 HC SEMI PRIVATE

## 2019-12-31 PROCEDURE — 97535 SELF CARE MNGMENT TRAINING: CPT

## 2019-12-31 PROCEDURE — 2140000000 HC CCU INTERMEDIATE R&B

## 2019-12-31 PROCEDURE — 2709999900 HC NON-CHARGEABLE SUPPLY

## 2019-12-31 PROCEDURE — 2580000003 HC RX 258: Performed by: INTERNAL MEDICINE

## 2019-12-31 PROCEDURE — G0378 HOSPITAL OBSERVATION PER HR: HCPCS

## 2019-12-31 PROCEDURE — 51700 IRRIGATION OF BLADDER: CPT

## 2019-12-31 PROCEDURE — 94760 N-INVAS EAR/PLS OXIMETRY 1: CPT

## 2019-12-31 PROCEDURE — 85025 COMPLETE CBC W/AUTO DIFF WBC: CPT

## 2019-12-31 PROCEDURE — 36415 COLL VENOUS BLD VENIPUNCTURE: CPT

## 2019-12-31 PROCEDURE — 6370000000 HC RX 637 (ALT 250 FOR IP): Performed by: INTERNAL MEDICINE

## 2019-12-31 PROCEDURE — 82948 REAGENT STRIP/BLOOD GLUCOSE: CPT

## 2019-12-31 PROCEDURE — 97530 THERAPEUTIC ACTIVITIES: CPT

## 2019-12-31 RX ADMIN — INSULIN LISPRO 4 UNITS: 100 INJECTION, SOLUTION INTRAVENOUS; SUBCUTANEOUS at 17:07

## 2019-12-31 RX ADMIN — Medication 125 MG: at 10:06

## 2019-12-31 RX ADMIN — Medication 125 MG: at 02:40

## 2019-12-31 RX ADMIN — Medication 1 CAPSULE: at 10:05

## 2019-12-31 RX ADMIN — INSULIN GLARGINE 17 UNITS: 100 INJECTION, SOLUTION SUBCUTANEOUS at 10:06

## 2019-12-31 RX ADMIN — QUETIAPINE FUMARATE 25 MG: 25 TABLET ORAL at 20:10

## 2019-12-31 RX ADMIN — LEVOTHYROXINE SODIUM 75 MCG: 75 TABLET ORAL at 06:46

## 2019-12-31 RX ADMIN — SODIUM CHLORIDE, PRESERVATIVE FREE 10 ML: 5 INJECTION INTRAVENOUS at 20:10

## 2019-12-31 RX ADMIN — INSULIN LISPRO 2 UNITS: 100 INJECTION, SOLUTION INTRAVENOUS; SUBCUTANEOUS at 13:48

## 2019-12-31 RX ADMIN — METOPROLOL SUCCINATE 25 MG: 25 TABLET, FILM COATED, EXTENDED RELEASE ORAL at 10:06

## 2019-12-31 RX ADMIN — INSULIN LISPRO 1 UNITS: 100 INJECTION, SOLUTION INTRAVENOUS; SUBCUTANEOUS at 20:10

## 2019-12-31 RX ADMIN — Medication 125 MG: at 20:10

## 2019-12-31 RX ADMIN — Medication 1 CAPSULE: at 13:48

## 2019-12-31 RX ADMIN — Medication 1 CAPSULE: at 20:10

## 2019-12-31 RX ADMIN — Medication 125 MG: at 13:49

## 2019-12-31 RX ADMIN — ATORVASTATIN CALCIUM 40 MG: 40 TABLET, FILM COATED ORAL at 20:10

## 2019-12-31 RX ADMIN — QUETIAPINE FUMARATE 25 MG: 25 TABLET ORAL at 10:06

## 2019-12-31 ASSESSMENT — PAIN SCALES - GENERAL
PAINLEVEL_OUTOF10: 0

## 2019-12-31 NOTE — PROGRESS NOTES
[facility]  INPATIENT PHYSICAL THERAPY  DAILY NOTE  [facility] CCU-STEPDOWN 3B - 3B-31/031-A    Time In: 1100  Time Out: 1118  Timed Code Treatment Minutes: 18 Minutes  Minutes: 18          Date: 2019  Patient Name: Valentín Monroe,  Gender:  male        MRN: [id number]  : 10/6/1928  (80 y.o.)     Referring Practitioner: Weston Hicks MD  Diagnosis: SHAYAN  Additional Pertinent Hx: This is a very pleasant 80 y.o. male who was admitted to the hospital with a chief complaints of hematuria. He is from [place]. Recently treated for C.difficle induced diarrhea and hematuria related to plavix and ASA. I was asked to see due to abnormal urine and hx of C.difficle. He is confused. He is on continuous bladder irrigation. His medical record was reviewed and discussed with the nursing staff. He has no fever or chills. He has pyuria and still has loose stool related to C. difficile. He has history of coronary artery disease congestive heart failure hypertension and COPD. Prior Level of Function:  Type of Home: Facility([facility])        Ambulation Assistance: Needs assistance  Transfer Assistance: Needs assistance  Additional Comments: Pt unable to give PLOF due to confusion, per chart, was at [facility]    Restrictions/Precautions:  Restrictions/Precautions: Fall Risk    SUBJECTIVE: RN approved session. Pt in bed upon arrival with son present and agrees to therapy with min encouragement pt did refuse furhter ambulation despite encouragement    PAIN: no c/o pain    OBJECTIVE:  Bed Mobility:  Rolling to Left: Minimal Assistance   Supine to Sit: Moderate Assistance  Scooting: Minimal Assistance    Transfers:  Sit to Stand: Minimal Assistance  Stand to Sit:Contact Guard Assistance, Minimal Assistance    Ambulation:  Contact Guard Assistance  Distance: 4ft fwd and retro  Surface: Level Tile  Device:Rolling Walker  Gait Deviations:   Forward Flexed Posture, Slow Lina, Decreased Step Length Bilaterally, Decreased Gait Speed, Decreased Heel Strike Bilaterally, Wide Base of Support and Unsteady Gait  Refused further distance      Exercise:  Patient was guided in 1 set(s) 10 reps of exercise to both lower extremities. Ankle pumps, Glut sets, Quad sets, Heelslides, Hip abduction/adduction and Straight leg raises. Exercises were completed for increased independence with functional mobility. Functional Outcome Measures: Completed  AM-PAC Inpatient Mobility without Stair Climbing Raw Score : 15  AM-PAC Inpatient without Stair Climbing T-Scale Score : 43.03    ASSESSMENT:  Assessment: Patient progressing toward established goals. Activity Tolerance:  Patient tolerance of  treatment: good. Pt tolerated session well. Pt demos decreased strength, endurance, balance, and independence with functional mobility. Pt will benefit from cont PT at this time     Equipment Recommendations:Equipment Needed: No  Discharge Recommendations:  Subacute/Skilled Nursing Facility    Plan: Times per week: 3-5 X GM  Current Treatment Recommendations: Strengthening, Functional Mobility Training, Transfer Training, Balance Training, Gait Training, Safety Education & Training, Patient/Caregiver Education & Training    Patient Education  Patient Education: Plan of Care, Bed Mobility, Transfers, Gait, Verbal Exercise Instruction    Goals:  Patient goals : does not state  Short term goals  Time Frame for Short term goals: by discharge  Short term goal 1: Pt to transfer supine <--> sit SBA to enable pt to get in/out of bed. Short term goal 2: Pt to transfer sit <--> stand SBA for increased functional mobility. Short term goal 3: Pt to ambulate >25 feet with RW CGA to enable pt to amb to the bathroom. Long term goals  Time Frame for Long term goals : NA due to short length of stay. Following session, patient left in safe position with all fall risk precautions in place.

## 2019-12-31 NOTE — PROGRESS NOTES
available)  ADL Assistance: Needs assistance  Homemaking Assistance: Needs assistance  Homemaking Responsibilities: No  Ambulation Assistance: Needs assistance  Transfer Assistance: Needs assistance    Active : No  Patient's  Info: Daughter transports  Occupation: Retired  Leisure & Hobbies: Watching sports on TV  Additional Comments: Pt unable to give PLOF due to confusion, per chart, was at [facility]    Cognition/Orientation:  Overall Orientation Status: Within Normal Limits  Overall Cognitive Status: Exceptions  Cognition Comment: Pt has little insight into his condition. His recent level of function was also questionable. Pt indicated that he was living at home and had not been at [facility] anytime recently. Cues needed for problem solving new situations. ADL's:  Feeding: Setup(pt fed himself with help only for removing lids from his side dishes and opening his soda)  Grooming: Minimal assistance(Washing his face and also shaving with regular razor and cream- assist for rinsing off blade)  Vision - Basic Assessment  Prior Vision: Wears glasses all the time    Functional Mobility:  Bed mobility  Supine to Sit: Unable to assess  Sit to Supine: Unable to assess    Functional Mobility  Functional Mobility Comments: Not able to demonstrate     Balance:  Balance  Sitting Balance: Unable to assess(comment)  Standing Balance: Unable to assess(comment)  Standing Balance  Time: Not applicable    Transfers:  Sit to stand: Unable to assess  Stand to sit: Unable to assess  Transfer Comments: Pt refused despite maximal encouragement. Upper Extremity Assessment:Hand Dominance: Right  LUE General PROM: Limited shoulder flexion and abduction and external rotation secondary to OA. 0-100 degrees flexion and abduction. LUE General AROM: 0-80 degrees flexion and abduction.   Left Hand PROM: WNL  Left Hand AROM: WNL  RUE General PROM: Limited shoulder flexion and abduction and external rotation secondary to

## 2019-12-31 NOTE — PROGRESS NOTES
IM Progress Note  Dr. Олег Guzman  12/31/2019 9:36 AM      Patient name Talia Beyer  [id number]  PCP: Rachana Patel MD  Admit Date: 12/27/2019  Acct No. [de-identified]    Subjective: Interval History:   Still with poor PO intake    Diet: DIET CARB CONTROL;    I/O last 3 completed shifts: In: 2413.2 [P.O.:620; I.V.:1793.2]  Out: 1495 [Urine:670; Stool:825]  No intake/output data recorded. Admission weight: 204 lb (92.5 kg) as of 12/27/2019  6:15 AM  Wt Readings from Last 3 Encounters:   12/31/19 186 lb 11.2 oz (84.7 kg)   12/14/19 204 lb (92.5 kg)   12/08/19 204 lb (92.5 kg)     Body mass index is 26.79 kg/m². ROS   CVS;  no cp or palpitation  Resp: no SOB or cough  Neuro:  No numbness or weakness or dizziness  Abd: no nausea or vomiting or abd pain      Medications:   Scheduled Meds:   atorvastatin  40 mg Oral Nightly    insulin glargine  17 Units Subcutaneous QAM    lactobacillus  1 capsule Oral TID    levothyroxine  75 mcg Oral QAM    metoprolol succinate  25 mg Oral Daily    QUEtiapine  25 mg Oral BID    vancomycin  125 mg Oral Q6H    sodium chloride flush  10 mL Intravenous 2 times per day    insulin lispro  0-12 Units Subcutaneous TID WC    insulin lispro  0-6 Units Subcutaneous Nightly     Continuous Infusions:   dextrose         Labs :     CBC:   Recent Labs     12/30/19  0447 12/31/19  0510   WBC 14.5* 13.8*   HGB 9.4* 9.5*    286     BMP:    Recent Labs     12/30/19  0447      K 4.3   *   CO2 15*   BUN 26*   CREATININE 1.0   GLUCOSE 150*     Hepatic: No results for input(s): AST, ALT, ALB, BILITOT, ALKPHOS in the last 72 hours. Troponin: No results for input(s): TROPONINI in the last 72 hours. BNP: No results for input(s): BNP in the last 72 hours. Lipids: No results for input(s): CHOL, HDL in the last 72 hours. Invalid input(s): LDLCALCU  INR: No results for input(s): INR in the last 72 hours.     Radiology    Objective:   Vitals: /71 Pulse 58   Temp 98.8 °F (37.1 °C) (Axillary)   Resp 18   Ht 5' 10\" (1.778 m)   Wt 186 lb 11.2 oz (84.7 kg)   SpO2 95%   BMI 26.79 kg/m²   HEENT: Head:pupils react  Neck: supple  Lungs: clear to auscultation  Heart: regular rate and rhythm   Abdomen: soft BS heard + colostomy NG NT  Extremities: warm no edema  Neurologic:  Alert, oriented to person and place    Impression:   :   1.  Hematuria, status post continuous bladder irrigation with UTI with ESBL  2. Recent C. diff. 3.  Insulin-requiring diabetes mellitus. 4.  Chronic elevation of troponin. 5.  Acute on chronic kidney disease. 6.  Coronary artery disease, status post coronary artery bypass graft. 7.  Previous history of prostate cancer. 8.  History of hypertension. 9.  History of hyperlipidemia. 10.  Hypothyroidism. 11.  Acid reflux. 12.  Chronic congestive heart failure with systolic dysfunction. 13.  Ulcerative colitis s/p colostomy  14 Anemia with acute blood loss   15 leucocytosis    Plan:     Will check with urology regarding the bladder irrigation he is still receiving  Stop IVF   Hospice to see today  Start pre Gino Hashimoto, MD

## 2019-12-31 NOTE — PROGRESS NOTES
Hospice referral completed with son Ashley Vieira. Patient daughter Nya MAR unable to attend as she is ill. Hospice concepts, philosophies, and services explained. Ashley Come aware of services as his mother(pt wife of 70 yrs) passed away at home in Sept and his father-in-law had at facility. Son told nurse that Don Cruz has declined significantly since passing of wife and that not eating or drinking much. Discussed with him hospice care at facility with cost of room and board out of pocket vs skilled(pt has been working some with therapy). He told nurse that Dr. Jordan Trejo had discussed him going skilled and when/if becomes to painful or poor quality of days that can transition to hospice at that time. Told him that certainly have had people go skilled to work on strength if able and that hospice can be done at [facility] with cost of room and board out of pocket or transition patient home. Ashley Come told nurse that sister had difficult time with care with colostomy and catheter. Asked about care in home. Reviewed with him hospice comes and goes, may be able to visit daily to every other day depending on needs, but hospice teachs and around the clock care is provided by family or would need to higher caregiver. Voiced understanding. At this time plan to go skilled and then make decision when patient condition changes. [facility] hospice number provided and if patient in hospital on 01.02.2020 will stop to see if questions/concerns. 24 hr number provided for questions/concerns and should family wish for services. May pursue care with [facility] as they had for patient wife/mother's care.  Updated primary nurse Venkatesh Ramon of referral and message left for DAVID Alcantara

## 2020-01-01 LAB
BLOOD CULTURE, ROUTINE: NORMAL
BLOOD CULTURE, ROUTINE: NORMAL
GLUCOSE BLD-MCNC: 124 MG/DL (ref 70–108)
GLUCOSE BLD-MCNC: 188 MG/DL (ref 70–108)
GLUCOSE BLD-MCNC: 190 MG/DL (ref 70–108)
GLUCOSE BLD-MCNC: 223 MG/DL (ref 70–108)

## 2020-01-01 PROCEDURE — 2580000003 HC RX 258: Performed by: INTERNAL MEDICINE

## 2020-01-01 PROCEDURE — 82948 REAGENT STRIP/BLOOD GLUCOSE: CPT

## 2020-01-01 PROCEDURE — 6370000000 HC RX 637 (ALT 250 FOR IP): Performed by: INTERNAL MEDICINE

## 2020-01-01 PROCEDURE — 94760 N-INVAS EAR/PLS OXIMETRY 1: CPT

## 2020-01-01 PROCEDURE — 2709999900 HC NON-CHARGEABLE SUPPLY

## 2020-01-01 PROCEDURE — G0378 HOSPITAL OBSERVATION PER HR: HCPCS

## 2020-01-01 PROCEDURE — 1200000000 HC SEMI PRIVATE

## 2020-01-01 RX ADMIN — INSULIN GLARGINE 17 UNITS: 100 INJECTION, SOLUTION SUBCUTANEOUS at 09:23

## 2020-01-01 RX ADMIN — Medication 125 MG: at 03:05

## 2020-01-01 RX ADMIN — SODIUM CHLORIDE, PRESERVATIVE FREE 10 ML: 5 INJECTION INTRAVENOUS at 23:10

## 2020-01-01 RX ADMIN — Medication 1 CAPSULE: at 09:21

## 2020-01-01 RX ADMIN — Medication 1 CAPSULE: at 15:24

## 2020-01-01 RX ADMIN — QUETIAPINE FUMARATE 25 MG: 25 TABLET ORAL at 22:59

## 2020-01-01 RX ADMIN — Medication 125 MG: at 15:24

## 2020-01-01 RX ADMIN — SODIUM CHLORIDE, PRESERVATIVE FREE 10 ML: 5 INJECTION INTRAVENOUS at 09:26

## 2020-01-01 RX ADMIN — LEVOTHYROXINE SODIUM 75 MCG: 75 TABLET ORAL at 07:00

## 2020-01-01 RX ADMIN — QUETIAPINE FUMARATE 25 MG: 25 TABLET ORAL at 09:21

## 2020-01-01 RX ADMIN — Medication 125 MG: at 09:23

## 2020-01-01 RX ADMIN — METOPROLOL SUCCINATE 25 MG: 25 TABLET, FILM COATED, EXTENDED RELEASE ORAL at 09:21

## 2020-01-01 RX ADMIN — Medication 125 MG: at 22:59

## 2020-01-01 RX ADMIN — INSULIN LISPRO 2 UNITS: 100 INJECTION, SOLUTION INTRAVENOUS; SUBCUTANEOUS at 17:56

## 2020-01-01 RX ADMIN — ATORVASTATIN CALCIUM 40 MG: 40 TABLET, FILM COATED ORAL at 23:10

## 2020-01-01 RX ADMIN — Medication 1 CAPSULE: at 22:59

## 2020-01-01 ASSESSMENT — PAIN SCALES - GENERAL
PAINLEVEL_OUTOF10: 0

## 2020-01-01 NOTE — PLAN OF CARE
Problem: Falls - Risk of:  Goal: Will remain free from falls  Description  Will remain free from falls  Outcome: Ongoing   Pt remains free from falls during shift, all fall precautions in place. Bed in low position, alarm activated and appropriate use of call light. Problem: Risk for Impaired Skin Integrity  Goal: Tissue integrity - skin and mucous membranes  Description  Structural intactness and normal physiological function of skin and  mucous membranes. Outcome: Ongoing   No new skin issues identified during shift, will continue to monitor and turn patient q 2 hours. Problem: Discharge Planning:  Goal: Participates in care planning  Description  Participates in care planning  Outcome: Ongoing   Discharge date remains upclear [facility] Care plan reviewed with patient. Patient verbalizes understanding of the plan of care and contributes to goal setting.

## 2020-01-01 NOTE — PROGRESS NOTES
14.4 oz (82.5 kg)   SpO2 95%   BMI 26.10 kg/m²   HEENT: Head:pupils react  Neck: supple  Lungs: clear to auscultation  Heart: regular rate and rhythm   Abdomen: soft BS heard + colostomy NG NT  Extremities: warm no edema  Neurologic:  Alert, oriented to person     Impression:   :   1.  Hematuria, status post continuous bladder irrigation with UTI with ESBL  2. Recent C. diff. 3.  Insulin-requiring diabetes mellitus. 4.  Chronic elevation of troponin. 5.  Acute on chronic kidney disease. 6.  Coronary artery disease, status post coronary artery bypass graft. 7.  Previous history of prostate cancer. 8.  History of hypertension. 9.  History of hyperlipidemia. 10.  Hypothyroidism. 11.  Acid reflux. 12.  Chronic congestive heart failure with systolic dysfunction.   13.  Ulcerative colitis s/p colostomy  14 Anemia with acute blood loss   15 leucocytosis    Plan:    Cont bladder irrigation  F/u on BMP  Await pre cert  Cont PT OT    Joseph Lucero MD

## 2020-01-01 NOTE — FLOWSHEET NOTE
Bret Issa regarding patient going to [facility] today, they are holding his bed. Talked with case management and patient needs pre cert before going back to [facility] for skilled nursing.

## 2020-01-02 LAB
ANION GAP SERPL CALCULATED.3IONS-SCNC: 11 MEQ/L (ref 8–16)
BASOPHILS # BLD: 0.6 %
BASOPHILS ABSOLUTE: 0.1 THOU/MM3 (ref 0–0.1)
BUN BLDV-MCNC: 12 MG/DL (ref 7–22)
CALCIUM SERPL-MCNC: 8.8 MG/DL (ref 8.5–10.5)
CHLORIDE BLD-SCNC: 108 MEQ/L (ref 98–111)
CO2: 18 MEQ/L (ref 23–33)
CREAT SERPL-MCNC: 1 MG/DL (ref 0.4–1.2)
EOSINOPHIL # BLD: 6.3 %
EOSINOPHILS ABSOLUTE: 0.7 THOU/MM3 (ref 0–0.4)
ERYTHROCYTE [DISTWIDTH] IN BLOOD BY AUTOMATED COUNT: 19.4 % (ref 11.5–14.5)
ERYTHROCYTE [DISTWIDTH] IN BLOOD BY AUTOMATED COUNT: 61.7 FL (ref 35–45)
GFR SERPL CREATININE-BSD FRML MDRD: 70 ML/MIN/1.73M2
GLUCOSE BLD-MCNC: 149 MG/DL (ref 70–108)
GLUCOSE BLD-MCNC: 153 MG/DL (ref 70–108)
GLUCOSE BLD-MCNC: 160 MG/DL (ref 70–108)
GLUCOSE BLD-MCNC: 171 MG/DL (ref 70–108)
GLUCOSE BLD-MCNC: 204 MG/DL (ref 70–108)
GLUCOSE BLD-MCNC: 232 MG/DL (ref 70–108)
GLUCOSE BLD-MCNC: 235 MG/DL (ref 70–108)
HCT VFR BLD CALC: 32.3 % (ref 42–52)
HEMOGLOBIN: 10.1 GM/DL (ref 14–18)
IMMATURE GRANS (ABS): 0.2 THOU/MM3 (ref 0–0.07)
IMMATURE GRANULOCYTES: 1.7 %
LYMPHOCYTES # BLD: 12.7 %
LYMPHOCYTES ABSOLUTE: 1.5 THOU/MM3 (ref 1–4.8)
MCH RBC QN AUTO: 27.2 PG (ref 26–33)
MCHC RBC AUTO-ENTMCNC: 31.3 GM/DL (ref 32.2–35.5)
MCV RBC AUTO: 87.1 FL (ref 80–94)
MONOCYTES # BLD: 11 %
MONOCYTES ABSOLUTE: 1.3 THOU/MM3 (ref 0.4–1.3)
NUCLEATED RED BLOOD CELLS: 0 /100 WBC
PLATELET # BLD: 294 THOU/MM3 (ref 130–400)
PMV BLD AUTO: 10.2 FL (ref 9.4–12.4)
POTASSIUM SERPL-SCNC: 4.6 MEQ/L (ref 3.5–5.2)
RBC # BLD: 3.71 MILL/MM3 (ref 4.7–6.1)
SEG NEUTROPHILS: 67.7 %
SEGMENTED NEUTROPHILS ABSOLUTE COUNT: 8.1 THOU/MM3 (ref 1.8–7.7)
SODIUM BLD-SCNC: 137 MEQ/L (ref 135–145)
WBC # BLD: 11.9 THOU/MM3 (ref 4.8–10.8)

## 2020-01-02 PROCEDURE — 1200000000 HC SEMI PRIVATE

## 2020-01-02 PROCEDURE — 85025 COMPLETE CBC W/AUTO DIFF WBC: CPT

## 2020-01-02 PROCEDURE — 97530 THERAPEUTIC ACTIVITIES: CPT

## 2020-01-02 PROCEDURE — 80048 BASIC METABOLIC PNL TOTAL CA: CPT

## 2020-01-02 PROCEDURE — 82948 REAGENT STRIP/BLOOD GLUCOSE: CPT

## 2020-01-02 PROCEDURE — 36415 COLL VENOUS BLD VENIPUNCTURE: CPT

## 2020-01-02 PROCEDURE — 97535 SELF CARE MNGMENT TRAINING: CPT

## 2020-01-02 PROCEDURE — 2709999900 HC NON-CHARGEABLE SUPPLY

## 2020-01-02 PROCEDURE — G0378 HOSPITAL OBSERVATION PER HR: HCPCS

## 2020-01-02 PROCEDURE — 94761 N-INVAS EAR/PLS OXIMETRY MLT: CPT

## 2020-01-02 PROCEDURE — 2580000003 HC RX 258: Performed by: INTERNAL MEDICINE

## 2020-01-02 PROCEDURE — 6370000000 HC RX 637 (ALT 250 FOR IP): Performed by: INTERNAL MEDICINE

## 2020-01-02 PROCEDURE — 97110 THERAPEUTIC EXERCISES: CPT

## 2020-01-02 RX ADMIN — INSULIN GLARGINE 17 UNITS: 100 INJECTION, SOLUTION SUBCUTANEOUS at 08:24

## 2020-01-02 RX ADMIN — SODIUM CHLORIDE, PRESERVATIVE FREE 10 ML: 5 INJECTION INTRAVENOUS at 21:28

## 2020-01-02 RX ADMIN — Medication 125 MG: at 05:18

## 2020-01-02 RX ADMIN — QUETIAPINE FUMARATE 25 MG: 25 TABLET ORAL at 21:28

## 2020-01-02 RX ADMIN — LEVOTHYROXINE SODIUM 75 MCG: 75 TABLET ORAL at 05:18

## 2020-01-02 RX ADMIN — Medication 125 MG: at 10:57

## 2020-01-02 RX ADMIN — INSULIN LISPRO 2 UNITS: 100 INJECTION, SOLUTION INTRAVENOUS; SUBCUTANEOUS at 08:24

## 2020-01-02 RX ADMIN — Medication 1 CAPSULE: at 12:47

## 2020-01-02 RX ADMIN — Medication 125 MG: at 17:27

## 2020-01-02 RX ADMIN — SODIUM CHLORIDE, PRESERVATIVE FREE 10 ML: 5 INJECTION INTRAVENOUS at 08:04

## 2020-01-02 RX ADMIN — ATORVASTATIN CALCIUM 40 MG: 40 TABLET, FILM COATED ORAL at 21:28

## 2020-01-02 RX ADMIN — METOPROLOL SUCCINATE 25 MG: 25 TABLET, FILM COATED, EXTENDED RELEASE ORAL at 10:04

## 2020-01-02 RX ADMIN — Medication 1 CAPSULE: at 21:28

## 2020-01-02 RX ADMIN — Medication 1 CAPSULE: at 08:03

## 2020-01-02 RX ADMIN — QUETIAPINE FUMARATE 25 MG: 25 TABLET ORAL at 08:03

## 2020-01-02 RX ADMIN — INSULIN LISPRO 4 UNITS: 100 INJECTION, SOLUTION INTRAVENOUS; SUBCUTANEOUS at 12:42

## 2020-01-02 RX ADMIN — INSULIN LISPRO 2 UNITS: 100 INJECTION, SOLUTION INTRAVENOUS; SUBCUTANEOUS at 17:07

## 2020-01-02 ASSESSMENT — PAIN SCALES - GENERAL: PAINLEVEL_OUTOF10: 0

## 2020-01-02 NOTE — PROGRESS NOTES
2-3 small clots and one moderate clot present in urine. Urine is a brown jose miguel color. Patient catheter irrigated with 120 mL sterile water. 110 mL withdrawn with syringe. Small clot present upon withdrawal. Patient has had adequate output in correlation to input. Catheter draining well. Matos care preformed. Bleeding at catheter site.

## 2020-01-02 NOTE — PROGRESS NOTES
[address]  Occupational Therapy  Daily Note  Time:   Time In: 8376  Time Out: 1140  Timed Code Treatment Minutes: 25 Minutes  Minutes: 25          Date: 2020  Patient Name: Lissy Gill,   Gender: male      Room: [facility]006-A  MRN: [id number]  : 10/6/1928  (80 y.o.)  Referring Practitioner: Dr. Eriberto Mishra MD  Diagnosis: SHAYAN  Additional Pertinent Hx: 80 y.o. male  with a background history of recent C difficile colitis, was recently discharged following gross hematuria the had required continuous bladder irrigation for about 2-3 days. At the time he had his Plavix and aspirin held however in the last 4 days his Plavix was resumed and the hematuria re started again this morning. He denies dysuria, abdominal pain, flank pains or urgency. No fever or chills however was noted to have evidence of acute kidney injury and hyperkalemia along with markers a urinary tract infection and leukocytosis. Still with diarrhea    Restrictions/Precautions:  Restrictions/Precautions: Fall Risk      SUBJECTIVE: Patient supine in bed upon arrival     PAIN: denies  Patient noticed the whirte board saying [address] and asked the therapist about it. Patient reports he was never at [facility]. ADL:   Feeding: set up and handing the patient his cup with liquid. The patient with LUE over RUE was able to place cup back on tray table that was right next to the bed. Patient sitting up in bed with head of bed raised. Patient demonstrated slight coughing after Oral intake. RN staff notified. Grooming: Moderate Assistance, X 1 and with set-up. Mod A for wetting and drying hair. Patient using LUE for Hand over hand raising RUE for washing face and getting towards top of head. Patient unable to reach to back of head due to limited RUE ROM. LB dressing : MAX A for Donning B LE socks. BALANCE:  Sitting Balance:  Not Assessed at this time.       ADDITIONAL ACTIVITIES:  Education on ADLs  Patient required multiple commands due to being hard of Hearing. ASSESSMENT:     Activity Tolerance:  Patient tolerance of  treatment: fair. Discharge Recommendations: Continue to assess pending progress, Patient would benefit from continued therapy after discharge, ECF with OT  Equipment Recommendations: Other: Pt may benefit from using [facility] for lower body ADLs  Plan: Times per week: 3-5x  Specific instructions for Next Treatment: Functional mobility as able; ADLs and adaptive strategies; upper body gentle exercise  Current Treatment Recommendations: ROM, Endurance Training, Functional Mobility Training, Self-Care / ADL  Plan Comment: Pt would benefit from continued skilled OT services when medically stable and discharged from Acute. OT recommended when pt returns to [facility]. Patient Education  Patient Education: Plan of Care and ADL's    Goals  Short term goals  Time Frame for Short term goals: By discharge  Short term goal 1: Pt will demonstrate functional mobility with OTR to prepare for doing self care while out of bed. Short term goal 2: Pt will complete upper body bathing or dressing with no > than MIN A while using any adaptive strategies needed to increase his indepence with self care. Short term goal 3: pt will complete lower body dressing or bathing with MOD A with use of AE as needed to increase his independence with self care. Long term goals  Time Frame for Long term goals : None secondary to short estimated length of stay. Following session, patient left in safe position with all fall risk precautions in place.

## 2020-01-02 NOTE — DISCHARGE INSTR - COC
Diagnosis Code    Essential hypertension I10    Prostate CA ([facility] C61    Colon cancer ([facility] C18.9    Diabetes mellitus ([facility] E11.9    SHAYAN (acute kidney injury) ([facility] N17.9    Hypothyroidism E03.9    CAD (coronary artery disease) I25.10    Chest pain R07.9    Hyponatremia E87.1    Anemia D64.9    Dyspnea R06.00    Type 2 diabetes mellitus with stage 3 chronic kidney disease, with long-term current use of insulin ([facility]) E11.22, N18.3, Z79.4    S/P CABG (coronary artery bypass graft) Z95.1    CKD (chronic kidney disease) N18.9    Hyperlipidemia E78.5    Class 1 obesity due to excess calories with serious comorbidity and body mass index (BMI) of 30.0 to 30.9 in adult E66.09, Z68.30    Syncope R55    Pyelonephritis N12    SIRS (systemic inflammatory response syndrome) ([facility]) R65.10    Chronic hyponatremia E87.1    SHAYAN (acute kidney injury) ([facility]) N17.9    Colostomy status ([facility]) Z93.3    Panlobular emphysema ([facility]) J43.1    Generalized weakness R53.1    Traumatic hematoma of scalp S00. 03XA    Scalp abrasion S00. 01XA    Subdural hematoma ([facility]) S06.5X9A    CKD (chronic kidney disease) stage 3, GFR 30-59 ml/min ([facility]) N18.3    Acute metabolic encephalopathy U38.70    Physical deconditioning R53.81    SIADH (syndrome of inappropriate ADH production) ([facility]) E22.2    Cerebrovascular disease I67.9    Agitation R45.1    Benign essential hypertension I10    Colostomy care ([facility] Z43.3    Peristomal skin complication T82.1    CHF (congestive heart failure), NYHA class I, acute on chronic, combined ([facility]) I50.43    Ventricular arrhythmia I49.9    Hematuria R31.9    UTI (urinary tract infection) N39.0    Gross hematuria R31.0    Hyperkalemia E87.5       Isolation/Infection:   Isolation          Contact  C Diff Contact        Patient Infection Status     Infection Onset Added Last Indicated Last Indicated By Review Planned Expiration Resolved Resolved By    ESBL (Extended Spectrum Beta Lactamase) 12/27/19 12/30/19 12/27/19 Culture, Reflexed, Urine        Kleb Pneumoniae in urine 12/2019    MRSA 11/06/19 11/10/19 11/06/19 MRSA SCREENING CULTURE ONLY        Rectal 11/2019    Resolved    MDRO (multi-drug resistant organism) 12/27/19 12/30/19 12/27/19 Culture, Reflexed, Urine   12/30/19 Delmy Side HORACE Buenrostro    VRE  12/20/19 12/20/19 Delmy Side HORACE Buenrostro   12/20/19 Michelle Acosta RN    C-diff Rule Out  12/14/19 12/14/19 Clostridium Difficile Toxin/Antigen (Ordered)   12/15/19 Rule-Out Order Resulted          Nurse Assessment:  Last Vital Signs: BP (!) 113/56   Pulse 60   Temp 97.6 °F (36.4 °C) (Axillary)   Resp 18   Ht 5' 10\" (1.778 m)   Wt 181 lb 14.4 oz (82.5 kg)   SpO2 93%   BMI 26.10 kg/m²     Last documented pain score (0-10 scale): Pain Level: 0  Last Weight:   Wt Readings from Last 1 Encounters:   01/01/20 181 lb 14.4 oz (82.5 kg)     Mental Status:  Alert with intermittent confusion. Cantankerous. IV Access:  None    Nursing Mobility/ADLs:  Walking   Dependent  Transfer  Dependent  Bathing  Dependent  Dressing  Dependent  Toileting  Dependent  Feeding  Independent  Med Admin  Assisted  Med Delivery   whole    Wound Care Documentation and Therapy:  Wound 11/25/19 Buttocks Mid red, possible bruising (Active)   Wound Pressure Unstageable 12/29/2019  3:17 AM   Dressing Status Clean;Dry; Intact 12/31/2019  4:32 PM   Dressing/Treatment Open to air;Moisture barrier 12/31/2019  4:32 PM   Wound Assessment Clean;Dry; Intact; Purple 1/2/2020  3:35 AM   Drainage Amount None 1/2/2020  3:35 AM   Drainage Description Sanguinous 12/31/2019  4:32 PM   Odor None 1/2/2020  3:35 AM   Number of days: 37        Elimination:  Continence:   · Bowel: Colostomy to LLQ  · Bladder: Matos catheter (inserted on 12/23/2019)  Urinary Catheter: Indication for Use of Catheter: Urology/Urologist seeing this patient or inserted indwelling catheter and Acute urinary retention/obstruction Facility (if applicable)   · Name:  · Address:  · Dialysis Schedule:  · Phone:  · Fax:    / signature: Electronically signed by SAFIA Bishop on 1/2/20 at 7:36 AM    PHYSICIAN SECTION    Prognosis: {Prognosis:6047526172}    Condition at Discharge: 50Radha Oliva Patient Condition:[id number]}    Rehab Potential (if transferring to Rehab): {Prognosis:6248257619}    Recommended Labs or Other Treatments After Discharge: ***    Physician Certification: I certify the above information and transfer of Jerica Guillermo  is necessary for the continuing treatment of the diagnosis listed and that he requires Kleber Bruce for greater 30 days.      Update Admission H&P: No change in H&P    PHYSICIAN SIGNATURE:  Electronically signed by Baron Shahid MD on 1/3/2020 at 10:04 AM   Electronically signed by Baron Shahid MD on 1/6/2020 at 9:49 AM   Electronically signed by Baron Shahid MD on 1/7/2020 at 11:15 AM

## 2020-01-02 NOTE — PROGRESS NOTES
[address] [address]  INPATIENT PHYSICAL THERAPY  DAILY NOTE  [facility] ON[facility] MED 5K - 5K-06/006-A    Time In: 6066  Time Out: 1004  Timed Code Treatment Minutes: 23 Minutes  Minutes: 23          Date: 2020  Patient Name: Katlyn Cr,  Gender:  male        MRN: [id number]  : 10/6/1928  (80 y.o.)     Referring Practitioner: Homero Dailey MD  Diagnosis: SHAYAN  Additional Pertinent Hx: This is a very pleasant 80 y.o. male who was admitted to the hospital with a chief complaints of hematuria. He is from [facility]. Recently treated for C.difficle induced diarrhea and hematuria related to plavix and ASA. I was asked to see due to abnormal urine and hx of C.difficle. He is confused. He is on continuous bladder irrigation. His medical record was reviewed and discussed with the nursing staff. He has no fever or chills. He has pyuria and still has loose stool related to C. difficile. He has history of coronary artery disease congestive heart failure hypertension and COPD. Prior Level of Function:  Lives With: Family  Type of Home: Facility([facility])  Home Layout: One level  Home Access: Level entry   Bathroom Shower/Tub: Walk-in shower  Bathroom Equipment: Grab bars in shower, Grab bars around toilet  Bathroom Accessibility: [address] Help From: Family, Other (comment)(staff as available)  ADL Assistance: Needs assistance  Homemaking Assistance: Needs assistance  Homemaking Responsibilities: No  Ambulation Assistance: Needs assistance  Transfer Assistance: Needs assistance  Active : No  Additional Comments: Pt unable to give PLOF due to confusion, per chart, was at [facility]    Restrictions/Precautions:  Restrictions/Precautions: Fall Risk    SUBJECTIVE: RN approved session. Pt in bed upon arrival, first attempt pt eating breakfast, second attempt pt agrees with encouragement throughout session. Pt agitated and not wanting to participate.  Pt very particular about his blankets and pillows \"or I'm not doing it\" pt with poor motivation and easily agitated. PAIN: no c/o pain/10    OBJECTIVE:  Bed Mobility:  Rolling to Left: Minimal Assistance, with head of bed raised, with verbal cues , with increased time for completion   Supine to Sit: Moderate Assistance, with head of bed raised, with verbal cues , with increased time for completion  Sit to Supine: Stand By Assistance   Scooting: Minimal Assistance, with verbal cues , with increased time for completion    Transfers:  Sit to Stand: Minimal Assistance  [facility], impulsive to sit to EOB, decreased eccentric control    Ambulation:  Contact Guard Assistance  Distance: 2 steps to [facility]  Surface: Level Tile  Device:Rolling Walker  Gait Deviations: Forward Flexed Posture, Slow Lina, Decreased Step Length Bilaterally, Decreased Gait Speed, Unsteady Gait and refusign further distance, shaky    Balance:  Static Sitting Balance:  Stand By Assistance  EOB ~4mins waiting for pt to initiate mobility    Exercise:  Patient was guided in 1 set(s) 10 reps of exercise to both lower extremities. Ankle pumps, Glut sets, Quad sets, Heelslides, Hip abduction/adduction and Straight leg raises. Exercises were completed for increased independence with functional mobility. Functional Outcome Measures: Completed  AM-PAC Inpatient Mobility without Stair Climbing Raw Score : 15  AM-PAC Inpatient without Stair Climbing T-Scale Score : 43.03    ASSESSMENT:  Assessment: Patient progressing toward established goals. Activity Tolerance:  Patient tolerance of  treatment: fair. Pt tolerated session well however demos decreased motivation and somewhat cooperative. Pt will benefit fromc otn PT at this time to improve overall mobility and to decrease the risk for falls.      Equipment Recommendations:Equipment Needed: No  Discharge Recommendations:  Subacute/Skilled Nursing Facility    Plan: Times per week: 3-5 X GM  Current Treatment Recommendations: Strengthening, Functional Mobility Training, Transfer Training, Balance Training, Gait Training, Safety Education & Training, Patient/Caregiver Education & Training    Patient Education  Patient Education: Plan of Care, [employer] Mobility, Transfers, Gait, Verbal Exercise Instruction    Goals:  Patient goals : does not state  Short term goals  Time Frame for Short term goals: by discharge  Short term goal 1: Pt to transfer supine <--> sit SBA to enable pt to get in/out of bed. Short term goal 2: Pt to transfer sit <--> stand SBA for increased functional mobility. Short term goal 3: Pt to ambulate >25 feet with RW CGA to enable pt to amb to the bathroom. Long term goals  Time Frame for Long term goals : NA due to short length of stay. Following session, patient left in safe position with all fall risk precautions in place.

## 2020-01-02 NOTE — CARE COORDINATION
1/2/20, 10:28 AM    DISCHARGE PLANNING EVALUATION      SW spoke with Kathi with [facility], requested they initiate pre-cert. PT note is completed, SW left message with OT asking for pt to be seen for pre-cert. [facility] will start pre-cert once OT note is completed.

## 2020-01-02 NOTE — PROGRESS NOTES
Progress note: Infectious diseases    Patient - Ambar Hardinan,  Age - 80 y.o.    - 10/6/1928      Room Number - 5K-06/006-A   MRN -  [id number]   Acct # - [de-identified]  Date of Admission -  2019  6:15 AM    SUBJECTIVE:   No new issues  Matos has clear urine  OBJECTIVE   VITALS    height is 5' 10\" (1.778 m) and weight is 181 lb 14.4 oz (82.5 kg). His axillary temperature is 98.4 °F (36.9 °C). His blood pressure is 146/63 (abnormal) and his pulse is 53. His respiration is 18 and oxygen saturation is 96%. Wt Readings from Last 3 Encounters:   20 181 lb 14.4 oz (82.5 kg)   19 204 lb (92.5 kg)   19 204 lb (92.5 kg)       I/O (24 Hours)    Intake/Output Summary (Last 24 hours) at 2020 5997  Last data filed at 2020 0813  Gross per 24 hour   Intake 450 ml   Output 1960 ml   Net -1510 ml       General Appearance  Awake, alert, oriented,  not  In acute distress  HEENT - normocephalic, atraumatic, pale conjunctiva,  anicteric sclera  Neck - Supple, no mass  Lungs -  Bilateral good air entry, no rhonchi, no wheeze  Cardiovascular - Heart sounds are normal.  Regular rate and rhythm without murmur, gallop or rub.   Abdomen - soft, not distended, nontender,   Neurologic -awake and oriented  Skin - No bruising or bleeding  Extremities - No edema, no cyanosis, clubbing   Matos with clear urine  MEDICATIONS:      atorvastatin  40 mg Oral Nightly    insulin glargine  17 Units Subcutaneous QAM    lactobacillus  1 capsule Oral TID    levothyroxine  75 mcg Oral QAM    metoprolol succinate  25 mg Oral Daily    QUEtiapine  25 mg Oral BID    vancomycin  125 mg Oral Q6H    sodium chloride flush  10 mL Intravenous 2 times per day    insulin lispro  0-12 Units Subcutaneous TID WC    insulin lispro  0-6 Units Subcutaneous Nightly      dextrose       acetaminophen, sodium chloride flush, ondansetron, glucose, dextrose, glucagon (rDNA), dextrose      LABS:     CBC:   Recent Labs     12/31/19  0510   WBC 13.8*   HGB 9.5*       Glucose:  Recent Labs     01/01/20 2000 01/02/20  0821 01/02/20  0840   POCGLU 190* 153* 149*         Problem list of patient:     Patient Active Problem List   Diagnosis Code    Essential hypertension I10    Prostate CA ([facility] C61    Colon cancer ([facility] C18.9    Diabetes mellitus ([facility] E11.9    SHAYAN (acute kidney injury) ([facility] N17.9    Hypothyroidism E03.9    CAD (coronary artery disease) I25.10    Chest pain R07.9    Hyponatremia E87.1    Anemia D64.9    Dyspnea R06.00    Type 2 diabetes mellitus with stage 3 chronic kidney disease, with long-term current use of insulin ([facility]) E11.22, N18.3, Z79.4    S/P CABG (coronary artery bypass graft) Z95.1    CKD (chronic kidney disease) N18.9    Hyperlipidemia E78.5    Class 1 obesity due to excess calories with serious comorbidity and body mass index (BMI) of 30.0 to 30.9 in adult E66.09, Z68.30    Syncope R55    Pyelonephritis N12    SIRS (systemic inflammatory response syndrome) ([facility]) R65.10    Chronic hyponatremia E87.1    SHAYAN (acute kidney injury) ([facility]) N17.9    Colostomy status ([facility]) Z93.3    Panlobular emphysema ([facility]) J43.1    Generalized weakness R53.1    Traumatic hematoma of scalp S00. 03XA    Scalp abrasion S00. 01XA    Subdural hematoma ([facility] S06.5X9A    CKD (chronic kidney disease) stage 3, GFR 30-59 ml/min ([facility]) N18.3    Acute metabolic encephalopathy G12.04    Physical deconditioning R53.81    SIADH (syndrome of inappropriate ADH production) ([facility]) E22.2    Cerebrovascular disease I67.9    Agitation R45.1    Benign essential hypertension I10    [facility] Z43.3    Peristomal skin complication I84.8    CHF (congestive heart failure), NYHA class I, acute on chronic, combined ([facility]) I50.43    Ventricular arrhythmia I49.9    Hematuria R31.9    UTI (urinary tract infection) N39.0   Carlee Ravi

## 2020-01-02 NOTE — PLAN OF CARE
appropriate glucose levels will improve to within specified parameters  Description  Ability to maintain appropriate glucose levels will improve to within specified parameters  Outcome: Ongoing  Note:   Insulin per order. POC slightly elevated. Problem: Urinary Elimination:  Goal: Signs and symptoms of infection will decrease  Description  Signs and symptoms of infection will decrease  Outcome: Ongoing  Note:   Oral Vancomycin. Chronic Matos for prostate cancer. Admitted for UTI. Dark urine.

## 2020-01-02 NOTE — PROGRESS NOTES
auscultation  Heart: regular rate and rhythm   Abdomen: soft BS heard + colostomy NG NT  Extremities: warm no edema  Neurologic:  Alert, oriented to person     Impression:   :   1.  Hematuria, status post continuous bladder irrigation with UTI with ESBL  2. Recent C. diff. 3.  Insulin-requiring diabetes mellitus. 4.  Chronic elevation of troponin. 5.  Acute on chronic kidney disease. 6.  Coronary artery disease, status post coronary artery bypass graft. 7.  Previous history of prostate cancer. 8.  History of hypertension. 9.  History of hyperlipidemia. 10.  Hypothyroidism. 11.  Acid reflux. 12.  Chronic congestive heart failure with systolic dysfunction.   13.  Ulcerative colitis s/p colostomy  14 Anemia with acute blood loss   15 leucocytosis    Plan:    Discharge when pre cert done  Matos to continue per urology    Lakshmi Richmond MD

## 2020-01-03 LAB
GLUCOSE BLD-MCNC: 138 MG/DL (ref 70–108)
GLUCOSE BLD-MCNC: 216 MG/DL (ref 70–108)
GLUCOSE BLD-MCNC: 237 MG/DL (ref 70–108)

## 2020-01-03 PROCEDURE — 2580000003 HC RX 258: Performed by: INTERNAL MEDICINE

## 2020-01-03 PROCEDURE — 6370000000 HC RX 637 (ALT 250 FOR IP): Performed by: INTERNAL MEDICINE

## 2020-01-03 PROCEDURE — G0378 HOSPITAL OBSERVATION PER HR: HCPCS

## 2020-01-03 PROCEDURE — 82948 REAGENT STRIP/BLOOD GLUCOSE: CPT

## 2020-01-03 PROCEDURE — 1200000000 HC SEMI PRIVATE

## 2020-01-03 PROCEDURE — 2709999900 HC NON-CHARGEABLE SUPPLY

## 2020-01-03 RX ORDER — ASPIRIN 81 MG/1
81 TABLET, CHEWABLE ORAL DAILY
Qty: 30 TABLET | Refills: 3
Start: 2020-01-03

## 2020-01-03 RX ADMIN — Medication 1 CAPSULE: at 20:03

## 2020-01-03 RX ADMIN — INSULIN LISPRO 2 UNITS: 100 INJECTION, SOLUTION INTRAVENOUS; SUBCUTANEOUS at 21:29

## 2020-01-03 RX ADMIN — SODIUM CHLORIDE, PRESERVATIVE FREE 10 ML: 5 INJECTION INTRAVENOUS at 20:04

## 2020-01-03 RX ADMIN — SODIUM CHLORIDE, PRESERVATIVE FREE 10 ML: 5 INJECTION INTRAVENOUS at 09:14

## 2020-01-03 RX ADMIN — Medication 125 MG: at 18:25

## 2020-01-03 RX ADMIN — Medication 1 CAPSULE: at 09:13

## 2020-01-03 RX ADMIN — INSULIN GLARGINE 17 UNITS: 100 INJECTION, SOLUTION SUBCUTANEOUS at 09:14

## 2020-01-03 RX ADMIN — QUETIAPINE FUMARATE 25 MG: 25 TABLET ORAL at 09:13

## 2020-01-03 RX ADMIN — Medication 125 MG: at 05:53

## 2020-01-03 RX ADMIN — INSULIN LISPRO 4 UNITS: 100 INJECTION, SOLUTION INTRAVENOUS; SUBCUTANEOUS at 13:09

## 2020-01-03 RX ADMIN — QUETIAPINE FUMARATE 25 MG: 25 TABLET ORAL at 20:03

## 2020-01-03 RX ADMIN — METOPROLOL SUCCINATE 25 MG: 25 TABLET, FILM COATED, EXTENDED RELEASE ORAL at 09:13

## 2020-01-03 RX ADMIN — ATORVASTATIN CALCIUM 40 MG: 40 TABLET, FILM COATED ORAL at 20:03

## 2020-01-03 RX ADMIN — LEVOTHYROXINE SODIUM 75 MCG: 75 TABLET ORAL at 05:16

## 2020-01-03 RX ADMIN — Medication 125 MG: at 21:29

## 2020-01-03 ASSESSMENT — PAIN SCALES - GENERAL: PAINLEVEL_OUTOF10: 0

## 2020-01-03 ASSESSMENT — PAIN - FUNCTIONAL ASSESSMENT: PAIN_FUNCTIONAL_ASSESSMENT: ACTIVITIES ARE NOT PREVENTED

## 2020-01-03 ASSESSMENT — PAIN DESCRIPTION - PROGRESSION: CLINICAL_PROGRESSION: RESOLVED

## 2020-01-03 NOTE — PLAN OF CARE
Problem: Falls - Risk of:  Goal: Will remain free from falls  Description  Will remain free from falls  1/3/2020 1600 by Sandy Griffin RN  Outcome: Ongoing  Note:   Pt using call light appropriately to call for assistance with ambulation to the bathroom and to chair. Pt is also compliant with use of non-skid slippers. Pt reports understanding of fall prevention when discussed. Up with 1 assist, walker, and gait belt, and with steady gait. Tolerates working with PT and OT well. Problem: Risk for Impaired Skin Integrity  Goal: Tissue integrity - skin and mucous membranes  Description  Structural intactness and normal physiological function of skin and  mucous membranes. 1/3/2020 1600 by Sandy Griffin RN  Outcome: Ongoing  Note:   Patient has a colostomy in the LLQ. Patient's buttocks/coccyx is red, but blanchable. Patient turns well in bed, but often refuses to be turned. Problem: Discharge Planning:  Goal: Participates in care planning  Description  Participates in care planning  1/3/2020 1600 by Sandy Griffin RN  Outcome: Ongoing  Note:   Patient plans [facility] at discharge. Patient working with social work and case management on discharge planning. Problem: Bowel Function - Altered:  Goal: Bowel elimination is within specified parameters  Description  Bowel elimination is within specified parameters  1/3/2020 1600 by Sandy Griffin RN  Outcome: Ongoing  Note:   Patient has a colostomy. Stool is soft and brown. Patient denies any discomfort in abdomen. Problem: Mental Status - Impaired:  Goal: Mental status will be restored to baseline  Description  Mental status will be restored to baseline  1/3/2020 1600 by Sandy Griffin RN  Outcome: Ongoing  Note:   Patient is alert and oriented x 4 for the majority of the time. Patient gets confused at times, but is easy to reorient.       Problem: Serum Glucose Level - Abnormal:  Goal: Ability to maintain appropriate glucose levels will

## 2020-01-03 NOTE — PROGRESS NOTES
IM Progress Note  Dr. Debo Bundy  1/3/2020 10:08 AM      Patient name Jaci Blackmon  [id number]  PCP: Lakshmi Richmond MD  Admit Date: 12/27/2019  Acct No. [de-identified]    Subjective: Interval History:   Lying in bed  Did not require bladder irrigation    Diet: DIET CARB CONTROL;    I/O last 3 completed shifts: In: 500 [P.O.:500]  Out: 1280 [Urine:250; Stool:1030]  No intake/output data recorded. Admission weight: 204 lb (92.5 kg) as of 12/27/2019  6:15 AM  Wt Readings from Last 3 Encounters:   01/01/20 181 lb 14.4 oz (82.5 kg)   12/14/19 204 lb (92.5 kg)   12/08/19 204 lb (92.5 kg)     Body mass index is 26.1 kg/m². Medications:   Scheduled Meds:   atorvastatin  40 mg Oral Nightly    insulin glargine  17 Units Subcutaneous QAM    lactobacillus  1 capsule Oral TID    levothyroxine  75 mcg Oral QAM    metoprolol succinate  25 mg Oral Daily    QUEtiapine  25 mg Oral BID    vancomycin  125 mg Oral Q6H    sodium chloride flush  10 mL Intravenous 2 times per day    insulin lispro  0-12 Units Subcutaneous TID WC    insulin lispro  0-6 Units Subcutaneous Nightly     Continuous Infusions:   dextrose         Labs :     CBC:   Recent Labs     01/02/20  1419   WBC 11.9*   HGB 10.1*        BMP:    Recent Labs     01/02/20  1419      K 4.6      CO2 18*   BUN 12   CREATININE 1.0   GLUCOSE 235*     Hepatic: No results for input(s): AST, ALT, ALB, BILITOT, ALKPHOS in the last 72 hours. Troponin: No results for input(s): TROPONINI in the last 72 hours. BNP: No results for input(s): BNP in the last 72 hours. Lipids: No results for input(s): CHOL, HDL in the last 72 hours. Invalid input(s): LDLCALCU  INR: No results for input(s): INR in the last 72 hours.     Radiology    Objective:   Vitals: /73   Pulse 69   Temp 97.8 °F (36.6 °C) (Oral)   Resp 18   Ht 5' 10\" (1.778 m)   Wt 181 lb 14.4 oz (82.5 kg)   SpO2 98%   BMI 26.10 kg/m²   HEENT: Head:pupils react  Neck: supple  Lungs: clear to auscultation  Heart: regular rate and rhythm   Abdomen: soft BS heard + colostomy NG NT  Extremities: warm no edema  Neurologic:  Alert, oriented to person     Impression:   :   1.  Hematuria, status post continuous bladder irrigation with UTI with ESBL  2. Recent C. diff. 3.  Insulin-requiring diabetes mellitus. 4.  Chronic elevation of troponin. 5.  Acute on chronic kidney disease. 6.  Coronary artery disease, status post coronary artery bypass graft. 7.  Previous history of prostate cancer. 8.  History of hypertension. 9.  History of hyperlipidemia. 10.  Hypothyroidism. 11.  Acid reflux. 12.  Chronic congestive heart failure with systolic dysfunction.   13.  Ulcerative colitis s/p colostomy  14 Anemia with acute blood loss   15 leucocytosis    Plan:    Await pre cert  Resume ASA when ok with uorlogy and stop plavix   Cont Lantus current dose     Eros Mcwilliams MD

## 2020-01-04 LAB
GLUCOSE BLD-MCNC: 141 MG/DL (ref 70–108)
GLUCOSE BLD-MCNC: 169 MG/DL (ref 70–108)
GLUCOSE BLD-MCNC: 196 MG/DL (ref 70–108)
GLUCOSE BLD-MCNC: 248 MG/DL (ref 70–108)

## 2020-01-04 PROCEDURE — 2580000003 HC RX 258: Performed by: INTERNAL MEDICINE

## 2020-01-04 PROCEDURE — 82948 REAGENT STRIP/BLOOD GLUCOSE: CPT

## 2020-01-04 PROCEDURE — 1200000000 HC SEMI PRIVATE

## 2020-01-04 PROCEDURE — G0378 HOSPITAL OBSERVATION PER HR: HCPCS

## 2020-01-04 PROCEDURE — 94760 N-INVAS EAR/PLS OXIMETRY 1: CPT

## 2020-01-04 PROCEDURE — 6370000000 HC RX 637 (ALT 250 FOR IP): Performed by: INTERNAL MEDICINE

## 2020-01-04 RX ADMIN — SODIUM CHLORIDE, PRESERVATIVE FREE 10 ML: 5 INJECTION INTRAVENOUS at 22:01

## 2020-01-04 RX ADMIN — QUETIAPINE FUMARATE 25 MG: 25 TABLET ORAL at 10:04

## 2020-01-04 RX ADMIN — Medication 1 CAPSULE: at 22:01

## 2020-01-04 RX ADMIN — Medication 1 CAPSULE: at 10:04

## 2020-01-04 RX ADMIN — INSULIN LISPRO 2 UNITS: 100 INJECTION, SOLUTION INTRAVENOUS; SUBCUTANEOUS at 17:41

## 2020-01-04 RX ADMIN — METOPROLOL SUCCINATE 25 MG: 25 TABLET, FILM COATED, EXTENDED RELEASE ORAL at 10:04

## 2020-01-04 RX ADMIN — Medication 125 MG: at 22:01

## 2020-01-04 RX ADMIN — Medication 1 CAPSULE: at 13:13

## 2020-01-04 RX ADMIN — INSULIN LISPRO 2 UNITS: 100 INJECTION, SOLUTION INTRAVENOUS; SUBCUTANEOUS at 13:10

## 2020-01-04 RX ADMIN — INSULIN LISPRO 2 UNITS: 100 INJECTION, SOLUTION INTRAVENOUS; SUBCUTANEOUS at 22:01

## 2020-01-04 RX ADMIN — INSULIN GLARGINE 17 UNITS: 100 INJECTION, SOLUTION SUBCUTANEOUS at 09:50

## 2020-01-04 RX ADMIN — QUETIAPINE FUMARATE 25 MG: 25 TABLET ORAL at 22:01

## 2020-01-04 RX ADMIN — Medication 125 MG: at 07:00

## 2020-01-04 RX ADMIN — Medication 125 MG: at 11:34

## 2020-01-04 RX ADMIN — Medication 125 MG: at 17:38

## 2020-01-04 RX ADMIN — ATORVASTATIN CALCIUM 40 MG: 40 TABLET, FILM COATED ORAL at 22:01

## 2020-01-04 RX ADMIN — SODIUM CHLORIDE, PRESERVATIVE FREE 10 ML: 5 INJECTION INTRAVENOUS at 10:06

## 2020-01-04 RX ADMIN — LEVOTHYROXINE SODIUM 75 MCG: 75 TABLET ORAL at 05:16

## 2020-01-04 ASSESSMENT — PAIN SCALES - GENERAL
PAINLEVEL_OUTOF10: 0

## 2020-01-04 NOTE — PLAN OF CARE
disoriented to place and situation but is easily reoriented. Patient ebony he is returning home and nurse had to explain he is going to [facility] - patient does not think this is true. Problem: Serum Glucose Level - Abnormal:  Goal: Ability to maintain appropriate glucose levels will improve to within specified parameters  Description  Ability to maintain appropriate glucose levels will improve to within specified parameters  Outcome: Ongoing  Note:   Insulin per order. POC elevated before bed and coverage given. Problem: Urinary Elimination:  Goal: Signs and symptoms of infection will decrease  Description  Signs and symptoms of infection will decrease  Outcome: Ongoing  Note:   Oral Vancomycin. Chronic Matos for prostate cancer. Admitted for UTI and hematuria. Urine is free from clots and tea colored. Contact plus precautions kept in place. Frequent hand washing preformed. Care plan reviewed with patient and family. Patient and family verbalize understanding of the plan of care and contribute to goal setting.

## 2020-01-04 NOTE — PROGRESS NOTES
IM Progress Note  Dr. Cedeno Running  1/4/2020 2:26 PM      Patient name Jorge Cadet  [id number]  PCP: Ria Castelan MD  Admit Date: 12/27/2019  Acct No. [de-identified]    Subjective: Interval History:   Lying in bed  No new complaint    Diet: DIET CARB CONTROL;    I/O last 3 completed shifts: In: 300 [P.O.:300]  Out: 475 [Urine:325; Stool:150]  I/O this shift:  In: -   Out: 150 [Urine:150]        Admission weight: 204 lb (92.5 kg) as of 12/27/2019  6:15 AM  Wt Readings from Last 3 Encounters:   01/01/20 181 lb 14.4 oz (82.5 kg)   12/14/19 204 lb (92.5 kg)   12/08/19 204 lb (92.5 kg)     Body mass index is 26.1 kg/m². Medications:   Scheduled Meds:   atorvastatin  40 mg Oral Nightly    insulin glargine  17 Units Subcutaneous QAM    lactobacillus  1 capsule Oral TID    levothyroxine  75 mcg Oral QAM    metoprolol succinate  25 mg Oral Daily    QUEtiapine  25 mg Oral BID    vancomycin  125 mg Oral Q6H    sodium chloride flush  10 mL Intravenous 2 times per day    insulin lispro  0-12 Units Subcutaneous TID WC    insulin lispro  0-6 Units Subcutaneous Nightly     Continuous Infusions:   dextrose         Labs :     CBC:   Recent Labs     01/02/20  1419   WBC 11.9*   HGB 10.1*        BMP:    Recent Labs     01/02/20  1419      K 4.6      CO2 18*   BUN 12   CREATININE 1.0   GLUCOSE 235*     Hepatic: No results for input(s): AST, ALT, ALB, BILITOT, ALKPHOS in the last 72 hours. Troponin: No results for input(s): TROPONINI in the last 72 hours. BNP: No results for input(s): BNP in the last 72 hours. Lipids: No results for input(s): CHOL, HDL in the last 72 hours. Invalid input(s): LDLCALCU  INR: No results for input(s): INR in the last 72 hours.     Radiology    Objective:   Vitals: /60   Pulse 66   Temp 98.9 °F (37.2 °C) (Oral)   Resp 18   Ht 5' 10\" (1.778 m)   Wt 181 lb 14.4 oz (82.5 kg)   SpO2 95% Comment: No need for O2 per O2 protocol  BMI 26.10 kg/m² HEENT: Head:pupils react  Neck: supple  Lungs: clear to auscultation  Heart: regular rate and rhythm   Abdomen: soft BS heard + colostomy NG NT  Extremities: warm no edema  Neurologic:  Alert, oriented to person     Impression:   :   1.  Hematuria, status post continuous bladder irrigation with UTI with ESBL  2. Recent C. diff. 3.  Insulin-requiring diabetes mellitus. 4.  Chronic elevation of troponin. 5.  Acute on chronic kidney disease. 6.  Coronary artery disease, status post coronary artery bypass graft. 7.  Previous history of prostate cancer. 8.  History of hypertension. 9.  History of hyperlipidemia. 10.  Hypothyroidism. 11.  Acid reflux. 12.  Chronic congestive heart failure with systolic dysfunction.   13.  Ulcerative colitis s/p colostomy  14 Anemia with acute blood loss   15 leucocytosis    Plan:    Await pre cert   Cont Lantus current dose   F/u labs    Jorge Morgan MD

## 2020-01-04 NOTE — PROGRESS NOTES
Patient frequently has a dry cough after swallowing liquids. Lung sounds are clear and diminished. Patient denies any trouble swallowing.

## 2020-01-04 NOTE — PLAN OF CARE
Problem: Falls - Risk of:  Goal: Will remain free from falls  Description  Will remain free from falls  Outcome: Ongoing    Fall precautions in place. Bed in low position. Call light and side table within reach. No falls noted this shift. Problem: Discharge Planning:  Goal: Participates in care planning  Description  Participates in care planning  Outcome: Ongoing    Discharge ongoing. Patient plans for discharge to [facility]. Problem: Urinary Elimination:  Goal: Signs and symptoms of infection will decrease  Description  Signs and symptoms of infection will decrease  Outcome: Ongoing    Matos remains in place. Adequate urinary output noted. Problem: Risk for Impaired Skin Integrity  Goal: Tissue integrity - skin and mucous membranes  Description  Structural intactness and normal physiological function of skin and  mucous membranes. Outcome: Ongoing    No new skin issues noted this shift. Patient encourage to turn and reposition hourly but often refuses. Care plan reviewed with patient. Patient verbalize understanding of the plan of care and contribute to goal setting.

## 2020-01-05 LAB
ANION GAP SERPL CALCULATED.3IONS-SCNC: 11 MEQ/L (ref 8–16)
ANISOCYTOSIS: PRESENT
BASOPHILS # BLD: 0.8 %
BASOPHILS ABSOLUTE: 0.1 THOU/MM3 (ref 0–0.1)
BUN BLDV-MCNC: 14 MG/DL (ref 7–22)
CALCIUM SERPL-MCNC: 9.2 MG/DL (ref 8.5–10.5)
CHLORIDE BLD-SCNC: 108 MEQ/L (ref 98–111)
CO2: 15 MEQ/L (ref 23–33)
CREAT SERPL-MCNC: 1 MG/DL (ref 0.4–1.2)
EOSINOPHIL # BLD: 7.5 %
EOSINOPHILS ABSOLUTE: 0.8 THOU/MM3 (ref 0–0.4)
ERYTHROCYTE [DISTWIDTH] IN BLOOD BY AUTOMATED COUNT: 19.9 % (ref 11.5–14.5)
ERYTHROCYTE [DISTWIDTH] IN BLOOD BY AUTOMATED COUNT: 66.3 FL (ref 35–45)
GFR SERPL CREATININE-BSD FRML MDRD: 70 ML/MIN/1.73M2
GLUCOSE BLD-MCNC: 142 MG/DL (ref 70–108)
GLUCOSE BLD-MCNC: 162 MG/DL (ref 70–108)
GLUCOSE BLD-MCNC: 174 MG/DL (ref 70–108)
GLUCOSE BLD-MCNC: 186 MG/DL (ref 70–108)
GLUCOSE BLD-MCNC: 192 MG/DL (ref 70–108)
HCT VFR BLD CALC: 35.8 % (ref 42–52)
HEMOGLOBIN: 10.7 GM/DL (ref 14–18)
IMMATURE GRANS (ABS): 0.11 THOU/MM3 (ref 0–0.07)
IMMATURE GRANULOCYTES: 1 %
LYMPHOCYTES # BLD: 18.5 %
LYMPHOCYTES ABSOLUTE: 2.1 THOU/MM3 (ref 1–4.8)
MCH RBC QN AUTO: 27.3 PG (ref 26–33)
MCHC RBC AUTO-ENTMCNC: 29.9 GM/DL (ref 32.2–35.5)
MCV RBC AUTO: 91.3 FL (ref 80–94)
MONOCYTES # BLD: 13.1 %
MONOCYTES ABSOLUTE: 1.5 THOU/MM3 (ref 0.4–1.3)
NUCLEATED RED BLOOD CELLS: 0 /100 WBC
PLATELET # BLD: 332 THOU/MM3 (ref 130–400)
PMV BLD AUTO: 10.3 FL (ref 9.4–12.4)
POTASSIUM SERPL-SCNC: 5 MEQ/L (ref 3.5–5.2)
RBC # BLD: 3.92 MILL/MM3 (ref 4.7–6.1)
SEG NEUTROPHILS: 59.1 %
SEGMENTED NEUTROPHILS ABSOLUTE COUNT: 6.6 THOU/MM3 (ref 1.8–7.7)
SODIUM BLD-SCNC: 134 MEQ/L (ref 135–145)
WBC # BLD: 11.2 THOU/MM3 (ref 4.8–10.8)

## 2020-01-05 PROCEDURE — 2580000003 HC RX 258: Performed by: INTERNAL MEDICINE

## 2020-01-05 PROCEDURE — 82948 REAGENT STRIP/BLOOD GLUCOSE: CPT

## 2020-01-05 PROCEDURE — 6370000000 HC RX 637 (ALT 250 FOR IP): Performed by: INTERNAL MEDICINE

## 2020-01-05 PROCEDURE — G0378 HOSPITAL OBSERVATION PER HR: HCPCS

## 2020-01-05 PROCEDURE — 85025 COMPLETE CBC W/AUTO DIFF WBC: CPT

## 2020-01-05 PROCEDURE — 80048 BASIC METABOLIC PNL TOTAL CA: CPT

## 2020-01-05 PROCEDURE — 36415 COLL VENOUS BLD VENIPUNCTURE: CPT

## 2020-01-05 PROCEDURE — 1200000000 HC SEMI PRIVATE

## 2020-01-05 RX ADMIN — Medication 1 CAPSULE: at 21:15

## 2020-01-05 RX ADMIN — METOPROLOL SUCCINATE 25 MG: 25 TABLET, FILM COATED, EXTENDED RELEASE ORAL at 09:51

## 2020-01-05 RX ADMIN — LEVOTHYROXINE SODIUM 75 MCG: 75 TABLET ORAL at 05:48

## 2020-01-05 RX ADMIN — Medication 1 CAPSULE: at 09:51

## 2020-01-05 RX ADMIN — Medication 125 MG: at 18:55

## 2020-01-05 RX ADMIN — Medication 125 MG: at 05:48

## 2020-01-05 RX ADMIN — Medication 125 MG: at 11:39

## 2020-01-05 RX ADMIN — INSULIN LISPRO 2 UNITS: 100 INJECTION, SOLUTION INTRAVENOUS; SUBCUTANEOUS at 12:41

## 2020-01-05 RX ADMIN — INSULIN LISPRO 2 UNITS: 100 INJECTION, SOLUTION INTRAVENOUS; SUBCUTANEOUS at 09:53

## 2020-01-05 RX ADMIN — ATORVASTATIN CALCIUM 40 MG: 40 TABLET, FILM COATED ORAL at 21:15

## 2020-01-05 RX ADMIN — Medication 125 MG: at 21:15

## 2020-01-05 RX ADMIN — INSULIN GLARGINE 17 UNITS: 100 INJECTION, SOLUTION SUBCUTANEOUS at 09:52

## 2020-01-05 RX ADMIN — SODIUM CHLORIDE, PRESERVATIVE FREE 10 ML: 5 INJECTION INTRAVENOUS at 21:16

## 2020-01-05 RX ADMIN — SODIUM CHLORIDE, PRESERVATIVE FREE 10 ML: 5 INJECTION INTRAVENOUS at 09:51

## 2020-01-05 RX ADMIN — QUETIAPINE FUMARATE 25 MG: 25 TABLET ORAL at 21:15

## 2020-01-05 RX ADMIN — QUETIAPINE FUMARATE 25 MG: 25 TABLET ORAL at 09:51

## 2020-01-05 RX ADMIN — Medication 1 CAPSULE: at 12:44

## 2020-01-05 ASSESSMENT — PAIN SCALES - GENERAL: PAINLEVEL_OUTOF10: 0

## 2020-01-06 PROBLEM — E43 SEVERE MALNUTRITION (HCC): Status: ACTIVE | Noted: 2020-01-06

## 2020-01-06 LAB
GLUCOSE BLD-MCNC: 159 MG/DL (ref 70–108)
GLUCOSE BLD-MCNC: 171 MG/DL (ref 70–108)
GLUCOSE BLD-MCNC: 191 MG/DL (ref 70–108)
GLUCOSE BLD-MCNC: 205 MG/DL (ref 70–108)

## 2020-01-06 PROCEDURE — 2580000003 HC RX 258: Performed by: INTERNAL MEDICINE

## 2020-01-06 PROCEDURE — G0378 HOSPITAL OBSERVATION PER HR: HCPCS

## 2020-01-06 PROCEDURE — 97116 GAIT TRAINING THERAPY: CPT

## 2020-01-06 PROCEDURE — 6370000000 HC RX 637 (ALT 250 FOR IP): Performed by: INTERNAL MEDICINE

## 2020-01-06 PROCEDURE — 2709999900 HC NON-CHARGEABLE SUPPLY

## 2020-01-06 PROCEDURE — 1200000000 HC SEMI PRIVATE

## 2020-01-06 PROCEDURE — 82948 REAGENT STRIP/BLOOD GLUCOSE: CPT

## 2020-01-06 RX ADMIN — ATORVASTATIN CALCIUM 40 MG: 40 TABLET, FILM COATED ORAL at 20:52

## 2020-01-06 RX ADMIN — Medication 125 MG: at 12:01

## 2020-01-06 RX ADMIN — INSULIN LISPRO 4 UNITS: 100 INJECTION, SOLUTION INTRAVENOUS; SUBCUTANEOUS at 12:01

## 2020-01-06 RX ADMIN — Medication 125 MG: at 23:06

## 2020-01-06 RX ADMIN — Medication 1 CAPSULE: at 14:13

## 2020-01-06 RX ADMIN — QUETIAPINE FUMARATE 25 MG: 25 TABLET ORAL at 08:57

## 2020-01-06 RX ADMIN — LEVOTHYROXINE SODIUM 75 MCG: 75 TABLET ORAL at 06:26

## 2020-01-06 RX ADMIN — INSULIN LISPRO 1 UNITS: 100 INJECTION, SOLUTION INTRAVENOUS; SUBCUTANEOUS at 20:52

## 2020-01-06 RX ADMIN — Medication 1 CAPSULE: at 08:58

## 2020-01-06 RX ADMIN — QUETIAPINE FUMARATE 25 MG: 25 TABLET ORAL at 20:52

## 2020-01-06 RX ADMIN — METOPROLOL SUCCINATE 25 MG: 25 TABLET, FILM COATED, EXTENDED RELEASE ORAL at 08:57

## 2020-01-06 RX ADMIN — SODIUM CHLORIDE, PRESERVATIVE FREE 10 ML: 5 INJECTION INTRAVENOUS at 08:57

## 2020-01-06 RX ADMIN — INSULIN LISPRO 2 UNITS: 100 INJECTION, SOLUTION INTRAVENOUS; SUBCUTANEOUS at 17:32

## 2020-01-06 RX ADMIN — Medication 1 CAPSULE: at 20:52

## 2020-01-06 RX ADMIN — SODIUM CHLORIDE, PRESERVATIVE FREE 10 ML: 5 INJECTION INTRAVENOUS at 20:53

## 2020-01-06 RX ADMIN — INSULIN LISPRO 2 UNITS: 100 INJECTION, SOLUTION INTRAVENOUS; SUBCUTANEOUS at 08:56

## 2020-01-06 RX ADMIN — Medication 125 MG: at 06:26

## 2020-01-06 RX ADMIN — INSULIN GLARGINE 17 UNITS: 100 INJECTION, SOLUTION SUBCUTANEOUS at 08:56

## 2020-01-06 RX ADMIN — Medication 125 MG: at 17:34

## 2020-01-06 ASSESSMENT — PAIN SCALES - GENERAL: PAINLEVEL_OUTOF10: 0

## 2020-01-06 NOTE — PLAN OF CARE
disoriented to place and situation but is easily reoriented. Problem: Serum Glucose Level - Abnormal:  Goal: Ability to maintain appropriate glucose levels will improve to within specified parameters  Description  Ability to maintain appropriate glucose levels will improve to within specified parameters  Outcome: Ongoing  Note:   Insulin per order. Problem: Urinary Elimination:  Goal: Signs and symptoms of infection will decrease  Description  Signs and symptoms of infection will decrease  Outcome: Ongoing  Note:   Oral Vancomycin. Chronic Matos for prostate cancer. Admitted for UTI and hematuria. Urine is free from clots and nick colored. Contact plus precautions kept in place. Frequent hand washing preformed. Care plan reviewed with patient and family. Patient and family verbalize understanding of the plan of care and contribute to goal setting.

## 2020-01-06 NOTE — DISCHARGE INSTR - DIET
 Good nutrition is important when healing from an illness, injury, or surgery. Follow any nutrition recommendations given to you during your hospital stay.  If you were given an oral nutrition supplement while in the hospital, continue to take this supplement at home. You can take it with meals, in-between meals, and/or before bedtime. These supplements can be purchased at most local grocery stores, pharmacies, and chain super-stores.  If you have any questions about your diet or nutrition, call the hospital and ask for the dietitian. · Do not skip meals. Eating a balanced diet may increase your energy level. · Continue to check blood sugars before meals and at bedtime.

## 2020-01-06 NOTE — PROGRESS NOTES
ASSESSMENT/PLAN   Hematuria improving  UTI: has ESBL producing bacteria: treated  CHF  Ok with discharge plan.      Arnoldo Caban MD, FACP 1/6/2020 10:15 AM

## 2020-01-06 NOTE — PROGRESS NOTES
[address] [address]  PHYSICAL THERAPY MISSED TREATMENT NOTE  ACUTE CARE    Date: 2020  Patient Name: Gareth Castillo        MRN: [id number]   : 10/6/1928  (80 y.o.)  Gender: male   Referring Practitioner: Dr. Eren Fallon MD  Referring Practitioner: Esperanza Mazariegos MD  Diagnosis: SHAYAN  Diagnosis: SHAYAN         REASON FOR MISSED TREATMENT:  Hold treatment per nursing request.  RN requesting to wait on session for a little bit due to needing to put a new colostomy bag in right now, also reports patient has been agitated all morning. Will try back later if time permits. Anita Urbano Sero PTA [id number]

## 2020-01-06 NOTE — PLAN OF CARE
Problem: Nutrition  Goal: Optimal nutrition therapy  Outcome: Ongoing   Nutrition Problem: Severe malnutrition, In context of acute illness or injury  Intervention: Food and/or Nutrient Delivery: Continue current diet, Start ONS, Vitamin Supplement(Recommend Folbee Plus (Renal Multivitamin) daily.  Started Glucerna TID & Eladio BID.)  Nutritional Goals: Pt will consume 75% or more of meals during LOS

## 2020-01-06 NOTE — DISCHARGE INSTR - PHARMACY
· Be safe with medicines. If your doctor prescribed medicine, take it exactly as prescribed. Call your doctor if you think you are having a problem with your medicine. You will get more details on the specific medicines your doctor prescribes. Unused medications, especially pain medications, should be disposed to ensure medications do not end up being misused in the future, as well as helping to ensure drugs are not impacting the environment. [facility] and many local police agencies have medication take-back bins to properly destroy these medications. Please see the site https://apps. deadiversion. Dealf[place].com.gov/pubdispsearch/spring/main?execution=e2s1 for additional take-back bins located in your area.

## 2020-01-07 VITALS
WEIGHT: 181.9 LBS | BODY MASS INDEX: 26.04 KG/M2 | TEMPERATURE: 98 F | DIASTOLIC BLOOD PRESSURE: 60 MMHG | SYSTOLIC BLOOD PRESSURE: 120 MMHG | HEART RATE: 68 BPM | RESPIRATION RATE: 16 BRPM | HEIGHT: 70 IN | OXYGEN SATURATION: 97 %

## 2020-01-07 LAB
GLUCOSE BLD-MCNC: 195 MG/DL (ref 70–108)
GLUCOSE BLD-MCNC: 250 MG/DL (ref 70–108)

## 2020-01-07 PROCEDURE — G0378 HOSPITAL OBSERVATION PER HR: HCPCS

## 2020-01-07 PROCEDURE — 2709999900 HC NON-CHARGEABLE SUPPLY

## 2020-01-07 PROCEDURE — 6370000000 HC RX 637 (ALT 250 FOR IP): Performed by: INTERNAL MEDICINE

## 2020-01-07 PROCEDURE — 97110 THERAPEUTIC EXERCISES: CPT

## 2020-01-07 PROCEDURE — 82948 REAGENT STRIP/BLOOD GLUCOSE: CPT

## 2020-01-07 PROCEDURE — 2580000003 HC RX 258: Performed by: INTERNAL MEDICINE

## 2020-01-07 RX ADMIN — INSULIN LISPRO 2 UNITS: 100 INJECTION, SOLUTION INTRAVENOUS; SUBCUTANEOUS at 09:27

## 2020-01-07 RX ADMIN — Medication 1 CAPSULE: at 09:26

## 2020-01-07 RX ADMIN — LEVOTHYROXINE SODIUM 75 MCG: 75 TABLET ORAL at 05:45

## 2020-01-07 RX ADMIN — Medication 125 MG: at 11:41

## 2020-01-07 RX ADMIN — INSULIN LISPRO 6 UNITS: 100 INJECTION, SOLUTION INTRAVENOUS; SUBCUTANEOUS at 12:43

## 2020-01-07 RX ADMIN — Medication 125 MG: at 05:45

## 2020-01-07 RX ADMIN — INSULIN GLARGINE 17 UNITS: 100 INJECTION, SOLUTION SUBCUTANEOUS at 09:27

## 2020-01-07 RX ADMIN — METOPROLOL SUCCINATE 25 MG: 25 TABLET, FILM COATED, EXTENDED RELEASE ORAL at 09:26

## 2020-01-07 RX ADMIN — QUETIAPINE FUMARATE 25 MG: 25 TABLET ORAL at 09:26

## 2020-01-07 RX ADMIN — SODIUM CHLORIDE, PRESERVATIVE FREE 10 ML: 5 INJECTION INTRAVENOUS at 09:29

## 2020-01-07 ASSESSMENT — PAIN SCALES - GENERAL
PAINLEVEL_OUTOF10: 0

## 2020-01-07 NOTE — CARE COORDINATION
1/7/20, 12:16 PM    DISCHARGE PLANNING EVALUATION    Patient is to be discharged today. He has been approved for skilled care at [facility] by his [place]. Transport arranged for 3:30 pm with [facility] and paperwork faxed. AVS faxed to [facility] admissions. HORACE Altamirano has called report to facility. Daughter Herberth Paz advised of discharge by ROCK Zepeda. Pat with [facility] admissions updated on discahrge arrangements. No other needs. 1/7/20, 12:19 PM    Patient goals/plan/ treatment preferences discussed by  and . Patient goals/plan/ treatment preferences reviewed with patient/ family. Patient/ family verbalize understanding of discharge plan and are in agreement with goal/plan/treatment preferences. Understanding was demonstrated using the teach back method. AVS provided by RN at time of discharge, which includes all necessary medical information pertaining to the patients current course of illness, treatment, post-discharge goals of care, and treatment preferences.     Services After Discharge  Services At/After Discharge: Nursing Services, Skilled Therapy, Aide Services, Transport, In ambulance([facility]EVEN)   IMM Letter  IMM Letter given to Patient/Family/Significant other/Guardian/POA/by[de-identified] updated  IMM Letter date given[de-identified] 01/07/20  IMM Letter time given[de-identified] 5973

## 2020-01-07 NOTE — PROGRESS NOTES
10\" (1.778 m)   Wt 181 lb 14.4 oz (82.5 kg)   SpO2 97%   BMI 26.10 kg/m²   HEENT: Head:pupils react  Neck: supple  Lungs: clear to auscultation  Heart: regular rate and rhythm   Abdomen: soft BS heard + colostomy NG NT  Extremities: warm no edema  Neurologic:  Awake oriented to person and place    Impression:   :   1.  Hematuria, status post continuous bladder irrigation with UTI with ESBL  2. Recent C. diff. 3.  Insulin-requiring diabetes mellitus. 4.  Chronic elevation of troponin. 5.  Acute on chronic kidney disease. 6.  Coronary artery disease, status post coronary artery bypass graft. 7.  Previous history of prostate cancer. 8.  History of hypertension. 9.  History of hyperlipidemia. 10.  Hypothyroidism. 11.  Acid reflux. 12.  Chronic congestive heart failure with systolic dysfunction.   13.  Ulcerative colitis s/p colostomy  14 Anemia with acute blood loss   15 leucocytosis    Plan:    Discharge to ECF with rousseau in place    Brandi Verduzco MD

## 2020-01-07 NOTE — PROGRESS NOTES
[address] [address]  INPATIENT PHYSICAL THERAPY  DAILY NOTE  [facility] ONC MED 5K - 5K-06/006-A  Time In: 11:50  Time Out: 12:05  Total Treatment Time: 15  Timed Code Minutes: 15             Date: 2020  Patient Name: Vitaliy Eugene,  Gender:  male        MRN: [id number]  : 10/6/1928  (80 y.o.)     Referring Practitioner: Clarissa Davis MD  Diagnosis: SHAYAN  Additional Pertinent Hx: This is a very pleasant 80 y.o. male who was admitted to the hospital with a chief complaints of hematuria. He is from NH. Recently treated for C.difficle induced diarrhea and hematuria related to plavix and ASA. I was asked to see due to abnormal urine and hx of C.difficle. He is confused. He is on continuous bladder irrigation. His medical record was reviewed and discussed with the nursing staff. He has no fever or chills. He has pyuria and still has loose stool related to C. difficile. He has history of coronary artery disease congestive heart failure hypertension and COPD. Prior Level of Function:  Lives With: Family  Type of Home: Facility([facility])  Home Layout: One level  Home Access: Level entry   Bathroom Shower/Tub: Walk-in shower  Bathroom Equipment: Grab bars in shower, Grab bars around toilet  Bathroom Accessibility: [address] Help From: Family, Other (comment)(staff as available)  ADL Assistance: Needs assistance  Homemaking Assistance: Needs assistance  Homemaking Responsibilities: No  Ambulation Assistance: Needs assistance  Transfer Assistance: Needs assistance  Active : No  Additional Comments: Pt unable to give PLOF due to confusion, per chart, was at [facility]    Restrictions/Precautions:  Restrictions/Precautions: [address]: RN approved session, reports that patient has been agitated with her earlier but okay to check with patient.   Patient in bed resting upon arrival, required max encouragement to participate but did eventually to agree to getting out of bed

## 2020-01-07 NOTE — PLAN OF CARE
Problem: Falls - Risk of:  Goal: Will remain free from falls  Description  Will remain free from falls  Outcome: Ongoing  Note:   No falls noted this shift. Fall risk assessment completed. Hourly rounding performed. Bed locked in lowest position, bed alarm on, call light and personal items within reach, and fall sign posted. Patient repositioned every two hours. Patient has a chronic rousseau catheter and a colostomy located in LUQ. Problem: Risk for Impaired Skin Integrity  Goal: Tissue integrity - skin and mucous membranes  Description  Structural intactness and normal physiological function of skin and  mucous membranes. Outcome: Ongoing  Note:   No new skin lesions noted this shift. Patient encouraged to reposition every two hours. Nursing staff assist with patient repositioning every two hours. Skin assessments completed and ongoing. Problem: Discharge Planning:  Goal: Participates in care planning  Description  Participates in care planning  Outcome: Ongoing  Note:   Patient from [facility]. Patient planning on returning upon discharge. Waiting on precert. Problem: Bowel Function - Altered:  Goal: Bowel elimination is within specified parameters  Description  Bowel elimination is within specified parameters  Outcome: Ongoing  Note:   Bowel sounds are active. Patient has colostomy located in LUQ. Problem: Mental Status - Impaired:  Goal: Mental status will be restored to baseline  Description  Mental status will be restored to baseline  Outcome: Ongoing  Note:   Patient oriented to name and place only. Problem: Serum Glucose Level - Abnormal:  Goal: Ability to maintain appropriate glucose levels will improve to within specified parameters  Description  Ability to maintain appropriate glucose levels will improve to within specified parameters  Outcome: Ongoing  Note:   Blood sugar was 159 patient received 1 unit of humalog. Patient has insulin sliding scale.  Carb Controlled diet ordered. Problem: Urinary Elimination:  Goal: Signs and symptoms of infection will decrease  Description  Signs and symptoms of infection will decrease  Outcome: Ongoing  Note:   Contact plus isolation precautions maintained. Patient admitted for a UTI. Patient has a chronic rousseau catheter. Oral Vancomycin every 6 hours. VSS. Problem: Nutrition  Goal: Optimal nutrition therapy  1/6/2020 1285 by Mohsen Ramirez RN  Outcome: Ongoing  Note:   Patient on a carb controlled diet and tolerating fairly well. Intake encouraged. Denies nausea and vomiting. Care plan reviewed with patient. Patient verbalize understanding of the plan of care and contribute to goal setting.        Electronically signed by Mohsen Ramirez RN on 1/6/2020 at 11:58 PM

## 2020-01-07 NOTE — PROGRESS NOTES
[facility] 5  Occupational Therapy  Daily Note  Time:    Time In: 5034  Time Out: 1132  Timed Code Treatment Minutes: 9 Minutes  Minutes: 9          Date: 2020  Patient Name: Hermilo Castillo,   Gender: male      Room: [facility]-A  MRN: [id number]  : 10/6/1928  (80 y.o.)  Referring Practitioner: Dr. Harvey Ortiz MD  Diagnosis: SHAYAN  Additional Pertinent Hx: 80 y.o. male  with a background history of recent C difficile colitis, was recently discharged following gross hematuria the had required continuous bladder irrigation for about 2-3 days. At the time he had his Plavix and aspirin held however in the last 4 days his Plavix was resumed and the hematuria re started again this morning. He denies dysuria, abdominal pain, flank pains or urgency. No fever or chills however was noted to have evidence of acute kidney injury and hyperkalemia along with markers a urinary tract infection and leukocytosis. Still with diarrhea    Restrictions/Precautions:  Restrictions/Precautions: Fall Risk    SUBJECTIVE: Pt quiet then therapist entered room, did not respond one way or the other. Upon getting Pt ready to get OOB, Pt declined OOB. Therapist unable to encourage Pt. Therapist then found Pt had colostomy leak in the bed. Nurse with Pt at end of session. Nurse reports Pt is being discharged to [facility] later today, Pt insists he is going home. PAIN: 0/10: no c/o pain during session    COGNITION: Slow Processing, Decreased Recall, Decreased Insight and Decreased Problem Solving    ADL:   Lower Extremity Dressing: Dependent. for adjusting slipper socks. BALANCE:  Pt declined        ADDITIONAL ACTIVITIES:  AAROM B shoulder flexion to tolerance x 10 reps. Exercises stopped so nurse could get Pt cleaned up. ASSESSMENT:     Activity Tolerance:  Patient tolerance of  treatment: poor.         Discharge Recommendations: Continue to assess pending progress, Patient would benefit from continued therapy after discharge, ECF with OT  Equipment Recommendations: Other: [facility]  Plan: Times per week: 3-5x  Specific instructions for Next Treatment: Functional mobility as able; ADLs and adaptive strategies; upper body gentle exercise  Current Treatment Recommendations: ROM, Endurance Training, Functional Mobility Training, Self-Care / ADL  Plan Comment: Pt would benefit from continued skilled OT services when medically stable and discharged from Acute. OT recommended when pt returns to [facility]. Patient Education  Patient Education: importance of participation    Goals  Short term goals  Time Frame for Short term goals: By discharge  Short term goal 1: Pt will demonstrate functional mobility with OTR to prepare for doing self care while out of bed. Short term goal 2: Pt will complete upper body bathing or dressing with no > than MIN A while using any adaptive strategies needed to increase his indepence with self care. Short term goal 3: pt will complete lower body dressing or bathing with MOD A with use of AE as needed to increase his independence with self care. Long term goals  Time Frame for Long term goals : None secondary to short estimated length of stay. Following session, patient left in safe position with all fall risk precautions in place.

## 2020-01-07 NOTE — FLOWSHEET NOTE
[facility]  PHYSICAL THERAPY MISSED TREATMENT NOTE  ACUTE CARE    Date: 2020  Patient Name: Kathie Lopez        MRN: [id number]   : 10/6/1928  (80 y.o.)  Gender: male   Referring Practitioner: Dr. Chantelle Orellana MD  Referring Practitioner: Benny Bourgeois MD  Diagnosis: SHAYAN  Diagnosis: SHAYAN         REASON FOR MISSED TREATMENT:  OT with pt at arrival, RN stated pt being d/c at ~3pm this date.  [facility] PTA [id number]

## 2020-01-08 NOTE — DISCHARGE SUMMARY
[address] [address]                               DISCHARGE SUMMARY    PATIENT NAME: Greer Bustillo                  :        10/06/1928  MED REC NO:   [id number]                           ROOM:       0006  ACCOUNT NO:   [de-identified]                           ADMIT DATE: 2019  PROVIDER:     MARIMAR Philippey: 2020    HOSPITAL COURSE:  A 22-year-old male admitted initially for complaints  of hematuria. The patient did require bladder irrigation, was seen by  Urology. He was on blood thinners, which will now be held and we will  resume only his aspirin if okay with Urology. The patient was seen by  Infectious Disease also as he had ESBL and was treated with fosfomycin  x1. The patient was waiting for his precert and was finally able to be  discharged on 2020. FINAL DIAGNOSES:  1. Hematuria. 2.  ESBL UTI, status post fosfomycin treatment. 3.  Recent history of C. diff. 4.  Insulin-requiring diabetes mellitus. 5.  Acute-on-chronic kidney disease. 6.  Coronary artery disease with history of coronary artery bypass  graft. 7.  Hypertension. 8.  Hyperlipidemia. 9.  Previous history of prostate cancer. 10.  History of ulcerative colitis, status post colostomy. 11.  Acid reflux. 12.  Chronic congestive heart failure with systolic dysfunction. 13.  Anemia with acute blood loss from his hematuria. 14.  Leukocytosis. CURRENT MEDICATIONS:  To continue as addressed in discharge sheet. DISPOSITION:  To nursing home. DIET:  As tolerated. ACTIVITY:  Continue physical therapy and occupational therapy. FOLLOWUP:  With physician at nursing home. CONDITION ON DISCHARGE:  Stable.         Dennis Vergara M.D.    D: 2020 11:18:35       T: 2020 21:58:54     MOIRA/BRIANA_REMBERTO  Job#: [id number]     Doc#: [id number]    CC:

## 2020-01-14 ENCOUNTER — PROCEDURE VISIT (OUTPATIENT)
Dept: UROLOGY | Age: 85
End: 2020-01-14
Payer: MEDICARE

## 2020-01-14 VITALS
WEIGHT: 179 LBS | HEIGHT: 70 IN | DIASTOLIC BLOOD PRESSURE: 60 MMHG | SYSTOLIC BLOOD PRESSURE: 98 MMHG | BODY MASS INDEX: 25.62 KG/M2

## 2020-01-14 PROCEDURE — 99214 OFFICE O/P EST MOD 30 MIN: CPT | Performed by: UROLOGY

## 2020-01-14 RX ORDER — MULTIVITAMIN
1 TABLET ORAL DAILY
COMMUNITY

## 2020-01-14 ASSESSMENT — ENCOUNTER SYMPTOMS
COLOR CHANGE: 0
EYE PAIN: 0
CHEST TIGHTNESS: 0
FACIAL SWELLING: 0
ABDOMINAL PAIN: 0
NAUSEA: 0
SHORTNESS OF BREATH: 0
EYE REDNESS: 0

## 2020-01-14 NOTE — PROGRESS NOTES
mouth 4 times daily for 12 days, Disp: , Rfl: 0    Lactobacillus (PROBIOTIC ACIDOPHILUS) TABS, Take 1 tablet by mouth 3 times daily, Disp: 60 tablet, Rfl: 0    insulin aspart (NOVOLOG PENFILL) 100 UNIT/ML injection cartridge, Inject into the skin 4 times daily (before meals and nightly) Inject as per sliding scale: If 151- 200 = 2 units; 201-250 = 4 units 251-300 = 6 units 301-350 = 8 units 351-400 = 10 units Call physician if blood sugar <60 or >400., Disp: , Rfl:     metoprolol succinate (TOPROL XL) 25 MG extended release tablet, Take 1 tablet by mouth daily, Disp: 30 tablet, Rfl: 3    acetaminophen (TYLENOL) 325 MG tablet, Take 2 tablets by mouth every 6 hours as needed for Pain, Disp: 120 tablet, Rfl: 3    atorvastatin (LIPITOR) 40 MG tablet, Take 1 tablet by mouth nightly, Disp: 30 tablet, Rfl: 3    insulin glargine (LANTUS) 100 UNIT/ML injection vial, Inject 17 Units into the skin every morning, Disp: , Rfl:     QUEtiapine (SEROQUEL) 25 MG tablet, Take 1 tablet by mouth 2 times daily Additional RF per his PCP, Disp: 30 tablet, Rfl: 0    levothyroxine (SYNTHROID) 75 MCG tablet, Take 1 tablet by mouth every morning, Disp: 30 tablet, Rfl: 0    aspirin 81 MG chewable tablet, Take 1 tablet by mouth daily Restart when ok with urology (Patient not taking: Reported on 1/14/2020), Disp: 30 tablet, Rfl: 3    Review of Systems   Constitutional: Negative for chills and fever. HENT: Negative for ear pain and facial swelling. Eyes: Negative for pain and redness. Respiratory: Negative for chest tightness and shortness of breath. Cardiovascular: Negative for chest pain and leg swelling. Gastrointestinal: Negative for abdominal pain and nausea. Endocrine: Negative for cold intolerance and heat intolerance. Genitourinary: Positive for difficulty urinating (has rousseau catheter). Has rousseau catheter   Musculoskeletal: Negative for joint swelling and neck pain. Skin: Negative for color change. Allergic/Immunologic: Negative for environmental allergies and food allergies. Neurological: Negative for dizziness and headaches. Hematological: Bruises/bleeds easily (due to medication). BP 98/60   Ht 5' 10\" (1.778 m) Comment: stated  Wt 179 lb (81.2 kg) Comment: stated  BMI 25.68 kg/m²     Objective:   Physical Exam elderly white male with a Matos catheter in place in a wheelchair. He has a colostomy in his left lower quadrant. Assessment:       Diagnosis Orders   1. Gross hematuria     2. Kidney stone     3. Prostate carcinoma ([address]     4. Urinary incontinence due to cognitive impairment         Mr. Ibrahim Shoulder today in follow-up for Gross hematuria [R31.0]. Patient has multiple urologic problems as above. His daughter and I have discussed all of these especially further evaluation of his hematuria. She states she was told he cannot undergo a general anesthetic. He cannot have cystoscopy here in the office due to his weakness and febile condition. If they want further evaluation, she will contact the office and we will set this up at a minimum and under monitored anesthetic care. Plan: For now, no treatment of the stone. He will continue Matos catheter. Prostate cancer is in remission. In excess of 25 minutes was spent with the patient discussing their medical history, treatment outcomes and possible treatment related side effects. Greater than 50 per cent of this time was spent in face to face discussion of current disease status and ongoing management.

## 2020-01-21 ENCOUNTER — APPOINTMENT (OUTPATIENT)
Dept: GENERAL RADIOLOGY | Age: 85
DRG: 698 | End: 2020-01-21
Payer: MEDICARE

## 2020-01-21 ENCOUNTER — APPOINTMENT (OUTPATIENT)
Dept: CT IMAGING | Age: 85
DRG: 698 | End: 2020-01-21
Payer: MEDICARE

## 2020-01-21 ENCOUNTER — HOSPITAL ENCOUNTER (INPATIENT)
Age: 85
LOS: 3 days | Discharge: HOSPICE | DRG: 698 | End: 2020-01-24
Attending: INTERNAL MEDICINE | Admitting: INTERNAL MEDICINE
Payer: MEDICARE

## 2020-01-21 ENCOUNTER — TELEPHONE (OUTPATIENT)
Dept: UROLOGY | Age: 85
End: 2020-01-21

## 2020-01-21 PROBLEM — R41.82 AMS (ALTERED MENTAL STATUS): Status: ACTIVE | Noted: 2020-01-21

## 2020-01-21 LAB
ALBUMIN SERPL-MCNC: 3.4 G/DL (ref 3.5–5.1)
ALP BLD-CCNC: 123 U/L (ref 38–126)
ALT SERPL-CCNC: 15 U/L (ref 11–66)
ANION GAP SERPL CALCULATED.3IONS-SCNC: 12 MEQ/L (ref 8–16)
ANISOCYTOSIS: PRESENT
AST SERPL-CCNC: 18 U/L (ref 5–40)
BACTERIA: ABNORMAL /HPF
BASE EXCESS MIXED: -4.9 MMOL/L (ref -2–3)
BASOPHILS # BLD: 0.8 %
BASOPHILS ABSOLUTE: 0.1 THOU/MM3 (ref 0–0.1)
BETA-HYDROXYBUTYRATE: 1.69 MG/DL (ref 0.2–2.81)
BILIRUB SERPL-MCNC: 0.6 MG/DL (ref 0.3–1.2)
BILIRUBIN DIRECT: < 0.2 MG/DL (ref 0–0.3)
BILIRUBIN URINE: ABNORMAL
BLOOD, URINE: ABNORMAL
BUN BLDV-MCNC: 48 MG/DL (ref 7–22)
CALCIUM SERPL-MCNC: 9.8 MG/DL (ref 8.5–10.5)
CASTS UA: ABNORMAL /LPF
CHARACTER, URINE: ABNORMAL
CHLORIDE BLD-SCNC: 104 MEQ/L (ref 98–111)
CO2: 19 MEQ/L (ref 23–33)
COLLECTED BY:: ABNORMAL
COLOR: ABNORMAL
CREAT SERPL-MCNC: 1.8 MG/DL (ref 0.4–1.2)
CRYSTALS, UA: ABNORMAL
DEVICE: ABNORMAL
EKG ATRIAL RATE: 77 BPM
EKG P-R INTERVAL: 160 MS
EKG Q-T INTERVAL: 396 MS
EKG QRS DURATION: 104 MS
EKG QTC CALCULATION (BAZETT): 448 MS
EKG R AXIS: -61 DEGREES
EKG T AXIS: 76 DEGREES
EKG VENTRICULAR RATE: 77 BPM
ELLIPTOCYTES: ABNORMAL
EOSINOPHIL # BLD: 2.8 %
EOSINOPHILS ABSOLUTE: 0.5 THOU/MM3 (ref 0–0.4)
EPITHELIAL CELLS, UA: ABNORMAL /HPF
ERYTHROCYTE [DISTWIDTH] IN BLOOD BY AUTOMATED COUNT: 20.8 % (ref 11.5–14.5)
ERYTHROCYTE [DISTWIDTH] IN BLOOD BY AUTOMATED COUNT: 64.9 FL (ref 35–45)
FLU A ANTIGEN: NEGATIVE
FLU B ANTIGEN: NEGATIVE
GFR SERPL CREATININE-BSD FRML MDRD: 36 ML/MIN/1.73M2
GLUCOSE BLD-MCNC: 242 MG/DL (ref 70–108)
GLUCOSE BLD-MCNC: 250 MG/DL (ref 70–108)
GLUCOSE BLD-MCNC: 262 MG/DL (ref 70–108)
GLUCOSE URINE: NEGATIVE MG/DL
HCO3, MIXED: 21 MMOL/L (ref 23–28)
HCT VFR BLD CALC: 38.3 % (ref 42–52)
HEMOGLOBIN: 11.7 GM/DL (ref 14–18)
ICTOTEST: NEGATIVE
IMMATURE GRANS (ABS): 0.55 THOU/MM3 (ref 0–0.07)
IMMATURE GRANULOCYTES: 3.1 %
KETONES, URINE: ABNORMAL
LACTIC ACID, SEPSIS: 1.9 MMOL/L (ref 0.5–1.9)
LEUKOCYTE ESTERASE, URINE: ABNORMAL
LIPASE: 7.4 U/L (ref 5.6–51.3)
LYMPHOCYTES # BLD: 11 %
LYMPHOCYTES ABSOLUTE: 1.9 THOU/MM3 (ref 1–4.8)
MAGNESIUM: 2 MG/DL (ref 1.6–2.4)
MCH RBC QN AUTO: 26.6 PG (ref 26–33)
MCHC RBC AUTO-ENTMCNC: 30.5 GM/DL (ref 32.2–35.5)
MCV RBC AUTO: 87 FL (ref 80–94)
MONOCYTES # BLD: 11.2 %
MONOCYTES ABSOLUTE: 2 THOU/MM3 (ref 0.4–1.3)
NITRITE, URINE: POSITIVE
NUCLEATED RED BLOOD CELLS: 0 /100 WBC
O2 SAT, MIXED: 35 %
OSMOLALITY CALCULATION: 291.1 MOSMOL/KG (ref 275–300)
OSMOLALITY: 317 MOSMOL/KG (ref 275–295)
PCO2, MIXED VENOUS: 39 MMHG (ref 41–51)
PH UA: 5 (ref 5–9)
PH, MIXED: 7.33 (ref 7.31–7.41)
PLATELET # BLD: 510 THOU/MM3 (ref 130–400)
PMV BLD AUTO: 10.5 FL (ref 9.4–12.4)
PO2 MIXED: 23 MMHG (ref 25–40)
POIKILOCYTES: ABNORMAL
POTASSIUM SERPL-SCNC: 5.6 MEQ/L (ref 3.5–5.2)
PRO-BNP: 965.3 PG/ML (ref 0–1800)
PROTEIN UA: >= 300
RBC # BLD: 4.4 MILL/MM3 (ref 4.7–6.1)
RBC URINE: > 200 /HPF
SCAN OF BLOOD SMEAR: NORMAL
SEG NEUTROPHILS: 71.1 %
SEGMENTED NEUTROPHILS ABSOLUTE COUNT: 12.6 THOU/MM3 (ref 1.8–7.7)
SODIUM BLD-SCNC: 135 MEQ/L (ref 135–145)
SPECIFIC GRAVITY, URINE: 1.02 (ref 1–1.03)
TOTAL PROTEIN: 7.5 G/DL (ref 6.1–8)
TOXIC GRANULATION: PRESENT
TROPONIN T: 0.05 NG/ML
UROBILINOGEN, URINE: 0.2 EU/DL (ref 0–1)
WBC # BLD: 17.7 THOU/MM3 (ref 4.8–10.8)
WBC UA: ABNORMAL /HPF

## 2020-01-21 PROCEDURE — 80053 COMPREHEN METABOLIC PANEL: CPT

## 2020-01-21 PROCEDURE — 82248 BILIRUBIN DIRECT: CPT

## 2020-01-21 PROCEDURE — 70450 CT HEAD/BRAIN W/O DYE: CPT

## 2020-01-21 PROCEDURE — 83880 ASSAY OF NATRIURETIC PEPTIDE: CPT

## 2020-01-21 PROCEDURE — 74176 CT ABD & PELVIS W/O CONTRAST: CPT

## 2020-01-21 PROCEDURE — 1200000003 HC TELEMETRY R&B

## 2020-01-21 PROCEDURE — 82010 KETONE BODYS QUAN: CPT

## 2020-01-21 PROCEDURE — 81001 URINALYSIS AUTO W/SCOPE: CPT

## 2020-01-21 PROCEDURE — 71045 X-RAY EXAM CHEST 1 VIEW: CPT

## 2020-01-21 PROCEDURE — 87077 CULTURE AEROBIC IDENTIFY: CPT

## 2020-01-21 PROCEDURE — 83605 ASSAY OF LACTIC ACID: CPT

## 2020-01-21 PROCEDURE — 87086 URINE CULTURE/COLONY COUNT: CPT

## 2020-01-21 PROCEDURE — 2580000003 HC RX 258: Performed by: NURSE PRACTITIONER

## 2020-01-21 PROCEDURE — 84484 ASSAY OF TROPONIN QUANT: CPT

## 2020-01-21 PROCEDURE — 82948 REAGENT STRIP/BLOOD GLUCOSE: CPT

## 2020-01-21 PROCEDURE — 83930 ASSAY OF BLOOD OSMOLALITY: CPT

## 2020-01-21 PROCEDURE — 6360000002 HC RX W HCPCS: Performed by: NURSE PRACTITIONER

## 2020-01-21 PROCEDURE — 87040 BLOOD CULTURE FOR BACTERIA: CPT

## 2020-01-21 PROCEDURE — 93005 ELECTROCARDIOGRAM TRACING: CPT | Performed by: PHYSICIAN ASSISTANT

## 2020-01-21 PROCEDURE — 85025 COMPLETE CBC W/AUTO DIFF WBC: CPT

## 2020-01-21 PROCEDURE — 83690 ASSAY OF LIPASE: CPT

## 2020-01-21 PROCEDURE — 6370000000 HC RX 637 (ALT 250 FOR IP): Performed by: INTERNAL MEDICINE

## 2020-01-21 PROCEDURE — 87804 INFLUENZA ASSAY W/OPTIC: CPT

## 2020-01-21 PROCEDURE — 2580000003 HC RX 258: Performed by: INTERNAL MEDICINE

## 2020-01-21 PROCEDURE — 83735 ASSAY OF MAGNESIUM: CPT

## 2020-01-21 PROCEDURE — 99285 EMERGENCY DEPT VISIT HI MDM: CPT

## 2020-01-21 PROCEDURE — 87186 SC STD MICRODIL/AGAR DIL: CPT

## 2020-01-21 PROCEDURE — 36415 COLL VENOUS BLD VENIPUNCTURE: CPT

## 2020-01-21 PROCEDURE — 6360000002 HC RX W HCPCS: Performed by: INTERNAL MEDICINE

## 2020-01-21 RX ORDER — METOPROLOL SUCCINATE 25 MG/1
25 TABLET, EXTENDED RELEASE ORAL DAILY
Status: DISCONTINUED | OUTPATIENT
Start: 2020-01-22 | End: 2020-01-24 | Stop reason: HOSPADM

## 2020-01-21 RX ORDER — ASPIRIN 81 MG/1
81 TABLET, CHEWABLE ORAL DAILY
Status: DISCONTINUED | OUTPATIENT
Start: 2020-01-22 | End: 2020-01-22

## 2020-01-21 RX ORDER — LEVOTHYROXINE SODIUM 0.07 MG/1
75 TABLET ORAL EVERY MORNING
Status: DISCONTINUED | OUTPATIENT
Start: 2020-01-22 | End: 2020-01-24 | Stop reason: HOSPADM

## 2020-01-21 RX ORDER — QUETIAPINE FUMARATE 25 MG/1
25 TABLET, FILM COATED ORAL 2 TIMES DAILY
Status: DISCONTINUED | OUTPATIENT
Start: 2020-01-21 | End: 2020-01-24 | Stop reason: HOSPADM

## 2020-01-21 RX ORDER — ACETAMINOPHEN 325 MG/1
650 TABLET ORAL EVERY 6 HOURS PRN
Status: DISCONTINUED | OUTPATIENT
Start: 2020-01-21 | End: 2020-01-24 | Stop reason: HOSPADM

## 2020-01-21 RX ORDER — SODIUM CHLORIDE 9 MG/ML
INJECTION, SOLUTION INTRAVENOUS CONTINUOUS
Status: DISCONTINUED | OUTPATIENT
Start: 2020-01-21 | End: 2020-01-21 | Stop reason: SDUPTHER

## 2020-01-21 RX ORDER — SODIUM CHLORIDE 9 MG/ML
INJECTION, SOLUTION INTRAVENOUS CONTINUOUS
Status: DISCONTINUED | OUTPATIENT
Start: 2020-01-21 | End: 2020-01-24 | Stop reason: HOSPADM

## 2020-01-21 RX ORDER — HEPARIN SODIUM 5000 [USP'U]/ML
5000 INJECTION, SOLUTION INTRAVENOUS; SUBCUTANEOUS EVERY 8 HOURS SCHEDULED
Status: DISCONTINUED | OUTPATIENT
Start: 2020-01-21 | End: 2020-01-22

## 2020-01-21 RX ORDER — ATORVASTATIN CALCIUM 40 MG/1
40 TABLET, FILM COATED ORAL NIGHTLY
Status: DISCONTINUED | OUTPATIENT
Start: 2020-01-21 | End: 2020-01-24 | Stop reason: HOSPADM

## 2020-01-21 RX ORDER — MULTIVITAMIN WITH FOLIC ACID 400 MCG
1 TABLET ORAL DAILY
Status: DISCONTINUED | OUTPATIENT
Start: 2020-01-22 | End: 2020-01-24 | Stop reason: HOSPADM

## 2020-01-21 RX ORDER — INSULIN GLARGINE 100 [IU]/ML
17 INJECTION, SOLUTION SUBCUTANEOUS EVERY MORNING
Status: DISCONTINUED | OUTPATIENT
Start: 2020-01-22 | End: 2020-01-24 | Stop reason: HOSPADM

## 2020-01-21 RX ADMIN — INSULIN LISPRO 3 UNITS: 100 INJECTION, SOLUTION INTRAVENOUS; SUBCUTANEOUS at 23:03

## 2020-01-21 RX ADMIN — SODIUM CHLORIDE: 9 INJECTION, SOLUTION INTRAVENOUS at 21:27

## 2020-01-21 RX ADMIN — SODIUM CHLORIDE: 9 INJECTION, SOLUTION INTRAVENOUS at 19:38

## 2020-01-21 RX ADMIN — PIPERACILLIN AND TAZOBACTAM 3.38 G: 3; .375 INJECTION, POWDER, LYOPHILIZED, FOR SOLUTION INTRAVENOUS at 19:38

## 2020-01-21 RX ADMIN — HEPARIN SODIUM 5000 UNITS: 5000 INJECTION INTRAVENOUS; SUBCUTANEOUS at 23:03

## 2020-01-21 ASSESSMENT — ENCOUNTER SYMPTOMS
NAUSEA: 0
CONSTIPATION: 0
DIARRHEA: 0
VOMITING: 0
COLOR CHANGE: 0
WHEEZING: 0
COUGH: 0
SHORTNESS OF BREATH: 0
ABDOMINAL PAIN: 0
BACK PAIN: 0

## 2020-01-21 ASSESSMENT — PAIN SCALES - GENERAL: PAINLEVEL_OUTOF10: 0

## 2020-01-21 NOTE — TELEPHONE ENCOUNTER
Depending on his CODE STATUS, they will have to decide along with the nursing facility physician if he needs to return to the emergency department. If he is back on anticoagulation therapy, he could obviously be having hematuria from this again.

## 2020-01-21 NOTE — TELEPHONE ENCOUNTER
Spoke to Taras Nick at [facility]. She was advised of the message below. She voiced understanding and will have Aj alexandra speak with [facility] physician regarding the patient condition.

## 2020-01-21 NOTE — ED NOTES
ED nurse-to-nurse bedside report    Chief Complaint   Patient presents with    Hematuria      LOC: alert and orientated to name, place, date  Vital signs   Vitals:    01/21/20 1541 01/21/20 1712   BP: 96/83 (!) 117/53   Pulse: 80 78   Resp: 16 16   Temp: 97.8 °F (36.6 °C)    TempSrc: Oral    SpO2: 96% 96%   Weight: 170 lb (77.1 kg)    Height: 5' 10\" (1.778 m)       Pain:    Pain Interventions: None  Pain Goal: None  Oxygen: Yes    Current needs required 1L   Telemetry: Yes  LDAs:    Continuous Infusions:   Mobility: Unknown  Obrien Fall Risk Score: No flowsheet data found.   Fall Interventions: none  Report given to: Lucy Morton RN  01/21/20 9813

## 2020-01-21 NOTE — ED NOTES
Pt resting quietly in room no needs expressed. VSS, respirations even and unlabored. Patient given oral hydration Denies other needs. Call light within reach. Side rails up x2 with call light in reach. Will continue to monitor.        Isa Avila RN  01/21/20 4162

## 2020-01-21 NOTE — ED PROVIDER NOTES
Jermain Harrison 13 COMPLAINT       Chief Complaint   Patient presents with    Hematuria       Nurses Notes reviewed and I agree except as noted in the HPI. HISTORY OF PRESENT ILLNESS    Yunior Flores is a 80 y.o. male who presents to the Emergency Department from his nursing home for the evaluation of hematuria that nursing home staff members report began this afternoon. The patient has a history of UTIs and has an indwelling Matos catheter. He has been admitted several times in the past for UTIs and gross hematuria requiring CBI. Nursing home staff denies any trauma to the patient's Matos catheter. The patient is also a diabetic, and nursing home staff report that his BG was 550 at the nursing home. His family member at bedside reports that he was given \"extra\" insulin today per nursing home staff. Patient's BG on arrival to this department was 242. Nursing home staff also reports that the patient has had increased confusion today. Patient reportedly had his last dose of Vancomycin 2 days ago for treatment of C. Diff. The patient is himself a poor historian. He denies any chest pain, shortness of breath, or abdominal pain. Patient was recently evaluated by Urology and reportedly has a right renal stone. He is reportedly not a candidate for surgical removal of this stone from his right kidney. Patient has a past medical history of colostomy placement due to a history of colon cancer, prostate cancer, HTN, CAD, CHF, status post CABG, DM, and CKD. There are no other complaints, symptoms, or concerns on initial encounter. The HPI was provided by the patient, nursing home report, and nurse. REVIEW OF SYSTEMS     Review of Systems   Constitutional: Negative for fatigue and fever. Respiratory: Negative for cough, shortness of breath and wheezing. Cardiovascular: Negative for chest pain.    Gastrointestinal: Negative for abdominal pain, constipation, diarrhea, nausea and vomiting. Genitourinary: Positive for hematuria. Negative for decreased urine volume, difficulty urinating and dysuria. Musculoskeletal: Negative for arthralgias, back pain, myalgias and neck pain. Skin: Negative for color change and pallor. Neurological: Negative for weakness. Psychiatric/Behavioral: Positive for confusion. Negative for suicidal ideas. PAST MEDICAL HISTORY    has a past medical history of Arthritis, CAD (coronary artery disease), Colostomy status ([facility] Diastolic CHF ([facility] GERD (gastroesophageal reflux disease), HTN (hypertension), Hyperlipidemia, Hypothyroid, Panlobular emphysema ([facility] Prostate cancer ([facility] S/P CABG (coronary artery bypass graft), and Type 2 diabetes mellitus with stage 3 chronic kidney disease, with long-term current use of insulin ([facility] SURGICAL HISTORY      has a past surgical history that includes Coronary artery bypass graft; knee surgery; colostomy; colectomy; Abdomen surgery; Cardiac surgery; vascular surgery; Tonsillectomy; Colonoscopy; Appendectomy; eye surgery; Dilatation, esophagus; joint replacement; and Endoscopy, colon, diagnostic. CURRENT MEDICATIONS       Current Discharge Medication List      CONTINUE these medications which have NOT CHANGED    Details   Probiotic Product (PROBIOTIC ADVANCED PO) Take 1 tablet by mouth 3 times daily      Nutritional Supplements (BOOST PO) Take 4 Bottles by mouth daily      Multiple Vitamin (MULTI-VITAMIN DAILY) TABS Take 1 tablet by mouth daily      insulin aspart (NOVOLOG PENFILL) 100 UNIT/ML injection cartridge Inject into the skin 4 times daily (before meals and nightly) Inject as per sliding scale: If 151- 200 = 2 units;  201-250 = 4 units  251-300 = 6 units  301-350 = 8 units  351-400 = 10 units  Call physician if blood sugar <60 or >400.       metoprolol succinate (TOPROL XL) 25 MG extended release tablet Take 1 tablet by mouth daily  Qty: 30 tablet, Refills: 3 atorvastatin (LIPITOR) 40 MG tablet Take 1 tablet by mouth nightly  Qty: 30 tablet, Refills: 3      insulin glargine (LANTUS) 100 UNIT/ML injection vial Inject 17 Units into the skin every morning      QUEtiapine (SEROQUEL) 25 MG tablet Take 1 tablet by mouth 2 times daily Additional RF per his PCP  Qty: 30 tablet, Refills: 0      levothyroxine (SYNTHROID) 75 MCG tablet Take 1 tablet by mouth every morning  Qty: 30 tablet, Refills: 0      aspirin 81 MG chewable tablet Take 1 tablet by mouth daily Restart when ok with urology  Qty: 30 tablet, Refills: 3      acetaminophen (TYLENOL) 325 MG tablet Take 2 tablets by mouth every 6 hours as needed for Pain  Qty: 120 tablet, Refills: 3             ALLERGIES     has No Known Allergies. FAMILY HISTORY     He indicated that his mother is . He indicated that his father is . He indicated that his sister is . family history includes Asthma in his father; Cancer in his mother. SOCIAL HISTORY      reports that he has quit smoking. He quit after 25.00 years of use. He has never used smokeless tobacco. He reports that he does not drink alcohol or use drugs. PHYSICAL EXAM     INITIAL VITALS:  height is 5' 10\" (1.778 m) and weight is 170 lb (77.1 kg). His oral temperature is 97.9 °F (36.6 °C). His blood pressure is 129/60 and his pulse is 68. His respiration is 18 and oxygen saturation is 95%. Physical Exam  Vitals signs and nursing note reviewed. Constitutional:       General: He is not in acute distress. Appearance: Normal appearance. He is well-developed. He is not ill-appearing, toxic-appearing or diaphoretic. HENT:      Head: Normocephalic and atraumatic. Right Ear: External ear normal.      Left Ear: External ear normal.      Nose: Nose normal.      Mouth/Throat:      Mouth: Mucous membranes are moist.      Pharynx: Oropharynx is clear. Eyes:      General: No scleral icterus. Right eye: No discharge.          Left eye: No discharge. Conjunctiva/sclera: Conjunctivae normal.      Pupils: Pupils are equal, round, and reactive to light. Neck:      Musculoskeletal: Normal range of motion. Cardiovascular:      Rate and Rhythm: Normal rate and regular rhythm. Heart sounds: Normal heart sounds. No murmur. No friction rub. No gallop. Pulmonary:      Effort: Pulmonary effort is normal. No respiratory distress. Breath sounds: Normal breath sounds. No stridor. No wheezing, rhonchi or rales. Chest:      Chest wall: No tenderness. Abdominal:      General: Bowel sounds are normal. There is no distension. Palpations: Abdomen is soft. There is no mass. Tenderness: There is no tenderness. There is no guarding or rebound. Hernia: No hernia is present. Genitourinary:     Comments: There is grossly bloody urine in the catheter bag. Musculoskeletal: Normal range of motion. Skin:     General: Skin is warm and dry. Coloration: Skin is not pale. Findings: No erythema or rash. Neurological:      General: No focal deficit present. Mental Status: He is alert. GCS: GCS eye subscore is 4. GCS verbal subscore is 5. GCS motor subscore is 6. Cranial Nerves: No cranial nerve deficit. Motor: No abnormal muscle tone. Coordination: Coordination normal.      Comments: Patient is oriented to person and place but not to time. Psychiatric:         Behavior: Behavior normal.         Thought Content:  Thought content normal.          DIFFERENTIAL DIAGNOSIS:   Includes but not limited to: UTI, SHAYAN, pyelonephritis, kidney or ureteral stone, dehydration, electrolyte abnormality, hyperglycemia, DKA, CVA    DIAGNOSTIC RESULTS     EKG: All EKG's are interpreted by the Emergency Department Physician who either signs or Co-signs this chart in the absence of a cardiologist.    Ventricular Rate Atrial Rate P-R Interval QRS Duration Q-T Interval     01/21/20 16:15:01 01/21/20 16:15:01 77 77 160 104 396       Collection Time Result Time QTc Calculation (Bazett) R Axis T Axis   01/21/20 16:15:01 01/21/20 16:15:01 448 -61 76       Final result                Narrative:    Normal sinus rhythm  Left axis deviation  ST & T wave abnormality, consider anterolateral ischemia  Abnormal ECG  When compared with ECG of 27-DEC-2019 10:15,  Incomplete right bundle branch block is no longer Present  Confirmed by Norma Luis ([id number]) on 1/21/2020 5:40:50 PM            No STEMI     RADIOLOGY: non-plainfilm images(s) such as CT, Ultrasound and MRI are read by the radiologist.    CT Head WO Contrast   Final Result   No acute intracranial findings. **This report has been created using voice recognition software. It may contain minor errors which are inherent in voice recognition technology. **      Final report electronically signed by Dr. Tika Perez on 1/21/2020 6:30 PM      CT ABDOMEN PELVIS WO CONTRAST Additional Contrast? None   Final Result      1. 7 mm calcification right kidney. No hydronephrosis or obstructing ureteral stone. .   2. Thickened and decompressed bladder wall. Mild perivesicular infiltration. Cystitis is not excluded correlation with urinalysis is advised. **This report has been created using voice recognition software. It may contain minor errors which are inherent in voice recognition technology. **      Final report electronically signed by Dr. Tika Perez on 1/21/2020 6:36 PM      XR CHEST PORTABLE   Final Result   1. There is asymmetric lucency throughout the left chest. There is no focal consolidation or infiltrate. There is no pleural effusion or pulmonary vascular congestion. A CTA examination of the chest with intravenous contrast is recommended to exclude a    pulmonary embolus. **This report has been created using voice recognition software. It may contain minor errors which are inherent in voice recognition technology. **      Final report electronically signed by Dr. Alona Koch on 1/21/2020 4:33 PM          LABS:     Labs Reviewed   CULTURE, REFLEXED, URINE - Abnormal; Notable for the following components:       Result Value    Organism enteric gram negative bacilli (*)     All other components within normal limits    Narrative:     Source: urine, clean catch       Site:           Current Antibiotics: not stated   CBC WITH AUTO DIFFERENTIAL - Abnormal; Notable for the following components:    WBC 17.7 (*)     RBC 4.40 (*)     Hemoglobin 11.7 (*)     Hematocrit 38.3 (*)     MCHC 30.5 (*)     RDW-CV 20.8 (*)     RDW-SD 64.9 (*)     Platelets 419 (*)     Segs Absolute 12.6 (*)     Monocytes Absolute 2.0 (*)     Eosinophils Absolute 0.5 (*)     Immature Grans (Abs) 0.55 (*)     All other components within normal limits   BASIC METABOLIC PANEL - Abnormal; Notable for the following components:    Potassium 5.6 (*)     CO2 19 (*)     Glucose 250 (*)     BUN 48 (*)     CREATININE 1.8 (*)     All other components within normal limits   HEPATIC FUNCTION PANEL - Abnormal; Notable for the following components:    Alb 3.4 (*)     All other components within normal limits   TROPONIN - Abnormal; Notable for the following components:    Troponin T 0.046 (*)     All other components within normal limits   BLOOD GAS, VENOUS - Abnormal; Notable for the following components:    PCO2, MIXED VENOUS 39 (*)     PO2, Mixed 23 (*)     HCO3, Mixed 21 (*)     Base Exc, Mixed -4.9 (*)     All other components within normal limits   GLOMERULAR FILTRATION RATE, ESTIMATED - Abnormal; Notable for the following components:    Est, Glom Filt Rate 36 (*)     All other components within normal limits   URINE WITH REFLEXED MICRO - Abnormal; Notable for the following components:    Bilirubin Urine SMALL (*)     Ketones, Urine TRACE (*)     Blood, Urine LARGE (*)     Protein, UA >= 300 (*)     Nitrite, Urine POSITIVE (*)     Leukocyte Esterase, Urine LARGE (*)     Color, UA RED (*)     Character, Urine TURBID (*) All other components within normal limits   OSMOLALITY, SERUM - Abnormal; Notable for the following components:    Osmolality 317 (*)     All other components within normal limits   POCT GLUCOSE - Abnormal; Notable for the following components:    POC Glucose 242 (*)     All other components within normal limits   POCT GLUCOSE - Abnormal; Notable for the following components:    POC Glucose 262 (*)     All other components within normal limits   POCT GLUCOSE - Abnormal; Notable for the following components:    POC Glucose 235 (*)     All other components within normal limits   POCT GLUCOSE - Abnormal; Notable for the following components:    POC Glucose 240 (*)     All other components within normal limits   POCT GLUCOSE - Abnormal; Notable for the following components:    POC Glucose 242 (*)     All other components within normal limits   RAPID INFLUENZA A/B ANTIGENS   CULTURE BLOOD #1    Narrative:     Source: blood-Adult-suboptimal <5.5oz./set volume       Site: Peripheral Vein            Current Antibiotics: not stated   CULTURE BLOOD #2    Narrative:     Source: blood-Adult-suboptimal <5.5oz./set volume       Site: Peripheral Vein            Current Antibiotics: not stated   BRAIN NATRIURETIC PEPTIDE   LIPASE   MAGNESIUM   LACTATE, SEPSIS   BETA-HYDROXYBUTYRATE   ANION GAP   OSMOLALITY   SCAN OF BLOOD SMEAR   BILE ACIDS, TOTAL       EMERGENCY DEPARTMENT COURSE:   Vitals:    Vitals:    01/21/20 1815 01/21/20 1940 01/22/20 0056 01/22/20 0750   BP: 106/60 (!) 108/58 128/65 129/60   Pulse: 77 78 85 68   Resp: 14 15 16 18   Temp:   98.7 °F (37.1 °C) 97.9 °F (36.6 °C)   TempSrc:   Oral Oral   SpO2: 97% 97% 98% 95%   Weight:       Height:         MDM:  The patient was seen and evaluated within the ED today with confusion, hyperglycemia, and gross hematuria. Within the department, I observed the patient's vital signs to be within acceptable range, and he was afebrile. On exam, I appreciated clear lung sounds. There is no chest wall tenderness. Patient is alert to person and place but not to time. Patient is a poor historian. There is grossly bloody urine in the catheter bag. Radiologic studies within the department revealed  no acute hemorrhage, infarct, mass, or other acute cranial or intracranial pathology. CXR revealed asymmetric lucency throughout the left chest. There is no focal consolidation or infiltrate. There is no pleural effusion or pulmonary vascular congestion. A CTA examination of the chest with intravenous contrast is recommended to exclude a pulmonary embolus. Abdominal CT is pending at this time. Laboratory work reveled a WBC count of 17.7 with a lactic acid of 1.9. CO2 is 19. BUN is 48, Cr 1.8 - this is acutely elevated from his last values. BG on BMP is 250. VBG revealed a pH of 7.33, PCO2 39, PO2 23, HCO3 21. Beta-hydroxybutyrate is 1.69. Lab work is not consistent with DKA. Troponin is 0.046, which is elevated above his chronically elevated baseline. Flu swab is negative. I observed the patient's condition to remain stable during the duration of the stay. At shift change, Kenn Calderon NP, took over the patient's care. She will review results, order additional tests and labs, treat, consult, and admit or discharge as appropriate. CRITICAL CARE:   None      CONSULTS:  Discussed the case with my attending physician in the Emergency Department, Dr. Nhi Morley, who advises obtaining an abdominal CT scan without contrast to evaluate for  tract stone obstruction. Does not advise obtaining a VQ scan at this time. PROCEDURES:  None     FINAL IMPRESSION      1. Altered mental status, unspecified altered mental status type    2. Hematuria, unspecified type    3. Urinary tract infection associated with indwelling urethral catheter, [facility]    4. Abnormal chest x-ray          DISPOSITION/PLAN   Kenn Calderon NP, took over the patient's care.  She will review results, order additional tests and labs, treat, consult, and admit or discharge as appropriate. PATIENT REFERRED TO:  Delvis Hickey MD  [facility]  [address], [address]            DISCHARGE MEDICATIONS:  Current Discharge Medication List          (Please note that portions of this note were completed with a voice recognition program.  Efforts were made to edit the dictations but occasionally words are mis-transcribed.)    The patient was given an opportunity to see the Emergency Attending. The patient voiced understanding that I was a Mid-LevelProvider and was in agreement with being seen independently by myself. Scribe:  Jane Villasenor 1/21/20 4:25 PM Scribing for and in the presence of Anusha Lyman PA-C. Signed by: Sathya Miller, 01/22/20 1:50 PM    Provider:  I personally performed the services described in the documentation, reviewed and edited the documentation which was dictated to the scribe in my presence, and it accurately records my words and actions.     Anusha Lyman PA-C 1/21/20 1:50 PM        Anusha Lyman PA-C  01/22/20 1412

## 2020-01-21 NOTE — TELEPHONE ENCOUNTER
Ulises Alaniz at [facility] [phone] stated the patient has more confusion and hematuria. He is constantly yelling and most of the time he does not know what he wants. The change in alter mental status started Sunday. He was having yellow cloudy urine now the urine turned dark red urine. He did have some some clots. The rousseau is patent with 200 ml output this shift. He is afebrile and does not c/o pain. He was readmitted from the 01/07/2020 from the hospital. She does not know when the catheter was last exchanged. Should the catheter be exchanged and a urine sent to the lab? Please advise. Thank you.

## 2020-01-22 LAB
GLUCOSE BLD-MCNC: 122 MG/DL (ref 70–108)
GLUCOSE BLD-MCNC: 174 MG/DL (ref 70–108)
GLUCOSE BLD-MCNC: 235 MG/DL (ref 70–108)
GLUCOSE BLD-MCNC: 240 MG/DL (ref 70–108)
GLUCOSE BLD-MCNC: 242 MG/DL (ref 70–108)

## 2020-01-22 PROCEDURE — 6370000000 HC RX 637 (ALT 250 FOR IP): Performed by: INTERNAL MEDICINE

## 2020-01-22 PROCEDURE — 2580000003 HC RX 258: Performed by: INTERNAL MEDICINE

## 2020-01-22 PROCEDURE — 6360000002 HC RX W HCPCS: Performed by: INTERNAL MEDICINE

## 2020-01-22 PROCEDURE — 1200000003 HC TELEMETRY R&B

## 2020-01-22 PROCEDURE — 82948 REAGENT STRIP/BLOOD GLUCOSE: CPT

## 2020-01-22 RX ORDER — QUETIAPINE FUMARATE 25 MG/1
25 TABLET, FILM COATED ORAL ONCE
Status: COMPLETED | OUTPATIENT
Start: 2020-01-22 | End: 2020-01-22

## 2020-01-22 RX ADMIN — ATORVASTATIN CALCIUM 40 MG: 40 TABLET, FILM COATED ORAL at 20:56

## 2020-01-22 RX ADMIN — LEVOTHYROXINE SODIUM 75 MCG: 75 TABLET ORAL at 06:11

## 2020-01-22 RX ADMIN — THERA TABS 1 TABLET: TAB at 09:30

## 2020-01-22 RX ADMIN — ACETAMINOPHEN 650 MG: 325 TABLET ORAL at 20:56

## 2020-01-22 RX ADMIN — INSULIN GLARGINE 17 UNITS: 100 INJECTION, SOLUTION SUBCUTANEOUS at 09:28

## 2020-01-22 RX ADMIN — QUETIAPINE FUMARATE 25 MG: 25 TABLET ORAL at 20:56

## 2020-01-22 RX ADMIN — METOPROLOL SUCCINATE 25 MG: 25 TABLET, FILM COATED, EXTENDED RELEASE ORAL at 09:30

## 2020-01-22 RX ADMIN — INSULIN LISPRO 4 UNITS: 100 INJECTION, SOLUTION INTRAVENOUS; SUBCUTANEOUS at 09:18

## 2020-01-22 RX ADMIN — SODIUM CHLORIDE: 9 INJECTION, SOLUTION INTRAVENOUS at 09:33

## 2020-01-22 RX ADMIN — PIPERACILLIN AND TAZOBACTAM 3.38 G: 3; .375 INJECTION, POWDER, LYOPHILIZED, FOR SOLUTION INTRAVENOUS at 03:49

## 2020-01-22 RX ADMIN — HEPARIN SODIUM 5000 UNITS: 5000 INJECTION INTRAVENOUS; SUBCUTANEOUS at 06:03

## 2020-01-22 RX ADMIN — PIPERACILLIN AND TAZOBACTAM 3.38 G: 3; .375 INJECTION, POWDER, LYOPHILIZED, FOR SOLUTION INTRAVENOUS at 10:36

## 2020-01-22 RX ADMIN — QUETIAPINE FUMARATE 25 MG: 25 TABLET ORAL at 15:05

## 2020-01-22 RX ADMIN — INSULIN LISPRO 2 UNITS: 100 INJECTION, SOLUTION INTRAVENOUS; SUBCUTANEOUS at 17:40

## 2020-01-22 RX ADMIN — QUETIAPINE FUMARATE 25 MG: 25 TABLET ORAL at 09:30

## 2020-01-22 RX ADMIN — INSULIN LISPRO 4 UNITS: 100 INJECTION, SOLUTION INTRAVENOUS; SUBCUTANEOUS at 12:47

## 2020-01-22 RX ADMIN — PIPERACILLIN AND TAZOBACTAM 3.38 G: 3; .375 INJECTION, POWDER, LYOPHILIZED, FOR SOLUTION INTRAVENOUS at 18:15

## 2020-01-22 ASSESSMENT — PAIN SCALES - GENERAL
PAINLEVEL_OUTOF10: 0
PAINLEVEL_OUTOF10: 5

## 2020-01-22 NOTE — DISCHARGE INSTR - COC
Continuity of Care Form    Patient Name: Jerica Guillermo   :  10/6/1928  MRN:  [id number]    Admit date:  2020  Discharge date:  ***    Code Status Order: Prior   Advance Directives:   [facility] Documentation     Date/Time Healthcare Directive Type of Healthcare Directive Copy in [address] Agent's Name Healthcare Agent's Phone Number    20 1721  --  --  --  --  Yash Jacques  [phone] (Home)    20 1720  Yes, patient has an advance directive for healthcare treatment  Durable power of  for health care;Living will  Yes, copy in [place] [id number] of   Divina Pelayo  --          Admitting Physician:  Eusebia Watts MD  PCP: Baron Shahid MD    Discharging Nurse: [facility] Unit/Room#: 5K-26/026-A  Discharging Unit Phone Number: ***    Emergency Contact:   Extended Emergency Contact Information  Primary Emergency Contact: Tal Rod of [address] Phone: [phone]  Relation: Child  Secondary Emergency Contact: Margarett Dakins [facility] of [address] Phone: [phone]  Mobile Phone: [phone]  Relation: Child    Past Surgical History:  Past Surgical History:   Procedure Laterality Date    ABDOMEN SURGERY      APPENDECTOMY      CARDIAC SURGERY      COLECTOMY      COLONOSCOPY      COLOSTOMY      CORONARY ARTERY BYPASS GRAFT      triple to Distal RCA, first OM, and LIMA to diagonal by Dr Verito Chavez, ESOPHAGUS      ENDOSCOPY, COLON, DIAGNOSTIC      EYE SURGERY      bilateral cataracts    JOINT REPLACEMENT      bilat knees    KNEE SURGERY      left total knee and right total knee    TONSILLECTOMY      VASCULAR SURGERY      cabg harvests from legs       Immunization History:   Immunization History   Administered Date(s) Administered    Influenza, Quadv, IM, PF (6 mo and older Fluzone, Flulaval, Fluarix, and 3 yrs and older Afluria) 2017    PPD Test 08/01/2019    Tdap (Boostrix, Adacel) 06/05/2018       Active Problems:  Patient Active Problem List   Diagnosis Code    Essential hypertension I10    Prostate CA ([facility] C61    Colon cancer ([facility] C18.9    Diabetes mellitus ([facility] E11.9    SHAYAN (acute kidney injury) ([facility] N17.9    Hypothyroidism E03.9    CAD (coronary artery disease) I25.10    Chest pain R07.9    Hyponatremia E87.1    Anemia D64.9    Dyspnea R06.00    Type 2 diabetes mellitus with stage 3 chronic kidney disease, with long-term current use of insulin ([facility]) E11.22, N18.3, Z79.4    S/P CABG (coronary artery bypass graft) Z95.1    CKD (chronic kidney disease) N18.9    Hyperlipidemia E78.5    Class 1 obesity due to excess calories with serious comorbidity and body mass index (BMI) of 30.0 to 30.9 in adult E66.09, Z68.30    Syncope R55    Pyelonephritis N12    SIRS (systemic inflammatory response syndrome) ([facility]) R65.10    Chronic hyponatremia E87.1    SHAYAN (acute kidney injury) ([facility]) N17.9    Colostomy status ([facility]) Z93.3    Panlobular emphysema ([facility]) J43.1    Generalized weakness R53.1    Traumatic hematoma of scalp S00. 03XA    Scalp abrasion S00. 01XA    Subdural hematoma ([facility] S06.5X9A    CKD (chronic kidney disease) stage 3, GFR 30-59 ml/min ([facility]) N18.3    Acute metabolic encephalopathy U98.08    Physical deconditioning R53.81    SIADH (syndrome of inappropriate ADH production) ([facility]) E22.2    Cerebrovascular disease I67.9    Agitation R45.1    Benign essential hypertension I10    Colostomy care ([facility] Z43.3    Peristomal skin complication Z87.0    CHF (congestive heart failure), NYHA class I, acute on chronic, combined ([facility]) I50.43    Ventricular arrhythmia I49.9    Hematuria R31.9    UTI (urinary tract infection) N39.0    Gross hematuria R31.0    Hyperkalemia E87.5    Severe malnutrition ([facility]) E43    AMS (altered mental status) R41.82       Isolation/Infection:   Isolation          Contact        Patient Infection

## 2020-01-22 NOTE — PROGRESS NOTES
Hans Barroso paper charting documents patient being on Honey thick liquids since 1/19/20 due to increased occurrences of choking on food.

## 2020-01-22 NOTE — H&P
30 years back. No recent smoking,  alcohol, or illicit drug use. He is currently residing in a nursing  home. REVIEW OF OTHER SYSTEMS:  The patient is sound asleep and hence limited. MEDICATIONS:  List at the nursing home had probiotic, Boost,  multivitamin, NovoLog, Toprol, Lipitor, Lantus, Seroquel, Synthroid,  aspirin, and Tylenol. PHYSICAL EXAMINATION:  VITAL SIGNS:  Blood pressure was 129/60, pulse was 68, respirations 18,  temperature 97.9. HEENT:  Pupils are reacting to light. Oropharynx not examined as the  patient did not open his mouth. NECK:  Supple. HEART:  S1, S2 appreciated. Not tachycardic. LUNGS:  Air entry is heard bilaterally. ABDOMEN:  Soft. Bowel sounds heard. EXTREMITIES:  Warm. NEUROLOGIC:  He is sound asleep. LABORATORY DATA:  On admission, sodium was 137, potassium 5.6, chloride  of 104, CO2 was 19, BUN of 48, creatinine of 1.8. Troponin 0.046. ALT  was 15, AST was 15, glucose was 250. WBC was 17.7, hemoglobin 11.7,  hematocrit of 38.3, and platelets of 525. RADIOLOGIC DATA:  CT of the head, no acute process. CT of the abdomen  and pelvis without contrast showed 11 mm calcified right kidney and  thickened and decompressed bladder wall was noted. IMPRESSION:  1. Altered mentation probably multifactorial with metabolic  encephalopathy with UTI, volume depletion. 2.  Hematuria. 3.  UTI with chronic indwelling Matos catheter. 4.  Acute kidney injury with chronic kidney disease. 5.  Hyperkalemia. 6.  Leukocytosis. 7.  Insulin-requiring diabetes mellitus. 8.  History of coronary artery disease, status post coronary artery  bypass graft. 9.  History of prostate cancer, status post radiation. 10.  History of ulcerative colitis, status post colectomy. 11.  Hypothyroidism. 12.  Hyperlipidemia. 13.  Hypertension. 14.  History of chronic congestive heart failure with an EF of 69% with  diastolic dysfunction. 15.  Malnutrition. RECOMMENDATIONS:  1.   Hold off on antiplatelet agents. Consult Urology. 2.  Continue antibiotics and await final urine culture results. 3.  Continue IV hydration and if potassium is still elevated, need  correction. 4.  Resume other home medications as appropriate. 5.  DVT prophylaxis with SCDs. GI prophylaxis with Pepcid. 6.  The patient's code status was limited code, but we will discuss with  family regarding hospice as he had multiple admissions and continued  decline in his general health. 7.  Dietary supplements.         Edgar Avilez M.D.    D: 01/22/2020 14:23:54       T: 01/22/2020 15:04:06     [email or web address]  Job#: [id number]     Doc#: [id number]    CC:

## 2020-01-22 NOTE — ED NOTES
Transported to [facility] by bed. Floor n otified.      Anette Nino, AINSLEY  54/90/28 [id number]

## 2020-01-22 NOTE — ED NOTES
ED to inpatient nurses report    Chief Complaint   Patient presents with    Hematuria      Present to ED from nursing home  LOC: alert to only name  Vital signs   Vitals:    01/21/20 1541 01/21/20 1712 01/21/20 1815 01/21/20 1940   BP: 96/83 (!) 117/53 106/60 (!) 108/58   Pulse: 80 78 77 78   Resp: 16 16 14 15   Temp: 97.8 °F (36.6 °C)      TempSrc: Oral      SpO2: 96% 96% 97% 97%   Weight: 170 lb (77.1 kg)      Height: 5' 10\" (1.778 m)         Oxygen Baseline room air    Current needs required room air   LDAs:   Peripheral IV 01/21/20 Right Antecubital (Active)   Site Assessment Clean; Intact;Dry 1/21/2020  6:16 PM   Line Status Normal saline locked 1/21/2020  6:16 PM   Dressing Status Clean;Dry; Intact 1/21/2020  6:16 PM   Dressing Intervention New 1/21/2020  5:21 PM     Mobility: Requires assistance * 2  Pending ED orders: none  Present condition: resting in bed, antibiotics running, VSS, respirations even and unlabored    Electronically signed by Jamaal Leyva RN on 1/21/2020 at 7:44 PM       America Osuna RN  01/21/20 1945

## 2020-01-22 NOTE — CARE COORDINATION
[address] [facility],[address]     2020    Patient: Gaudencio Cuevas Patient : 10/6/1928   MRN: [id number]  Reason for Admission: ER, patient presented with hematuria and hyperglycemia. RARS: Readmission Risk Score: 48         Spoke with: Aparna Corea is a 80 y.o. male who presents to the Emergency Department from his nursing home for the evaluation of hematuria that nursing home staff members reported. The patient has a history of UTIs and has an indwelling Rousseau catheter. Increase confusion. Consults to Palliative Care and SS, IV fluids, Zosyn, DM management, Heparin subq, prn Tylenol, telemetry. Patient was placed in ECF on , [facility] and is planing on returning to SNF. Patient has multiple admissions and has a chronic rousseau catheter.       Readmission Risk  Patient Active Problem List   Diagnosis    Essential hypertension    Prostate CA ([facility]    Colon cancer ([facility]    Diabetes mellitus ([facility]    SHAYAN (acute kidney injury) ([facility]    Hypothyroidism    CAD (coronary artery disease)    Chest pain    Hyponatremia    Anemia    Dyspnea    Type 2 diabetes mellitus with stage 3 chronic kidney disease, with long-term current use of insulin ([facility])    S/P CABG (coronary artery bypass graft)    CKD (chronic kidney disease)    Hyperlipidemia    Class 1 obesity due to excess calories with serious comorbidity and body mass index (BMI) of 30.0 to 30.9 in adult    Syncope    Pyelonephritis    SIRS (systemic inflammatory response syndrome) ([facility])    Chronic hyponatremia    SHAYAN (acute kidney injury) ([facility])    Colostomy status ([facility])    Panlobular emphysema (HCC)    Generalized weakness    Traumatic hematoma of scalp    Scalp abrasion    Subdural hematoma ([facility])    CKD (chronic kidney disease) stage 3, GFR 30-59 ml/min ([facility])    Acute metabolic encephalopathy    Physical deconditioning    SIADH (syndrome of inappropriate ADH production) ([facility]    Cerebrovascular disease    Agitation    Benign essential hypertension    Colostomy care ([facility]    Peristomal skin complication    CHF (congestive heart failure), NYHA class I, acute on chronic, combined (HCC)    Ventricular arrhythmia    Hematuria    UTI (urinary tract infection)    Gross hematuria    Hyperkalemia    Severe malnutrition (HCC)    AMS (altered mental status)       Inpatient Assessment  Care Transitions Summary    Care Transitions Inpatient Review  Medication Review  Are you able to afford your medications?:  Yes  How often do you have difficulty taking your medications?:  I always take them as prescribed. Housing Review  Social Support  Durable Medical Equipment  Patient DME:  Radha Salinas  Functional Review  Hearing and Vision  Hearing Impairment:  Hard of hearing  Care Transitions Interventions  No Identified Needs                                 Follow Up  Future Appointments   Date Time Provider Martha Llanos   1/23/2020 10:15 AM STR [facility] ROOM 2 STRZ RAD [facility] Radiolog       Health Maintenance  There are no preventive care reminders to display for this patient.     Car Marinelli RN

## 2020-01-23 LAB
ANION GAP SERPL CALCULATED.3IONS-SCNC: 12 MEQ/L (ref 8–16)
ANISOCYTOSIS: PRESENT
BASOPHILIA: ABNORMAL
BASOPHILIC STIPPLING: ABNORMAL
BASOPHILS # BLD: 0.9 %
BASOPHILS ABSOLUTE: 0.1 THOU/MM3 (ref 0–0.1)
BUN BLDV-MCNC: 42 MG/DL (ref 7–22)
CALCIUM SERPL-MCNC: 8.6 MG/DL (ref 8.5–10.5)
CHLORIDE BLD-SCNC: 110 MEQ/L (ref 98–111)
CO2: 18 MEQ/L (ref 23–33)
CREAT SERPL-MCNC: 1.8 MG/DL (ref 0.4–1.2)
CRENATED RBC'S: ABNORMAL
ELLIPTOCYTES: ABNORMAL
EOSINOPHIL # BLD: 8 %
EOSINOPHILS ABSOLUTE: 1.1 THOU/MM3 (ref 0–0.4)
ERYTHROCYTE [DISTWIDTH] IN BLOOD BY AUTOMATED COUNT: 20.6 % (ref 11.5–14.5)
ERYTHROCYTE [DISTWIDTH] IN BLOOD BY AUTOMATED COUNT: 67.7 FL (ref 35–45)
GFR SERPL CREATININE-BSD FRML MDRD: 36 ML/MIN/1.73M2
GLUCOSE BLD-MCNC: 118 MG/DL (ref 70–108)
GLUCOSE BLD-MCNC: 138 MG/DL (ref 70–108)
GLUCOSE BLD-MCNC: 146 MG/DL (ref 70–108)
GLUCOSE BLD-MCNC: 201 MG/DL (ref 70–108)
HCT VFR BLD CALC: 32.8 % (ref 42–52)
HEMOGLOBIN: 9.8 GM/DL (ref 14–18)
IMMATURE GRANS (ABS): 0.59 THOU/MM3 (ref 0–0.07)
IMMATURE GRANULOCYTES: 4.3 %
LYMPHOCYTES # BLD: 15.6 %
LYMPHOCYTES ABSOLUTE: 2.1 THOU/MM3 (ref 1–4.8)
MCH RBC QN AUTO: 27.1 PG (ref 26–33)
MCHC RBC AUTO-ENTMCNC: 29.9 GM/DL (ref 32.2–35.5)
MCV RBC AUTO: 90.6 FL (ref 80–94)
MONOCYTES # BLD: 11.6 %
MONOCYTES ABSOLUTE: 1.6 THOU/MM3 (ref 0.4–1.3)
NUCLEATED RED BLOOD CELLS: 0 /100 WBC
ORGANISM: ABNORMAL
PLATELET # BLD: 406 THOU/MM3 (ref 130–400)
PLATELET ESTIMATE: ABNORMAL
PMV BLD AUTO: 11.1 FL (ref 9.4–12.4)
POIKILOCYTES: ABNORMAL
POTASSIUM SERPL-SCNC: 5 MEQ/L (ref 3.5–5.2)
RBC # BLD: 3.62 MILL/MM3 (ref 4.7–6.1)
SCAN OF BLOOD SMEAR: NORMAL
SEG NEUTROPHILS: 59.6 %
SEGMENTED NEUTROPHILS ABSOLUTE COUNT: 8.2 THOU/MM3 (ref 1.8–7.7)
SODIUM BLD-SCNC: 140 MEQ/L (ref 135–145)
URINE CULTURE REFLEX: ABNORMAL
WBC # BLD: 13.7 THOU/MM3 (ref 4.8–10.8)

## 2020-01-23 PROCEDURE — 1200000003 HC TELEMETRY R&B

## 2020-01-23 PROCEDURE — 85025 COMPLETE CBC W/AUTO DIFF WBC: CPT

## 2020-01-23 PROCEDURE — 36415 COLL VENOUS BLD VENIPUNCTURE: CPT

## 2020-01-23 PROCEDURE — 6370000000 HC RX 637 (ALT 250 FOR IP): Performed by: INTERNAL MEDICINE

## 2020-01-23 PROCEDURE — 6360000002 HC RX W HCPCS: Performed by: INTERNAL MEDICINE

## 2020-01-23 PROCEDURE — 99221 1ST HOSP IP/OBS SF/LOW 40: CPT | Performed by: NURSE PRACTITIONER

## 2020-01-23 PROCEDURE — 80048 BASIC METABOLIC PNL TOTAL CA: CPT

## 2020-01-23 PROCEDURE — 2709999900 HC NON-CHARGEABLE SUPPLY

## 2020-01-23 PROCEDURE — 82948 REAGENT STRIP/BLOOD GLUCOSE: CPT

## 2020-01-23 PROCEDURE — 2580000003 HC RX 258: Performed by: INTERNAL MEDICINE

## 2020-01-23 RX ORDER — LEVOFLOXACIN 5 MG/ML
500 INJECTION, SOLUTION INTRAVENOUS EVERY 24 HOURS
Status: DISCONTINUED | OUTPATIENT
Start: 2020-01-23 | End: 2020-01-23

## 2020-01-23 RX ORDER — LEVOFLOXACIN 5 MG/ML
250 INJECTION, SOLUTION INTRAVENOUS EVERY 24 HOURS
Status: DISCONTINUED | OUTPATIENT
Start: 2020-01-23 | End: 2020-01-24 | Stop reason: HOSPADM

## 2020-01-23 RX ADMIN — QUETIAPINE FUMARATE 25 MG: 25 TABLET ORAL at 12:44

## 2020-01-23 RX ADMIN — PIPERACILLIN AND TAZOBACTAM 3.38 G: 3; .375 INJECTION, POWDER, LYOPHILIZED, FOR SOLUTION INTRAVENOUS at 12:44

## 2020-01-23 RX ADMIN — INSULIN GLARGINE 17 UNITS: 100 INJECTION, SOLUTION SUBCUTANEOUS at 12:44

## 2020-01-23 RX ADMIN — THERA TABS 1 TABLET: TAB at 12:44

## 2020-01-23 RX ADMIN — SODIUM CHLORIDE: 9 INJECTION, SOLUTION INTRAVENOUS at 11:55

## 2020-01-23 RX ADMIN — INSULIN LISPRO 4 UNITS: 100 INJECTION, SOLUTION INTRAVENOUS; SUBCUTANEOUS at 12:44

## 2020-01-23 RX ADMIN — LEVOTHYROXINE SODIUM 75 MCG: 75 TABLET ORAL at 12:44

## 2020-01-23 RX ADMIN — PIPERACILLIN AND TAZOBACTAM 3.38 G: 3; .375 INJECTION, POWDER, LYOPHILIZED, FOR SOLUTION INTRAVENOUS at 05:11

## 2020-01-23 RX ADMIN — LEVOFLOXACIN 250 MG: 5 INJECTION, SOLUTION INTRAVENOUS at 17:04

## 2020-01-23 RX ADMIN — METOPROLOL SUCCINATE 25 MG: 25 TABLET, FILM COATED, EXTENDED RELEASE ORAL at 12:44

## 2020-01-23 ASSESSMENT — PAIN SCALES - GENERAL: PAINLEVEL_OUTOF10: 0

## 2020-01-23 NOTE — CONSULTS
Relationship status: Not on file    Intimate partner violence:     Fear of current or ex partner: Not on file     Emotionally abused: Not on file     Physically abused: Not on file     Forced sexual activity: Not on file   Other Topics Concern    Not on file   Social History Narrative    Not on file     Family History:       Problem Relation Age of Onset    Cancer Mother     Asthma Father        ROS:  Constitutional: Negative for chills, or fevers. PHYSICAL EXAM:  VITALS:  BP (!) 132/54   Pulse 56   Temp 97.6 °F (36.4 °C) (Oral)   Resp 16   Ht 5' 10\" (1.778 m)   Wt 170 lb (77.1 kg)   SpO2 96%   BMI 24.39 kg/m² . Nursing note and vitals reviewed. Constitutional: Alert, flat affect. HEENT:   Head:         Normocephalic and atraumatic. Mouth/Throat:          Mucous membranes are normal.   Eyes:         EOM are normal. No scleral icterus. Nose:    The external appearance of the nose is normal  Ears: The ears appear normal to external inspection. Cardiovascular:       Normal rate, regular rhythm. Pulmonary/Chest:  Normal respiratory rate and rhthym. No use of accessory muscles. Lungs clear bilaterally. Abdominal:          Soft. No tenderness. Active bowel sounds             Genitalia:    Normal penis, catheter draining rust colored urine in the bag, and nick colored urine in the tubing. Urethral meatus is normal in size and location    Extremities:    No cyanosis, clubbing, or edema present. Neurological:    Alert and oriented.      DATA:  CBC:   Lab Results   Component Value Date    WBC 13.7 01/23/2020    RBC 3.62 01/23/2020    RBC 4.16 10/22/2011    HGB 9.8 01/23/2020    HCT 32.8 01/23/2020    MCV 90.6 01/23/2020    MCH 27.1 01/23/2020    MCHC 29.9 01/23/2020    RDW 19.6 06/06/2018     01/23/2020    MPV 11.1 01/23/2020     BMP:    Lab Results   Component Value Date     01/23/2020    K 5.0 01/23/2020    K 4.8 12/28/2019     01/23/2020    CO2 18 01/23/2020    BUN 42 01/23/2020    CREATININE 1.8 01/23/2020    CALCIUM 8.6 01/23/2020    LABGLOM 36 01/23/2020    GLUCOSE 146 01/23/2020    GLUCOSE 170 10/23/2011     BUN/Creatinine:    Lab Results   Component Value Date    BUN 42 01/23/2020    CREATININE 1.8 01/23/2020     Magnesium:    Lab Results   Component Value Date    MG 2.0 01/21/2020     Phosphorus:    Lab Results   Component Value Date    PHOS 3.6 06/29/2016     PT/INR:    Lab Results   Component Value Date    INR 1.09 12/14/2019     U/A:    Lab Results   Component Value Date    COLORU RED 01/21/2020    PHUR 5.0 01/21/2020    LABCAST NONE SEEN 12/27/2019    WBCUA 50-75 01/21/2020    RBCUA > 200 01/21/2020    YEAST NONE SEEN 12/27/2019    BACTERIA MODERATE 01/21/2020    SPECGRAV >=1.030 12/27/2019    LEUKOCYTESUR LARGE 01/21/2020    UROBILINOGEN 0.2 01/21/2020    BILIRUBINUR SMALL 01/21/2020    BLOODU LARGE 01/21/2020    GLUCOSEU NEGATIVE 01/21/2020    AMORPHOUS NONE SEEN 12/27/2019       Imaging: The patient has had a CT A/P Without Contrast which I have independently reviewed along with its accompanying report. The study demonstrates:    Narrative   PROCEDURE: CT ABDOMEN PELVIS WO CONTRAST       CLINICAL INFORMATION: hematuria, sepsis .       COMPARISON: 12/14/2019       TECHNIQUE: 2-D multiplanar noncontrast images of the abdomen and pelvis   All CT scans at this facility use dose modulation, iterative reconstruction, and/or weight-based dosing when appropriate to reduce radiation dose to as low as reasonably achievable.       FINDINGS: Lung bases   Mild bronchiectasis and basilar fibrosis. Coronary artery calcifications.       Abdomen pelvis   Solid or evaluation limited without IV contrast. Streak artifact from patient's extremities. 10 mm nonobstructing calculus right kidney. No hydronephrosis of either kidney.       Mildly distended gallbladder. Spleen is normal.   Liver is normal.   Dense atherosclerosis of the aorta and mesenteric vessels.    Normal caliber

## 2020-01-23 NOTE — PLAN OF CARE
Problem: Falls - Risk of:  Goal: Will remain free from falls  Description  Will remain free from falls  Outcome: Met This Shift  Fall assessment completed. Patient using call light appropriately to call for assistance with ambulation to bathroom. Personal items within reach. Patient is also compliant with use of non-skid slippers. Problem: Infection - Methicillin-Resistant Staphylococcus Aureus Infection:  Goal: Absence of methicillin-resistant Staphylococcus aureus infection  Description  Absence of methicillin-resistant Staphylococcus aureus infection  Outcome: Met This Shift  Patient in contact isolation. Problem: Safety:  Goal: Ability to chew and swallow food without choking will improve  Description  Ability to chew and swallow food without choking will improve  Outcome: Met This Shift     Patient able to swallow food without choking. Problem: Skin Integrity:  Goal: Signs of wound healing will improve  Description  Signs of wound healing will improve  Outcome: Met This Shift  No skin breakdown this shift. Patient being assisted with turning. Patients states understanding of repositioning every two hours. Problem: Discharge Planning:  Goal: Patients continuum of care needs are met  Description  Patients continuum of care needs are met  Outcome: Ongoing  Discharge plan is in process. Plan discharge to [facility]. Problem: Mental Status - Impaired:  Goal: Mental status will improve  Description  Mental status will improve  Outcome: Ongoing  Patient alert & oriented x1. Patient able to follow commands. Hand grasps equal bilaterally. Care plan reviewed with patient and family. Patient and family verbalize understanding of the plan of care and contribute to goal setting.

## 2020-01-23 NOTE — PROGRESS NOTES
Pt. alert and oriented x1 knows his name but no time or situation primary nurse is aware. Pupil L and R round 3mm and equal. Pt has sensation in upper extremities bilaterally with moderate  strength bilaterally. Breath sounds are clear throughout. Pt HR is irregular and primary nurse is aware. Pt has active bowelsounds with no tenderness or masses felt. Pt has sensation in lower extremities bilaterally with moderate pedal push and pull and pulses strong in both. Pt has no edema. Pt has slight redness and irritation on his coccyx primary nurse aware repositioned pt on right side. Pt has a Q2h repositioning order.

## 2020-01-24 VITALS
RESPIRATION RATE: 18 BRPM | SYSTOLIC BLOOD PRESSURE: 116 MMHG | TEMPERATURE: 98.1 F | OXYGEN SATURATION: 96 % | BODY MASS INDEX: 24.34 KG/M2 | HEART RATE: 64 BPM | WEIGHT: 170 LBS | HEIGHT: 70 IN | DIASTOLIC BLOOD PRESSURE: 59 MMHG

## 2020-01-24 LAB
ANION GAP SERPL CALCULATED.3IONS-SCNC: 12 MEQ/L (ref 8–16)
ANISOCYTOSIS: PRESENT
BASOPHILS # BLD: 1 %
BASOPHILS ABSOLUTE: 0.1 THOU/MM3 (ref 0–0.1)
BUN BLDV-MCNC: 31 MG/DL (ref 7–22)
CALCIUM SERPL-MCNC: 8.5 MG/DL (ref 8.5–10.5)
CHLORIDE BLD-SCNC: 113 MEQ/L (ref 98–111)
CO2: 14 MEQ/L (ref 23–33)
CREAT SERPL-MCNC: 1.5 MG/DL (ref 0.4–1.2)
EOSINOPHIL # BLD: 9.1 %
EOSINOPHILS ABSOLUTE: 1 THOU/MM3 (ref 0–0.4)
ERYTHROCYTE [DISTWIDTH] IN BLOOD BY AUTOMATED COUNT: 20.7 % (ref 11.5–14.5)
ERYTHROCYTE [DISTWIDTH] IN BLOOD BY AUTOMATED COUNT: 71.4 FL (ref 35–45)
GFR SERPL CREATININE-BSD FRML MDRD: 44 ML/MIN/1.73M2
GLUCOSE BLD-MCNC: 129 MG/DL (ref 70–108)
GLUCOSE BLD-MCNC: 131 MG/DL (ref 70–108)
GLUCOSE BLD-MCNC: 141 MG/DL (ref 70–108)
HCT VFR BLD CALC: 34.6 % (ref 42–52)
HEMOGLOBIN: 10.1 GM/DL (ref 14–18)
IMMATURE GRANS (ABS): 0.46 THOU/MM3 (ref 0–0.07)
IMMATURE GRANULOCYTES: 4.1 %
LYMPHOCYTES # BLD: 16.3 %
LYMPHOCYTES ABSOLUTE: 1.8 THOU/MM3 (ref 1–4.8)
MCH RBC QN AUTO: 27.4 PG (ref 26–33)
MCHC RBC AUTO-ENTMCNC: 29.2 GM/DL (ref 32.2–35.5)
MCV RBC AUTO: 93.8 FL (ref 80–94)
MONOCYTES # BLD: 12.1 %
MONOCYTES ABSOLUTE: 1.4 THOU/MM3 (ref 0.4–1.3)
NUCLEATED RED BLOOD CELLS: 0 /100 WBC
PLATELET # BLD: 367 THOU/MM3 (ref 130–400)
PMV BLD AUTO: 10.7 FL (ref 9.4–12.4)
POTASSIUM SERPL-SCNC: 4.2 MEQ/L (ref 3.5–5.2)
RBC # BLD: 3.69 MILL/MM3 (ref 4.7–6.1)
SCAN OF BLOOD SMEAR: NORMAL
SEG NEUTROPHILS: 57.4 %
SEGMENTED NEUTROPHILS ABSOLUTE COUNT: 6.5 THOU/MM3 (ref 1.8–7.7)
SODIUM BLD-SCNC: 139 MEQ/L (ref 135–145)
WBC # BLD: 11.3 THOU/MM3 (ref 4.8–10.8)

## 2020-01-24 PROCEDURE — 80048 BASIC METABOLIC PNL TOTAL CA: CPT

## 2020-01-24 PROCEDURE — 85025 COMPLETE CBC W/AUTO DIFF WBC: CPT

## 2020-01-24 PROCEDURE — 36415 COLL VENOUS BLD VENIPUNCTURE: CPT

## 2020-01-24 PROCEDURE — 51700 IRRIGATION OF BLADDER: CPT

## 2020-01-24 PROCEDURE — 99231 SBSQ HOSP IP/OBS SF/LOW 25: CPT | Performed by: NURSE PRACTITIONER

## 2020-01-24 PROCEDURE — 6370000000 HC RX 637 (ALT 250 FOR IP): Performed by: INTERNAL MEDICINE

## 2020-01-24 PROCEDURE — 82948 REAGENT STRIP/BLOOD GLUCOSE: CPT

## 2020-01-24 RX ADMIN — ACETAMINOPHEN 650 MG: 325 TABLET ORAL at 00:09

## 2020-01-24 RX ADMIN — LEVOTHYROXINE SODIUM 75 MCG: 75 TABLET ORAL at 08:52

## 2020-01-24 RX ADMIN — QUETIAPINE FUMARATE 25 MG: 25 TABLET ORAL at 08:52

## 2020-01-24 RX ADMIN — ATORVASTATIN CALCIUM 40 MG: 40 TABLET, FILM COATED ORAL at 00:09

## 2020-01-24 RX ADMIN — QUETIAPINE FUMARATE 25 MG: 25 TABLET ORAL at 00:09

## 2020-01-24 RX ADMIN — INSULIN GLARGINE 17 UNITS: 100 INJECTION, SOLUTION SUBCUTANEOUS at 08:55

## 2020-01-24 RX ADMIN — THERA TABS 1 TABLET: TAB at 08:52

## 2020-01-24 RX ADMIN — METOPROLOL SUCCINATE 25 MG: 25 TABLET, FILM COATED, EXTENDED RELEASE ORAL at 08:52

## 2020-01-24 ASSESSMENT — PAIN SCALES - GENERAL
PAINLEVEL_OUTOF10: 0
PAINLEVEL_OUTOF10: 4

## 2020-01-24 NOTE — PROGRESS NOTES
TROPONINI in the last 72 hours. BNP: No results for input(s): BNP in the last 72 hours. Lipids: No results for input(s): CHOL, HDL in the last 72 hours. Invalid input(s): LDLCALCU  INR: No results for input(s): INR in the last 72 hours. Radiology    Objective:   Vitals: BP (!) 116/59   Pulse 64   Temp 98.1 °F (36.7 °C) (Oral)   Resp 18   Ht 5' 10\" (1.778 m)   Wt 170 lb (77.1 kg)   SpO2 96%   BMI 24.39 kg/m²   HEENT: Head:pupils react  Neck: supple  Lungs: clear to auscultation  Heart: regular rate and rhythm   Abdomen: soft BS heard   Extremities: warm  edema  Neurologic:  Awake    Impression:   :   1. Altered mentation probably multifactorial with metabolic encephalopathy with UTI, volume depletion. 2.  Hematuria. 3.  UTI sec to chronic indwelling Matos catheter. 4.  Acute kidney injury with chronic kidney disease. 5.  Hyperkalemia. 6.  Leukocytosis. 7.  Insulin-requiring diabetes mellitus. 8.  History of coronary artery disease, status post coronary artery  bypass graft. 9.  History of prostate cancer, status post radiation. 10.  History of ulcerative colitis, status post colectomy. 11.  Hypothyroidism. 12.  Hyperlipidemia. 13.  Hypertension. 14.  History of chronic congestive heart failure with an EF of 80% with diastolic dysfunction. 15.  Malnutrition.     Plan:    Pt wants to go home  SS and hospice to d/w family and decide      Sana Tariq MD

## 2020-01-24 NOTE — PROGRESS NOTES
[address] [facility] [address]  [address] [facility] [address]  Dept: [phone]  Loc: [phone]  Visit Date: 2020  Urology Progress Note    Chief Complaint: Hematuria    Subjective:   Patient is resting in bed, rousseau catheter draining clear yellow urine without evidence of clots, CBI off, nectar thickened diet tolerating,       Vitals:  BP (!) 116/59   Pulse 64   Temp 98.1 °F (36.7 °C) (Oral)   Resp 18   Ht 5' 10\" (1.778 m)   Wt 170 lb (77.1 kg)   SpO2 96%   BMI 24.39 kg/m²   Temp  Av.9 °F (36.6 °C)  Min: 97.6 °F (36.4 °C)  Max: 98.1 °F (36.7 °C)    Intake/Output Summary (Last 24 hours) at 2020 1456  Last data filed at 2020 1409  Gross per 24 hour   Intake 3023.91 ml   Output 2300 ml   Net 723.91 ml       Social History     Socioeconomic History    Marital status:       Spouse name: Jarod Santillan Number of children: 3    Years of education: Not on file    Highest education level: Not on file   Occupational History    Not on file   Social Needs    Financial resource strain: Not on file    Food insecurity:     Worry: Not on file     Inability: Not on file    Transportation needs:     Medical: Not on file     Non-medical: Not on file   Tobacco Use    Smoking status: Former Smoker     Years: 25.00    Smokeless tobacco: Never Used    Tobacco comment: quit 40 yrs ago   Substance and Sexual Activity    Alcohol use: No    Drug use: No    Sexual activity: Not on file   Lifestyle    Physical activity:     Days per week: Not on file     Minutes per session: Not on file    Stress: Not on file   Relationships    Social connections:     Talks on phone: Not on file     Gets together: Not on file     Attends Gnosticist service: Not on file     Active member of club or organization: Not on file     Attends meetings of clubs or organizations: Not on file     Relationship status: Not on file    Intimate partner violence:     Fear of current or ex partner: Not on file     Emotionally abused: Not on file     Physically abused: Not on file     Forced sexual activity: Not on file   Other Topics Concern    Not on file   Social History Narrative    Not on file     Family History   Problem Relation Age of Onset    Cancer Mother     Asthma Father      No Known Allergies    Objective:    Constitutional: Alert and disoriented times x3, no acute distress, and cooperative to examination with appropriate mood and affect. HEENT:   Head:         Normocephalic and atraumatic. Mucous membranes are normal.   Eyes:         EOM are normal. No scleral icterus. Nose:    The external appearance of the nose is normal  Ears: The ears appear normal to external inspection. Cardiovascular:       Normal rate, regular rhythm. Pulmonary/Chest:  Normal respiratory rate and rhthym. No use of accessory muscles. Lungs clear bilaterally. Abdominal:          Soft. No tenderness. Active bowel sounds. Genitalia:    Rousseau catheter draining clear, yellow urine, CBI off. Musculoskeletal:    Normal range of motion. He exhibits no edema or tenderness of lower extremities. Extremities:    No cyanosis, clubbing, or edema present. Neurological:    Alert disoriented. Labs:  WBC:    Lab Results   Component Value Date    WBC 11.3 01/24/2020     Hemoglobin/Hematocrit:    Lab Results   Component Value Date    HGB 10.1 01/24/2020    HCT 34.6 01/24/2020     BMP:    Lab Results   Component Value Date     01/24/2020    K 4.2 01/24/2020    K 4.8 12/28/2019     01/24/2020    CO2 14 01/24/2020    BUN 31 01/24/2020    LABALBU 3.4 01/21/2020    CREATININE 1.5 01/24/2020    CALCIUM 8.5 01/24/2020    LABGLOM 44 01/24/2020       Impression:  Acute cystitis with hematuria  Gross hematuria  Right renal calculus  Chronic catheter  Hx of prostate cancer with radiation    Plan:  Continue rousseau catheter with planned change at [facility] every 4 weeks.   May resume ASA and Plavix in 3

## 2020-01-24 NOTE — PLAN OF CARE
Problem: Falls - Risk of:  Goal: Will remain free from falls  Description  Will remain free from falls  1/24/2020 0233 by Etta Hughes RN  Outcome: Ongoing  Note:   Remains free of falls this shift. Bed in lowest position. Call light and personal items within reach. Bed alarm on. Rounding hourly. Problem: Risk for Impaired Skin Integrity  Goal: Tissue integrity - skin and mucous membranes  Description  Structural intactness and normal physiological function of skin and  mucous membranes. 1/24/2020 0233 by Etta Hughes RN  Outcome: Ongoing  Note:   No new skin issues this shift. Patient has scattered bruising. Patient turned and repositioned, uses pillows for support. Problem: Discharge Planning:  Goal: Patients continuum of care needs are met  Description  Patients continuum of care needs are met  1/24/2020 0233 by Etta Hughes RN  Outcome: Ongoing  Note:   Patient is from [facility]. Discharge plan is ongoing, possible return to [facility] with hospice. Problem: Infection - Methicillin-Resistant Staphylococcus Aureus Infection:  Goal: Absence of methicillin-resistant Staphylococcus aureus infection  Description  Absence of methicillin-resistant Staphylococcus aureus infection  1/24/2020 0233 by Etta Hughes RN  Outcome: Ongoing  Note:   All contact isolation precautions in place. Problem: Safety:  Goal: Ability to chew and swallow food without choking will improve  Description  Ability to chew and swallow food without choking will improve  1/24/2020 0233 by Etta Hughes RN  Outcome: Ongoing  Note:   Patient is on pureed diet with honey thickened liquids. Tolerates well. Problem: Skin Integrity:  Goal: Signs of wound healing will improve  Description  Signs of wound healing will improve  1/24/2020 0233 by Etta Hughes RN  Outcome: Ongoing  Note:   Patient has colostomy bag, clean dry and intact.       Problem: Mental Status - Impaired:  Goal: Mental status will

## 2020-01-24 NOTE — CARE COORDINATION
1/24/20, 11:46 AM    DISCHARGE PLANNING EVALUATION    Sylvia Wolfshasha with palliative care has advised that after speaking with son, plan is for patient to be discharged home with hospice care. They are requesting  HH-son states that they have already been in contact with this agency. Call to [facility] (Ziyad)-she is aware of patient. They provided care for his spouse under hospice. Daughter called agency on Tuesday about to signing patient on when he left ECF. SW advised her that patient is not able to participate in therapy for skilled coverage and family has indicated plan to go home with hospice. She will contact family and get back with SW.     Received call from Loyd Bee with [address] spoke with son and equipment is being delivered to patient's home between 2 and 4 pm today. Transportation to be arranged after that time. Call to daughter Josey-confirmed that she is aware of discharge today with hospice services through [facility]. Advised her of equipment delivery time-she states that there will be someone at the home. Spoke with [facility] and transport arranged for 4:30 pm and paperwork faxed. Spoke with son and updated him on discharge arrangements. He states that he will be at patient's home before he gets there and will notify his sister of transport time. Provided him with list for private duty agencies. AVS faxed to Zach Olvera. Call to Loyd Bee to advise her of this and of transport time. Marci with [facility] advised of change in discharge plans. No other needs. 1/24/20, 1:47 PM    Patient goals/plan/ treatment preferences discussed by  and . Patient goals/plan/ treatment preferences reviewed with patient/ family. Patient/ family verbalize understanding of discharge plan and are in agreement with goal/plan/treatment preferences. Understanding was demonstrated using the teach back method.   AVS provided by RN at time of discharge, which includes all necessary medical information pertaining to the patients current course of illness, treatment, post-discharge goals of care, and treatment preferences.     Services After Discharge  Services At/After Discharge: Hospice, Transport([facility])   IMM Letter  IMM Letter given to Patient/Family/Significant other/Guardian/POA/by[de-identified] staff  IMM Letter date given[de-identified] 01/21/20  IMM Letter time given[de-identified] 1942

## 2020-01-24 NOTE — CARE COORDINATION
Notification received that patient will be discharged to home today with [facility].  Electronically signed by Felipe Tian RN on 1/24/20 at 12:07 PM

## 2020-01-24 NOTE — PROGRESS NOTES
Patient discharged to home with [facility], instructions given to son verbalizes understanding and denies needs.  Patient will be visited per home health hospice, everything has been scheduled to be delivered this evening per Sandie HERNANDEZ. Belongings sent with [facility]     Medication List      CONTINUE taking these medications    acetaminophen 325 MG tablet  Commonly known as:  TYLENOL  Take 2 tablets by mouth every 6 hours as needed for Pain     aspirin 81 MG chewable tablet  Take 1 tablet by mouth daily Restart when ok with urology     BOOST PO     insulin glargine 100 UNIT/ML injection vial  Commonly known as:  LANTUS     levothyroxine 75 MCG tablet  Commonly known as:  SYNTHROID  Take 1 tablet by mouth every morning     metoprolol succinate 25 MG extended release tablet  Commonly known as:  TOPROL XL  Take 1 tablet by mouth daily     Multi-Vitamin Daily Tabs     NovoLOG PenFill 100 UNIT/ML injection cartridge  Generic drug:  insulin aspart     PROBIOTIC ADVANCED PO     QUEtiapine 25 MG tablet  Commonly known as:  SEROQUEL  Take 1 tablet by mouth 2 times daily Additional RF per his PCP        STOP taking these medications    atorvastatin 40 MG tablet  Commonly known as:  LIPITOR

## 2020-01-26 PROBLEM — N39.0 UTI (URINARY TRACT INFECTION): Status: RESOLVED | Noted: 2019-12-27 | Resolved: 2020-01-26

## 2020-01-27 LAB
BLOOD CULTURE, ROUTINE: NORMAL
BLOOD CULTURE, ROUTINE: NORMAL

## 2020-02-04 NOTE — ED PROVIDER NOTES
[address]     Pt Name: Shawna Scott  MRN: [id number]  [place] 10/6/1928  Date of evaluation: 2/4/20        Mid-level provider Note:    I have personally performed and/or participated in the history, exam and medical decision making and agree with all pertinent clinical information as noted by the previous provider. I have also reviewed and agree with the past medical, family and social history unless otherwise noted. I have personally performed a face to face diagnostic evaluation on this patient. I have reviewed the previous provider's findings and agree. Evaluation: I assumed care of this patient from [place], [place]. Patient labs are grossly reassuring. She does have a UTI. I discussed the case with Dr. Lior Thakur move him who graciously agrees to admit the patient           Ct Abdomen Pelvis Wo Contrast Additional Contrast? None    Result Date: 1/21/2020  PROCEDURE: CT ABDOMEN PELVIS WO CONTRAST CLINICAL INFORMATION: SHAYAN, history right renal stone . COMPARISON: December 27, 2019 TECHNIQUE: Axial 5 mm CT images were obtained through the abdomen and pelvis. No contrast was given. Coronal reconstructions were obtained. All CT scans at this facility use dose modulation, iterative reconstruction, and/or weight-based dosing when appropriate to reduce radiation dose to as low as reasonably achievable. FINDINGS: Minimal atelectasis or scarring in the lung bases. Coronary artery calcification. Spleen, pancreas adrenal glands liver gallbladder are unremarkable. 7 mm calcification in the right kidney. Right renal cortical probable cyst. No hydronephrosis or obstructing ureteral stones. Decompressed bladder with Matos catheter. There is apparent wall thickening which may be related to underdistention. Cystitis is not excluded. Normal caliber abdominal aorta with advanced atherosclerotic changes. Postsurgical changes rectum with left lower quadrant ostomy. CHANGES: 1. There are stable median sternotomy wires and mediastinal surgical clips. 2. There is a suture anchor at the right humeral head. LINES/TUBES/MECHANICAL DEVICES: None. TRACHEA/HEART/MEDIASTINUM/HILUM: 1. Stable atheromatous calcification thoracic aorta. LUNG FIELDS: 1. There is asymmetric lucency throughout the left chest. There is no focal consolidation or infiltrate. There is no pleural effusion or pulmonary vascular congestion. A CTA examination of the chest with intravenous contrast is recommended to exclude a pulmonary embolus. OTHER: None. PNEUMOTHORAX:  None. OSSEOUS STRUCTURES: 1. No acute osseous abnormality. 1. There is asymmetric lucency throughout the left chest. There is no focal consolidation or infiltrate. There is no pleural effusion or pulmonary vascular congestion. A CTA examination of the chest with intravenous contrast is recommended to exclude a pulmonary embolus. **This report has been created using voice recognition software. It may contain minor errors which are inherent in voice recognition technology. ** Final report electronically signed by Dr. Ji Jameson on 1/21/2020 4:33 PM        DIAGNOSIS  1. Altered mental status, unspecified altered mental status type    2. Hematuria, unspecified type    3. Urinary tract infection associated with indwelling urethral catheter, initial encounter ([facility] [address]    4.  Abnormal chest x-ray           DISPOSITION/PLAN  DISPOSITION Admitted 01/21/2020 07:17:28 PM      PATIENT REFERRED TO:  Tessa Obregon MD  [address] [place] [address]  [phone]          DISCHARGE MEDICATIONS:  Discharge Medication List as of 1/24/2020  1:30 PM            MY Hwang CNP, APRN - CNP  02/04/20 0543

## 2020-02-10 ENCOUNTER — CARE COORDINATION (OUTPATIENT)
Dept: CASE MANAGEMENT | Age: 85
End: 2020-02-10

## 2020-09-07 NOTE — PATIENT INSTRUCTIONS
cooked ½ C 366   Navy beans, cooked ½ C 354   Plums, dried, pitted five 350   Artichokes, cooked one medium 343   Mashed potatoes ½ C 343   Edamame/soybeans, green ½ C 338   Tomato, canned  ½ C 325   Raisins ¼ C 299   Tomato, raw one medium 292   Papaya one small 286   Peach one medium 285   Pistachios, dry roasted, salted  1 oz (47 nuts) 285   Pumpkin, cooked, mashed ½ C 282   French fries 10 fries 278   Mushrooms, white, cooked ½ C 278   Beets, cooked ½ C 259   Lake sprouts, cooked ½ C 247   Kiwi one medium 237   Orange, raw one medium 237   Green peas, cooked ½ C 217   Cantaloupe, raw ½ C 214   Pear, raw one medium 212   Almonds, dry roasted 1 oz (24 nuts) 202   Apricot, canned, juice pack ½ C 202   Asparagus, cooked ½ C 202   [employer] squash, cooked ½ C 201   Apple, raw with skin one medium 195   Honeydew ½ C 194   Carrots, cooked ½ C 183   Onions, cooked ½ C 175   Spinach, raw 1 C 167   Corn, sweet yellow ½ C 163   Red schmitz pepper ½ C 157   Kale, cooked ½ C 150   Cabbage, cooked ½ C 147   Mustard greens, cooked ½ C 142   [place] ½ C 139   Figs, dried two figs 134   Summer squash, cooked ½ C 126   Grapes 10 grapes 120   Okra, cooked ½ C 108   Bamboo shoots, canned 1 C 108   Celery, raw one stalk 105   Peanut butter, creamy 1 Tbsp 104   Green beans, cooked ½ C 104   Cauliflower, cooked ½ C 91   Mushrooms, shitake, cooked ½ C 88   Watermelon ½ C 85   Cucumber, peeled ½ C 88   Iceberg lettuce 1 C 81   Tomato, sun dried one piece 80   Eggplant, cooked ½ C 69   Pickle one pickle 61   Ketchup 1 Tbsp 60   Radishes one radish 57     5   C=cup, mg=milligrams, oz=ounces, Tbsp=tablespoon    Reference  [place] Dept of Agriculture, Agricultural Research Service, [employer]  [employer] Database for Standard Reference, Release 25: Potassium, K (mg) content of selected foods per common measure SHORTNESS OF BREATH/WHEEZING

## 2020-12-01 NOTE — PROGRESS NOTES
Continue current medications. Recommended rescheduling appt with PCP for management of DM. Will address ED follow up today. Most recent A1C was elevated at 8.8. Started on insulin at last appt with NP.    IM Progress Note  Dr. Jordan Trejo  1/6/2020 10:28 AM      Patient name Adrianna Louie  [id number]  PCP: Brandi Verduzco MD  Admit Date: 12/27/2019  Acct No. [de-identified]    Subjective: Interval History:   intermittent confusion but not new    Diet: DIET CARB CONTROL;    I/O last 3 completed shifts: In: 200 [P.O.:588]  Out: 1400 [Urine:450; Stool:950]  No intake/output data recorded. Admission weight: 204 lb (92.5 kg) as of 12/27/2019  6:15 AM  Wt Readings from Last 3 Encounters:   01/01/20 181 lb 14.4 oz (82.5 kg)   12/14/19 204 lb (92.5 kg)   12/08/19 204 lb (92.5 kg)     Body mass index is 26.1 kg/m². Medications:   Scheduled Meds:   atorvastatin  40 mg Oral Nightly    insulin glargine  17 Units Subcutaneous QAM    lactobacillus  1 capsule Oral TID    levothyroxine  75 mcg Oral QAM    metoprolol succinate  25 mg Oral Daily    QUEtiapine  25 mg Oral BID    vancomycin  125 mg Oral Q6H    sodium chloride flush  10 mL Intravenous 2 times per day    insulin lispro  0-12 Units Subcutaneous TID WC    insulin lispro  0-6 Units Subcutaneous Nightly     Continuous Infusions:   dextrose         Labs :     CBC:   Recent Labs     01/05/20  0833   WBC 11.2*   HGB 10.7*        BMP:    Recent Labs     01/05/20  0833   *   K 5.0      CO2 15*   BUN 14   CREATININE 1.0   GLUCOSE 162*     Hepatic: No results for input(s): AST, ALT, ALB, BILITOT, ALKPHOS in the last 72 hours. Troponin: No results for input(s): TROPONINI in the last 72 hours. BNP: No results for input(s): BNP in the last 72 hours. Lipids: No results for input(s): CHOL, HDL in the last 72 hours. Invalid input(s): LDLCALCU  INR: No results for input(s): INR in the last 72 hours.     Radiology    Objective:   Vitals: BP (!) 129/59   Pulse 70   Temp 97.7 °F (36.5 °C) (Oral)   Resp 18   Ht 5' 10\" (1.778 m)   Wt 181 lb 14.4 oz (82.5 kg)   SpO2 100%   BMI 26.10 kg/m²   HEENT: Head:pupils react  Neck:

## 2021-06-23 NOTE — TELEPHONE ENCOUNTER
"Pt taken off O2 for trail RA sats, Pt remains short of breath with increase RR and work of breathing.  Pt states \"I just need to go to the ED\".  Pt has not returned to pre procedure baseline status.  Per Neuro Rad and Cardiology NP directive pt will be transferred to the ED for further evaluation.  Report called to Dr Patel in ED and Ama Caballero RN in ED.  Awaiting available ED bed to transfer pt. Will continue to monitor for changes until transfer.   " ----- Message from Stefania Urrutia CNP sent at 1/9/2018  2:59 PM EST -----  Pls advise spt Low K diet and send diet information to him, Pls

## 2024-01-29 NOTE — ED PROVIDER NOTES
and back pain (lower). Negative for joint swelling and neck pain. Skin: Negative for pallor and rash. Allergic/Immunologic: Negative for environmental allergies. Neurological: Negative for dizziness, syncope, weakness, light-headedness, numbness and headaches. Hematological: Negative for adenopathy. Psychiatric/Behavioral: Negative for confusion and suicidal ideas. The patient is not nervous/anxious. PAST MEDICAL HISTORY    has a past medical history of Arthritis; CAD (coronary artery disease); Colostomy status ([facility] [address] Diastolic CHF ([facility] [address] GERD (gastroesophageal reflux disease); HTN (hypertension); Hyperlipidemia; Hypothyroid; Panlobular emphysema ([facility] [address] Prostate cancer ([facility] [address] S/P CABG (coronary artery bypass graft); and Type 2 diabetes mellitus with stage 3 chronic kidney disease, with long-term current use of insulin ([facility] [address] SURGICAL HISTORY      has a past surgical history that includes Coronary artery bypass graft; knee surgery; colostomy; colectomy; Abdomen surgery; Cardiac surgery; joint replacement; vascular surgery; Tonsillectomy; Colonoscopy; Appendectomy; eye surgery; and Dilatation, esophagus.     CURRENT MEDICATIONS       Discharge Medication List as of 9/9/2018 11:55 AM      CONTINUE these medications which have NOT CHANGED    Details   glimepiride (AMARYL) 4 MG tablet Take 0.5 tablets by mouth daily, Disp-30 tablet, R-3NO PRINT      insulin glargine (LANTUS) 100 UNIT/ML injection vial Inject 26 Units into the skin every morning, Disp-1 vial, R-3NO PRINT      levothyroxine (SYNTHROID) 75 MCG tablet Take 1 tablet by mouth every morning, Disp-30 tablet, R-0Print      hydrALAZINE (APRESOLINE) 25 MG tablet Take 1 tablet by mouth every 8 hours, Disp-90 tablet, R-0NO PRINT      carvedilol (COREG) 3.125 MG tablet Take 1 tablet by mouth 2 times daily (with meals), Disp-60 tablet, R-3NO PRINT      sodium chloride 1 g tablet Take 1 tablet by mouth 2 times daily, Disp-90 tablet, R-3Normal described. Multifocal mild spinal stenosis. **This report has been created using voice recognition software. It may contain minor errors which are inherent in voice recognition technology. **      Final report electronically signed by Dr. Junaid Block on 9/9/2018 11:31 AM      CT PELVIS WO CONTRAST Additional Contrast? None   Final Result   1. No acute finding in the pelvis. No fracture is seen. 2. Multifocal degenerative changes, described above. Mild spinal stenosis lower lumbar spine. **This report has been created using voice recognition software. It may contain minor errors which are inherent in voice recognition technology. **      Final report electronically signed by Dr. Junaid Block on 9/9/2018 11:09 AM          LABS:     Labs Reviewed   CBC WITH AUTO DIFFERENTIAL - Abnormal; Notable for the following:        Result Value    WBC 11.1 (*)     RBC 4.25 (*)     Hemoglobin 12.1 (*)     Hematocrit 36.2 (*)     RDW-CV 18.1 (*)     RDW-SD 56.2 (*)     Eosinophils # 0.5 (*)     Immature Grans (Abs) 0.08 (*)     All other components within normal limits   BASIC METABOLIC PANEL - Abnormal; Notable for the following:     Sodium 132 (*)     Potassium 5.3 (*)     Chloride 97 (*)     Glucose 206 (*)     BUN 25 (*)     CREATININE 1.3 (*)     All other components within normal limits   HEPATIC FUNCTION PANEL - Abnormal; Notable for the following:      Total Bilirubin 1.3 (*)     All other components within normal limits   OSMOLALITY - Abnormal; Notable for the following:     Osmolality Calc 274.9 (*)     All other components within normal limits   GLOMERULAR FILTRATION RATE, ESTIMATED - Abnormal; Notable for the following:     Est, Glom Filt Rate 52 (*)     All other components within normal limits   ANION GAP   TSH WITHOUT REFLEX       EMERGENCY DEPARTMENT COURSE:   Vitals:    Vitals:    09/09/18 0924 09/09/18 1017 09/09/18 1117   BP: (!) 169/60 (!) 146/75 (!) 163/69   Pulse: 66     Resp: 16 that the patient be given Tylenol within the ED and make an appointment with [facility]. PROCEDURES:  None    FINAL IMPRESSION      1. Acute exacerbation of chronic low back pain          DISPOSITION/PLAN   Discharged    PATIENT REFERRED TO:  Veva Schilder, MD  [address] [place] MARITZAVIN[place] LISA.[address]    Schedule an appointment as soon as possible for a visit       [address] [phone]  In 1 day  as scheduled      DISCHARGE MEDICATIONS:  Discharge Medication List as of 9/9/2018 11:55 AM      START taking these medications    Details   lidocaine (LIDODERM) 5 % Place 1 patch onto the skin daily 12 hours on, 12 hours off., Disp-30 patch, R-0Print             (Please note that portions of this note were completed with a voice recognition program.  Efforts were made to edit the dictations but occasionally words are mis-transcribed.)    The patient was given an opportunity to see the Emergency Attending. The patient voiced understanding that I was a Mid-Level Provider and was in agreement with being seen independently by myself. Scribe:  Andrew Spencer 9/9/18 9:54 AM Scribing for and in the presence of MITCH Delgado. Signed by: Sathya Martinez, 09/09/18 12:38 PM    Provider:  I personally performed the services described in the documentation, reviewed and edited the documentation which was dictated to the scribe in my presence, and it accurately records my words and actions.     MITCH Delgado. 9/9/18 12:38 PM                MY Rosado CNP  09/09/18 2364 normal...

## 2025-03-30 NOTE — DISCHARGE SUMMARY
(ACTOS) 30 MG tablet  Take 1 tablet by mouth daily Additional RF per his PCP             QUEtiapine (SEROQUEL) 25 MG tablet  Take 1 tablet by mouth 2 times daily Additional RF per his PCP             sodium chloride 1 g tablet  Take 1 tablet by mouth 3 times daily (with meals)                 35 minutes spent preparing the patient for discharge    Temitope Alonso MD
.